# Patient Record
Sex: FEMALE | Race: WHITE | NOT HISPANIC OR LATINO | Employment: OTHER | URBAN - METROPOLITAN AREA
[De-identification: names, ages, dates, MRNs, and addresses within clinical notes are randomized per-mention and may not be internally consistent; named-entity substitution may affect disease eponyms.]

---

## 2023-03-15 DIAGNOSIS — E03.9 HYPOTHYROIDISM, UNSPECIFIED TYPE: ICD-10-CM

## 2023-03-15 RX ORDER — LEVOTHYROXINE SODIUM 100 UG/1
100 TABLET ORAL DAILY
COMMUNITY
Start: 2014-02-28 | End: 2023-03-15 | Stop reason: SDUPTHER

## 2023-03-15 RX ORDER — LEVOTHYROXINE SODIUM 100 UG/1
100 TABLET ORAL DAILY
Qty: 90 TABLET | Refills: 1 | Status: SHIPPED | OUTPATIENT
Start: 2023-03-15 | End: 2023-09-06 | Stop reason: SDUPTHER

## 2023-04-26 DIAGNOSIS — E13.69 OTHER SPECIFIED DIABETES MELLITUS WITH OTHER SPECIFIED COMPLICATION, UNSPECIFIED WHETHER LONG TERM INSULIN USE (MULTI): ICD-10-CM

## 2023-04-26 RX ORDER — LANCETS 33 GAUGE
EACH MISCELLANEOUS
Qty: 1 EACH | Refills: 2 | Status: SHIPPED | OUTPATIENT
Start: 2023-04-26

## 2023-04-26 RX ORDER — LANCETS 33 GAUGE
EACH MISCELLANEOUS
COMMUNITY
End: 2023-04-26 | Stop reason: SDUPTHER

## 2023-06-07 DIAGNOSIS — Z00.00 ROUTINE GENERAL MEDICAL EXAMINATION AT A HEALTH CARE FACILITY: ICD-10-CM

## 2023-06-07 RX ORDER — DILTIAZEM HYDROCHLORIDE 60 MG/1
1 CAPSULE, EXTENDED RELEASE ORAL DAILY
COMMUNITY
Start: 2020-11-13 | End: 2023-06-07 | Stop reason: SDUPTHER

## 2023-06-07 RX ORDER — DILTIAZEM HYDROCHLORIDE 60 MG/1
60 CAPSULE, EXTENDED RELEASE ORAL DAILY
Qty: 90 CAPSULE | Refills: 3 | Status: SHIPPED | OUTPATIENT
Start: 2023-06-07

## 2023-06-19 ENCOUNTER — TELEPHONE (OUTPATIENT)
Dept: PRIMARY CARE | Facility: CLINIC | Age: 78
End: 2023-06-19
Payer: MEDICARE

## 2023-06-19 DIAGNOSIS — R05.9 COUGH, UNSPECIFIED TYPE: ICD-10-CM

## 2023-06-19 DIAGNOSIS — E78.5 HYPERLIPIDEMIA, UNSPECIFIED HYPERLIPIDEMIA TYPE: ICD-10-CM

## 2023-06-19 RX ORDER — BUDESONIDE AND FORMOTEROL FUMARATE DIHYDRATE 160; 4.5 UG/1; UG/1
2 AEROSOL RESPIRATORY (INHALATION) 2 TIMES DAILY
COMMUNITY
Start: 2017-03-22 | End: 2023-06-19 | Stop reason: SDUPTHER

## 2023-06-19 RX ORDER — ATORVASTATIN CALCIUM 10 MG/1
10 TABLET, FILM COATED ORAL DAILY
COMMUNITY
Start: 2016-11-03 | End: 2023-06-19 | Stop reason: SDUPTHER

## 2023-06-20 ENCOUNTER — TELEPHONE (OUTPATIENT)
Dept: PRIMARY CARE | Facility: CLINIC | Age: 78
End: 2023-06-20
Payer: MEDICARE

## 2023-06-20 DIAGNOSIS — Z00.00 ROUTINE GENERAL MEDICAL EXAMINATION AT A HEALTH CARE FACILITY: ICD-10-CM

## 2023-06-20 RX ORDER — ATORVASTATIN CALCIUM 10 MG/1
10 TABLET, FILM COATED ORAL DAILY
Qty: 90 TABLET | Refills: 1 | Status: SHIPPED | OUTPATIENT
Start: 2023-06-20 | End: 2023-12-08 | Stop reason: SDUPTHER

## 2023-06-20 RX ORDER — BUDESONIDE AND FORMOTEROL FUMARATE DIHYDRATE 160; 4.5 UG/1; UG/1
2 AEROSOL RESPIRATORY (INHALATION) 2 TIMES DAILY
Qty: 10.2 G | Refills: 3 | Status: SHIPPED | OUTPATIENT
Start: 2023-06-20

## 2023-06-20 NOTE — TELEPHONE ENCOUNTER
PT uses Symbicort that was called in, but she wanted the albuterol sulfate inhalation solution 0.083 % 2.5 mg.

## 2023-06-21 ENCOUNTER — TELEPHONE (OUTPATIENT)
Dept: PRIMARY CARE | Facility: CLINIC | Age: 78
End: 2023-06-21

## 2023-06-21 RX ORDER — ALBUTEROL SULFATE 0.83 MG/ML
2.5 SOLUTION RESPIRATORY (INHALATION) 4 TIMES DAILY PRN
Qty: 3 ML | Refills: 1 | Status: SHIPPED | OUTPATIENT
Start: 2023-06-21 | End: 2024-06-20

## 2023-06-21 NOTE — TELEPHONE ENCOUNTER
Bon called and stated the albuterol 2.5/ has 3ml and concerned that isn't correct.      Please look at and correct if need be.

## 2023-07-31 DIAGNOSIS — N39.0 URINARY TRACT INFECTION WITHOUT HEMATURIA, SITE UNSPECIFIED: ICD-10-CM

## 2023-08-01 LAB
HYALINE CASTS, URINE: ABNORMAL /LPF
RBC, URINE: 2 /HPF (ref 0–5)
SQUAMOUS EPITHELIAL CELLS, URINE: <1 /HPF
WBC, URINE: <1 /HPF (ref 0–5)

## 2023-08-03 ENCOUNTER — TELEPHONE (OUTPATIENT)
Dept: PRIMARY CARE | Facility: CLINIC | Age: 78
End: 2023-08-03
Payer: MEDICARE

## 2023-08-04 ENCOUNTER — TELEPHONE (OUTPATIENT)
Dept: PRIMARY CARE | Facility: CLINIC | Age: 78
End: 2023-08-04

## 2023-08-04 NOTE — TELEPHONE ENCOUNTER
PT STATES SHE CHIPPED TOOTH AND WILL NEED SOME DENTAL WORK DONE. NEEDS ANTIBIOTIC CALLED IN AND WOULD ALSO LIKE TO KNOW IN CASE IT IS AN ABSCESS WOULD THIS TAKE CARE OF IT ALSO

## 2023-08-05 LAB — URINE CULTURE: NORMAL

## 2023-08-07 DIAGNOSIS — Z00.00 ROUTINE GENERAL MEDICAL EXAMINATION AT A HEALTH CARE FACILITY: ICD-10-CM

## 2023-08-07 RX ORDER — CLINDAMYCIN HYDROCHLORIDE 300 MG/1
300 CAPSULE ORAL 3 TIMES DAILY
Qty: 30 CAPSULE | Refills: 0 | Status: SHIPPED | OUTPATIENT
Start: 2023-08-07 | End: 2023-08-24 | Stop reason: SDUPTHER

## 2023-08-24 DIAGNOSIS — Z00.00 ROUTINE GENERAL MEDICAL EXAMINATION AT A HEALTH CARE FACILITY: ICD-10-CM

## 2023-08-24 RX ORDER — CLINDAMYCIN HYDROCHLORIDE 300 MG/1
300 CAPSULE ORAL 3 TIMES DAILY
Qty: 30 CAPSULE | Refills: 0 | Status: SHIPPED | OUTPATIENT
Start: 2023-08-24 | End: 2023-09-03

## 2023-08-24 NOTE — TELEPHONE ENCOUNTER
Pt got done with her clindamycin. Would like to know if she can get a refill or something similar since she will have a tooth extraction soon.

## 2023-09-06 DIAGNOSIS — E03.9 HYPOTHYROIDISM, UNSPECIFIED TYPE: ICD-10-CM

## 2023-09-06 RX ORDER — LEVOTHYROXINE SODIUM 100 UG/1
100 TABLET ORAL DAILY
Qty: 90 TABLET | Refills: 0 | Status: SHIPPED | OUTPATIENT
Start: 2023-09-06 | End: 2023-12-08 | Stop reason: SDUPTHER

## 2023-11-22 ENCOUNTER — TELEPHONE (OUTPATIENT)
Dept: PRIMARY CARE | Facility: CLINIC | Age: 78
End: 2023-11-22
Payer: MEDICARE

## 2023-11-22 NOTE — TELEPHONE ENCOUNTER
PATIENT CALLED AND IS ASKING FOR A CALL BACK FROM [facility], THIS IS IN REGARDS TO [facility]

## 2023-11-27 ENCOUNTER — TELEMEDICINE (OUTPATIENT)
Dept: PRIMARY CARE | Facility: CLINIC | Age: 78
End: 2023-11-27
Payer: MEDICARE

## 2023-11-27 VITALS — HEIGHT: 63 IN | BODY MASS INDEX: 47.84 KG/M2 | WEIGHT: 270 LBS

## 2023-11-27 DIAGNOSIS — E11.8 CONTROLLED TYPE 2 DIABETES MELLITUS WITH COMPLICATION, WITH LONG-TERM CURRENT USE OF INSULIN (MULTI): ICD-10-CM

## 2023-11-27 DIAGNOSIS — Z79.4 CONTROLLED TYPE 2 DIABETES MELLITUS WITH COMPLICATION, WITH LONG-TERM CURRENT USE OF INSULIN (MULTI): ICD-10-CM

## 2023-11-27 DIAGNOSIS — Z99.89 DEPENDENCE ON OTHER ENABLING MACHINES AND DEVICES: ICD-10-CM

## 2023-11-27 DIAGNOSIS — J44.1 CHRONIC OBSTRUCTIVE PULMONARY DISEASE WITH (ACUTE) EXACERBATION (MULTI): Primary | ICD-10-CM

## 2023-11-27 DIAGNOSIS — Z99.81 DEPENDENCE ON SUPPLEMENTAL OXYGEN: ICD-10-CM

## 2023-11-27 DIAGNOSIS — R26.2 DIFFICULTY IN WALKING, NOT ELSEWHERE CLASSIFIED: ICD-10-CM

## 2023-11-27 DIAGNOSIS — M15.9 OSTEOARTHRITIS OF MULTIPLE JOINTS, UNSPECIFIED OSTEOARTHRITIS TYPE: ICD-10-CM

## 2023-11-27 DIAGNOSIS — J96.22 ACUTE AND CHRONIC RESPIRATORY FAILURE WITH HYPERCAPNIA (MULTI): ICD-10-CM

## 2023-11-27 PROBLEM — I48.91 UNSPECIFIED ATRIAL FIBRILLATION (MULTI): Status: ACTIVE | Noted: 2022-09-19

## 2023-11-27 PROBLEM — K21.9 GASTRO-ESOPHAGEAL REFLUX DISEASE WITHOUT ESOPHAGITIS: Status: ACTIVE | Noted: 2018-01-17

## 2023-11-27 PROBLEM — K80.20 CALCULUS OF GALLBLADDER WITHOUT CHOLECYSTITIS WITHOUT OBSTRUCTION: Status: ACTIVE | Noted: 2022-11-01

## 2023-11-27 PROBLEM — E03.9 HYPOTHYROIDISM: Status: ACTIVE | Noted: 2018-01-17

## 2023-11-27 PROBLEM — I15.2 HYPERTENSION ASSOCIATED WITH DIABETES (MULTI): Status: ACTIVE | Noted: 2023-11-27

## 2023-11-27 PROBLEM — F41.8 ANXIETY ASSOCIATED WITH DEPRESSION: Status: ACTIVE | Noted: 2023-11-27

## 2023-11-27 PROBLEM — E78.5 HYPERLIPIDEMIA ASSOCIATED WITH TYPE 2 DIABETES MELLITUS (MULTI): Status: ACTIVE | Noted: 2023-11-27

## 2023-11-27 PROBLEM — I25.10 ATHEROSCLEROTIC HEART DISEASE OF NATIVE CORONARY ARTERY WITHOUT ANGINA PECTORIS: Status: ACTIVE | Noted: 2022-09-19

## 2023-11-27 PROBLEM — E11.69 HYPERLIPIDEMIA ASSOCIATED WITH TYPE 2 DIABETES MELLITUS (MULTI): Status: ACTIVE | Noted: 2023-11-27

## 2023-11-27 PROBLEM — I27.20 PULMONARY HYPERTENSION, UNSPECIFIED (MULTI): Status: ACTIVE | Noted: 2022-11-01

## 2023-11-27 PROBLEM — E11.59 HYPERTENSION ASSOCIATED WITH DIABETES (MULTI): Status: ACTIVE | Noted: 2023-11-27

## 2023-11-27 PROBLEM — E66.01 MORBID OBESITY (MULTI): Status: ACTIVE | Noted: 2023-11-27

## 2023-11-27 PROBLEM — I25.2 OLD MYOCARDIAL INFARCTION: Status: ACTIVE | Noted: 2022-09-19

## 2023-11-27 PROBLEM — E11.9 DIABETES TYPE 2, CONTROLLED (MULTI): Status: ACTIVE | Noted: 2022-11-01

## 2023-11-27 PROCEDURE — 99213 OFFICE O/P EST LOW 20 MIN: CPT | Performed by: EMERGENCY MEDICINE

## 2023-11-27 RX ORDER — FUROSEMIDE 20 MG/1
20 TABLET ORAL EVERY OTHER DAY
COMMUNITY
End: 2024-02-07 | Stop reason: WASHOUT

## 2023-11-27 NOTE — PROGRESS NOTES
Subjective   Patient ID: Ailyn Banegas is a 78 y.o. female who presents for Lab Orders.    Assessment/Plan   Problem List Items Addressed This Visit       Acute and chronic respiratory failure with hypercapnia (CMS/HCC)    Chronic obstructive pulmonary disease with (acute) exacerbation (CMS/HCC) - Primary    Dependence on other enabling machines and devices    Dependence on supplemental oxygen    Diabetes type 2, controlled (CMS/HCC)    Difficulty in walking, not elsewhere classified    Polyosteoarthritis, unspecified     Recommend home health care in view of patient's mobility and self care deficits.     Peripheral edema- taking furosemide 20mg      COPD- continue Symbicort, albuterol, and guaifenesin  Patient is on 3L oxygen at baseline      Hyperlipemia- continue atorvastatin     Hypothyroid- continue Synthroid      Diabetes- controlled on insulin      Paroxysmal atrial fibrillation with RVR and Nonsustained ventricular tachycardia- on diltiazem     Follow up as able in person     Source of history: Nurse, Medical personnel, Medical record, Patient.  History limitation: None.    HPI  78 year old female for virtual follow up     This visit was completed virtually due to the restrictions of the COVID-19 pandemic. All issues as below were discussed and addressed but no physical exam was performed. If it was felt that the patient should be evaluated in clinic or ER, then they were directed there. The patient verbally consented to visit      Patient signed up for home health program thorough her insurance, however has been very disappointed with it. She has not been provided with the necessary services and would like to pursue other options.   States that program was supposed to come to the home to provide a flu shot, but has been teller her they do not yet have them for months.     Patient has a very difficult time leaving the home. She is on 3L oxygen and has mobility difficulties.     History from  "hospitalization-  Hospital Course:   Pt with Hx including HTN, HLD, DM, CAD/MI, A. Fib, COPD/Reactive Airway Disease, Morbid Obesity, and DJD presented to [facility] ED with SOB. + Hypoxia. + Pulmonary Edema. Pt admitted and monitored and Diuresed and started on IV Steroids and O2. Pulmonary and Cardiology consulted and recommendations appreciated. + COPD exacerbation (Severe COPD with MARLENY and Obesity Hypoventilation). Pt did develop episode of NS V Tach while hypoxic. BiPap initiated Q HS. Pt slowly improved. O2 demand improved to 4 L NC (Baseline is 3 L at home). Solu Medrol changed to Prednisone and Pt D/C'd to SNF to F/U with Cardiology and Pulmonary.     No Known Allergies    Current Outpatient Medications   Medication Sig Dispense Refill    albuterol 2.5 mg /3 mL (0.083 %) nebulizer solution Take 3 mL (2.5 mg) by nebulization 4 times a day as needed for wheezing or shortness of breath. 3 mL 1    atorvastatin (Lipitor) 10 mg tablet Take 1 tablet (10 mg) by mouth once daily. 90 tablet 1    budesonide-formoteroL (Symbicort) 160-4.5 mcg/actuation inhaler Inhale 2 puffs 2 times a day. 10.2 g 3    dilTIAZem SR (Cardizem SR) 60 mg 12 hr capsule Take 1 capsule (60 mg) by mouth once daily. 90 capsule 3    furosemide (Lasix) 20 mg tablet Take 1 tablet (20 mg) by mouth every other day.      lancets (OneTouch Delica Lancets) 33 gauge misc Check sugar once a day 1 each 2    levothyroxine (Synthroid) 100 mcg tablet Take 1 tablet (100 mcg) by mouth once daily. 90 tablet 0     No current facility-administered medications for this visit.       Objective   Visit Vitals  Ht 1.6 m (5' 3\")   Wt 122 kg (270 lb)   BMI 47.83 kg/m²   Smoking Status Never   BSA 2.33 m²     Physical Exam  Patient was not physically examined.     Review of Systems   Comprehensive review of systems as allowed by patient condition and nursing input is negative    Orders Only on 07/31/2023   Component Date Value Ref Range Status    WBC, Urine 07/31/2023 <1  0 " - 5 /HPF Final    RBC, Urine 07/31/2023 2  0 - 5 /HPF Final    Squamous Epithelial Cells, Urine 07/31/2023 <1  /HPF Final    Hyaline Casts, Urine 07/31/2023 OCC (A)  /LPF Final    Urine Culture 07/31/2023 MIXED URETHRAL BREANA.   Final       Radiology: Reviewed imaging in powerchart.  No results found.    No family history on file.  Social History     Socioeconomic History    Marital status:      Spouse name: None    Number of children: None    Years of education: None    Highest education level: None   Occupational History    None   Tobacco Use    Smoking status: Never    Smokeless tobacco: Never   Substance and Sexual Activity    Alcohol use: None    Drug use: Not Currently    Sexual activity: None   Other Topics Concern    None   Social History Narrative    None     Social Determinants of Health     Financial Resource Strain: Not on file   Food Insecurity: Not on file   Transportation Needs: Not on file   Physical Activity: Not on file   Stress: Not on file   Social Connections: Not on file   Intimate Partner Violence: Not on file   Housing Stability: Not on file     Past Medical History:   Diagnosis Date    Abnormal weight gain     Weight gain    Essential (primary) hypertension     HTN (hypertension), benign    Localized edema     Edema extremities    Old myocardial infarction     History of myocardial infarction    Old myocardial infarction 02/27/2017    History of non-ST elevation myocardial infarction (NSTEMI)    Other specified postprocedural states     History of repair of both hip joints    Other symptoms and signs involving the genitourinary system     Urinary problem    Pain due to internal orthopedic prosthetic devices, implants and grafts, initial encounter (CMS/[facility])     Artificial joint pain    Personal history of other diseases of the circulatory system     History of hypertension    Personal history of other diseases of the musculoskeletal system and connective tissue     Personal history of  gout    Personal history of other diseases of the musculoskeletal system and connective tissue     Personal history of arthritis    Personal history of other diseases of the musculoskeletal system and connective tissue     Personal history of fibromyalgia    Personal history of other diseases of the musculoskeletal system and connective tissue     History of muscle pain    Personal history of other diseases of the respiratory system 02/12/2018    History of chronic respiratory failure    Personal history of other endocrine, nutritional and metabolic disease     History of thyroid disease    Personal history of other endocrine, nutritional and metabolic disease     History of obesity    Personal history of other endocrine, nutritional and metabolic disease     History of high cholesterol    Personal history of other endocrine, nutritional and metabolic disease 02/28/2014    History of hypothyroidism    Personal history of other medical treatment     History of mammogram    Personal history of other specified conditions     H/O shortness of breath    Personal history of pneumonia (recurrent)     History of pneumonia    Presence of artificial hip joint, bilateral     Status post total replacement of both hips    Pure hypercholesterolemia, unspecified     High cholesterol    Unspecified disorder of nose and nasal sinuses     Sinus problem     Past Surgical History:   Procedure Laterality Date    CARDIAC CATHETERIZATION  01/25/2018    Cardiac Cath Procedure Outcome:    CARPAL TUNNEL RELEASE  02/27/2014    Neuroplasty Median Nerve At Carpal Tunnel    HIP SURGERY  03/03/2018    Hip Surgery    MOUTH SURGERY  04/14/2015    Oral Surgery Tooth Extraction    THYROID SURGERY  04/14/2015    Thyroid Surgery    TONSILLECTOMY  04/14/2015    Tonsillectomy    TOTAL HIP ARTHROPLASTY  01/25/2018    Hip Replacement       Scribe Attestation  By signing my name below, I, Luc Betts   attest that this documentation has been  prepared under the direction and in the presence of Minh Crawford MD.

## 2023-12-08 DIAGNOSIS — E03.9 HYPOTHYROIDISM, UNSPECIFIED TYPE: ICD-10-CM

## 2023-12-08 DIAGNOSIS — E78.5 HYPERLIPIDEMIA, UNSPECIFIED HYPERLIPIDEMIA TYPE: ICD-10-CM

## 2023-12-11 RX ORDER — ATORVASTATIN CALCIUM 10 MG/1
10 TABLET, FILM COATED ORAL DAILY
Qty: 90 TABLET | Refills: 1 | Status: SHIPPED | OUTPATIENT
Start: 2023-12-11

## 2023-12-11 RX ORDER — LEVOTHYROXINE SODIUM 100 UG/1
100 TABLET ORAL DAILY
Qty: 90 TABLET | Refills: 1 | Status: SHIPPED | OUTPATIENT
Start: 2023-12-11 | End: 2024-06-06 | Stop reason: SDUPTHER

## 2023-12-12 ENCOUNTER — TELEPHONE (OUTPATIENT)
Dept: PRIMARY CARE | Facility: CLINIC | Age: 78
End: 2023-12-12
Payer: MEDICARE

## 2023-12-12 NOTE — TELEPHONE ENCOUNTER
PT IS REQUESTING TO HAVE A FLU VACCINE ORDERED THRU PHARMACY BECAUSE SHE IS HOME BOUND. HH WILL ADMINISTER IF ORDERED

## 2023-12-14 PROCEDURE — G0180 MD CERTIFICATION HHA PATIENT: HCPCS | Performed by: EMERGENCY MEDICINE

## 2024-01-17 ENCOUNTER — TELEPHONE (OUTPATIENT)
Dept: PRIMARY CARE | Facility: CLINIC | Age: 79
End: 2024-01-17

## 2024-01-17 NOTE — TELEPHONE ENCOUNTER
PT was in ER around 1/14/24 and she got all her results of test except a urinalysis.  She is wondering if she could get results.

## 2024-01-19 ENCOUNTER — TELEPHONE (OUTPATIENT)
Dept: PRIMARY CARE | Facility: CLINIC | Age: 79
End: 2024-01-19
Payer: MEDICARE

## 2024-01-30 NOTE — TELEPHONE ENCOUNTER
Rosaline DE LA VEGA [facility]   is:  [phone]    Wheezing in upper lobes and yellowish phlegm.    PT started albuterol nebulizer yesterday.

## 2024-02-02 ENCOUNTER — TELEPHONE (OUTPATIENT)
Dept: PRIMARY CARE | Facility: CLINIC | Age: 79
End: 2024-02-02
Payer: MEDICARE

## 2024-02-02 DIAGNOSIS — A49.9 BACTERIAL INFECTION: ICD-10-CM

## 2024-02-02 DIAGNOSIS — R05.9 COUGH, UNSPECIFIED TYPE: ICD-10-CM

## 2024-02-02 RX ORDER — AZITHROMYCIN 250 MG/1
250 TABLET, FILM COATED ORAL DAILY
Qty: 6 TABLET | Refills: 0 | Status: SHIPPED | OUTPATIENT
Start: 2024-02-02 | End: 2024-02-07

## 2024-02-06 ENCOUNTER — TELEPHONE (OUTPATIENT)
Dept: PRIMARY CARE | Facility: CLINIC | Age: 79
End: 2024-02-06
Payer: MEDICARE

## 2024-02-07 ENCOUNTER — TELEMEDICINE (OUTPATIENT)
Dept: PRIMARY CARE | Facility: CLINIC | Age: 79
End: 2024-02-07
Payer: MEDICARE

## 2024-02-07 VITALS — WEIGHT: 180 LBS | HEIGHT: 63 IN | BODY MASS INDEX: 31.89 KG/M2

## 2024-02-07 DIAGNOSIS — A49.9 BACTERIAL INFECTION: Primary | ICD-10-CM

## 2024-02-07 DIAGNOSIS — R60.9 EDEMA, UNSPECIFIED TYPE: ICD-10-CM

## 2024-02-07 PROCEDURE — 99213 OFFICE O/P EST LOW 20 MIN: CPT | Performed by: EMERGENCY MEDICINE

## 2024-02-07 PROCEDURE — 1036F TOBACCO NON-USER: CPT | Performed by: EMERGENCY MEDICINE

## 2024-02-07 PROCEDURE — 1159F MED LIST DOCD IN RCRD: CPT | Performed by: EMERGENCY MEDICINE

## 2024-02-07 RX ORDER — DOXYCYCLINE HYCLATE 100 MG
100 TABLET ORAL 2 TIMES DAILY
Qty: 20 TABLET | Refills: 0 | Status: SHIPPED | OUTPATIENT
Start: 2024-02-07 | End: 2024-02-17

## 2024-02-07 RX ORDER — TORSEMIDE 20 MG/1
40 TABLET ORAL DAILY
Qty: 20 TABLET | Refills: 1 | Status: SHIPPED | OUTPATIENT
Start: 2024-02-07 | End: 2024-05-17 | Stop reason: SDUPTHER

## 2024-02-07 RX ORDER — SPIRONOLACTONE 50 MG/1
100 TABLET, FILM COATED ORAL DAILY
Qty: 20 TABLET | Refills: 1 | Status: SHIPPED | OUTPATIENT
Start: 2024-02-07 | End: 2024-02-16 | Stop reason: SDUPTHER

## 2024-02-07 NOTE — TELEPHONE ENCOUNTER
spironolactone (Aldactone) 50 mg tablet   torsemide (Demadex) 20 mg tablet     [facility] called and need the scripts clarified.      SIG:  Both states 20 days, but not enough pills.  Is it 10 days?

## 2024-02-07 NOTE — PROGRESS NOTES
Subjective   Patient ID: Ailyn Banegas is a 78 y.o. female who presents for Wheezing and Leg Swelling.    Assessment/Plan   Problem List Items Addressed This Visit    None  Visit Diagnoses       Bacterial infection    -  Primary    Relevant Medications    doxycycline (Vibra-Tabs) 100 mg tablet    Edema, unspecified type        Relevant Medications    spironolactone (Aldactone) 50 mg tablet    torsemide (Demadex) 20 mg tablet          Recommend home health care in view of patient's mobility and self care deficits.      Peripheral edema- discontinue furosemide 20mg. Will start on torsemide 20mg and aldacotne 50mg   Doxycycline called in to cover both suspected cellulitis and respiratory infection.      COPD- continue Symbicort, albuterol, and guaifenesin  Patient is on 3L oxygen at baseline      Hyperlipemia- continue atorvastatin     Hypothyroid- continue Synthroid      Diabetes- controlled on insulin      Paroxysmal atrial fibrillation with RVR and Nonsustained ventricular tachycardia- on diltiazem      Follow up as able in person     Source of history: Nurse, Medical personnel, Medical record, Patient.  History limitation: None.    HPI  78 y.o. female here for virtual follow up visit     This visit was completed virtually due to the restrictions of the COVID-19 pandemic. All issues as below were discussed and addressed but no physical exam was performed. If it was felt that the patient should be evaluated in clinic or ER, then they were directed there. The patient verbally consented to visit      Presents with continued cough and wheezing. Started z-bridgett 5 days ago and have seen no improvement.     Home health nurse concerned about patient's lower extremity edema. Left lower leg red and warm to touch. Suspicious of cellulitis.     Patient has a very difficult time leaving the home. She is on 3L oxygen and has mobility difficulties.     Pt with Hx including HTN, HLD, DM, CAD/MI, A. Fib, COPD/Reactive Airway  "Disease, Morbid Obesity, and DJD.      No Known Allergies    Current Outpatient Medications   Medication Sig Dispense Refill    albuterol 2.5 mg /3 mL (0.083 %) nebulizer solution Take 3 mL (2.5 mg) by nebulization 4 times a day as needed for wheezing or shortness of breath. 3 mL 1    atorvastatin (Lipitor) 10 mg tablet Take 1 tablet (10 mg) by mouth once daily. 90 tablet 1    budesonide-formoteroL (Symbicort) 160-4.5 mcg/actuation inhaler Inhale 2 puffs 2 times a day. 10.2 g 3    dilTIAZem SR (Cardizem SR) 60 mg 12 hr capsule Take 1 capsule (60 mg) by mouth once daily. 90 capsule 3    furosemide (Lasix) 20 mg tablet Take 1 tablet (20 mg) by mouth every other day.      lancets (OneTouch Delica Lancets) 33 gauge misc Check sugar once a day 1 each 2    levothyroxine (Synthroid) 100 mcg tablet Take 1 tablet (100 mcg) by mouth once daily. 90 tablet 1    azithromycin (Zithromax) 250 mg tablet Take 1 tablet (250 mg) by mouth once daily for 5 days. Two pills on first day followed by one pill for the next four days (Patient not taking: Reported on 2/7/2024) 6 tablet 0    doxycycline (Vibra-Tabs) 100 mg tablet Take 1 tablet (100 mg) by mouth 2 times a day for 10 days. Take with a full glass of water and do not lie down for at least 30 minutes after. 20 tablet 0    spironolactone (Aldactone) 50 mg tablet Take 2 tablets (100 mg) by mouth once daily for 20 days. 20 tablet 1    torsemide (Demadex) 20 mg tablet Take 2 tablets (40 mg) by mouth once daily for 20 days. 20 tablet 1     No current facility-administered medications for this visit.       Objective   Visit Vitals  Ht 1.6 m (5' 3\")   Wt 81.6 kg (180 lb)   BMI 31.89 kg/m²   Smoking Status Never   BSA 1.9 m²     Physical Exam  Patient was not physically examined.     Review of Systems  Comprehensive review of systems as allowed by patient condition and nursing input is negative    No visits with results within 4 Month(s) from this visit.   Latest known visit with results " is:   Orders Only on 07/31/2023   Component Date Value Ref Range Status    WBC, Urine 07/31/2023 <1  0 - 5 /HPF Final    RBC, Urine 07/31/2023 2  0 - 5 /HPF Final    Squamous Epithelial Cells, Urine 07/31/2023 <1  /HPF Final    Hyaline Casts, Urine 07/31/2023 OCC (A)  /LPF Final    Urine Culture 07/31/2023 MIXED URETHRAL BREANA.   Final       Radiology: Reviewed imaging in powerchart.  No results found.    No family history on file.  Social History     Socioeconomic History    Marital status:      Spouse name: None    Number of children: None    Years of education: None    Highest education level: None   Occupational History    None   Tobacco Use    Smoking status: Never    Smokeless tobacco: Never   Substance and Sexual Activity    Alcohol use: None    Drug use: Not Currently    Sexual activity: None   Other Topics Concern    None   Social History Narrative    None     Social Determinants of Health     Financial Resource Strain: Not on file   Food Insecurity: Not on file   Transportation Needs: Not on file   Physical Activity: Not on file   Stress: Not on file   Social Connections: Not on file   Intimate Partner Violence: Not on file   Housing Stability: Not on file     Past Medical History:   Diagnosis Date    Abnormal weight gain     Weight gain    Essential (primary) hypertension     HTN (hypertension), benign    Localized edema     Edema extremities    Old myocardial infarction     History of myocardial infarction    Old myocardial infarction 02/27/2017    History of non-ST elevation myocardial infarction (NSTEMI)    Other specified postprocedural states     History of repair of both hip joints    Other symptoms and signs involving the genitourinary system     Urinary problem    Pain due to internal orthopedic prosthetic devices, implants and grafts, initial encounter (CMS/[facility])     Artificial joint pain    Personal history of other diseases of the circulatory system     History of hypertension    Personal  history of other diseases of the musculoskeletal system and connective tissue     Personal history of gout    Personal history of other diseases of the musculoskeletal system and connective tissue     Personal history of arthritis    Personal history of other diseases of the musculoskeletal system and connective tissue     Personal history of fibromyalgia    Personal history of other diseases of the musculoskeletal system and connective tissue     History of muscle pain    Personal history of other diseases of the respiratory system 02/12/2018    History of chronic respiratory failure    Personal history of other endocrine, nutritional and metabolic disease     History of thyroid disease    Personal history of other endocrine, nutritional and metabolic disease     History of obesity    Personal history of other endocrine, nutritional and metabolic disease     History of high cholesterol    Personal history of other endocrine, nutritional and metabolic disease 02/28/2014    History of hypothyroidism    Personal history of other medical treatment     History of mammogram    Personal history of other specified conditions     H/O shortness of breath    Personal history of pneumonia (recurrent)     History of pneumonia    Presence of artificial hip joint, bilateral     Status post total replacement of both hips    Pure hypercholesterolemia, unspecified     High cholesterol    Unspecified disorder of nose and nasal sinuses     Sinus problem     Past Surgical History:   Procedure Laterality Date    CARDIAC CATHETERIZATION  01/25/2018    Cardiac Cath Procedure Outcome:    CARPAL TUNNEL RELEASE  02/27/2014    Neuroplasty Median Nerve At Carpal Tunnel    HIP SURGERY  03/03/2018    Hip Surgery    MOUTH SURGERY  04/14/2015    Oral Surgery Tooth Extraction    THYROID SURGERY  04/14/2015    Thyroid Surgery    TONSILLECTOMY  04/14/2015    Tonsillectomy    TOTAL HIP ARTHROPLASTY  01/25/2018    Hip Replacement       Lissette  Attestation  By signing my name below, I, Chapis Winston , [employer]   attest that this documentation has been prepared under the direction and in the presence of Minh Crawford MD.

## 2024-02-13 ENCOUNTER — TELEPHONE (OUTPATIENT)
Dept: PRIMARY CARE | Facility: CLINIC | Age: 79
End: 2024-02-13
Payer: MEDICARE

## 2024-02-13 DIAGNOSIS — R60.9 EDEMA, UNSPECIFIED TYPE: ICD-10-CM

## 2024-02-13 NOTE — TELEPHONE ENCOUNTER
Rosaline RAMIREZ is:  [phone]   Home Health      PT has both legs that are still swollen.  Red bumps and feels like a burning sensation under skin.     Statement Selected

## 2024-02-16 ENCOUNTER — TELEMEDICINE (OUTPATIENT)
Dept: PRIMARY CARE | Facility: CLINIC | Age: 79
End: 2024-02-16
Payer: MEDICARE

## 2024-02-16 VITALS — WEIGHT: 180 LBS | BODY MASS INDEX: 31.89 KG/M2 | HEIGHT: 63 IN

## 2024-02-16 DIAGNOSIS — M79.606 PAIN AND SWELLING OF LOWER EXTREMITY, UNSPECIFIED LATERALITY: Primary | ICD-10-CM

## 2024-02-16 DIAGNOSIS — Z79.4 CONTROLLED TYPE 2 DIABETES MELLITUS WITH COMPLICATION, WITH LONG-TERM CURRENT USE OF INSULIN (MULTI): ICD-10-CM

## 2024-02-16 DIAGNOSIS — I15.2 HYPERTENSION ASSOCIATED WITH DIABETES (MULTI): ICD-10-CM

## 2024-02-16 DIAGNOSIS — I48.91 ATRIAL FIBRILLATION, UNSPECIFIED TYPE (MULTI): ICD-10-CM

## 2024-02-16 DIAGNOSIS — M79.89 PAIN AND SWELLING OF LOWER EXTREMITY, UNSPECIFIED LATERALITY: Primary | ICD-10-CM

## 2024-02-16 DIAGNOSIS — E78.5 HYPERLIPIDEMIA ASSOCIATED WITH TYPE 2 DIABETES MELLITUS (MULTI): ICD-10-CM

## 2024-02-16 DIAGNOSIS — E11.59 HYPERTENSION ASSOCIATED WITH DIABETES (MULTI): ICD-10-CM

## 2024-02-16 DIAGNOSIS — E11.69 HYPERLIPIDEMIA ASSOCIATED WITH TYPE 2 DIABETES MELLITUS (MULTI): ICD-10-CM

## 2024-02-16 DIAGNOSIS — E11.8 CONTROLLED TYPE 2 DIABETES MELLITUS WITH COMPLICATION, WITH LONG-TERM CURRENT USE OF INSULIN (MULTI): ICD-10-CM

## 2024-02-16 PROCEDURE — 1036F TOBACCO NON-USER: CPT | Performed by: EMERGENCY MEDICINE

## 2024-02-16 PROCEDURE — 99214 OFFICE O/P EST MOD 30 MIN: CPT | Performed by: EMERGENCY MEDICINE

## 2024-02-16 PROCEDURE — 1159F MED LIST DOCD IN RCRD: CPT | Performed by: EMERGENCY MEDICINE

## 2024-02-16 RX ORDER — FUROSEMIDE 40 MG/1
40 TABLET ORAL DAILY
Qty: 10 TABLET | Refills: 0 | Status: SHIPPED | OUTPATIENT
Start: 2024-02-16 | End: 2024-02-29 | Stop reason: SDUPTHER

## 2024-02-16 RX ORDER — SPIRONOLACTONE 50 MG/1
100 TABLET, FILM COATED ORAL DAILY
Qty: 20 TABLET | Refills: 0 | Status: SHIPPED | OUTPATIENT
Start: 2024-02-16 | End: 2024-02-29 | Stop reason: SDUPTHER

## 2024-02-16 NOTE — PROGRESS NOTES
Subjective   Patient ID: Ailyn Banegas is a 78 y.o. female who presents for Follow-up and Leg Swelling.    Assessment/Plan   Problem List Items Addressed This Visit    None  Visit Diagnoses       Pain and swelling of lower extremity, unspecified laterality    -  Primary    Relevant Orders    Vascular US lower extremity venous duplex bilateral          Recommend home health care in view of patient's mobility and self care deficits.      Peripheral edema-this is persistent  We will obtain lower extremity venous duplex   Patient does not want to take torsemide.  Will order Lasix 40 mg and Aldactone 100 mg for short course  She just completed antibiotics    If patient does not improve with these measures, may need to be admitted    COPD- continue Symbicort, albuterol, and guaifenesin  Patient is on 3L oxygen at baseline      Hyperlipemia- continue atorvastatin     Hypothyroid- continue Synthroid      Diabetes- controlled on insulin      Paroxysmal atrial fibrillation with RVR and Nonsustained ventricular tachycardia- on diltiazem      Follow up as able in person     Source of history: Nurse, Medical personnel, Medical record, Patient.  History limitation: None.    HPI  78 y.o. female here for virtual follow up visit     This visit was completed virtually due to the restrictions of the COVID-19 pandemic. All issues as below were discussed and addressed but no physical exam was performed. If it was felt that the patient should be evaluated in clinic or ER, then they were directed there. The patient verbally consented to visit      States that her legs are still swollen    Patient has a very difficult time leaving the home. She is on 3L oxygen and has mobility difficulties.     Pt with Hx including HTN, HLD, DM, CAD/MI, A. Fib, COPD/Reactive Airway Disease, Morbid Obesity, and DJD.      No Known Allergies    Current Outpatient Medications   Medication Sig Dispense Refill    albuterol 2.5 mg /3 mL (0.083 %) nebulizer  "solution Take 3 mL (2.5 mg) by nebulization 4 times a day as needed for wheezing or shortness of breath. 3 mL 1    atorvastatin (Lipitor) 10 mg tablet Take 1 tablet (10 mg) by mouth once daily. 90 tablet 1    budesonide-formoteroL (Symbicort) 160-4.5 mcg/actuation inhaler Inhale 2 puffs 2 times a day. 10.2 g 3    dilTIAZem SR (Cardizem SR) 60 mg 12 hr capsule Take 1 capsule (60 mg) by mouth once daily. 90 capsule 3    doxycycline (Vibra-Tabs) 100 mg tablet Take 1 tablet (100 mg) by mouth 2 times a day for 10 days. Take with a full glass of water and do not lie down for at least 30 minutes after. 20 tablet 0    lancets (OneTouch Delica Lancets) 33 gauge misc Check sugar once a day 1 each 2    levothyroxine (Synthroid) 100 mcg tablet Take 1 tablet (100 mcg) by mouth once daily. 90 tablet 1    spironolactone (Aldactone) 50 mg tablet Take 2 tablets (100 mg) by mouth once daily for 20 days. 20 tablet 1    torsemide (Demadex) 20 mg tablet Take 2 tablets (40 mg) by mouth once daily for 20 days. 20 tablet 1     No current facility-administered medications for this visit.       Objective   Visit Vitals  Ht 1.6 m (5' 3\")   Wt 81.6 kg (180 lb)   BMI 31.89 kg/m²   Smoking Status Never   BSA 1.9 m²     Physical Exam  Patient was not physically examined.     Review of Systems  Comprehensive review of systems as allowed by patient condition and nursing input is negative    No visits with results within 4 Month(s) from this visit.   Latest known visit with results is:   Orders Only on 07/31/2023   Component Date Value Ref Range Status    WBC, Urine 07/31/2023 <1  0 - 5 /HPF Final    RBC, Urine 07/31/2023 2  0 - 5 /HPF Final    Squamous Epithelial Cells, Urine 07/31/2023 <1  /HPF Final    Hyaline Casts, Urine 07/31/2023 OCC (A)  /LPF Final    Urine Culture 07/31/2023 MIXED URETHRAL BREANA.   Final       Radiology: Reviewed imaging in powerchart.  No results found.    No family history on file.  Social History     Socioeconomic " History    Marital status:      Spouse name: Not on file    Number of children: Not on file    Years of education: Not on file    Highest education level: Not on file   Occupational History    Not on file   Tobacco Use    Smoking status: Never    Smokeless tobacco: Never   Substance and Sexual Activity    Alcohol use: Not on file    Drug use: Not Currently    Sexual activity: Not on file   Other Topics Concern    Not on file   Social History Narrative    Not on file     Social Determinants of Health     Financial Resource Strain: Not on file   Food Insecurity: Not on file   Transportation Needs: Not on file   Physical Activity: Not on file   Stress: Not on file   Social Connections: Not on file   Intimate Partner Violence: Not on file   Housing Stability: Not on file     Past Medical History:   Diagnosis Date    Abnormal weight gain     Weight gain    Essential (primary) hypertension     HTN (hypertension), benign    Localized edema     Edema extremities    Old myocardial infarction     History of myocardial infarction    Old myocardial infarction 02/27/2017    History of non-ST elevation myocardial infarction (NSTEMI)    Other specified postprocedural states     History of repair of both hip joints    Other symptoms and signs involving the genitourinary system     Urinary problem    Pain due to internal orthopedic prosthetic devices, implants and grafts, initial encounter (CMS/[facility])     Artificial joint pain    Personal history of other diseases of the circulatory system     History of hypertension    Personal history of other diseases of the musculoskeletal system and connective tissue     Personal history of gout    Personal history of other diseases of the musculoskeletal system and connective tissue     Personal history of arthritis    Personal history of other diseases of the musculoskeletal system and connective tissue     Personal history of fibromyalgia    Personal history of other diseases of the  musculoskeletal system and connective tissue     History of muscle pain    Personal history of other diseases of the respiratory system 02/12/2018    History of chronic respiratory failure    Personal history of other endocrine, nutritional and metabolic disease     History of thyroid disease    Personal history of other endocrine, nutritional and metabolic disease     History of obesity    Personal history of other endocrine, nutritional and metabolic disease     History of high cholesterol    Personal history of other endocrine, nutritional and metabolic disease 02/28/2014    History of hypothyroidism    Personal history of other medical treatment     History of mammogram    Personal history of other specified conditions     H/O shortness of breath    Personal history of pneumonia (recurrent)     History of pneumonia    Presence of artificial hip joint, bilateral     Status post total replacement of both hips    Pure hypercholesterolemia, unspecified     High cholesterol    Unspecified disorder of nose and nasal sinuses     Sinus problem     Past Surgical History:   Procedure Laterality Date    CARDIAC CATHETERIZATION  01/25/2018    Cardiac Cath Procedure Outcome:    CARPAL TUNNEL RELEASE  02/27/2014    Neuroplasty Median Nerve At Carpal Tunnel    HIP SURGERY  03/03/2018    Hip Surgery    MOUTH SURGERY  04/14/2015    Oral Surgery Tooth Extraction    THYROID SURGERY  04/14/2015    Thyroid Surgery    TONSILLECTOMY  04/14/2015    Tonsillectomy    TOTAL HIP ARTHROPLASTY  01/25/2018    Hip Replacement       Scribe Attestation  By signing my name below, I, Minh Crawford MD , Scribe   attest that this documentation has been prepared under the direction and in the presence of Minh Crawford MD.

## 2024-02-29 ENCOUNTER — TELEPHONE (OUTPATIENT)
Dept: PRIMARY CARE | Facility: CLINIC | Age: 79
End: 2024-02-29
Payer: MEDICARE

## 2024-02-29 DIAGNOSIS — R60.9 EDEMA, UNSPECIFIED TYPE: ICD-10-CM

## 2024-03-01 RX ORDER — FUROSEMIDE 40 MG/1
20 TABLET ORAL DAILY
Qty: 30 TABLET | Refills: 0 | Status: SHIPPED | OUTPATIENT
Start: 2024-03-01

## 2024-03-01 RX ORDER — SPIRONOLACTONE 50 MG/1
50 TABLET, FILM COATED ORAL DAILY
Qty: 30 TABLET | Refills: 0 | Status: SHIPPED | OUTPATIENT
Start: 2024-03-01 | End: 2024-04-03 | Stop reason: SDUPTHER

## 2024-03-05 ENCOUNTER — HOSPITAL ENCOUNTER (OUTPATIENT)
Dept: VASCULAR MEDICINE | Facility: CLINIC | Age: 79
Discharge: HOME | End: 2024-03-05
Payer: MEDICARE

## 2024-03-05 DIAGNOSIS — M79.606 PAIN AND SWELLING OF LOWER EXTREMITY, UNSPECIFIED LATERALITY: ICD-10-CM

## 2024-03-05 DIAGNOSIS — R60.0 LOCALIZED EDEMA: ICD-10-CM

## 2024-03-05 DIAGNOSIS — M79.89 PAIN AND SWELLING OF LOWER EXTREMITY, UNSPECIFIED LATERALITY: ICD-10-CM

## 2024-03-05 PROCEDURE — 93970 EXTREMITY STUDY: CPT

## 2024-03-05 PROCEDURE — 93970 EXTREMITY STUDY: CPT | Performed by: INTERNAL MEDICINE

## 2024-04-03 ENCOUNTER — TELEMEDICINE (OUTPATIENT)
Dept: PRIMARY CARE | Facility: CLINIC | Age: 79
End: 2024-04-03
Payer: MEDICARE

## 2024-04-03 VITALS — HEIGHT: 63 IN | BODY MASS INDEX: 31.89 KG/M2

## 2024-04-03 DIAGNOSIS — J44.1 CHRONIC OBSTRUCTIVE PULMONARY DISEASE WITH (ACUTE) EXACERBATION (MULTI): ICD-10-CM

## 2024-04-03 DIAGNOSIS — E11.69 HYPERLIPIDEMIA ASSOCIATED WITH TYPE 2 DIABETES MELLITUS (MULTI): Primary | ICD-10-CM

## 2024-04-03 DIAGNOSIS — E11.8 CONTROLLED TYPE 2 DIABETES MELLITUS WITH COMPLICATION, WITH LONG-TERM CURRENT USE OF INSULIN (MULTI): ICD-10-CM

## 2024-04-03 DIAGNOSIS — Z79.4 CONTROLLED TYPE 2 DIABETES MELLITUS WITH COMPLICATION, WITH LONG-TERM CURRENT USE OF INSULIN (MULTI): ICD-10-CM

## 2024-04-03 DIAGNOSIS — E78.5 HYPERLIPIDEMIA ASSOCIATED WITH TYPE 2 DIABETES MELLITUS (MULTI): Primary | ICD-10-CM

## 2024-04-03 DIAGNOSIS — R60.9 EDEMA, UNSPECIFIED TYPE: ICD-10-CM

## 2024-04-03 DIAGNOSIS — E11.59 HYPERTENSION ASSOCIATED WITH DIABETES (MULTI): ICD-10-CM

## 2024-04-03 DIAGNOSIS — I15.2 HYPERTENSION ASSOCIATED WITH DIABETES (MULTI): ICD-10-CM

## 2024-04-03 DIAGNOSIS — I48.91 ATRIAL FIBRILLATION, UNSPECIFIED TYPE (MULTI): ICD-10-CM

## 2024-04-03 PROCEDURE — 1159F MED LIST DOCD IN RCRD: CPT | Performed by: EMERGENCY MEDICINE

## 2024-04-03 PROCEDURE — 1036F TOBACCO NON-USER: CPT | Performed by: EMERGENCY MEDICINE

## 2024-04-03 PROCEDURE — 99213 OFFICE O/P EST LOW 20 MIN: CPT | Performed by: EMERGENCY MEDICINE

## 2024-04-03 RX ORDER — SPIRONOLACTONE 50 MG/1
100 TABLET, FILM COATED ORAL DAILY
Qty: 60 TABLET | Refills: 2 | Status: SHIPPED | OUTPATIENT
Start: 2024-04-03

## 2024-04-03 NOTE — PROGRESS NOTES
Subjective   Patient ID: Ailyn Banegas is a 79 y.o. female who presents for Home health  and Leg Swelling.    Assessment/Plan   Problem List Items Addressed This Visit       Chronic obstructive pulmonary disease with (acute) exacerbation (CMS/HCC)    Diabetes type 2, controlled (CMS/HCC)    Hyperlipidemia associated with type 2 diabetes mellitus (CMS/HCC) - Primary    Hypertension associated with diabetes (CMS/HCC)    Unspecified atrial fibrillation (CMS/[facility])     Other Visit Diagnoses       Edema, unspecified type              Recommend home health care in view of patient's mobility and self care deficits.      Peripheral edema-this is persistent  Lower extremity venous duplex negative.   Patient does not want to take torsemide.  Currently taking Lasix 20 mg and aldactone 50 mg. Counseled to increase to Lasix 40 mg and Aldactone 100 mg.   Reviewed elevation and compression stockings.     If patient does not improve with these measures, may need to be admitted    Sinus issues- ENT referral provided.     COPD- continue Symbicort, albuterol, and guaifenesin  Patient is on 3L oxygen at baseline      Hyperlipemia- continue atorvastatin     Hypothyroid- continue Synthroid      Diabetes- controlled on insulin      Paroxysmal atrial fibrillation with RVR and Nonsustained ventricular tachycardia- on diltiazem      Follow up as able in person     Source of history: Nurse, Medical personnel, Medical record, Patient.  History limitation: None.    HPI  79 y.o. female here for virtual follow up visit     This visit was completed virtually due to the restrictions of the COVID-19 pandemic. All issues as below were discussed and addressed but no physical exam was performed. If it was felt that the patient should be evaluated in clinic or ER, then they were directed there. The patient verbally consented to visit      Continued leg swelling. Ongoing for months and no improvement.   Redness from mid calf down. Hard and painful at  "night. Now starting to also become painful throughout day.     Has difficulty elevating legs. Tries to do so in recliner throughout day but notices additional pain with elevation.     Patient has a very difficult time leaving the home. She is on 3L oxygen and has mobility difficulties.     Notes worsening sinus problems wit changing weather.     Pt with Hx including HTN, HLD, DM, CAD/MI, A. Fib, COPD/Reactive Airway Disease, Morbid Obesity, and DJD.      No Known Allergies    Current Outpatient Medications   Medication Sig Dispense Refill    albuterol 2.5 mg /3 mL (0.083 %) nebulizer solution Take 3 mL (2.5 mg) by nebulization 4 times a day as needed for wheezing or shortness of breath. 3 mL 1    atorvastatin (Lipitor) 10 mg tablet Take 1 tablet (10 mg) by mouth once daily. 90 tablet 1    budesonide-formoteroL (Symbicort) 160-4.5 mcg/actuation inhaler Inhale 2 puffs 2 times a day. 10.2 g 3    dilTIAZem SR (Cardizem SR) 60 mg 12 hr capsule Take 1 capsule (60 mg) by mouth once daily. 90 capsule 3    furosemide (Lasix) 40 mg tablet Take 0.5 tablets (20 mg) by mouth once daily. 30 tablet 0    lancets (OneTouch Delica Lancets) 33 gauge misc Check sugar once a day 1 each 2    levothyroxine (Synthroid) 100 mcg tablet Take 1 tablet (100 mcg) by mouth once daily. 90 tablet 1    spironolactone (Aldactone) 50 mg tablet Take 1 tablet (50 mg) by mouth once daily. 30 tablet 0    torsemide (Demadex) 20 mg tablet Take 2 tablets (40 mg) by mouth once daily for 20 days. 20 tablet 1     No current facility-administered medications for this visit.       Objective   Visit Vitals  Ht 1.6 m (5' 3\")   BMI 31.89 kg/m²   Smoking Status Never   BSA 1.9 m²     Physical Exam  Patient was not physically examined as this was a virtual visit.     Review of Systems  Comprehensive review of systems as allowed by patient condition and nursing input is negative    No visits with results within 4 Month(s) from this visit.   Latest known visit with " results is:   Orders Only on 07/31/2023   Component Date Value Ref Range Status    WBC, Urine 07/31/2023 <1  0 - 5 /HPF Final    RBC, Urine 07/31/2023 2  0 - 5 /HPF Final    Squamous Epithelial Cells, Urine 07/31/2023 <1  /HPF Final    Hyaline Casts, Urine 07/31/2023 OCC (A)  /LPF Final    Urine Culture 07/31/2023 MIXED URETHRAL BREANA.   Final       Radiology: Reviewed imaging in powerchart.  No results found.    No family history on file.  Social History     Socioeconomic History    Marital status:      Spouse name: None    Number of children: None    Years of education: None    Highest education level: None   Occupational History    None   Tobacco Use    Smoking status: Never    Smokeless tobacco: Never   Substance and Sexual Activity    Alcohol use: None    Drug use: Not Currently    Sexual activity: None   Other Topics Concern    None   Social History Narrative    None     Social Determinants of Health     Financial Resource Strain: Not on file   Food Insecurity: Not on file   Transportation Needs: Not on file   Physical Activity: Not on file   Stress: Not on file   Social Connections: Not on file   Intimate Partner Violence: Not on file   Housing Stability: Not on file     Past Medical History:   Diagnosis Date    Abnormal weight gain     Weight gain    Essential (primary) hypertension     HTN (hypertension), benign    Localized edema     Edema extremities    Old myocardial infarction     History of myocardial infarction    Old myocardial infarction 02/27/2017    History of non-ST elevation myocardial infarction (NSTEMI)    Other specified postprocedural states     History of repair of both hip joints    Other symptoms and signs involving the genitourinary system     Urinary problem    Pain due to internal orthopedic prosthetic devices, implants and grafts, initial encounter (CMS/[facility])     Artificial joint pain    Personal history of other diseases of the circulatory system     History of hypertension     Personal history of other diseases of the musculoskeletal system and connective tissue     Personal history of gout    Personal history of other diseases of the musculoskeletal system and connective tissue     Personal history of arthritis    Personal history of other diseases of the musculoskeletal system and connective tissue     Personal history of fibromyalgia    Personal history of other diseases of the musculoskeletal system and connective tissue     History of muscle pain    Personal history of other diseases of the respiratory system 02/12/2018    History of chronic respiratory failure    Personal history of other endocrine, nutritional and metabolic disease     History of thyroid disease    Personal history of other endocrine, nutritional and metabolic disease     History of obesity    Personal history of other endocrine, nutritional and metabolic disease     History of high cholesterol    Personal history of other endocrine, nutritional and metabolic disease 02/28/2014    History of hypothyroidism    Personal history of other medical treatment     History of mammogram    Personal history of other specified conditions     H/O shortness of breath    Personal history of pneumonia (recurrent)     History of pneumonia    Presence of artificial hip joint, bilateral     Status post total replacement of both hips    Pure hypercholesterolemia, unspecified     High cholesterol    Unspecified disorder of nose and nasal sinuses     Sinus problem     Past Surgical History:   Procedure Laterality Date    CARDIAC CATHETERIZATION  01/25/2018    Cardiac Cath Procedure Outcome:    CARPAL TUNNEL RELEASE  02/27/2014    Neuroplasty Median Nerve At Carpal Tunnel    HIP SURGERY  03/03/2018    Hip Surgery    MOUTH SURGERY  04/14/2015    Oral Surgery Tooth Extraction    THYROID SURGERY  04/14/2015    Thyroid Surgery    TONSILLECTOMY  04/14/2015    Tonsillectomy    TOTAL HIP ARTHROPLASTY  01/25/2018    Hip Replacement       Lissette  Attestation  By signing my name below, I, Chapis Winston , Scribclaire   attest that this documentation has been prepared under the direction and in the presence of Minh Crawford MD.

## 2024-04-18 ENCOUNTER — TELEPHONE (OUTPATIENT)
Dept: PRIMARY CARE | Facility: CLINIC | Age: 79
End: 2024-04-18
Payer: MEDICARE

## 2024-04-18 DIAGNOSIS — B99.9 INFECTION: ICD-10-CM

## 2024-04-18 RX ORDER — DOXYCYCLINE 100 MG/1
100 CAPSULE ORAL 2 TIMES DAILY
Qty: 20 CAPSULE | Refills: 0 | Status: SHIPPED | OUTPATIENT
Start: 2024-04-18 | End: 2024-04-28

## 2024-04-18 NOTE — TELEPHONE ENCOUNTER
PT had a virtual 4/3/24     PT is still on the new dose of   spironolactone (Aldactone) 100 mg tablet      She is faithfully taking the above med along with the Lasix 40 mg, but it hasn't made a change in her legs.    Red below the knee to the toes.  Hard and cold to the touch and very hot and painful at night.      She also wants reinstatement of nurse care.    What is next steps for her legs.

## 2024-04-24 ENCOUNTER — TELEPHONE (OUTPATIENT)
Dept: PRIMARY CARE | Facility: CLINIC | Age: 79
End: 2024-04-24
Payer: MEDICARE

## 2024-04-26 ENCOUNTER — TELEPHONE (OUTPATIENT)
Dept: PRIMARY CARE | Facility: CLINIC | Age: 79
End: 2024-04-26
Payer: MEDICARE

## 2024-05-01 ENCOUNTER — TELEPHONE (OUTPATIENT)
Dept: PRIMARY CARE | Facility: CLINIC | Age: 79
End: 2024-05-01
Payer: MEDICARE

## 2024-05-01 NOTE — TELEPHONE ENCOUNTER
Pt spoke with Lymphedema clinic and she said that she is on oxygen and cannot be going to get measured. Wants to know if there are other alternatives.   She also has a question regarding her Diuretics. She finished the Doxycycline and would like to know if she should keep taking both aldactone and torsemide

## 2024-05-03 NOTE — TELEPHONE ENCOUNTER
Pt would like to know if she should keep the same dosage of aldactone 100 mg and torsemide 40mg if she will be taking these as needed now instead of daily

## 2024-05-10 ENCOUNTER — TELEPHONE (OUTPATIENT)
Dept: PRIMARY CARE | Facility: CLINIC | Age: 79
End: 2024-05-10
Payer: MEDICARE

## 2024-05-10 NOTE — TELEPHONE ENCOUNTER
Sandra GARCIA from [facility] called saying that she went to see Pt and Pt claimed that she doesn't want physical therapy. Patient wanted a nurse to her house and take her vital and evaluate her for her swelling. Physical Therapy told me that she has been requesting home health every year for 3 years straight. Physical Therapy told her that Medicare doesn't skill that since she's been in the hospital and had plenty of home health. She advised patient to go the the office and get her legs evaluated bur Pt doesn't want to leave her house. Therapist believes that she might have peripheral vascular disease. Please advice     Sandra   [phone]

## 2024-05-17 ENCOUNTER — TELEMEDICINE (OUTPATIENT)
Dept: PRIMARY CARE | Facility: CLINIC | Age: 79
End: 2024-05-17
Payer: MEDICARE

## 2024-05-17 VITALS — WEIGHT: 250 LBS | BODY MASS INDEX: 44.3 KG/M2 | HEIGHT: 63 IN

## 2024-05-17 DIAGNOSIS — Z79.4 CONTROLLED TYPE 2 DIABETES MELLITUS WITH COMPLICATION, WITH LONG-TERM CURRENT USE OF INSULIN (MULTI): Primary | ICD-10-CM

## 2024-05-17 DIAGNOSIS — E11.59 HYPERTENSION ASSOCIATED WITH DIABETES (MULTI): ICD-10-CM

## 2024-05-17 DIAGNOSIS — I15.2 HYPERTENSION ASSOCIATED WITH DIABETES (MULTI): ICD-10-CM

## 2024-05-17 DIAGNOSIS — R60.9 EDEMA, UNSPECIFIED TYPE: ICD-10-CM

## 2024-05-17 DIAGNOSIS — E66.01 MORBID OBESITY (MULTI): ICD-10-CM

## 2024-05-17 DIAGNOSIS — Z99.81 DEPENDENCE ON SUPPLEMENTAL OXYGEN: ICD-10-CM

## 2024-05-17 DIAGNOSIS — E11.8 CONTROLLED TYPE 2 DIABETES MELLITUS WITH COMPLICATION, WITH LONG-TERM CURRENT USE OF INSULIN (MULTI): Primary | ICD-10-CM

## 2024-05-17 PROCEDURE — 1036F TOBACCO NON-USER: CPT | Performed by: EMERGENCY MEDICINE

## 2024-05-17 PROCEDURE — 99214 OFFICE O/P EST MOD 30 MIN: CPT | Performed by: EMERGENCY MEDICINE

## 2024-05-17 PROCEDURE — 1159F MED LIST DOCD IN RCRD: CPT | Performed by: EMERGENCY MEDICINE

## 2024-05-17 RX ORDER — TORSEMIDE 20 MG/1
40 TABLET ORAL DAILY
Qty: 60 TABLET | Refills: 2 | Status: SHIPPED | OUTPATIENT
Start: 2024-05-17

## 2024-05-17 NOTE — TELEPHONE ENCOUNTER
PT is looking for her med to be called in and I see it is up for Dr Crawford to sign off.    torsemide (Demadex) 20 mg tablet      She also mentioned some type of order where they draw blood from her in her home?

## 2024-05-17 NOTE — PROGRESS NOTES
Subjective   Patient ID: Ailyn Banegas is a 79 y.o. female who presents for Follow-up.    Assessment/Plan   Problem List Items Addressed This Visit    None      Recommend home health care in view of patient's mobility and self care deficits.      Peripheral edema-this is persistent  Lower extremity venous duplex negative.   Patient does not want to take torsemide.  Currently taking Lasix 20 mg and aldactone 50 mg. Counseled to increase to Lasix 40 mg and Aldactone 100 mg.   Reviewed elevation and compression stockings.     If patient does not improve with these measures, may need to be admitted    Extensive leg edema-duplex was negative.  Counseled patient to try torsemide instead of furosemide.  Will call it in  We will also refer her to wound clinic for consideration of lymphedema pumps  Counseled about reducing fluid intake    We will try to order labs to be done at her house to monitor electrolytes since she is on diuretics    COPD- continue Symbicort, albuterol, and guaifenesin  Patient is on 3L oxygen at baseline      Hyperlipemia- continue atorvastatin     Hypothyroid- continue Synthroid      Diabetes- controlled on insulin      Paroxysmal atrial fibrillation with RVR and Nonsustained ventricular tachycardia- on diltiazem      Follow up as able in person     Source of history: Nurse, Medical personnel, Medical record, Patient.  History limitation: None.    HPI  79 y.o. female here for virtual follow up visit      This visit was completed virtually due to the restrictions of the COVID-19 pandemic. All issues as below were discussed and addressed but no physical exam was performed. If it was felt that the patient should be evaluated in clinic or ER, then they were directed there. The patient verbally consented to visit    Continued leg swelling. Ongoing for months and no improvement.   Redness from mid calf down. Hard and painful at night. Now starting to also become painful throughout day.     Has difficulty  "elevating legs. Tries to do so in recliner throughout day but notices additional pain with elevation.     Patient has a very difficult time leaving the home. She is on 3L oxygen and has mobility difficulties.     Notes worsening sinus problems wit changing weather.     Pt with Hx including HTN, HLD, DM, CAD/MI, A. Fib, COPD/Reactive Airway Disease, Morbid Obesity, and DJD.      No Known Allergies    Current Outpatient Medications   Medication Sig Dispense Refill    albuterol 2.5 mg /3 mL (0.083 %) nebulizer solution Take 3 mL (2.5 mg) by nebulization 4 times a day as needed for wheezing or shortness of breath. 3 mL 1    atorvastatin (Lipitor) 10 mg tablet Take 1 tablet (10 mg) by mouth once daily. 90 tablet 1    budesonide-formoteroL (Symbicort) 160-4.5 mcg/actuation inhaler Inhale 2 puffs 2 times a day. 10.2 g 3    dilTIAZem SR (Cardizem SR) 60 mg 12 hr capsule Take 1 capsule (60 mg) by mouth once daily. 90 capsule 3    furosemide (Lasix) 40 mg tablet Take 0.5 tablets (20 mg) by mouth once daily. 30 tablet 0    lancets (OneTouch Delica Lancets) 33 gauge misc Check sugar once a day 1 each 2    levothyroxine (Synthroid) 100 mcg tablet Take 1 tablet (100 mcg) by mouth once daily. 90 tablet 1    spironolactone (Aldactone) 50 mg tablet Take 2 tablets (100 mg) by mouth once daily. 60 tablet 2    torsemide (Demadex) 20 mg tablet Take 2 tablets (40 mg) by mouth once daily for 20 days. (Patient not taking: Reported on 5/17/2024) 20 tablet 1     No current facility-administered medications for this visit.       Objective   Visit Vitals  Ht 1.6 m (5' 3\")   Wt 113 kg (250 lb)   BMI 44.29 kg/m²   Smoking Status Never   BSA 2.24 m²     Physical Exam  Patient was not physically examined as this was a virtual visit.     Review of Systems  Comprehensive review of systems as allowed by patient condition and nursing input is negative    No visits with results within 4 Month(s) from this visit.   Latest known visit with results is: "   Orders Only on 07/31/2023   Component Date Value Ref Range Status    WBC, Urine 07/31/2023 <1  0 - 5 /HPF Final    RBC, Urine 07/31/2023 2  0 - 5 /HPF Final    Squamous Epithelial Cells, Urine 07/31/2023 <1  /HPF Final    Hyaline Casts, Urine 07/31/2023 OCC (A)  /LPF Final    Urine Culture 07/31/2023 MIXED URETHRAL BREANA.   Final       Radiology: Reviewed imaging in powerchart.  No results found.    No family history on file.  Social History     Socioeconomic History    Marital status:      Spouse name: None    Number of children: None    Years of education: None    Highest education level: None   Occupational History    None   Tobacco Use    Smoking status: Never    Smokeless tobacco: Never   Substance and Sexual Activity    Alcohol use: None    Drug use: Not Currently    Sexual activity: None   Other Topics Concern    None   Social History Narrative    None     Social Determinants of Health     Financial Resource Strain: Not on file   Food Insecurity: Not on file   Transportation Needs: Not on file   Physical Activity: Not on file   Stress: Not on file   Social Connections: Not on file   Intimate Partner Violence: Not on file   Housing Stability: Not on file     Past Medical History:   Diagnosis Date    Abnormal weight gain     Weight gain    Essential (primary) hypertension     HTN (hypertension), benign    Localized edema     Edema extremities    Old myocardial infarction     History of myocardial infarction    Old myocardial infarction 02/27/2017    History of non-ST elevation myocardial infarction (NSTEMI)    Other specified postprocedural states     History of repair of both hip joints    Other symptoms and signs involving the genitourinary system     Urinary problem    Pain due to internal orthopedic prosthetic devices, implants and grafts, initial encounter (CMS-HCC)     Artificial joint pain    Personal history of other diseases of the circulatory system     History of hypertension    Personal  history of other diseases of the musculoskeletal system and connective tissue     Personal history of gout    Personal history of other diseases of the musculoskeletal system and connective tissue     Personal history of arthritis    Personal history of other diseases of the musculoskeletal system and connective tissue     Personal history of fibromyalgia    Personal history of other diseases of the musculoskeletal system and connective tissue     History of muscle pain    Personal history of other diseases of the respiratory system 02/12/2018    History of chronic respiratory failure    Personal history of other endocrine, nutritional and metabolic disease     History of thyroid disease    Personal history of other endocrine, nutritional and metabolic disease     History of obesity    Personal history of other endocrine, nutritional and metabolic disease     History of high cholesterol    Personal history of other endocrine, nutritional and metabolic disease 02/28/2014    History of hypothyroidism    Personal history of other medical treatment     History of mammogram    Personal history of other specified conditions     H/O shortness of breath    Personal history of pneumonia (recurrent)     History of pneumonia    Presence of artificial hip joint, bilateral     Status post total replacement of both hips    Pure hypercholesterolemia, unspecified     High cholesterol    Unspecified disorder of nose and nasal sinuses     Sinus problem     Past Surgical History:   Procedure Laterality Date    CARDIAC CATHETERIZATION  01/25/2018    Cardiac Cath Procedure Outcome:    CARPAL TUNNEL RELEASE  02/27/2014    Neuroplasty Median Nerve At Carpal Tunnel    HIP SURGERY  03/03/2018    Hip Surgery    MOUTH SURGERY  04/14/2015    Oral Surgery Tooth Extraction    THYROID SURGERY  04/14/2015    Thyroid Surgery    TONSILLECTOMY  04/14/2015    Tonsillectomy    TOTAL HIP ARTHROPLASTY  01/25/2018    Hip Replacement       Lissette  Attestation  By signing my name below, I, Minh Crawford MD , Scribe   attest that this documentation has been prepared under the direction and in the presence of Minh Crawford MD.

## 2024-05-20 ENCOUNTER — TELEPHONE (OUTPATIENT)
Dept: PRIMARY CARE | Facility: CLINIC | Age: 79
End: 2024-05-20
Payer: MEDICARE

## 2024-05-20 DIAGNOSIS — R73.02 IGT (IMPAIRED GLUCOSE TOLERANCE): ICD-10-CM

## 2024-05-20 DIAGNOSIS — E03.9 HYPOTHYROIDISM, UNSPECIFIED TYPE: ICD-10-CM

## 2024-05-20 DIAGNOSIS — D64.9 ANEMIA, UNSPECIFIED TYPE: ICD-10-CM

## 2024-05-20 DIAGNOSIS — Z00.00 ROUTINE GENERAL MEDICAL EXAMINATION AT A HEALTH CARE FACILITY: ICD-10-CM

## 2024-05-20 NOTE — TELEPHONE ENCOUNTER
CALLING RE POTENTIAL TRANSPORT TO WOUND CENTER. I SUGGESTED HOME HEALTH.     JOSE JUAN GUZMAN: [phone]

## 2024-05-25 PROCEDURE — 99238 HOSP IP/OBS DSCHRG MGMT 30/<: CPT | Performed by: EMERGENCY MEDICINE

## 2024-05-28 NOTE — TELEPHONE ENCOUNTER
PATIENT WAS DISCHARGED FROM THE HOSPITAL ON SATURDAY. SHE SAID SHE MOSTLY SAW DR CM WHILE SHE WAS THERE. SHE WAS DISCHARGED HOME ON TOROSEMIDE AND ALSO PREDNISONE  SHE IS EXPERIENCING BLOOD IN HER URINE AND IS ASKING IF THESE MEDS CAUSE THIS.  PLEASE CALL PATIENT BACK AND ADVISE  THANK YOU

## 2024-05-29 ENCOUNTER — PATIENT OUTREACH (OUTPATIENT)
Dept: CARE COORDINATION | Facility: CLINIC | Age: 79
End: 2024-05-29
Payer: MEDICARE

## 2024-05-29 NOTE — TELEPHONE ENCOUNTER
Tried calling back but no answer. Dr. Medrano said those meds should not couse that.   Please ask who she is seeing as primary care. She was supposed to establish with Dr. Medrano but not scheduled and Dr. Crawford listed as pcp. If staying with Dr. Crawford we will need to inform him and get recommendations.

## 2024-05-29 NOTE — TELEPHONE ENCOUNTER
Please call patient back regarding the blood in her urine. She left a message yesterday.  She is also asking about [facility]

## 2024-05-29 NOTE — PROGRESS NOTES
Outreach call to patient to support a smooth transition of care from recent admission.  Phone ring, no answer.

## 2024-05-30 ENCOUNTER — TELEMEDICINE (OUTPATIENT)
Dept: PRIMARY CARE | Facility: CLINIC | Age: 79
End: 2024-05-30
Payer: MEDICARE

## 2024-05-30 DIAGNOSIS — N39.0 URINARY TRACT INFECTION WITH HEMATURIA, SITE UNSPECIFIED: ICD-10-CM

## 2024-05-30 DIAGNOSIS — J98.9 CHRONIC RESPIRATORY DISEASE: ICD-10-CM

## 2024-05-30 DIAGNOSIS — G47.33 OSA (OBSTRUCTIVE SLEEP APNEA): ICD-10-CM

## 2024-05-30 DIAGNOSIS — Z76.89 ENCOUNTER FOR SUPPORT AND COORDINATION OF TRANSITION OF CARE: Primary | ICD-10-CM

## 2024-05-30 DIAGNOSIS — R31.9 URINARY TRACT INFECTION WITH HEMATURIA, SITE UNSPECIFIED: ICD-10-CM

## 2024-05-30 DIAGNOSIS — J44.9 CHRONIC OBSTRUCTIVE PULMONARY DISEASE, UNSPECIFIED COPD TYPE (MULTI): ICD-10-CM

## 2024-05-30 PROCEDURE — 99214 OFFICE O/P EST MOD 30 MIN: CPT | Performed by: STUDENT IN AN ORGANIZED HEALTH CARE EDUCATION/TRAINING PROGRAM

## 2024-05-30 PROCEDURE — 1159F MED LIST DOCD IN RCRD: CPT | Performed by: STUDENT IN AN ORGANIZED HEALTH CARE EDUCATION/TRAINING PROGRAM

## 2024-05-30 PROCEDURE — 1036F TOBACCO NON-USER: CPT | Performed by: STUDENT IN AN ORGANIZED HEALTH CARE EDUCATION/TRAINING PROGRAM

## 2024-05-30 RX ORDER — PREDNISONE 10 MG/1
TABLET ORAL
COMMUNITY

## 2024-05-30 RX ORDER — NITROFURANTOIN 25; 75 MG/1; MG/1
100 CAPSULE ORAL 2 TIMES DAILY
Qty: 10 CAPSULE | Refills: 0 | Status: SHIPPED | OUTPATIENT
Start: 2024-05-30 | End: 2024-06-04

## 2024-05-30 NOTE — PROGRESS NOTES
Subjective   Patient ID: Ailyn Banegas is a 79 y.o. female who presents for the following    Assessment/Plan   TCM   -Recently admitted to the hospital for acute on chronic respiratory failure  - Breathing is improved and she is stable.  - Tolerating home medications appropriately  - Currently on prednisone taper, torsemide, Symbicort, as needed albuterol.  - Also has a history of MARLENY and is compliant with home NIV machine.    UTI  Hematuria     PLAN  -Patient is homebound and unable to come in for evaluation.  Advise she needs a thorough physical exam to look for hemorrhoids, vaginal lesions, evidence of pyelonephritis.  Patient states that if her symptoms progress or worsen or she develops symptoms such as fevers, chills, flank pain, altered mental status she will come to the ER for evaluation.  - Will treat empirically with Macrobid 100 mg p.o. twice daily for 5 days  - Advised patient to follow-up in person at office for next visit.  - If patient is able to come into the office will obtain UA with culture.  - If hematuria does not resolve within the next week patient is is advised to come back and we will refer to urology.    HPI  79F presents via virtual visit for evaluation. She is homebound and can only complete virtual visit. She was recently hospitalized for respiratory failure at [facility] from 5/20/24-5/25/24. After discharge home she was doing better in regards to respiratory status, but noticed hematuria that started a couple days ago. Describes urine as malodorous and pink. Had purewick in hospital. Denies any abd pain, fevers, chills, dysuria. States that she has a hx of constipation but does not recall hx of rectal hemorrhoids. Denies any visible vaginal masses or vaginal discharge. Has not noticed gross vaginal bleeding.     Discussed differentials such as hemorrhoids, UTI, vaginal lesions/mass, vaginal trauma, bladder etiology/cystitis.     Denies fevers, chills, weight loss, lightheadedness,  dizziness, vision changes, sore throat, runny nose, CP, SOB, cough, palpitations, n/v/d, abd pain, black/bloody stools, arthralgias, or new numbness/weakness/tingling in arms/legs/face.      Visit Vitals  Smoking Status Never     PHYSICAL EXAM   Physical Exam     Visit Vitals  Smoking Status Never        Deferred      REVIEW OF SYSTEMS   ROS in HPI     No Known Allergies    Current Outpatient Medications   Medication Sig Dispense Refill    albuterol 2.5 mg /3 mL (0.083 %) nebulizer solution Take 3 mL (2.5 mg) by nebulization 4 times a day as needed for wheezing or shortness of breath. 3 mL 1    atorvastatin (Lipitor) 10 mg tablet Take 1 tablet (10 mg) by mouth once daily. 90 tablet 1    budesonide-formoteroL (Symbicort) 160-4.5 mcg/actuation inhaler Inhale 2 puffs 2 times a day. 10.2 g 3    dilTIAZem SR (Cardizem SR) 60 mg 12 hr capsule Take 1 capsule (60 mg) by mouth once daily. 90 capsule 3    furosemide (Lasix) 40 mg tablet Take 0.5 tablets (20 mg) by mouth once daily. 30 tablet 0    lancets (OneTouch Delica Lancets) 33 gauge misc Check sugar once a day 1 each 2    levothyroxine (Synthroid) 100 mcg tablet Take 1 tablet (100 mcg) by mouth once daily. 90 tablet 1    predniSONE (Deltasone) 10 mg tablet Take by mouth.      torsemide (Demadex) 20 mg tablet Take 2 tablets (40 mg) by mouth once daily. 60 tablet 2    spironolactone (Aldactone) 50 mg tablet Take 2 tablets (100 mg) by mouth once daily. (Patient not taking: Reported on 5/30/2024) 60 tablet 2     No current facility-administered medications for this visit.       Objective     No visits with results within 4 Month(s) from this visit.   Latest known visit with results is:   Orders Only on 07/31/2023   Component Date Value Ref Range Status    WBC, Urine 07/31/2023 <1  0 - 5 /HPF Final    RBC, Urine 07/31/2023 2  0 - 5 /HPF Final    Squamous Epithelial Cells, Urine 07/31/2023 <1  /HPF Final    Hyaline Casts, Urine 07/31/2023 OCC (A)  /LPF Final    Urine Culture  07/31/2023 MIXED URETHRAL BREANA.   Final       Radiology: Reviewed imaging in powerchart.  No results found.    No family history on file.  Social History     Socioeconomic History    Marital status:      Spouse name: None    Number of children: None    Years of education: None    Highest education level: None   Occupational History    None   Tobacco Use    Smoking status: Never    Smokeless tobacco: Never   Vaping Use    Vaping status: Never Used   Substance and Sexual Activity    Alcohol use: None    Drug use: Not Currently    Sexual activity: None   Other Topics Concern    None   Social History Narrative    None     Social Determinants of Health     Financial Resource Strain: Not on file   Food Insecurity: Not on file   Transportation Needs: Not on file   Physical Activity: Not on file   Stress: Not on file   Social Connections: Not on file   Intimate Partner Violence: Not on file   Housing Stability: Not on file     Past Medical History:   Diagnosis Date    Abnormal weight gain     Weight gain    Essential (primary) hypertension     HTN (hypertension), benign    Localized edema     Edema extremities    Old myocardial infarction     History of myocardial infarction    Old myocardial infarction 02/27/2017    History of non-ST elevation myocardial infarction (NSTEMI)    Other specified postprocedural states     History of repair of both hip joints    Other symptoms and signs involving the genitourinary system     Urinary problem    Pain due to internal orthopedic prosthetic devices, implants and grafts, initial encounter (CMS-HCC)     Artificial joint pain    Personal history of other diseases of the circulatory system     History of hypertension    Personal history of other diseases of the musculoskeletal system and connective tissue     Personal history of gout    Personal history of other diseases of the musculoskeletal system and connective tissue     Personal history of arthritis    Personal history of  other diseases of the musculoskeletal system and connective tissue     Personal history of fibromyalgia    Personal history of other diseases of the musculoskeletal system and connective tissue     History of muscle pain    Personal history of other diseases of the respiratory system 02/12/2018    History of chronic respiratory failure    Personal history of other endocrine, nutritional and metabolic disease     History of thyroid disease    Personal history of other endocrine, nutritional and metabolic disease     History of obesity    Personal history of other endocrine, nutritional and metabolic disease     History of high cholesterol    Personal history of other endocrine, nutritional and metabolic disease 02/28/2014    History of hypothyroidism    Personal history of other medical treatment     History of mammogram    Personal history of other specified conditions     H/O shortness of breath    Personal history of pneumonia (recurrent)     History of pneumonia    Presence of artificial hip joint, bilateral     Status post total replacement of both hips    Pure hypercholesterolemia, unspecified     High cholesterol    Unspecified disorder of nose and nasal sinuses     Sinus problem     Past Surgical History:   Procedure Laterality Date    CARDIAC CATHETERIZATION  01/25/2018    Cardiac Cath Procedure Outcome:    CARPAL TUNNEL RELEASE  02/27/2014    Neuroplasty Median Nerve At Carpal Tunnel    HIP SURGERY  03/03/2018    Hip Surgery    MOUTH SURGERY  04/14/2015    Oral Surgery Tooth Extraction    THYROID SURGERY  04/14/2015    Thyroid Surgery    TONSILLECTOMY  04/14/2015    Tonsillectomy    TOTAL HIP ARTHROPLASTY  01/25/2018    Hip Replacement       Charting was completed using voice recognition technology and may include unintended errors.

## 2024-06-06 DIAGNOSIS — E03.9 HYPOTHYROIDISM, UNSPECIFIED TYPE: ICD-10-CM

## 2024-06-06 RX ORDER — LEVOTHYROXINE SODIUM 100 UG/1
100 TABLET ORAL DAILY
Qty: 90 TABLET | Refills: 1 | Status: SHIPPED | OUTPATIENT
Start: 2024-06-06

## 2024-06-17 ENCOUNTER — TELEPHONE (OUTPATIENT)
Dept: PRIMARY CARE | Facility: CLINIC | Age: 79
End: 2024-06-17
Payer: MEDICARE

## 2024-06-17 DIAGNOSIS — Z00.00 ROUTINE GENERAL MEDICAL EXAMINATION AT A HEALTH CARE FACILITY: ICD-10-CM

## 2024-06-20 RX ORDER — DILTIAZEM HYDROCHLORIDE 60 MG/1
60 CAPSULE, EXTENDED RELEASE ORAL DAILY
Qty: 90 CAPSULE | Refills: 1 | Status: SHIPPED | OUTPATIENT
Start: 2024-06-20

## 2024-06-26 DIAGNOSIS — E78.5 HYPERLIPIDEMIA, UNSPECIFIED HYPERLIPIDEMIA TYPE: ICD-10-CM

## 2024-06-26 RX ORDER — ATORVASTATIN CALCIUM 10 MG/1
10 TABLET, FILM COATED ORAL DAILY
Qty: 90 TABLET | Refills: 1 | Status: SHIPPED | OUTPATIENT
Start: 2024-06-26

## 2024-07-02 ENCOUNTER — TELEPHONE (OUTPATIENT)
Dept: PRIMARY CARE | Facility: CLINIC | Age: 79
End: 2024-07-02
Payer: MEDICARE

## 2024-07-03 ENCOUNTER — TELEMEDICINE (OUTPATIENT)
Dept: PRIMARY CARE | Facility: CLINIC | Age: 79
End: 2024-07-03
Payer: MEDICARE

## 2024-07-03 VITALS — BODY MASS INDEX: 48.73 KG/M2 | HEIGHT: 63 IN | WEIGHT: 275 LBS

## 2024-07-03 DIAGNOSIS — A49.9 BACTERIAL INFECTION: ICD-10-CM

## 2024-07-03 DIAGNOSIS — F41.8 ANXIETY ASSOCIATED WITH DEPRESSION: ICD-10-CM

## 2024-07-03 DIAGNOSIS — R60.9 PERIPHERAL EDEMA: Primary | ICD-10-CM

## 2024-07-03 DIAGNOSIS — E78.5 HYPERLIPIDEMIA ASSOCIATED WITH TYPE 2 DIABETES MELLITUS (MULTI): ICD-10-CM

## 2024-07-03 DIAGNOSIS — E03.9 HYPOTHYROIDISM, UNSPECIFIED TYPE: ICD-10-CM

## 2024-07-03 DIAGNOSIS — E11.69 HYPERLIPIDEMIA ASSOCIATED WITH TYPE 2 DIABETES MELLITUS (MULTI): ICD-10-CM

## 2024-07-03 DIAGNOSIS — J44.1 CHRONIC OBSTRUCTIVE PULMONARY DISEASE WITH (ACUTE) EXACERBATION (MULTI): ICD-10-CM

## 2024-07-03 PROCEDURE — 1158F ADVNC CARE PLAN TLK DOCD: CPT | Performed by: EMERGENCY MEDICINE

## 2024-07-03 PROCEDURE — 1159F MED LIST DOCD IN RCRD: CPT | Performed by: EMERGENCY MEDICINE

## 2024-07-03 PROCEDURE — 99213 OFFICE O/P EST LOW 20 MIN: CPT | Performed by: EMERGENCY MEDICINE

## 2024-07-03 PROCEDURE — 1123F ACP DISCUSS/DSCN MKR DOCD: CPT | Performed by: EMERGENCY MEDICINE

## 2024-07-03 PROCEDURE — 1036F TOBACCO NON-USER: CPT | Performed by: EMERGENCY MEDICINE

## 2024-07-03 RX ORDER — DOXYCYCLINE HYCLATE 50 MG/1
50 TABLET, FILM COATED ORAL 2 TIMES DAILY
Qty: 20 TABLET | Refills: 0 | Status: SHIPPED | OUTPATIENT
Start: 2024-07-03 | End: 2024-07-13

## 2024-07-03 NOTE — PROGRESS NOTES
Subjective   Patient ID: Ailyn Banegas is a 79 y.o. female who presents for a follow-up    Assessment/Plan   79 year old patient presents for follow-up    This visit was completed virtually due to the restrictions of the COVID-19 pandemic. All issues as below were discussed and addressed but no physical exam was performed. If it was felt that the patient should be evaluated in clinic or ER, then they were directed there. The patient verbally consented to visit.     Peripheral Edema - Patient legs are hard and red, painful at the touch. Water blisters on the legs, one burst and is infected oozing green discharge. She is currently using water pills.  Doxycycline prescribed for infection.  Patient was told that if she has not improved, she will her to come in person    Chronic respiratory failure - Currently on prednisone taper, torsemide, Symbicort, as needed albuterol.    History of MARLENY - Compliant with home NIV machine.    Hyperlipidemia - Patient is on Lipitor    Hypothyroidism - Patient is on Synthroid        HPI    79F presents via virtual visit for follow-up.     Patient has been requesting home health persistently, however her insurance would not cover it. Counseled her on the situation and order home health with a different company, if insurance does not cover we will follow-up.    She is homebound and can only complete virtual visit. She was recently hospitalized for respiratory failure at [facility] from 5/20/24-5/25/24. After discharge home she was doing better in regards to respiratory status, but noticed hematuria that started a couple days ago. Describes urine as malodorous and pink. Had purewick in hospital. Denies any abd pain, fevers, chills, dysuria. States that she has a hx of constipation but does not recall hx of rectal hemorrhoids. Denies any visible vaginal masses or vaginal discharge. Has not noticed gross vaginal bleeding.     Discussed differentials such as hemorrhoids, UTI, vaginal lesions/mass,  vaginal trauma, bladder etiology/cystitis.     Denies fevers, chills, weight loss, lightheadedness, dizziness, vision changes, sore throat, runny nose, CP, SOB, cough, palpitations, n/v/d, abd pain, black/bloody stools, arthralgias, or new numbness/weakness/tingling in arms/legs/face.          PHYSICAL EXAM   Patient was not physically examined as this visit was completed virtually.        REVIEW OF SYSTEMS   Comprehensive review of symptoms was not positive for any symptoms other than HPI.      No Known Allergies    Current Outpatient Medications   Medication Sig Dispense Refill    atorvastatin (Lipitor) 10 mg tablet Take 1 tablet (10 mg) by mouth once daily. 90 tablet 1    budesonide-formoteroL (Symbicort) 160-4.5 mcg/actuation inhaler Inhale 2 puffs 2 times a day. 10.2 g 3    dilTIAZem SR (Cardizem SR) 60 mg 12 hr capsule Take 1 capsule (60 mg) by mouth once daily. 90 capsule 1    furosemide (Lasix) 40 mg tablet Take 0.5 tablets (20 mg) by mouth once daily. 30 tablet 0    lancets (OneTouch Delica Lancets) 33 gauge misc Check sugar once a day 1 each 2    levothyroxine (Synthroid) 100 mcg tablet Take 1 tablet (100 mcg) by mouth once daily. 90 tablet 1    predniSONE (Deltasone) 10 mg tablet Take by mouth.      spironolactone (Aldactone) 50 mg tablet Take 2 tablets (100 mg) by mouth once daily. (Patient taking differently: Take 1 tablet (50 mg) by mouth once daily.) 60 tablet 2    torsemide (Demadex) 20 mg tablet Take 2 tablets (40 mg) by mouth once daily. (Patient taking differently: Take 1 tablet (20 mg) by mouth once daily.) 60 tablet 2    albuterol 2.5 mg /3 mL (0.083 %) nebulizer solution Take 3 mL (2.5 mg) by nebulization 4 times a day as needed for wheezing or shortness of breath. 3 mL 1     No current facility-administered medications for this visit.       Objective     No visits with results within 4 Month(s) from this visit.   Latest known visit with results is:   Orders Only on 07/31/2023   Component Date  Value Ref Range Status    WBC, Urine 07/31/2023 <1  0 - 5 /HPF Final    RBC, Urine 07/31/2023 2  0 - 5 /HPF Final    Squamous Epithelial Cells, Urine 07/31/2023 <1  /HPF Final    Hyaline Casts, Urine 07/31/2023 OCC (A)  /LPF Final    Urine Culture 07/31/2023 MIXED URETHRAL BREANA.   Final       Radiology: Reviewed imaging in powerchart.  No results found.    No family history on file.  Social History     Socioeconomic History    Marital status:      Spouse name: None    Number of children: None    Years of education: None    Highest education level: None   Occupational History    None   Tobacco Use    Smoking status: Never    Smokeless tobacco: Never   Vaping Use    Vaping status: Never Used   Substance and Sexual Activity    Alcohol use: None    Drug use: Not Currently    Sexual activity: None   Other Topics Concern    None   Social History Narrative    None     Social Determinants of Health     Financial Resource Strain: Not on file   Food Insecurity: Not on file   Transportation Needs: Not on file   Physical Activity: Not on file   Stress: Not on file   Social Connections: Not on file   Intimate Partner Violence: Not on file   Housing Stability: Not on file     Past Medical History:   Diagnosis Date    Abnormal weight gain     Weight gain    Essential (primary) hypertension     HTN (hypertension), benign    Localized edema     Edema extremities    Old myocardial infarction     History of myocardial infarction    Old myocardial infarction 02/27/2017    History of non-ST elevation myocardial infarction (NSTEMI)    Other specified postprocedural states     History of repair of both hip joints    Other symptoms and signs involving the genitourinary system     Urinary problem    Pain due to internal orthopedic prosthetic devices, implants and grafts, initial encounter (CMS-[facility])     Artificial joint pain    Personal history of other diseases of the circulatory system     History of hypertension    Personal  history of other diseases of the musculoskeletal system and connective tissue     Personal history of gout    Personal history of other diseases of the musculoskeletal system and connective tissue     Personal history of arthritis    Personal history of other diseases of the musculoskeletal system and connective tissue     Personal history of fibromyalgia    Personal history of other diseases of the musculoskeletal system and connective tissue     History of muscle pain    Personal history of other diseases of the respiratory system 02/12/2018    History of chronic respiratory failure    Personal history of other endocrine, nutritional and metabolic disease     History of thyroid disease    Personal history of other endocrine, nutritional and metabolic disease     History of obesity    Personal history of other endocrine, nutritional and metabolic disease     History of high cholesterol    Personal history of other endocrine, nutritional and metabolic disease 02/28/2014    History of hypothyroidism    Personal history of other medical treatment     History of mammogram    Personal history of other specified conditions     H/O shortness of breath    Personal history of pneumonia (recurrent)     History of pneumonia    Presence of artificial hip joint, bilateral     Status post total replacement of both hips    Pure hypercholesterolemia, unspecified     High cholesterol    Unspecified disorder of nose and nasal sinuses     Sinus problem     Past Surgical History:   Procedure Laterality Date    CARDIAC CATHETERIZATION  01/25/2018    Cardiac Cath Procedure Outcome:    CARPAL TUNNEL RELEASE  02/27/2014    Neuroplasty Median Nerve At Carpal Tunnel    HIP SURGERY  03/03/2018    Hip Surgery    MOUTH SURGERY  04/14/2015    Oral Surgery Tooth Extraction    THYROID SURGERY  04/14/2015    Thyroid Surgery    TONSILLECTOMY  04/14/2015    Tonsillectomy    TOTAL HIP ARTHROPLASTY  01/25/2018    Hip Replacement       Charting was  completed using voice recognition technology and may include unintended errors.

## 2024-07-05 ENCOUNTER — PATIENT OUTREACH (OUTPATIENT)
Dept: CARE COORDINATION | Facility: CLINIC | Age: 79
End: 2024-07-05
Payer: MEDICARE

## 2024-07-15 ENCOUNTER — TELEPHONE (OUTPATIENT)
Dept: PRIMARY CARE | Facility: CLINIC | Age: 79
End: 2024-07-15
Payer: MEDICARE

## 2024-07-15 DIAGNOSIS — B99.9 INFECTION: ICD-10-CM

## 2024-07-15 NOTE — TELEPHONE ENCOUNTER
Pt stated that she finished doxy and legs are worse. Stated that she got doxy 100mg before and doesn't understand why she got 50mg instead of 100. Pt has several blisters in both legs that are draining a green white discharge. Legs are hot to the touch and red. Would like something topical too.

## 2024-07-15 NOTE — ADDENDUM NOTE
Addended by: DEANA BRAND on: 7/15/2024 05:36 PM     Modules accepted: Orders     Patient returned RN call to schedule EGD. Please contact back at [phone]

## 2024-07-16 RX ORDER — DOXYCYCLINE 100 MG/1
100 CAPSULE ORAL 2 TIMES DAILY
Qty: 20 CAPSULE | Refills: 0 | Status: SHIPPED | OUTPATIENT
Start: 2024-07-16 | End: 2024-07-26

## 2024-07-19 DIAGNOSIS — Z00.00 ROUTINE GENERAL MEDICAL EXAMINATION AT A HEALTH CARE FACILITY: ICD-10-CM

## 2024-07-30 ENCOUNTER — APPOINTMENT (OUTPATIENT)
Dept: PRIMARY CARE | Facility: CLINIC | Age: 79
End: 2024-07-30
Payer: MEDICARE

## 2024-08-06 ENCOUNTER — TELEPHONE (OUTPATIENT)
Dept: PRIMARY CARE | Facility: CLINIC | Age: 79
End: 2024-08-06
Payer: MEDICARE

## 2024-08-06 DIAGNOSIS — Z00.00 ROUTINE GENERAL MEDICAL EXAMINATION AT A HEALTH CARE FACILITY: ICD-10-CM

## 2024-08-06 RX ORDER — NYSTATIN 100000 [USP'U]/G
1 POWDER TOPICAL 3 TIMES DAILY
Qty: 60 G | Refills: 1 | Status: SHIPPED | OUTPATIENT
Start: 2024-08-06

## 2024-08-06 RX ORDER — NYSTATIN 100000 [USP'U]/G
POWDER TOPICAL 2 TIMES DAILY
Qty: 60 G | Refills: 1 | Status: SHIPPED | OUTPATIENT
Start: 2024-08-06 | End: 2025-08-06

## 2024-08-06 RX ORDER — NYSTATIN 100000 [USP'U]/G
1 POWDER TOPICAL 3 TIMES DAILY
COMMUNITY
End: 2024-08-06 | Stop reason: SDUPTHER

## 2024-08-06 NOTE — TELEPHONE ENCOUNTER
NYSTATIN POWDER  [facility]    PLEASE CALL PATIENT ONCE SCRIPT IS SENT

## 2024-08-09 ENCOUNTER — TELEPHONE (OUTPATIENT)
Dept: PRIMARY CARE | Facility: CLINIC | Age: 79
End: 2024-08-09
Payer: MEDICARE

## 2024-08-09 DIAGNOSIS — K21.9 GASTROESOPHAGEAL REFLUX DISEASE, UNSPECIFIED WHETHER ESOPHAGITIS PRESENT: ICD-10-CM

## 2024-08-09 PROCEDURE — G0180 MD CERTIFICATION HHA PATIENT: HCPCS | Performed by: EMERGENCY MEDICINE

## 2024-08-09 NOTE — TELEPHONE ENCOUNTER
Pantoprazole 40 mg twice a day was ordered when she was in hospital by dread mariano md. She has not had medication for a few days and finds that she does need it for her heartburn. Can this be prescribed. She does want noted she does not think she needs 40 mg twice a day.     Marcs [place] \[facility] [place] [facility]\"".

## 2024-08-13 RX ORDER — PANTOPRAZOLE SODIUM 20 MG/1
20 TABLET, DELAYED RELEASE ORAL 2 TIMES DAILY
Qty: 180 TABLET | Refills: 1 | Status: SHIPPED | OUTPATIENT
Start: 2024-08-13 | End: 2025-08-13

## 2024-09-27 DIAGNOSIS — R05.9 COUGH, UNSPECIFIED TYPE: ICD-10-CM

## 2024-09-27 RX ORDER — BUDESONIDE AND FORMOTEROL FUMARATE DIHYDRATE 160; 4.5 UG/1; UG/1
2 AEROSOL RESPIRATORY (INHALATION) 2 TIMES DAILY
Qty: 10.2 G | Refills: 3 | Status: SHIPPED | OUTPATIENT
Start: 2024-09-27

## 2024-11-18 ENCOUNTER — DOCUMENTATION (OUTPATIENT)
Dept: CARE COORDINATION | Facility: CLINIC | Age: 79
End: 2024-11-18
Payer: MEDICARE

## 2024-11-19 ENCOUNTER — PATIENT OUTREACH (OUTPATIENT)
Dept: CARE COORDINATION | Facility: CLINIC | Age: 79
End: 2024-11-19
Payer: MEDICARE

## 2024-11-19 SDOH — ECONOMIC STABILITY: GENERAL: WOULD YOU LIKE HELP WITH ANY OF THE FOLLOWING NEEDS?: I DONT NEED HELP WITH ANY OF THESE

## 2024-11-19 NOTE — PROGRESS NOTES
Outreach call to patient to support a smooth transition of care from recent admission/discharge from SNF. Patient states she is doing well, home care nurse was at home today and reviewed discharge medications and instructions with her. Patient confirms she has all medications. Patient states she is aware she needs to make an appointment with PCP and will call PCP office to schedule. No needs identified at this time.  Will continue to monitor through transition period.

## 2024-11-21 ENCOUNTER — TELEPHONE (OUTPATIENT)
Dept: PRIMARY CARE | Facility: CLINIC | Age: 79
End: 2024-11-21
Payer: MEDICARE

## 2024-11-21 NOTE — TELEPHONE ENCOUNTER
Called and let Pt. Know that per Dr. Crawford, it is okay to wait for tmr appt and  Would prefer a virtual so that he is able to see the swelling

## 2024-11-21 NOTE — TELEPHONE ENCOUNTER
PT was discharged from [facility] on 11/17/24.  She was there for two weeks after a hospital discharge with Cellulitis.      She has symptoms with the left that is swollen, red and hot to the touch.  The other leg has blisters.    She is concerned and would like a very strong antibiotic that will help her to stay out of the hospital.      She would like a call back if something can be called in prior to the virtual I scheduled for tomorrow.

## 2024-11-22 ENCOUNTER — TELEMEDICINE (OUTPATIENT)
Dept: PRIMARY CARE | Facility: CLINIC | Age: 79
End: 2024-11-22
Payer: MEDICARE

## 2024-11-22 DIAGNOSIS — I27.20 PULMONARY HYPERTENSION, UNSPECIFIED (MULTI): ICD-10-CM

## 2024-11-22 DIAGNOSIS — E78.5 HYPERLIPIDEMIA ASSOCIATED WITH TYPE 2 DIABETES MELLITUS (MULTI): ICD-10-CM

## 2024-11-22 DIAGNOSIS — R60.0 PERIPHERAL EDEMA: ICD-10-CM

## 2024-11-22 DIAGNOSIS — E11.59 HYPERTENSION ASSOCIATED WITH DIABETES (MULTI): ICD-10-CM

## 2024-11-22 DIAGNOSIS — E66.01 MORBID OBESITY (MULTI): ICD-10-CM

## 2024-11-22 DIAGNOSIS — E11.69 HYPERLIPIDEMIA ASSOCIATED WITH TYPE 2 DIABETES MELLITUS (MULTI): ICD-10-CM

## 2024-11-22 DIAGNOSIS — I48.91 ATRIAL FIBRILLATION, UNSPECIFIED TYPE (MULTI): ICD-10-CM

## 2024-11-22 DIAGNOSIS — Z00.00 ENCOUNTER FOR MEDICARE ANNUAL WELLNESS EXAM: Primary | ICD-10-CM

## 2024-11-22 DIAGNOSIS — A49.9 BACTERIAL INFECTION: ICD-10-CM

## 2024-11-22 DIAGNOSIS — I15.2 HYPERTENSION ASSOCIATED WITH DIABETES (MULTI): ICD-10-CM

## 2024-11-22 PROCEDURE — G0439 PPPS, SUBSEQ VISIT: HCPCS | Performed by: EMERGENCY MEDICINE

## 2024-11-22 PROCEDURE — 1036F TOBACCO NON-USER: CPT | Performed by: EMERGENCY MEDICINE

## 2024-11-22 PROCEDURE — 99213 OFFICE O/P EST LOW 20 MIN: CPT | Performed by: EMERGENCY MEDICINE

## 2024-11-22 PROCEDURE — 1159F MED LIST DOCD IN RCRD: CPT | Performed by: EMERGENCY MEDICINE

## 2024-11-22 PROCEDURE — 99497 ADVNCD CARE PLAN 30 MIN: CPT | Performed by: EMERGENCY MEDICINE

## 2024-11-22 PROCEDURE — 1158F ADVNC CARE PLAN TLK DOCD: CPT | Performed by: EMERGENCY MEDICINE

## 2024-11-22 PROCEDURE — 1170F FXNL STATUS ASSESSED: CPT | Performed by: EMERGENCY MEDICINE

## 2024-11-22 PROCEDURE — 1123F ACP DISCUSS/DSCN MKR DOCD: CPT | Performed by: EMERGENCY MEDICINE

## 2024-11-22 RX ORDER — DOXYCYCLINE HYCLATE 100 MG
100 TABLET ORAL 2 TIMES DAILY
Qty: 20 TABLET | Refills: 0 | Status: SHIPPED | OUTPATIENT
Start: 2024-11-22 | End: 2024-12-02

## 2024-11-22 ASSESSMENT — ACTIVITIES OF DAILY LIVING (ADL)
MANAGING_FINANCES: NEEDS ASSISTANCE
GROCERY_SHOPPING: NEEDS ASSISTANCE
DRESSING: INDEPENDENT
GROCERY_SHOPPING: INDEPENDENT
TAKING_MEDICATION: INDEPENDENT
DOING_HOUSEWORK: INDEPENDENT
TAKING_MEDICATION: NEEDS ASSISTANCE
DOING_HOUSEWORK: NEEDS ASSISTANCE
BATHING: INDEPENDENT
MANAGING_FINANCES: INDEPENDENT

## 2024-11-22 ASSESSMENT — PATIENT HEALTH QUESTIONNAIRE - PHQ9
2. FEELING DOWN, DEPRESSED OR HOPELESS: NOT AT ALL
2. FEELING DOWN, DEPRESSED OR HOPELESS: NOT AT ALL
1. LITTLE INTEREST OR PLEASURE IN DOING THINGS: NOT AT ALL
SUM OF ALL RESPONSES TO PHQ9 QUESTIONS 1 AND 2: 0
1. LITTLE INTEREST OR PLEASURE IN DOING THINGS: NOT AT ALL
SUM OF ALL RESPONSES TO PHQ9 QUESTIONS 1 AND 2: 0

## 2024-11-22 NOTE — PROGRESS NOTES
Subjective   Patient ID: Ailyn Banegas is a 79 y.o. female who presents for a virtual follow-up and wellness visit    This visit was completed virtually due to the restrictions of the COVID-19 pandemic. All issues as below were discussed and addressed but no physical exam was performed. If it was felt that the patient should be evaluated in clinic or ER, then they were directed there. The patient verbally consented to visit.    Assessment/Plan   Patient presents for virtual follow up visit and medicare wellness    Peripheral Edema - Patient legs are hard and red, painful at the touch. Water blisters on the legs. She states that torsemide was helpful, we will recommend torsemide at 40 mg and aldactone at 100 mg.  Doxycycline prescribed for infection.  Patient does not wish to go to the hospital at this time.    Chronic respiratory failure - Currently on prednisone taper, torsemide, Symbicort, as needed albuterol.    History of MARLENY - Compliant with home NIV machine.    Hyperlipidemia - Patient is on Lipitor    Hypothyroidism - Patient is on Synthroid    Follow up in 3 months or as necessary    PH Q-9 depression screening was completed by authorized employee of the practice for 5-10 minutes and  explained the questionnaire and discussed the answers with the patient.     Alcohol screening was completed for 5 to 10 minutes     I discussed advanced care planning for more than 16 minutes including the explanation and discussion of advanced directives. Information and advise was also provided on DO NOT RESUSCITATE and patient encouraged to consider this  Patient is not sure about DNR at this time.      Edema      Patient presents for virtual follow up visit and medicare wellness    Patient has a home health aid and occupational therapist at home.    She is homebound and can only complete virtual visit. She was recently hospitalized for respiratory failure at [facility] from 5/20/24-5/25/24. After discharge home she was doing  better in regards to respiratory status, but noticed hematuria that started a couple days ago. Describes urine as malodorous and pink. Had samra in hospital. Denies any abd pain, fevers, chills, dysuria. States that she has a hx of constipation but does not recall hx of rectal hemorrhoids. Denies any visible vaginal masses or vaginal discharge. Has not noticed gross vaginal bleeding.     Discussed differentials such as hemorrhoids, UTI, vaginal lesions/mass, vaginal trauma, bladder etiology/cystitis.     Denies fevers, chills, weight loss, lightheadedness, dizziness, vision changes, sore throat, runny nose, CP, SOB, cough, palpitations, n/v/d, abd pain, black/bloody stools, arthralgias, or new numbness/weakness/tingling in arms/legs/face.          PHYSICAL EXAM   Patient was not physically examined as this visit was completed virtually.        REVIEW OF SYSTEMS   Comprehensive review of symptoms was not positive for any symptoms other than HPI.      No Known Allergies    Current Outpatient Medications   Medication Sig Dispense Refill    atorvastatin (Lipitor) 10 mg tablet Take 1 tablet (10 mg) by mouth once daily. 90 tablet 1    budesonide-formoteroL (Symbicort) 160-4.5 mcg/actuation inhaler Inhale 2 puffs 2 times a day. 10.2 g 3    dilTIAZem SR (Cardizem SR) 60 mg 12 hr capsule Take 1 capsule (60 mg) by mouth once daily. 90 capsule 1    furosemide (Lasix) 40 mg tablet Take 0.5 tablets (20 mg) by mouth once daily. 30 tablet 0    lancets (OneTouch Delica Lancets) 33 gauge misc Check sugar once a day 1 each 2    levothyroxine (Synthroid) 100 mcg tablet Take 1 tablet (100 mcg) by mouth once daily. 90 tablet 1    nystatin (Mycostatin) 100,000 unit/gram powder Apply topically 2 times a day. For Rash 60 g 1    nystatin (Mycostatin) 100,000 unit/gram powder Apply 1 Application topically 3 times a day. 60 g 1    pantoprazole (ProtoNix) 20 mg EC tablet Take 1 tablet (20 mg) by mouth 2 times a day. Do not crush, chew, or  split. 180 tablet 1    predniSONE (Deltasone) 10 mg tablet Take by mouth.      spironolactone (Aldactone) 50 mg tablet Take 2 tablets (100 mg) by mouth once daily. (Patient taking differently: Take 1 tablet (50 mg) by mouth once daily.) 60 tablet 2    albuterol 2.5 mg /3 mL (0.083 %) nebulizer solution Take 3 mL (2.5 mg) by nebulization 4 times a day as needed for wheezing or shortness of breath. 3 mL 1    torsemide (Demadex) 20 mg tablet Take 2 tablets (40 mg) by mouth once daily. (Patient not taking: Reported on 11/22/2024) 60 tablet 2     No current facility-administered medications for this visit.       Objective     No visits with results within 4 Month(s) from this visit.   Latest known visit with results is:   Orders Only on 07/31/2023   Component Date Value Ref Range Status    WBC, Urine 07/31/2023 <1  0 - 5 /HPF Final    RBC, Urine 07/31/2023 2  0 - 5 /HPF Final    Squamous Epithelial Cells, Urine 07/31/2023 <1  /HPF Final    Hyaline Casts, Urine 07/31/2023 OCC (A)  /LPF Final    Urine Culture 07/31/2023 MIXED URETHRAL BREANA.   Final       Radiology: Reviewed imaging in powerchart.  No results found.    No family history on file.  Social History     Socioeconomic History    Marital status:    Tobacco Use    Smoking status: Never    Smokeless tobacco: Never   Vaping Use    Vaping status: Never Used   Substance and Sexual Activity    Drug use: Not Currently     Past Medical History:   Diagnosis Date    Abnormal weight gain     Weight gain    Essential (primary) hypertension     HTN (hypertension), benign    Localized edema     Edema extremities    Old myocardial infarction     History of myocardial infarction    Old myocardial infarction 02/27/2017    History of non-ST elevation myocardial infarction (NSTEMI)    Other specified postprocedural states     History of repair of both hip joints    Other symptoms and signs involving the genitourinary system     Urinary problem    Pain due to internal  orthopedic prosthetic devices, implants and grafts, initial encounter (CMS-[facility])     Artificial joint pain    Personal history of other diseases of the circulatory system     History of hypertension    Personal history of other diseases of the musculoskeletal system and connective tissue     Personal history of gout    Personal history of other diseases of the musculoskeletal system and connective tissue     Personal history of arthritis    Personal history of other diseases of the musculoskeletal system and connective tissue     Personal history of fibromyalgia    Personal history of other diseases of the musculoskeletal system and connective tissue     History of muscle pain    Personal history of other diseases of the respiratory system 02/12/2018    History of chronic respiratory failure    Personal history of other endocrine, nutritional and metabolic disease     History of thyroid disease    Personal history of other endocrine, nutritional and metabolic disease     History of obesity    Personal history of other endocrine, nutritional and metabolic disease     History of high cholesterol    Personal history of other endocrine, nutritional and metabolic disease 02/28/2014    History of hypothyroidism    Personal history of other medical treatment     History of mammogram    Personal history of other specified conditions     H/O shortness of breath    Personal history of pneumonia (recurrent)     History of pneumonia    Presence of artificial hip joint, bilateral     Status post total replacement of both hips    Pure hypercholesterolemia, unspecified     High cholesterol    Unspecified disorder of nose and nasal sinuses     Sinus problem     Past Surgical History:   Procedure Laterality Date    CARDIAC CATHETERIZATION  01/25/2018    Cardiac Cath Procedure Outcome:    CARPAL TUNNEL RELEASE  02/27/2014    Neuroplasty Median Nerve At Carpal Tunnel    HIP SURGERY  03/03/2018    Hip Surgery    MOUTH SURGERY   04/14/2015    Oral Surgery Tooth Extraction    THYROID SURGERY  04/14/2015    Thyroid Surgery    TONSILLECTOMY  04/14/2015    Tonsillectomy    TOTAL HIP ARTHROPLASTY  01/25/2018    Hip Replacement       Charting was completed using voice recognition technology and may include unintended errors.

## 2024-12-10 ENCOUNTER — TELEPHONE (OUTPATIENT)
Dept: PRIMARY CARE | Facility: CLINIC | Age: 79
End: 2024-12-10
Payer: MEDICARE

## 2024-12-13 DIAGNOSIS — R60.9 EDEMA, UNSPECIFIED TYPE: ICD-10-CM

## 2024-12-13 RX ORDER — TORSEMIDE 20 MG/1
40 TABLET ORAL DAILY
Qty: 60 TABLET | Refills: 2 | Status: SHIPPED | OUTPATIENT
Start: 2024-12-13

## 2024-12-13 RX ORDER — SPIRONOLACTONE 50 MG/1
100 TABLET, FILM COATED ORAL DAILY
Qty: 60 TABLET | Refills: 2 | Status: SHIPPED | OUTPATIENT
Start: 2024-12-13

## 2024-12-17 ENCOUNTER — TELEPHONE (OUTPATIENT)
Dept: PRIMARY CARE | Facility: CLINIC | Age: 79
End: 2024-12-17
Payer: MEDICARE

## 2024-12-17 DIAGNOSIS — L03.90 CELLULITIS, UNSPECIFIED CELLULITIS SITE: Primary | ICD-10-CM

## 2024-12-17 RX ORDER — DOXYCYCLINE 100 MG/1
100 CAPSULE ORAL 2 TIMES DAILY
Qty: 20 CAPSULE | Refills: 0 | Status: SHIPPED | OUTPATIENT
Start: 2024-12-17 | End: 2024-12-27

## 2024-12-17 NOTE — TELEPHONE ENCOUNTER
Pt is not able to come in to take a look at legs, always has problems coming in. Legs are swollen and starting to blister again. There are small bubble and wants to prevent them from hurting. Last time Dr. Crawford prescribed doxycycline 100 mg 2 times daily and she claims it worked. Would like to know if you are able to refill this without an appt.  Please advice

## 2024-12-24 ENCOUNTER — PATIENT OUTREACH (OUTPATIENT)
Dept: CARE COORDINATION | Facility: CLINIC | Age: 79
End: 2024-12-24
Payer: MEDICARE

## 2024-12-27 DIAGNOSIS — F41.8 ANXIETY ASSOCIATED WITH DEPRESSION: ICD-10-CM

## 2024-12-27 RX ORDER — HYDROXYZINE PAMOATE 25 MG/1
25 CAPSULE ORAL 3 TIMES DAILY PRN
Qty: 12 CAPSULE | Refills: 0 | Status: SHIPPED | OUTPATIENT
Start: 2024-12-27

## 2024-12-27 RX ORDER — HYDROXYZINE PAMOATE 25 MG/1
25 CAPSULE ORAL 3 TIMES DAILY PRN
COMMUNITY
Start: 2024-07-18 | End: 2024-12-27 | Stop reason: SDUPTHER

## 2024-12-30 DIAGNOSIS — E03.9 HYPOTHYROIDISM, UNSPECIFIED TYPE: ICD-10-CM

## 2024-12-30 RX ORDER — LEVOTHYROXINE SODIUM 100 UG/1
100 TABLET ORAL DAILY
Qty: 90 TABLET | Refills: 3 | Status: SHIPPED | OUTPATIENT
Start: 2024-12-30

## 2025-02-10 DIAGNOSIS — Z00.00 ROUTINE GENERAL MEDICAL EXAMINATION AT A HEALTH CARE FACILITY: ICD-10-CM

## 2025-02-10 DIAGNOSIS — E78.5 HYPERLIPIDEMIA, UNSPECIFIED HYPERLIPIDEMIA TYPE: ICD-10-CM

## 2025-02-10 RX ORDER — DILTIAZEM HYDROCHLORIDE 60 MG/1
60 CAPSULE, EXTENDED RELEASE ORAL DAILY
Qty: 90 CAPSULE | Refills: 1 | Status: SHIPPED | OUTPATIENT
Start: 2025-02-10

## 2025-02-10 RX ORDER — ATORVASTATIN CALCIUM 10 MG/1
10 TABLET, FILM COATED ORAL DAILY
Qty: 90 TABLET | Refills: 1 | Status: SHIPPED | OUTPATIENT
Start: 2025-02-10

## 2025-02-24 ENCOUNTER — TELEPHONE (OUTPATIENT)
Dept: PRIMARY CARE | Facility: CLINIC | Age: 80
End: 2025-02-24
Payer: MEDICARE

## 2025-02-24 DIAGNOSIS — L03.90 CELLULITIS, UNSPECIFIED CELLULITIS SITE: ICD-10-CM

## 2025-02-24 NOTE — TELEPHONE ENCOUNTER
Pt is currently taking 50mg spironolactone and 20 torsemide and doubles as needed. Said legs are starting to blister. Mentioned you put her on doxy 100mg last time this happened and it helped her. Please advice if this can be prescribed

## 2025-02-26 ENCOUNTER — TELEPHONE (OUTPATIENT)
Dept: PRIMARY CARE | Facility: CLINIC | Age: 80
End: 2025-02-26
Payer: MEDICARE

## 2025-02-26 RX ORDER — DOXYCYCLINE 100 MG/1
100 CAPSULE ORAL 2 TIMES DAILY
Qty: 20 CAPSULE | Refills: 0 | Status: SHIPPED | OUTPATIENT
Start: 2025-02-26 | End: 2025-03-08

## 2025-04-11 ENCOUNTER — TELEPHONE (OUTPATIENT)
Dept: PRIMARY CARE | Facility: CLINIC | Age: 80
End: 2025-04-11
Payer: MEDICARE

## 2025-04-11 DIAGNOSIS — Z00.00 ROUTINE GENERAL MEDICAL EXAMINATION AT A HEALTH CARE FACILITY: ICD-10-CM

## 2025-04-11 NOTE — TELEPHONE ENCOUNTER
PATIENT CALLED AND STATED SHE IS HAVING SOME DENTAL WORK DONE AND IN THE PAST DR GALLARDO HAS PRESCRIBED MEDICATION FOR HER BEFORE DENTAL PROCEDURES. I ASKED HER IF IT WAS ANTIBIOTICS OR IF SHE KNEW THE NAME OF THE MEDICATION AND SHE STATED ITS NOT AN ANTIBIOTIC. I ASKED HER TO CALL HER DENTIST TO SEE IF HE COULD TELL HER WHAT MEDICINE IS NEEDED BUT SHE SAID DR GALLARDO SHOULD KNOW THIS ALREADY    PLEASE CALL PATIENT BACK

## 2025-04-15 RX ORDER — AMOXICILLIN 500 MG/1
500 CAPSULE ORAL 3 TIMES DAILY
Qty: 21 CAPSULE | Refills: 0 | Status: SHIPPED | OUTPATIENT
Start: 2025-04-15 | End: 2025-04-22

## 2025-04-16 DIAGNOSIS — L03.90 CELLULITIS, UNSPECIFIED CELLULITIS SITE: ICD-10-CM

## 2025-04-16 RX ORDER — DOXYCYCLINE 100 MG/1
100 CAPSULE ORAL 2 TIMES DAILY
Qty: 20 CAPSULE | Refills: 0 | Status: SHIPPED | OUTPATIENT
Start: 2025-04-16 | End: 2025-04-26

## 2025-04-16 NOTE — TELEPHONE ENCOUNTER
Pt called back saying that her dentist will hold off on procedure. You said that it was okay to refill doxy for her legs. Will send this to pharmacy. She is aware not to take amoxicillin since she will have doxy

## 2025-04-30 ENCOUNTER — TELEMEDICINE (OUTPATIENT)
Dept: PRIMARY CARE | Facility: CLINIC | Age: 80
End: 2025-04-30
Payer: MEDICARE

## 2025-04-30 VITALS — BODY MASS INDEX: 50.3 KG/M2 | HEIGHT: 62 IN

## 2025-04-30 DIAGNOSIS — A49.9 BACTERIAL INFECTION: Primary | ICD-10-CM

## 2025-04-30 DIAGNOSIS — J44.1 CHRONIC OBSTRUCTIVE PULMONARY DISEASE WITH (ACUTE) EXACERBATION (MULTI): ICD-10-CM

## 2025-04-30 DIAGNOSIS — I25.10 ATHEROSCLEROSIS OF NATIVE CORONARY ARTERY OF NATIVE HEART WITHOUT ANGINA PECTORIS: ICD-10-CM

## 2025-04-30 DIAGNOSIS — I47.20 VENTRICULAR TACHYCARDIA (MULTI): ICD-10-CM

## 2025-04-30 DIAGNOSIS — I15.2 HYPERTENSION ASSOCIATED WITH DIABETES: ICD-10-CM

## 2025-04-30 DIAGNOSIS — J44.9 CHRONIC OBSTRUCTIVE PULMONARY DISEASE, UNSPECIFIED COPD TYPE (MULTI): ICD-10-CM

## 2025-04-30 DIAGNOSIS — I27.20 PULMONARY HYPERTENSION, UNSPECIFIED (MULTI): ICD-10-CM

## 2025-04-30 DIAGNOSIS — E11.8 CONTROLLED TYPE 2 DIABETES MELLITUS WITH COMPLICATION, WITH LONG-TERM CURRENT USE OF INSULIN (MULTI): ICD-10-CM

## 2025-04-30 DIAGNOSIS — J96.22 ACUTE AND CHRONIC RESPIRATORY FAILURE WITH HYPERCAPNIA: ICD-10-CM

## 2025-04-30 DIAGNOSIS — Z79.4 CONTROLLED TYPE 2 DIABETES MELLITUS WITH COMPLICATION, WITH LONG-TERM CURRENT USE OF INSULIN (MULTI): ICD-10-CM

## 2025-04-30 DIAGNOSIS — E66.2 EXTREME OBESITY WITH ALVEOLAR HYPOVENTILATION (MULTI): ICD-10-CM

## 2025-04-30 DIAGNOSIS — I48.0 PAROXYSMAL ATRIAL FIBRILLATION (MULTI): ICD-10-CM

## 2025-04-30 DIAGNOSIS — J96.21 ACUTE ON CHRONIC RESPIRATORY FAILURE WITH HYPOXEMIA: ICD-10-CM

## 2025-04-30 DIAGNOSIS — E11.69 HYPERLIPIDEMIA ASSOCIATED WITH TYPE 2 DIABETES MELLITUS: ICD-10-CM

## 2025-04-30 DIAGNOSIS — E78.5 HYPERLIPIDEMIA ASSOCIATED WITH TYPE 2 DIABETES MELLITUS: ICD-10-CM

## 2025-04-30 DIAGNOSIS — I48.91 ATRIAL FIBRILLATION, UNSPECIFIED TYPE (MULTI): ICD-10-CM

## 2025-04-30 DIAGNOSIS — E11.59 HYPERTENSION ASSOCIATED WITH DIABETES: ICD-10-CM

## 2025-04-30 DIAGNOSIS — E66.01 MORBID OBESITY (MULTI): ICD-10-CM

## 2025-04-30 PROCEDURE — 99214 OFFICE O/P EST MOD 30 MIN: CPT | Performed by: EMERGENCY MEDICINE

## 2025-04-30 PROCEDURE — 1159F MED LIST DOCD IN RCRD: CPT | Performed by: EMERGENCY MEDICINE

## 2025-04-30 PROCEDURE — 1036F TOBACCO NON-USER: CPT | Performed by: EMERGENCY MEDICINE

## 2025-04-30 RX ORDER — MULTIVIT,IRON,MINERALS/LUTEIN
1 TABLET ORAL DAILY
COMMUNITY

## 2025-04-30 RX ORDER — MULTIVIT-MIN/FA/LYCOPEN/LUTEIN .4-300-25
1 TABLET ORAL DAILY
COMMUNITY
Start: 2015-04-14

## 2025-04-30 RX ORDER — DOXYCYCLINE HYCLATE 50 MG/1
50 TABLET, FILM COATED ORAL 2 TIMES DAILY
Qty: 20 TABLET | Refills: 2 | Status: SHIPPED | OUTPATIENT
Start: 2025-04-30 | End: 2025-05-30

## 2025-04-30 RX ORDER — ACETAMINOPHEN 325 MG/1
TABLET ORAL EVERY 4 HOURS PRN
COMMUNITY
Start: 2022-09-19

## 2025-04-30 ASSESSMENT — PATIENT HEALTH QUESTIONNAIRE - PHQ9
1. LITTLE INTEREST OR PLEASURE IN DOING THINGS: NOT AT ALL
2. FEELING DOWN, DEPRESSED OR HOPELESS: NOT AT ALL
SUM OF ALL RESPONSES TO PHQ9 QUESTIONS 1 AND 2: 0

## 2025-04-30 NOTE — PROGRESS NOTES
Subjective   Patient ID: Ailyn Banegas is a 80 y.o. female who presents for a virtual follow-up and wellness visit    This visit was completed virtually due to the restrictions of the COVID-19 pandemic. All issues as below were discussed and addressed but no physical exam was performed. If it was felt that the patient should be evaluated in clinic or ER, then they were directed there. The patient verbally consented to visit.    Assessment/Plan   Patient presents for virtual follow up visit and medicare wellness    Peripheral Edema - Patient legs are hard and red, painful at the touch. Water blisters on the legs. She states that torsemide was helpful, we will recommend torsemide at 40 mg and aldactone at 100 mg.  Doxycycline prescribed for infection.  Patient does not wish to go to the hospital at this time.    Decub ulcers-patient wears diapers and this is secondary to exposure to urine.  Counseled them to keep the area clean and dry as much as possible    Chronic respiratory failure - Currently on prednisone taper, torsemide, Symbicort, as needed albuterol.    History of MARLENY - Compliant with home NIV machine.    Hyperlipidemia - Patient is on Lipitor    Hypothyroidism - Patient is on Synthroid      Will order home health-nursing and STN 8/8    Follow up in 3 months or as necessary        Patient presents for virtual follow up visit and medicare wellness    Patient has a home health aid and occupational therapist at home.    She is homebound and can only complete virtual visit. She was recently hospitalized for respiratory failure at [facility] from 5/20/24-5/25/24. After discharge home she was doing better in regards to respiratory status, but noticed hematuria that started a couple days ago. Describes urine as malodorous and pink. Had tariqck in hospital. Denies any abd pain, fevers, chills, dysuria. States that she has a hx of constipation but does not recall hx of rectal hemorrhoids. Denies any visible vaginal  masses or vaginal discharge. Has not noticed gross vaginal bleeding.     Discussed differentials such as hemorrhoids, UTI, vaginal lesions/mass, vaginal trauma, bladder etiology/cystitis.     Denies fevers, chills, weight loss, lightheadedness, dizziness, vision changes, sore throat, runny nose, CP, SOB, cough, palpitations, n/v/d, abd pain, black/bloody stools, arthralgias, or new numbness/weakness/tingling in arms/legs/face.          PHYSICAL EXAM   Patient was not physically examined as this visit was completed virtually.        REVIEW OF SYSTEMS   Comprehensive review of symptoms was not positive for any symptoms other than HPI.      No Known Allergies    Current Outpatient Medications   Medication Sig Dispense Refill    acetaminophen (Tylenol) 325 mg tablet Take by mouth every 4 hours if needed.      albuterol 2.5 mg /3 mL (0.083 %) nebulizer solution Take 3 mL (2.5 mg) by nebulization 4 times a day as needed for wheezing or shortness of breath. 3 mL 1    atorvastatin (Lipitor) 10 mg tablet Take 1 tablet (10 mg) by mouth once daily. 90 tablet 1    budesonide-formoteroL (Symbicort) 160-4.5 mcg/actuation inhaler Inhale 2 puffs 2 times a day. (Patient taking differently: Inhale 2 puffs if needed.) 10.2 g 3    doxycycline (Vibramycin) 100 mg capsule Take 1 capsule (100 mg) by mouth 2 times a day for 10 days. Take with at least 8 ounces (large glass) of water, do not lie down for 30 minutes after 20 capsule 0    furosemide (Lasix) 40 mg tablet Take 0.5 tablets (20 mg) by mouth once daily. 30 tablet 0    hydrOXYzine pamoate (Vistaril) 25 mg capsule Take 1 capsule (25 mg) by mouth 3 times a day as needed for anxiety. 12 capsule 0    levothyroxine (Synthroid) 100 mcg tablet Take 1 tablet (100 mcg) by mouth once daily. 90 tablet 3    multivit-min-iron-FA-vit K-lut (Centrum Silver Women) 8 mg iron-400 mcg-50 mcg tablet Take 1 tablet by mouth once daily.      multivitamin with minerals iron-free (Centrum Silver) Take 1  tablet by mouth once daily.      nystatin (Mycostatin) 100,000 unit/gram powder Apply topically 2 times a day. For Rash 60 g 1    pantoprazole (ProtoNix) 20 mg EC tablet Take 1 tablet (20 mg) by mouth 2 times a day. Do not crush, chew, or split. (Patient taking differently: Take 1 tablet (20 mg) by mouth if needed. Do not crush, chew, or split.) 180 tablet 1    spironolactone (Aldactone) 50 mg tablet Take 2 tablets (100 mg) by mouth once daily. 60 tablet 2    torsemide (Demadex) 20 mg tablet Take 2 tablets (40 mg) by mouth once daily. 60 tablet 2    dilTIAZem SR (Cardizem SR) 60 mg 12 hr capsule Take 1 capsule (60 mg) by mouth once daily. 90 capsule 1    lancets (OneTouch Delica Lancets) 33 gauge misc Check sugar once a day (Patient not taking: Reported on 4/30/2025) 1 each 2    nystatin (Mycostatin) 100,000 unit/gram powder Apply 1 Application topically 3 times a day. (Patient not taking: Reported on 4/30/2025) 60 g 1    predniSONE (Deltasone) 10 mg tablet Take by mouth. (Patient not taking: Reported on 4/30/2025)       No current facility-administered medications for this visit.       Objective     No visits with results within 4 Month(s) from this visit.   Latest known visit with results is:   Orders Only on 07/31/2023   Component Date Value Ref Range Status    WBC, Urine 07/31/2023 <1  0 - 5 /HPF Final    RBC, Urine 07/31/2023 2  0 - 5 /HPF Final    Squamous Epithelial Cells, Urine 07/31/2023 <1  /HPF Final    Hyaline Casts, Urine 07/31/2023 OCC (A)  /LPF Final    Urine Culture 07/31/2023 MIXED URETHRAL BREANA.   Final       Radiology: Reviewed imaging in powerchart.  No results found.    No family history on file.  Social History     Socioeconomic History    Marital status:    Tobacco Use    Smoking status: Never    Smokeless tobacco: Never   Vaping Use    Vaping status: Never Used   Substance and Sexual Activity    Alcohol use: Not Currently    Drug use: Not Currently     Past Medical History:    Diagnosis Date    Abnormal weight gain     Weight gain    Essential (primary) hypertension     HTN (hypertension), benign    Localized edema     Edema extremities    Old myocardial infarction     History of myocardial infarction    Old myocardial infarction 02/27/2017    History of non-ST elevation myocardial infarction (NSTEMI)    Other specified postprocedural states     History of repair of both hip joints    Other symptoms and signs involving the genitourinary system     Urinary problem    Pain due to internal orthopedic prosthetic devices, implants and grafts, initial encounter     Artificial joint pain    Personal history of other diseases of the circulatory system     History of hypertension    Personal history of other diseases of the musculoskeletal system and connective tissue     Personal history of gout    Personal history of other diseases of the musculoskeletal system and connective tissue     Personal history of arthritis    Personal history of other diseases of the musculoskeletal system and connective tissue     Personal history of fibromyalgia    Personal history of other diseases of the musculoskeletal system and connective tissue     History of muscle pain    Personal history of other diseases of the respiratory system 02/12/2018    History of chronic respiratory failure    Personal history of other endocrine, nutritional and metabolic disease     History of thyroid disease    Personal history of other endocrine, nutritional and metabolic disease     History of obesity    Personal history of other endocrine, nutritional and metabolic disease     History of high cholesterol    Personal history of other endocrine, nutritional and metabolic disease 02/28/2014    History of hypothyroidism    Personal history of other medical treatment     History of mammogram    Personal history of other specified conditions     H/O shortness of breath    Personal history of pneumonia (recurrent)     History of  pneumonia    Presence of artificial hip joint, bilateral     Status post total replacement of both hips    Pure hypercholesterolemia, unspecified     High cholesterol    Unspecified disorder of nose and nasal sinuses     Sinus problem     Past Surgical History:   Procedure Laterality Date    CARDIAC CATHETERIZATION  01/25/2018    Cardiac Cath Procedure Outcome:    CARPAL TUNNEL RELEASE  02/27/2014    Neuroplasty Median Nerve At Carpal Tunnel    HIP SURGERY  03/03/2018    Hip Surgery    MOUTH SURGERY  04/14/2015    Oral Surgery Tooth Extraction    THYROID SURGERY  04/14/2015    Thyroid Surgery    TONSILLECTOMY  04/14/2015    Tonsillectomy    TOTAL HIP ARTHROPLASTY  01/25/2018    Hip Replacement       Charting was completed using voice recognition technology and may include unintended errors.

## 2025-05-01 ENCOUNTER — TELEPHONE (OUTPATIENT)
Dept: PRIMARY CARE | Facility: CLINIC | Age: 80
End: 2025-05-01
Payer: MEDICARE

## 2025-05-01 DIAGNOSIS — L03.90 CELLULITIS, UNSPECIFIED CELLULITIS SITE: ICD-10-CM

## 2025-05-01 NOTE — TELEPHONE ENCOUNTER
Pt is asking for a refill on lasix but you only prescribed it for 10 days. She said they've been working but still has a bit of swelling. She wants to know if you was her to decrease the dosage or make any changes to it. She wanted me to ask you before making an appt.   
No

## 2025-05-01 NOTE — TELEPHONE ENCOUNTER
Patient was prescribed doxycycline 50 mg, 2 times a day. Normally patient was taking 100 mg 2 times a day, according to patient this was helping her. She would like to know if it was an error being prescribed 50 mg 2 times a day or is that was she is supposed to take? Please advise.

## 2025-05-02 DIAGNOSIS — L03.90 CELLULITIS, UNSPECIFIED CELLULITIS SITE: ICD-10-CM

## 2025-05-02 RX ORDER — DOXYCYCLINE 100 MG/1
100 CAPSULE ORAL 2 TIMES DAILY
Qty: 20 CAPSULE | Refills: 0 | Status: CANCELLED | OUTPATIENT
Start: 2025-05-02 | End: 2025-05-12

## 2025-05-02 RX ORDER — DOXYCYCLINE 100 MG/1
100 CAPSULE ORAL 2 TIMES DAILY
Qty: 20 CAPSULE | Refills: 2 | Status: SHIPPED | OUTPATIENT
Start: 2025-05-02 | End: 2025-06-01

## 2025-05-09 ENCOUNTER — TELEPHONE (OUTPATIENT)
Dept: PRIMARY CARE | Facility: CLINIC | Age: 80
End: 2025-05-09
Payer: MEDICARE

## 2025-05-09 DIAGNOSIS — R26.2 DIFFICULTY IN WALKING: ICD-10-CM

## 2025-05-09 DIAGNOSIS — R60.0 PERIPHERAL EDEMA: ICD-10-CM

## 2025-05-09 DIAGNOSIS — L89.90 PRESSURE INJURY OF SKIN, UNSPECIFIED INJURY STAGE, UNSPECIFIED LOCATION: ICD-10-CM

## 2025-05-12 ENCOUNTER — TELEPHONE (OUTPATIENT)
Dept: HOME HEALTH SERVICES | Facility: HOME HEALTH | Age: 80
End: 2025-05-12
Payer: MEDICARE

## 2025-05-12 ENCOUNTER — DOCUMENTATION (OUTPATIENT)
Dept: HOME HEALTH SERVICES | Facility: HOME HEALTH | Age: 80
End: 2025-05-12
Payer: MEDICARE

## 2025-05-12 ENCOUNTER — HOME HEALTH ADMISSION (OUTPATIENT)
Dept: HOME HEALTH SERVICES | Facility: HOME HEALTH | Age: 80
End: 2025-05-12
Payer: MEDICARE

## 2025-05-12 NOTE — TELEPHONE ENCOUNTER
Verified with patient - they want to go through  for home health. There is no other agency that they are going through.

## 2025-05-12 NOTE — HH CARE COORDINATION
Home Care received a Referral for Nursing, Physical Therapy, and Occupational Therapy. We have processed the referral for a Start of Care on 5.13-5.14.25.     If you have any questions or concerns, please feel free to contact us at [phone]. Follow the prompts, enter your five digit zip code, and you will be directed to your care team on [facility].

## 2025-05-12 NOTE — TELEPHONE ENCOUNTER
Dr. Crawford,  home care received your referral for this patient.  Per your note of 4.30.25 it states patient is active with OT and HHA in the home which would indicate the patient currently has home care from another agency.  Please confirm there is no other agency in the home. If not, then [facility] will proceed with accepting the referral.  We will wait for your response.  Thank you.

## 2025-05-15 ENCOUNTER — HOME CARE VISIT (OUTPATIENT)
Dept: HOME HEALTH SERVICES | Facility: HOME HEALTH | Age: 80
End: 2025-05-15
Payer: MEDICARE

## 2025-05-15 VITALS
HEART RATE: 90 BPM | OXYGEN SATURATION: 93 % | DIASTOLIC BLOOD PRESSURE: 60 MMHG | TEMPERATURE: 98.5 F | SYSTOLIC BLOOD PRESSURE: 110 MMHG | RESPIRATION RATE: 18 BRPM

## 2025-05-15 PROCEDURE — G0299 HHS/HOSPICE OF RN EA 15 MIN: HCPCS

## 2025-05-15 SDOH — ECONOMIC STABILITY: HOUSING INSECURITY: EVIDENCE OF SMOKING MATERIAL: 0

## 2025-05-15 SDOH — HEALTH STABILITY: MENTAL HEALTH: SMOKING IN HOME: 0

## 2025-05-15 ASSESSMENT — ENCOUNTER SYMPTOMS
LOWER EXTREMITY EDEMA: 1
LIMITED RANGE OF MOTION: 1
HIGHEST PAIN SEVERITY IN PAST 24 HOURS: 0/10
LOWEST PAIN SEVERITY IN PAST 24 HOURS: 0/10
PAIN SEVERITY GOAL: 0/10
MUSCLE WEAKNESS: 1
APPETITE LEVEL: FAIR

## 2025-05-15 ASSESSMENT — ACTIVITIES OF DAILY LIVING (ADL)
OASIS_M1830: 04
ENTERING_EXITING_HOME: STAND BY ASSIST

## 2025-05-15 ASSESSMENT — PAIN SCALES - PAIN ASSESSMENT IN ADVANCED DEMENTIA (PAINAD): BREATHING: 2

## 2025-05-16 ENCOUNTER — HOME CARE VISIT (OUTPATIENT)
Dept: HOME HEALTH SERVICES | Facility: HOME HEALTH | Age: 80
End: 2025-05-16
Payer: MEDICARE

## 2025-05-16 PROCEDURE — G0156 HHCP-SVS OF AIDE,EA 15 MIN: HCPCS

## 2025-05-16 PROCEDURE — G0152 HHCP-SERV OF OT,EA 15 MIN: HCPCS

## 2025-05-16 PROCEDURE — G0151 HHCP-SERV OF PT,EA 15 MIN: HCPCS

## 2025-05-16 SDOH — HEALTH STABILITY: MENTAL HEALTH: SMOKING IN HOME: 0

## 2025-05-16 SDOH — ECONOMIC STABILITY: HOUSING INSECURITY: EVIDENCE OF SMOKING MATERIAL: 0

## 2025-05-16 ASSESSMENT — ENCOUNTER SYMPTOMS
HIGHEST PAIN SEVERITY IN PAST 24 HOURS: 10/10
LOWEST PAIN SEVERITY IN PAST 24 HOURS: 4/10
PAIN: 1
PAIN LOCATION - PAIN SEVERITY: 4/10
PAIN LOCATION - PAIN SEVERITY: 4/10
PAIN LOCATION: LEFT LEG
MUSCLE WEAKNESS: 1
PERSON REPORTING PAIN: PATIENT
SUBJECTIVE PAIN PROGRESSION: WAXING AND WANING
PAIN LOCATION: RIGHT LEG
PAIN LOCATION - PAIN SEVERITY: 4/10
OCCASIONAL FEELINGS OF UNSTEADINESS: 1
PERSON REPORTING PAIN: PATIENT
PAIN: 1

## 2025-05-16 ASSESSMENT — ACTIVITIES OF DAILY LIVING (ADL)
DRESSING_UB_CURRENT_FUNCTION: MINIMUM ASSIST
TOILETING: MAXIMUM ASSIST
TOILETING: 1
LAUNDRY ASSESSED: 1
GROOMING ASSESSED: 1
PHYSICAL TRANSFERS ASSESSED: 1
BATHING_CURRENT_FUNCTION: MAXIMUM ASSIST
DRESSING_LB_CURRENT_FUNCTION: MAXIMUM ASSIST
CURRENT_FUNCTION: MODERATE ASSIST
BATHING ASSESSED: 1
PREPARING MEALS: DEPENDENT
FEEDING ASSESSED: 1
AMBULATION ASSISTANCE: NON-AMBULATORY
GROOMING_CURRENT_FUNCTION: SUPERVISION
FEEDING: INDEPENDENT
LAUNDRY: DEPENDENT

## 2025-05-19 ENCOUNTER — HOME CARE VISIT (OUTPATIENT)
Dept: HOME HEALTH SERVICES | Facility: HOME HEALTH | Age: 80
End: 2025-05-19
Payer: MEDICARE

## 2025-05-19 VITALS
OXYGEN SATURATION: 90 % | TEMPERATURE: 96.8 F | SYSTOLIC BLOOD PRESSURE: 110 MMHG | HEART RATE: 87 BPM | DIASTOLIC BLOOD PRESSURE: 67 MMHG

## 2025-05-19 PROCEDURE — G0158 HHC OT ASSISTANT EA 15: HCPCS | Mod: CO

## 2025-05-19 SDOH — HEALTH STABILITY: MENTAL HEALTH: SMOKING IN HOME: 0

## 2025-05-19 SDOH — ECONOMIC STABILITY: HOUSING INSECURITY: EVIDENCE OF SMOKING MATERIAL: 0

## 2025-05-19 ASSESSMENT — ENCOUNTER SYMPTOMS
LOWEST PAIN SEVERITY IN PAST 24 HOURS: 2/10
PERSON REPORTING PAIN: PATIENT
PAIN LOCATION: LEFT LEG
PAIN: 1
HIGHEST PAIN SEVERITY IN PAST 24 HOURS: 5/10
PAIN LOCATION: RIGHT LEG

## 2025-05-20 ENCOUNTER — HOME CARE VISIT (OUTPATIENT)
Dept: HOME HEALTH SERVICES | Facility: HOME HEALTH | Age: 80
End: 2025-05-20
Payer: MEDICARE

## 2025-05-20 VITALS — DIASTOLIC BLOOD PRESSURE: 68 MMHG | SYSTOLIC BLOOD PRESSURE: 124 MMHG

## 2025-05-20 PROCEDURE — G0151 HHCP-SERV OF PT,EA 15 MIN: HCPCS

## 2025-05-20 ASSESSMENT — ENCOUNTER SYMPTOMS
PAIN LOCATION: LEFT LEG
PAIN: 1
PAIN LOCATION: RIGHT LEG
PERSON REPORTING PAIN: PATIENT

## 2025-05-21 ENCOUNTER — TELEPHONE (OUTPATIENT)
Dept: PRIMARY CARE | Facility: CLINIC | Age: 80
End: 2025-05-21
Payer: MEDICARE

## 2025-05-21 ENCOUNTER — HOME CARE VISIT (OUTPATIENT)
Dept: HOME HEALTH SERVICES | Facility: HOME HEALTH | Age: 80
End: 2025-05-21
Payer: MEDICARE

## 2025-05-21 VITALS
RESPIRATION RATE: 18 BRPM | HEART RATE: 81 BPM | DIASTOLIC BLOOD PRESSURE: 72 MMHG | SYSTOLIC BLOOD PRESSURE: 122 MMHG | TEMPERATURE: 97.7 F | OXYGEN SATURATION: 95 %

## 2025-05-21 DIAGNOSIS — J44.1 CHRONIC OBSTRUCTIVE PULMONARY DISEASE WITH (ACUTE) EXACERBATION (MULTI): ICD-10-CM

## 2025-05-21 DIAGNOSIS — E78.5 DYSLIPIDEMIA: ICD-10-CM

## 2025-05-21 DIAGNOSIS — D64.9 ANEMIA, UNSPECIFIED TYPE: Primary | ICD-10-CM

## 2025-05-21 DIAGNOSIS — E03.9 HYPOTHYROIDISM, UNSPECIFIED TYPE: ICD-10-CM

## 2025-05-21 DIAGNOSIS — R73.02 IGT (IMPAIRED GLUCOSE TOLERANCE): ICD-10-CM

## 2025-05-21 DIAGNOSIS — Z00.00 ROUTINE GENERAL MEDICAL EXAMINATION AT A HEALTH CARE FACILITY: ICD-10-CM

## 2025-05-21 PROCEDURE — G0299 HHS/HOSPICE OF RN EA 15 MIN: HCPCS

## 2025-05-21 SDOH — ECONOMIC STABILITY: HOUSING INSECURITY: EVIDENCE OF SMOKING MATERIAL: 0

## 2025-05-21 SDOH — HEALTH STABILITY: MENTAL HEALTH: SMOKING IN HOME: 0

## 2025-05-21 SDOH — ECONOMIC STABILITY: GENERAL

## 2025-05-21 ASSESSMENT — ENCOUNTER SYMPTOMS
LOWEST PAIN SEVERITY IN PAST 24 HOURS: 4/10
SUBJECTIVE PAIN PROGRESSION: UNCHANGED
MUSCLE WEAKNESS: 1
LOSS OF SENSATION IN FEET: 1
PAIN: 1
DEPRESSION: 0
PAIN SEVERITY GOAL: 0/10
STOOL FREQUENCY: DAILY
FATIGUES EASILY: 1
PAIN LOCATION: LEFT KNEE
SHORTNESS OF BREATH: 1
PERSON REPORTING PAIN: PATIENT
PAIN LOCATION - PAIN FREQUENCY: INTERMITTENT
PAIN LOCATION - PAIN QUALITY: ACHES
LAST BOWEL MOVEMENT: 67346
BOWEL PATTERN NORMAL: 1
LOWER EXTREMITY EDEMA: 1
OCCASIONAL FEELINGS OF UNSTEADINESS: 1
HIGHEST PAIN SEVERITY IN PAST 24 HOURS: 10/10
DYSPNEA ACTIVITY LEVEL: AFTER AMBULATING LESS THAN 10 FT
PAIN LOCATION - PAIN SEVERITY: 7/10

## 2025-05-21 ASSESSMENT — PAIN SCALES - PAIN ASSESSMENT IN ADVANCED DEMENTIA (PAINAD)
NEGVOCALIZATION: 1 - OCCASIONAL MOAN OR GROAN. LOW-LEVEL SPEECH WITH A NEGATIVE OR DISAPPROVING QUALITY.
FACIALEXPRESSION: 1
BREATHING: 1
BODYLANGUAGE: 1 - TENSE. DISTRESSED PACING. FIDGETING.
FACIALEXPRESSION: 1 - SAD. FRIGHTENED. FROWN.
CONSOLABILITY: 1
BODYLANGUAGE: 1
TOTALSCORE: 5
CONSOLABILITY: 1 - DISTRACTED OR REASSURED BY VOICE OR TOUCH.
NEGVOCALIZATION: 1

## 2025-05-21 ASSESSMENT — ACTIVITIES OF DAILY LIVING (ADL)
CURRENT_FUNCTION: STAND BY ASSIST
PHYSICAL TRANSFERS ASSESSED: 1
MONEY MANAGEMENT (EXPENSES/BILLS): NEEDS ASSISTANCE

## 2025-05-21 NOTE — HOME HEALTH
Pt doing well. VSS. Legs remain edematous, no open areas. Teaching regarding elevation and compression done. Pt encouraged to stand once and hour and to take some steps with husbands assist.

## 2025-05-21 NOTE — TELEPHONE ENCOUNTER
Shruthi RN Case manager for  Home health called. She wanted to know if you are agreeable to prescribing an albuetrol rescue inhaler for patient. Especially for when she is SOB and not near nebulizer. Please advise.      Shruthi [phone]

## 2025-05-22 ENCOUNTER — HOME CARE VISIT (OUTPATIENT)
Dept: HOME HEALTH SERVICES | Facility: HOME HEALTH | Age: 80
End: 2025-05-22
Payer: MEDICARE

## 2025-05-22 VITALS — SYSTOLIC BLOOD PRESSURE: 135 MMHG | DIASTOLIC BLOOD PRESSURE: 78 MMHG | HEART RATE: 88 BPM | OXYGEN SATURATION: 92 %

## 2025-05-22 PROCEDURE — G0158 HHC OT ASSISTANT EA 15: HCPCS | Mod: CO

## 2025-05-22 SDOH — ECONOMIC STABILITY: HOUSING INSECURITY: HOME SAFETY: NO SMOKING SIGN PROVIDED FOR PLACEMENT ON THE DOOR.

## 2025-05-22 SDOH — HEALTH STABILITY: MENTAL HEALTH: SMOKING IN HOME: 0

## 2025-05-22 SDOH — ECONOMIC STABILITY: HOUSING INSECURITY: EVIDENCE OF SMOKING MATERIAL: 0

## 2025-05-22 ASSESSMENT — ENCOUNTER SYMPTOMS
PERSON REPORTING PAIN: PATIENT
PAIN: 1
HIGHEST PAIN SEVERITY IN PAST 24 HOURS: 7/10
PAIN LOCATION: GENERALIZED

## 2025-05-22 NOTE — TELEPHONE ENCOUNTER
Patient called and left a voicemail asking about having blood work done to check her kidney function by the  nurse. Called KEL[facility] for clarification. Shruthi said that patient told her normally she gets blood work before her next visit with you. And she wanted to know if the  nurse could do her labs. If you are agreeable to placing some labs for patient we can fax them to [facility] and they will be done in the next few weeks before she is discharged.     Fax- [phone]

## 2025-05-23 ENCOUNTER — HOME CARE VISIT (OUTPATIENT)
Dept: HOME HEALTH SERVICES | Facility: HOME HEALTH | Age: 80
End: 2025-05-23
Payer: MEDICARE

## 2025-05-23 VITALS — HEART RATE: 91 BPM | OXYGEN SATURATION: 87 %

## 2025-05-23 PROCEDURE — G0156 HHCP-SVS OF AIDE,EA 15 MIN: HCPCS

## 2025-05-23 PROCEDURE — G0151 HHCP-SERV OF PT,EA 15 MIN: HCPCS

## 2025-05-23 RX ORDER — ALBUTEROL SULFATE 90 UG/1
2 INHALANT RESPIRATORY (INHALATION) EVERY 4 HOURS PRN
Qty: 18 G | Refills: 2 | Status: SHIPPED | OUTPATIENT
Start: 2025-05-23 | End: 2026-05-23

## 2025-05-23 ASSESSMENT — ENCOUNTER SYMPTOMS
SUBJECTIVE PAIN PROGRESSION: WAXING AND WANING
PAIN: 1
PERSON REPORTING PAIN: PATIENT
PAIN LOCATION: LEFT KNEE
PAIN LOCATION - PAIN SEVERITY: 10/10
HIGHEST PAIN SEVERITY IN PAST 24 HOURS: 10/10

## 2025-05-24 ENCOUNTER — HOME CARE VISIT (OUTPATIENT)
Dept: HOME HEALTH SERVICES | Facility: HOME HEALTH | Age: 80
End: 2025-05-24
Payer: MEDICARE

## 2025-06-19 ENCOUNTER — TELEPHONE (OUTPATIENT)
Dept: PRIMARY CARE | Facility: CLINIC | Age: 80
End: 2025-06-19
Payer: MEDICARE

## 2025-06-22 ENCOUNTER — APPOINTMENT (OUTPATIENT)
Dept: RADIOLOGY | Facility: HOSPITAL | Age: 80
DRG: 208 | End: 2025-06-22
Payer: MEDICARE

## 2025-06-22 ENCOUNTER — APPOINTMENT (OUTPATIENT)
Dept: CARDIOLOGY | Facility: HOSPITAL | Age: 80
DRG: 208 | End: 2025-06-22
Payer: MEDICARE

## 2025-06-22 ENCOUNTER — HOSPITAL ENCOUNTER (INPATIENT)
Facility: HOSPITAL | Age: 80
End: 2025-06-22
Attending: EMERGENCY MEDICINE | Admitting: INTERNAL MEDICINE
Payer: MEDICARE

## 2025-06-22 DIAGNOSIS — K21.9 GASTROESOPHAGEAL REFLUX DISEASE, UNSPECIFIED WHETHER ESOPHAGITIS PRESENT: ICD-10-CM

## 2025-06-22 DIAGNOSIS — E03.9 HYPOTHYROIDISM, UNSPECIFIED TYPE: ICD-10-CM

## 2025-06-22 DIAGNOSIS — I50.20 UNSPECIFIED SYSTOLIC (CONGESTIVE) HEART FAILURE: ICD-10-CM

## 2025-06-22 DIAGNOSIS — E78.5 HYPERLIPIDEMIA, UNSPECIFIED HYPERLIPIDEMIA TYPE: ICD-10-CM

## 2025-06-22 DIAGNOSIS — J96.02 ACUTE RESPIRATORY FAILURE WITH HYPOXIA AND HYPERCAPNIA: Primary | ICD-10-CM

## 2025-06-22 DIAGNOSIS — J96.21 ACUTE AND CHRONIC RESPIRATORY FAILURE WITH HYPOXIA: ICD-10-CM

## 2025-06-22 DIAGNOSIS — J44.1 COPD EXACERBATION (MULTI): ICD-10-CM

## 2025-06-22 DIAGNOSIS — E66.01 MORBID OBESITY (MULTI): ICD-10-CM

## 2025-06-22 DIAGNOSIS — I50.30 UNSPECIFIED DIASTOLIC (CONGESTIVE) HEART FAILURE: ICD-10-CM

## 2025-06-22 DIAGNOSIS — J96.01 ACUTE RESPIRATORY FAILURE WITH HYPOXIA AND HYPERCAPNIA: Primary | ICD-10-CM

## 2025-06-22 DIAGNOSIS — R60.9 EDEMA, UNSPECIFIED TYPE: ICD-10-CM

## 2025-06-22 DIAGNOSIS — F41.8 ANXIETY ASSOCIATED WITH DEPRESSION: ICD-10-CM

## 2025-06-22 DIAGNOSIS — Z00.00 ROUTINE GENERAL MEDICAL EXAMINATION AT A HEALTH CARE FACILITY: ICD-10-CM

## 2025-06-22 DIAGNOSIS — I38 HEART FAILURE DUE TO VALVULAR DISEASE, ACUTE, DIASTOLIC: ICD-10-CM

## 2025-06-22 DIAGNOSIS — R26.2 DIFFICULTY IN WALKING, NOT ELSEWHERE CLASSIFIED: ICD-10-CM

## 2025-06-22 DIAGNOSIS — J44.1 CHRONIC OBSTRUCTIVE PULMONARY DISEASE WITH (ACUTE) EXACERBATION (MULTI): ICD-10-CM

## 2025-06-22 DIAGNOSIS — R05.9 COUGH, UNSPECIFIED TYPE: ICD-10-CM

## 2025-06-22 DIAGNOSIS — I50.31 HEART FAILURE DUE TO VALVULAR DISEASE, ACUTE, DIASTOLIC: ICD-10-CM

## 2025-06-22 DIAGNOSIS — I50.9 ACUTE ON CHRONIC CONGESTIVE HEART FAILURE, UNSPECIFIED HEART FAILURE TYPE: ICD-10-CM

## 2025-06-22 LAB
ALBUMIN SERPL BCP-MCNC: 4.1 G/DL (ref 3.4–5)
ALP SERPL-CCNC: 90 U/L (ref 33–136)
ALT SERPL W P-5'-P-CCNC: 17 U/L (ref 7–45)
ANION GAP BLDV CALCULATED.4IONS-SCNC: ABNORMAL MMOL/L
ANION GAP BLDV CALCULATED.4IONS-SCNC: ABNORMAL MMOL/L
ANION GAP SERPL CALC-SCNC: 9 MMOL/L (ref 10–20)
APPARATUS: ABNORMAL
APPARATUS: ABNORMAL
ARTERIAL PATENCY WRIST A: POSITIVE
ARTERIAL PATENCY WRIST A: POSITIVE
AST SERPL W P-5'-P-CCNC: 15 U/L (ref 9–39)
BASE EXCESS BLDA CALC-SCNC: ABNORMAL MMOL/L
BASE EXCESS BLDA CALC-SCNC: ABNORMAL MMOL/L
BASE EXCESS BLDV CALC-SCNC: ABNORMAL MMOL/L
BASE EXCESS BLDV CALC-SCNC: ABNORMAL MMOL/L
BASOPHILS # BLD AUTO: 0.05 X10*3/UL (ref 0–0.1)
BASOPHILS NFR BLD AUTO: 0.7 %
BILIRUB SERPL-MCNC: 0.7 MG/DL (ref 0–1.2)
BNP SERPL-MCNC: 106 PG/ML (ref 0–99)
BODY TEMPERATURE: 37 DEGREES CELSIUS
BUN SERPL-MCNC: 21 MG/DL (ref 6–23)
CA-I BLDV-SCNC: 1.22 MMOL/L (ref 1.1–1.33)
CA-I BLDV-SCNC: 1.29 MMOL/L (ref 1.1–1.33)
CALCIUM SERPL-MCNC: 9.7 MG/DL (ref 8.6–10.3)
CARDIAC TROPONIN I PNL SERPL HS: 12 NG/L (ref 0–13)
CARDIAC TROPONIN I PNL SERPL HS: 9 NG/L (ref 0–13)
CHLORIDE BLDV-SCNC: 90 MMOL/L (ref 98–107)
CHLORIDE BLDV-SCNC: 90 MMOL/L (ref 98–107)
CHLORIDE SERPL-SCNC: 87 MMOL/L (ref 98–107)
CO2 SERPL-SCNC: 44 MMOL/L (ref 21–32)
CREAT SERPL-MCNC: 0.76 MG/DL (ref 0.5–1.05)
CRITICAL CALL TIME: 1801
CRITICAL CALL TIME: 2030
CRITICAL CALL TIME: 2239
CRITICAL CALL TIME: 2341
CRITICAL CALLED BY: ABNORMAL
CRITICAL CALLED TO: ABNORMAL
CRITICAL READ BACK: ABNORMAL
EGFRCR SERPLBLD CKD-EPI 2021: 79 ML/MIN/1.73M*2
EOSINOPHIL # BLD AUTO: 0.05 X10*3/UL (ref 0–0.4)
EOSINOPHIL NFR BLD AUTO: 0.7 %
ERYTHROCYTE [DISTWIDTH] IN BLOOD BY AUTOMATED COUNT: 15.3 % (ref 11.5–14.5)
FLUAV RNA RESP QL NAA+PROBE: NOT DETECTED
FLUBV RNA RESP QL NAA+PROBE: NOT DETECTED
GLUCOSE BLDV-MCNC: 158 MG/DL (ref 74–99)
GLUCOSE BLDV-MCNC: 165 MG/DL (ref 74–99)
GLUCOSE SERPL-MCNC: 162 MG/DL (ref 74–99)
HCO3 BLDA-SCNC: ABNORMAL MMOL/L
HCO3 BLDA-SCNC: ABNORMAL MMOL/L
HCO3 BLDV-SCNC: ABNORMAL MMOL/L
HCO3 BLDV-SCNC: ABNORMAL MMOL/L
HCT VFR BLD AUTO: 40.3 % (ref 36–46)
HCT VFR BLD EST: 36 % (ref 36–46)
HCT VFR BLD EST: 36 % (ref 36–46)
HGB BLD-MCNC: 11.7 G/DL (ref 12–16)
HGB BLDV-MCNC: 12.1 G/DL (ref 12–16)
HGB BLDV-MCNC: 12.1 G/DL (ref 12–16)
IMM GRANULOCYTES # BLD AUTO: 0.09 X10*3/UL (ref 0–0.5)
IMM GRANULOCYTES NFR BLD AUTO: 1.3 % (ref 0–0.9)
INHALED O2 CONCENTRATION: 40 %
INHALED O2 CONCENTRATION: 60 %
IPAP CMH2O: 0.9 CM H2O
LACTATE BLDV-SCNC: 0.8 MMOL/L (ref 0.4–2)
LACTATE BLDV-SCNC: 0.9 MMOL/L (ref 0.4–2)
LYMPHOCYTES # BLD AUTO: 0.45 X10*3/UL (ref 0.8–3)
LYMPHOCYTES NFR BLD AUTO: 6.3 %
MCH RBC QN AUTO: 28.4 PG (ref 26–34)
MCHC RBC AUTO-ENTMCNC: 29 G/DL (ref 32–36)
MCV RBC AUTO: 98 FL (ref 80–100)
MONOCYTES # BLD AUTO: 0.37 X10*3/UL (ref 0.05–0.8)
MONOCYTES NFR BLD AUTO: 5.2 %
NEUTROPHILS # BLD AUTO: 6.12 X10*3/UL (ref 1.6–5.5)
NEUTROPHILS NFR BLD AUTO: 85.8 %
NRBC BLD-RTO: 0 /100 WBCS (ref 0–0)
OXYHGB MFR BLDA: 89.6 % (ref 94–98)
OXYHGB MFR BLDA: 90.9 % (ref 94–98)
OXYHGB MFR BLDV: 77.7 % (ref 45–75)
OXYHGB MFR BLDV: 80.7 % (ref 45–75)
PCO2 BLDA: >125 MM HG (ref 38–42)
PCO2 BLDA: >125 MM HG (ref 38–42)
PCO2 BLDV: >125 MM HG (ref 41–51)
PCO2 BLDV: >125 MM HG (ref 41–51)
PH BLDA: 7.22 PH (ref 7.38–7.42)
PH BLDA: 7.23 PH (ref 7.38–7.42)
PH BLDV: 7.19 PH (ref 7.33–7.43)
PH BLDV: 7.21 PH (ref 7.33–7.43)
PLATELET # BLD AUTO: 177 X10*3/UL (ref 150–450)
PO2 BLDA: 67 MM HG (ref 85–95)
PO2 BLDA: 68 MM HG (ref 85–95)
PO2 BLDV: 51 MM HG (ref 35–45)
PO2 BLDV: 54 MM HG (ref 35–45)
POTASSIUM BLDV-SCNC: 4.7 MMOL/L (ref 3.5–5.3)
POTASSIUM BLDV-SCNC: 5.1 MMOL/L (ref 3.5–5.3)
POTASSIUM SERPL-SCNC: 5.3 MMOL/L (ref 3.5–5.3)
PROT SERPL-MCNC: 7.1 G/DL (ref 6.4–8.2)
RBC # BLD AUTO: 4.12 X10*6/UL (ref 4–5.2)
RSV RNA RESP QL NAA+PROBE: NOT DETECTED
SAO2 % BLDA: 93 % (ref 94–100)
SAO2 % BLDA: 94 % (ref 94–100)
SAO2 % BLDV: 81 % (ref 45–75)
SAO2 % BLDV: 84 % (ref 45–75)
SARS-COV-2 RNA RESP QL NAA+PROBE: NOT DETECTED
SODIUM BLDV-SCNC: 132 MMOL/L (ref 136–145)
SODIUM BLDV-SCNC: 133 MMOL/L (ref 136–145)
SODIUM SERPL-SCNC: 135 MMOL/L (ref 136–145)
SPECIMEN DRAWN FROM PATIENT: ABNORMAL
SPECIMEN DRAWN FROM PATIENT: ABNORMAL
SPONTANEOUS TIDAL VOLUME: 589 ML
TEST COMMENT: ABNORMAL
TIDAL VOLUME: 450 ML
TIDAL VOLUME: 500 ML
VENTILATOR MODE: ABNORMAL
VENTILATOR MODE: ABNORMAL
VENTILATOR RATE: 20 BPM
VENTILATOR RATE: 24 BPM
WBC # BLD AUTO: 7.1 X10*3/UL (ref 4.4–11.3)

## 2025-06-22 PROCEDURE — 84484 ASSAY OF TROPONIN QUANT: CPT | Performed by: EMERGENCY MEDICINE

## 2025-06-22 PROCEDURE — 99285 EMERGENCY DEPT VISIT HI MDM: CPT | Performed by: EMERGENCY MEDICINE

## 2025-06-22 PROCEDURE — 94640 AIRWAY INHALATION TREATMENT: CPT

## 2025-06-22 PROCEDURE — 2500000005 HC RX 250 GENERAL PHARMACY W/O HCPCS: Performed by: EMERGENCY MEDICINE

## 2025-06-22 PROCEDURE — 83880 ASSAY OF NATRIURETIC PEPTIDE: CPT | Performed by: EMERGENCY MEDICINE

## 2025-06-22 PROCEDURE — 96365 THER/PROPH/DIAG IV INF INIT: CPT

## 2025-06-22 PROCEDURE — 99291 CRITICAL CARE FIRST HOUR: CPT | Performed by: EMERGENCY MEDICINE

## 2025-06-22 PROCEDURE — 94660 CPAP INITIATION&MGMT: CPT

## 2025-06-22 PROCEDURE — 71045 X-RAY EXAM CHEST 1 VIEW: CPT

## 2025-06-22 PROCEDURE — 2500000002 HC RX 250 W HCPCS SELF ADMINISTERED DRUGS (ALT 637 FOR MEDICARE OP, ALT 636 FOR OP/ED): Performed by: EMERGENCY MEDICINE

## 2025-06-22 PROCEDURE — 84132 ASSAY OF SERUM POTASSIUM: CPT | Performed by: EMERGENCY MEDICINE

## 2025-06-22 PROCEDURE — 2500000004 HC RX 250 GENERAL PHARMACY W/ HCPCS (ALT 636 FOR OP/ED): Performed by: INTERNAL MEDICINE

## 2025-06-22 PROCEDURE — 99291 CRITICAL CARE FIRST HOUR: CPT | Performed by: INTERNAL MEDICINE

## 2025-06-22 PROCEDURE — 36415 COLL VENOUS BLD VENIPUNCTURE: CPT | Performed by: EMERGENCY MEDICINE

## 2025-06-22 PROCEDURE — 71045 X-RAY EXAM CHEST 1 VIEW: CPT | Performed by: STUDENT IN AN ORGANIZED HEALTH CARE EDUCATION/TRAINING PROGRAM

## 2025-06-22 PROCEDURE — 0BH17EZ INSERTION OF ENDOTRACHEAL AIRWAY INTO TRACHEA, VIA NATURAL OR ARTIFICIAL OPENING: ICD-10-PCS

## 2025-06-22 PROCEDURE — 84132 ASSAY OF SERUM POTASSIUM: CPT | Performed by: INTERNAL MEDICINE

## 2025-06-22 PROCEDURE — 2500000004 HC RX 250 GENERAL PHARMACY W/ HCPCS (ALT 636 FOR OP/ED): Performed by: EMERGENCY MEDICINE

## 2025-06-22 PROCEDURE — 5A1945Z RESPIRATORY VENTILATION, 24-96 CONSECUTIVE HOURS: ICD-10-PCS

## 2025-06-22 PROCEDURE — 36600 WITHDRAWAL OF ARTERIAL BLOOD: CPT

## 2025-06-22 PROCEDURE — 87637 SARSCOV2&INF A&B&RSV AMP PRB: CPT | Performed by: EMERGENCY MEDICINE

## 2025-06-22 PROCEDURE — 96375 TX/PRO/DX INJ NEW DRUG ADDON: CPT

## 2025-06-22 PROCEDURE — 85025 COMPLETE CBC W/AUTO DIFF WBC: CPT | Performed by: EMERGENCY MEDICINE

## 2025-06-22 PROCEDURE — 2020000001 HC ICU ROOM DAILY

## 2025-06-22 PROCEDURE — 96376 TX/PRO/DX INJ SAME DRUG ADON: CPT

## 2025-06-22 PROCEDURE — 82805 BLOOD GASES W/O2 SATURATION: CPT | Performed by: INTERNAL MEDICINE

## 2025-06-22 PROCEDURE — 93005 ELECTROCARDIOGRAM TRACING: CPT

## 2025-06-22 RX ORDER — IPRATROPIUM BROMIDE AND ALBUTEROL SULFATE 2.5; .5 MG/3ML; MG/3ML
3 SOLUTION RESPIRATORY (INHALATION) EVERY 20 MIN
Status: COMPLETED | OUTPATIENT
Start: 2025-06-22 | End: 2025-06-22

## 2025-06-22 RX ORDER — FUROSEMIDE 10 MG/ML
40 INJECTION INTRAMUSCULAR; INTRAVENOUS ONCE
Status: COMPLETED | OUTPATIENT
Start: 2025-06-22 | End: 2025-06-22

## 2025-06-22 RX ORDER — ACETAZOLAMIDE 500 MG/5ML
500 INJECTION, POWDER, LYOPHILIZED, FOR SOLUTION INTRAVENOUS ONCE
Status: COMPLETED | OUTPATIENT
Start: 2025-06-22 | End: 2025-06-22

## 2025-06-22 RX ORDER — ENOXAPARIN SODIUM 100 MG/ML
60 INJECTION SUBCUTANEOUS EVERY 12 HOURS SCHEDULED
Status: DISCONTINUED | OUTPATIENT
Start: 2025-06-22 | End: 2025-07-07 | Stop reason: HOSPADM

## 2025-06-22 RX ORDER — MAGNESIUM SULFATE HEPTAHYDRATE 40 MG/ML
2 INJECTION, SOLUTION INTRAVENOUS ONCE
Status: COMPLETED | OUTPATIENT
Start: 2025-06-22 | End: 2025-06-22

## 2025-06-22 RX ADMIN — ACETAZOLAMIDE 500 MG: 500 INJECTION, POWDER, LYOPHILIZED, FOR SOLUTION INTRAVENOUS at 22:10

## 2025-06-22 RX ADMIN — MAGNESIUM SULFATE HEPTAHYDRATE 2 G: 40 INJECTION, SOLUTION INTRAVENOUS at 18:58

## 2025-06-22 RX ADMIN — ENOXAPARIN SODIUM 60 MG: 100 INJECTION SUBCUTANEOUS at 22:08

## 2025-06-22 RX ADMIN — FUROSEMIDE 40 MG: 10 INJECTION, SOLUTION INTRAMUSCULAR; INTRAVENOUS at 19:01

## 2025-06-22 RX ADMIN — Medication 60 PERCENT: at 21:35

## 2025-06-22 RX ADMIN — METHYLPREDNISOLONE SODIUM SUCCINATE 125 MG: 125 INJECTION, POWDER, FOR SOLUTION INTRAMUSCULAR; INTRAVENOUS at 18:23

## 2025-06-22 RX ADMIN — Medication 40 PERCENT: at 17:52

## 2025-06-22 RX ADMIN — IPRATROPIUM BROMIDE AND ALBUTEROL SULFATE 3 ML: .5; 3 SOLUTION RESPIRATORY (INHALATION) at 17:49

## 2025-06-22 RX ADMIN — FUROSEMIDE 40 MG: 10 INJECTION, SOLUTION INTRAMUSCULAR; INTRAVENOUS at 18:23

## 2025-06-22 RX ADMIN — FUROSEMIDE 10 MG/HR: 10 INJECTION, SOLUTION INTRAMUSCULAR; INTRAVENOUS at 22:08

## 2025-06-22 RX ADMIN — Medication 60 PERCENT: at 19:01

## 2025-06-22 RX ADMIN — IPRATROPIUM BROMIDE AND ALBUTEROL SULFATE 3 ML: .5; 3 SOLUTION RESPIRATORY (INHALATION) at 18:06

## 2025-06-22 RX ADMIN — IPRATROPIUM BROMIDE AND ALBUTEROL SULFATE 3 ML: .5; 3 SOLUTION RESPIRATORY (INHALATION) at 18:03

## 2025-06-22 ASSESSMENT — LIFESTYLE VARIABLES
EVER FELT BAD OR GUILTY ABOUT YOUR DRINKING: NO
HAVE PEOPLE ANNOYED YOU BY CRITICIZING YOUR DRINKING: NO
TOTAL SCORE: 0
HAVE YOU EVER FELT YOU SHOULD CUT DOWN ON YOUR DRINKING: NO
EVER HAD A DRINK FIRST THING IN THE MORNING TO STEADY YOUR NERVES TO GET RID OF A HANGOVER: NO

## 2025-06-22 ASSESSMENT — COGNITIVE AND FUNCTIONAL STATUS - GENERAL
HELP NEEDED FOR BATHING: TOTAL
DRESSING REGULAR LOWER BODY CLOTHING: TOTAL
MOVING FROM LYING ON BACK TO SITTING ON SIDE OF FLAT BED WITH BEDRAILS: TOTAL
PERSONAL GROOMING: A LOT
DRESSING REGULAR UPPER BODY CLOTHING: TOTAL
WALKING IN HOSPITAL ROOM: TOTAL
EATING MEALS: A LOT
PATIENT BASELINE BEDBOUND: YES
MOBILITY SCORE: 6
STANDING UP FROM CHAIR USING ARMS: TOTAL
MOVING TO AND FROM BED TO CHAIR: TOTAL
DAILY ACTIVITIY SCORE: 8
TURNING FROM BACK TO SIDE WHILE IN FLAT BAD: TOTAL
CLIMB 3 TO 5 STEPS WITH RAILING: TOTAL
TOILETING: TOTAL

## 2025-06-22 ASSESSMENT — PAIN - FUNCTIONAL ASSESSMENT: PAIN_FUNCTIONAL_ASSESSMENT: CPOT (CRITICAL CARE PAIN OBSERVATION TOOL)

## 2025-06-22 NOTE — H&P
[facility] Pulmonary and Critical Care Medicine   History and Physical        Subjective   Patient is a 80 y.o. female admitted on 6/22/2025  5:35 PM with chief complaint of SOB.     HPI:  Ms Banegas is a 81 yo woman with PMHx s/f reported COPD (no PFTs on record, on baseline 5L NC), MARLENY/likely OHS, Hypothyroidism, DLD, Morbid Obesity who was brought into the ED for respiratory distress.     Patient called EMS reporting onset of SOB, difficulties catching her breath or communicating. No associated cough, fevers, chills, chest pain. Noted LE edema. Patient had recent hospitalization at [facility] 05/2025 for pneumonia and COPD exacerbation, treated with steroids and antibiotics.     Patient was worked up in the ED with both laboratory studies and imaging studies.   VS unremarkable.   Labs notable for Hgb 11.7 (~baseline), bicarbonate 44, , troponin wnl, Covid test negative, VBG 7.21/>125.   CXR showed pulmonary edema and pleural effusions.    ED therapeutics consisted of Dounebs, Solumedrol, Magnesium, Lasix. Was placed on AVAPS, admitted to ICU for close monitoring.     Past Medical History:  Medical History[1]    Past Surgical History:  Surgical History[2]     Family History:  Family History[3]     Social History:   reports that she has never smoked. She has never used smokeless tobacco. She reports that she does not currently use alcohol. She reports that she does not currently use drugs.    Scheduled Medications:   Scheduled Medications[4]     Continuous Medications:   Continuous Medications[5]     PRN Medications:   PRN Medications[6]    Review of systems:   Review of Systems   A 10+ point ROS was completed and otherwise negative except as noted above and per HPI.    Objective   Vitals:  Most Recent:  Vitals:    06/22/25 1930   BP: 138/86   Pulse: 76   Resp: (!) 21   Temp:    SpO2: 94%     24hr Min/Max:  Temp  Min: 36.9 °C (98.4 °F)  Max: 36.9 °C (98.4 °F)  Pulse  Min: 71  Max: 81  BP  Min: 123/65   Max: 138/86  Resp  Min: 18  Max: 26  SpO2  Min: 90 %  Max: 95 %    LDA:   External Urinary Catheter Female (Active)   Placement Date/Time: 06/22/25 1840   Placed by: NADYA ENAMORADO  Hand Hygiene Completed: Yes  External Catheter Type: Female   Number of days: 0       Vent settings:  FiO2 (%):  [40 %-60 %] 60 %  S RR:  [16] 16  S VT:  [450 mL] 450 mL  MAP (cm H2O):  [14.2] 14.2    Hemodynamic parameters for last 24 hours:       No intake or output data in the 24 hours ending 06/22/25 1959    Physical exam:    Gen: Pleasant, no acute distress  HEENT: no gross abnormalities  CV: RRR  Lungs: Diminished bilaterally  GI: obese but soft, non tender, non distended  Ext: 3+ LE edema, bilateral venous stasis changes  Neuro: somnolent but arousable, motor and sensory grossly intact  Psych: appears appropriate    Lab/Radiology/Diagnostic Review:  Results for orders placed or performed during the hospital encounter of 06/22/25 (from the past 24 hours)   CBC and Auto Differential   Result Value Ref Range    WBC 7.1 4.4 - 11.3 x10*3/uL    nRBC 0.0 0.0 - 0.0 /100 WBCs    RBC 4.12 4.00 - 5.20 x10*6/uL    Hemoglobin 11.7 (L) 12.0 - 16.0 g/dL    Hematocrit 40.3 36.0 - 46.0 %    MCV 98 80 - 100 fL    MCH 28.4 26.0 - 34.0 pg    MCHC 29.0 (L) 32.0 - 36.0 g/dL    RDW 15.3 (H) 11.5 - 14.5 %    Platelets 177 150 - 450 x10*3/uL    Neutrophils % 85.8 40.0 - 80.0 %    Immature Granulocytes %, Automated 1.3 (H) 0.0 - 0.9 %    Lymphocytes % 6.3 13.0 - 44.0 %    Monocytes % 5.2 2.0 - 10.0 %    Eosinophils % 0.7 0.0 - 6.0 %    Basophils % 0.7 0.0 - 2.0 %    Neutrophils Absolute 6.12 (H) 1.60 - 5.50 x10*3/uL    Immature Granulocytes Absolute, Automated 0.09 0.00 - 0.50 x10*3/uL    Lymphocytes Absolute 0.45 (L) 0.80 - 3.00 x10*3/uL    Monocytes Absolute 0.37 0.05 - 0.80 x10*3/uL    Eosinophils Absolute 0.05 0.00 - 0.40 x10*3/uL    Basophils Absolute 0.05 0.00 - 0.10 x10*3/uL   Comprehensive metabolic panel   Result Value Ref Range    Glucose 162 (H) 74 -  99 mg/dL    Sodium 135 (L) 136 - 145 mmol/L    Potassium 5.3 3.5 - 5.3 mmol/L    Chloride 87 (L) 98 - 107 mmol/L    Bicarbonate 44 (HH) 21 - 32 mmol/L    Anion Gap 9 (L) 10 - 20 mmol/L    Urea Nitrogen 21 6 - 23 mg/dL    Creatinine 0.76 0.50 - 1.05 mg/dL    eGFR 79 >60 mL/min/1.73m*2    Calcium 9.7 8.6 - 10.3 mg/dL    Albumin 4.1 3.4 - 5.0 g/dL    Alkaline Phosphatase 90 33 - 136 U/L    Total Protein 7.1 6.4 - 8.2 g/dL    AST 15 9 - 39 U/L    Bilirubin, Total 0.7 0.0 - 1.2 mg/dL    ALT 17 7 - 45 U/L   B-Type Natriuretic Peptide   Result Value Ref Range     (H) 0 - 99 pg/mL   BLOOD GAS VENOUS FULL PANEL   Result Value Ref Range    POCT pH, Venous 7.21 (LL) 7.33 - 7.43 pH    POCT pCO2, Venous >125 (HH) 41 - 51 mm Hg    POCT pO2, Venous 54 (H) 35 - 45 mm Hg    POCT SO2, Venous 84 (H) 45 - 75 %    POCT Oxy Hemoglobin, Venous 80.7 (H) 45.0 - 75.0 %    POCT Hematocrit Calculated, Venous 36.0 36.0 - 46.0 %    POCT Sodium, Venous 132 (L) 136 - 145 mmol/L    POCT Potassium, Venous 5.1 3.5 - 5.3 mmol/L    POCT Chloride, Venous 90 (L) 98 - 107 mmol/L    POCT Ionized Calicum, Venous 1.29 1.10 - 1.33 mmol/L    POCT Glucose, Venous 165 (H) 74 - 99 mg/dL    POCT Lactate, Venous 0.9 0.4 - 2.0 mmol/L    POCT Base Excess, Venous      POCT HCO3 Calculated, Venous      POCT Hemoglobin, Venous 12.1 12.0 - 16.0 g/dL    POCT Anion Gap, Venous      Patient Temperature 37.0 degrees Celsius    FiO2 40 %    Critical Called By JAZZY MORAES RRT     Critical Called To DR KLERMAN     Critical Call Time 1801     Critical Read Back Y    Troponin I, High Sensitivity, Initial   Result Value Ref Range    Troponin I, High Sensitivity 9 0 - 13 ng/L   Sars-CoV-2, Influenza A/B and RSV PCR   Result Value Ref Range    Coronavirus 2019, PCR Not Detected Not Detected    Flu A Result Not Detected Not Detected    Flu B Result Not Detected Not Detected    RSV PCR Not Detected Not Detected     All other labs and Imaging have been personally reviewed.      Assessment/Plan     Ms Banegas is a 81 yo woman with PMHx s/f reported COPD (no PFTs on record, on baseline 5L NC), MARLENY/likely OHS, Hypothyroidism, DLD, Morbid Obesity who was brought into the ED for respiratory distress. Admitted to ICU for acute on chronic hypercapnic hypoxic respiratory failure 2/2 ADHF.     Neuro:  #Encephalopathy  -metabolic in setting of hypercapnia  -non focal neurological exam, no reported injury, low threshold for CT head    Cardiovascular  #ADHF  -monitor on telemetry  -troponin wnl  -updated Echo ordered  -s/p Lasix 80mg IV->monitor UOP and redose with goal net negative 2L, weight up >20lbs per   -Maintain MAPs>65  -Cardiology consult pending Echo results    #HTN  -continue home medications     #DLD  -continue home statin    Pulmonary:  #Acute on Chronic Hypercapnic Hypoxic Respiratory Failure  #Possible Pneumonia  -suspected 2/2 above, continue diuresis  -attempted AVAPS/BIPAP overnight without improvement->requiring intubation for no improvement of blood gases and worsening mental status  -procalcitonin, urine legionella/strep pneumo ags, tracheal aspirate ordered  -Given continued decompensation, will cover with broad spectrum antibiotics for now pending above    GI:  -no acute issues    Renal:   -no acute issues    Endocrine:  #Hypothyroidism  -continue home Synthroid    Heme/Onc:  #Anemia  -no active bleeding noted, monitor    ID:  -no acute issues  -Culture Data: none    Skin/MSK:  #LE wounds  -monitor for now    ICU CHECK LIST:   Antimicrobials: none  Oxygen: AVAPS  Feeding: NPO for now  Fluids: none  Analgesia: PRN  Sedation: none  Thromboprophylaxis: SCDs and subcutaneous Lovenox  Ulcer prophylaxis: PPI   Glycemic control: BGM with SSI PRN  Bowel care: PRN   Indwelling catheters: none  Lines: PIVs   Dispo: MICU   Code Status: DNAR    I have personally spent 50 minutes of critical care time, exclusive of time spent on any procedures, in evaluation and management of this  critically ill patient’s condition mentioned above in the assessment and plan.     Kate Galvin MD  Pulmonary & Critical Care Attending          [1]   Past Medical History:  Diagnosis Date    Abnormal weight gain     Weight gain    Essential (primary) hypertension     HTN (hypertension), benign    Localized edema     Edema extremities    Old myocardial infarction     History of myocardial infarction    Old myocardial infarction 02/27/2017    History of non-ST elevation myocardial infarction (NSTEMI)    Other specified postprocedural states     History of repair of both hip joints    Other symptoms and signs involving the genitourinary system     Urinary problem    Pain due to internal orthopedic prosthetic devices, implants and grafts, initial encounter     Artificial joint pain    Personal history of other diseases of the circulatory system     History of hypertension    Personal history of other diseases of the musculoskeletal system and connective tissue     Personal history of gout    Personal history of other diseases of the musculoskeletal system and connective tissue     Personal history of arthritis    Personal history of other diseases of the musculoskeletal system and connective tissue     Personal history of fibromyalgia    Personal history of other diseases of the musculoskeletal system and connective tissue     History of muscle pain    Personal history of other diseases of the respiratory system 02/12/2018    History of chronic respiratory failure    Personal history of other endocrine, nutritional and metabolic disease     History of thyroid disease    Personal history of other endocrine, nutritional and metabolic disease     History of obesity    Personal history of other endocrine, nutritional and metabolic disease     History of high cholesterol    Personal history of other endocrine, nutritional and metabolic disease 02/28/2014    History of hypothyroidism    Personal history of other  medical treatment     History of mammogram    Personal history of other specified conditions     H/O shortness of breath    Personal history of pneumonia (recurrent)     History of pneumonia    Presence of artificial hip joint, bilateral     Status post total replacement of both hips    Pure hypercholesterolemia, unspecified     High cholesterol    Unspecified disorder of nose and nasal sinuses     Sinus problem   [2]   Past Surgical History:  Procedure Laterality Date    CARDIAC CATHETERIZATION  01/25/2018    Cardiac Cath Procedure Outcome:    CARPAL TUNNEL RELEASE  02/27/2014    Neuroplasty Median Nerve At Carpal Tunnel    HIP SURGERY  03/03/2018    Hip Surgery    MOUTH SURGERY  04/14/2015    Oral Surgery Tooth Extraction    THYROID SURGERY  04/14/2015    Thyroid Surgery    TONSILLECTOMY  04/14/2015    Tonsillectomy    TOTAL HIP ARTHROPLASTY  01/25/2018    Hip Replacement   [3] No family history on file.  [4] oxygen, , inhalation, Continuous - Inhalation  [5]    [6]

## 2025-06-22 NOTE — SIGNIFICANT EVENT
Goals of Care/ICU Eval:     -Spoke with Pt's  and daughter and confirmed code status. Pt is a DNR but ok with short term intubation but no tracheostomy for prolonged vent needs.     Adjusted current AVAPS to RR 20. IPAP/EPAP 20/10. Mve is now 10L/Min. Volumes close to 500cc.     Will check Abg in one hour 2000.     Pt accepted to MICU.     Rolf Randall MD

## 2025-06-22 NOTE — ED PROVIDER NOTES
Emergency Department Provider Note       History of Present Illness     History provided by: EMS, patient  Limitations to History: Respiratory Distress  External Records Reviewed with Brief Summary: Discharge Summary from 25 from [facility] which showed admission - for resp distress, thought to be COPD exacerbation and CAP, dc'ed on prednisone taper over 12 days and levofloxacin    HPI:  Ailyn Banegas is a 80 y.o. female COPD on 5L home O2 MARLENY/obesity hypoventilation syndrome BIBEMS for resp distress - she called EMS today for SOB today.  No fever or cough.  Pt was hypoxic to 88% on home 5L but improved to 90s on NRB at 10L.  Pt unable to provide much more history due to resp distress - pursed lip breathing, head bobbing, unable to speak even one word sentences without sig distress.    Physical Exam   Triage vitals:  T    HR    BP    RR    O2        General: awake but sleepy, in severe resp distress  Eyes: Gaze conjugate.  No scleral icterus or injection  HENT: Normo-cephalic, atraumatic. No stridor  CV: Regular rate, regular rhythm  Resp: very poor air movement b/l, tachypneic even at rest with pursed lip breathing intermittently and head bobbing, unable to speak even 1 word without worsening distress  GI: Soft, non-distended, non-tender. No rebound or guarding.  MSK/Extremities: No gross bony deformities. Moving all extremities, sig b/l LE edema  Skin: Warm. Erythema to b/l legs consistent with venostasis  Neuro: sleepy/arousable, oriented. Face symmetric. Speech is fluent.  Diffuse generalized weakness but symmetric, sensation apparently intact  Psych: Appropriate mood and affect      Medical Decision Making & ED Course   Medical Decision Makin y.o. female presents with resp distress - hypoxic, hypercapneic, sleepy (but easily arousable), with tachypnea, severe inc WOB ( head bobbing, pursed lips, inability to speak) and very poor air movement b/l.  CXR with mild volume overload without clear  pneumonia.  Viral swab pending.  Labs with acute on chronic CO2 retention, minimally elevated BNP, no leukocytosis.  Pt treated with duonebs x 3, solumedrol and IV Lasix.  She was placed on BiPAP>AVAPS immediately on arrival and initially seemed to be improving (improved WOB) but then declined, becoming hypoxic and more somnolent.  AVAPS settings adjusted and discussed with ICU attending Dr. Randall who accepts admission to ICU.  ----      Differential diagnoses considered include but are not limited to: acute resp failure with hypoxia/hypercapnia, COPD exac, CHF exac, PNA, pleural effusion, pericardial effusion    Social Determinants of Health which Significantly Impact Care: Social Determinants of Health which Significantly Impact Care: obesity     EKG Independent Interpretation: EKG interpreted by myself. Please see ED Course for full interpretation.    Independent Result Review and Interpretation: Relevant laboratory and radiographic results were reviewed and independently interpreted by myself.  As necessary, they are commented on in the ED Course.    Chronic conditions affecting the patient's care: As documented above in MDM    The patient was discussed with the following consultants/services: see MDM    Care Considerations: As documented above in MDM    ED Course:  ED Course as of 06/24/25 1131   Sun Jun 22, 2025   1820 CXR interpreted by me with inc edema, ?LLL opacity [EK]   Tue Jun 24, 2025   1129 EKG interpreted by me with sinus rhythm with 1st deg AVB ?LAD otherwise normal intervals no ischemic changes  axis appears to be new compared to prior [EK]      ED Course User Index  [EK] Elyse H Klerman, MD         Diagnoses as of 06/24/25 1131   Acute respiratory failure with hypoxia and hypercapnia   COPD exacerbation (Multi)   Acute on chronic congestive heart failure, unspecified heart failure type       Disposition   As a result of their workup, the patient will require admission to the hospital.  The  patient was informed of her diagnosis.  The patient was given the opportunity to ask questions and I answered them. The patient agreed to be admitted to the hospital.    Procedures   Critical Care    Performed by: Elyse H Klerman, MD  Authorized by: Elyse H Klerman, MD    Critical care provider statement:     Critical care time (minutes):  60    Critical care time was exclusive of:  Separately billable procedures and treating other patients    Critical care was necessary to treat or prevent imminent or life-threatening deterioration of the following conditions:  Respiratory failure    Critical care was time spent personally by me on the following activities:  Development of treatment plan with patient or surrogate, evaluation of patient's response to treatment, examination of patient, obtaining history from patient or surrogate, ordering and performing treatments and interventions, ordering and review of laboratory studies, ordering and review of radiographic studies, pulse oximetry, re-evaluation of patient's condition and review of old charts    Care discussed with: admitting provider            Elyse H Klerman, MD  Emergency Medicine                                                       Elyse H Klerman, MD  06/24/25 6947

## 2025-06-23 ENCOUNTER — APPOINTMENT (OUTPATIENT)
Dept: RADIOLOGY | Facility: HOSPITAL | Age: 80
End: 2025-06-23
Payer: MEDICARE

## 2025-06-23 LAB
ALBUMIN SERPL BCP-MCNC: 3.6 G/DL (ref 3.4–5)
ANION GAP BLDA CALCULATED.4IONS-SCNC: -4 MMO/L (ref 10–25)
ANION GAP BLDA CALCULATED.4IONS-SCNC: ABNORMAL MMOL/L
ANION GAP SERPL CALC-SCNC: 13 MMOL/L (ref 10–20)
ANION GAP SERPL CALC-SCNC: ABNORMAL MMOL/L
APPARATUS: ABNORMAL
APPARATUS: ABNORMAL
ARTERIAL PATENCY WRIST A: POSITIVE
BASE EXCESS BLDA CALC-SCNC: 18.2 MMOL/L (ref -2–3)
BASE EXCESS BLDA CALC-SCNC: 22 MMOL/L (ref -2–3)
BASE EXCESS BLDA CALC-SCNC: 25.5 MMOL/L (ref -2–3)
BASE EXCESS BLDA CALC-SCNC: ABNORMAL MMOL/L
BODY TEMPERATURE: 37 DEGREES CELSIUS
BUN SERPL-MCNC: 21 MG/DL (ref 6–23)
BUN SERPL-MCNC: 25 MG/DL (ref 6–23)
CA-I BLDA-SCNC: 1.23 MMOL/L (ref 1.1–1.33)
CA-I BLDA-SCNC: 1.26 MMOL/L (ref 1.1–1.33)
CALCIUM SERPL-MCNC: 9.5 MG/DL (ref 8.6–10.3)
CALCIUM SERPL-MCNC: 9.6 MG/DL (ref 8.6–10.3)
CHLORIDE BLDA-SCNC: 89 MMOL/L (ref 98–107)
CHLORIDE BLDA-SCNC: 89 MMOL/L (ref 98–107)
CHLORIDE SERPL-SCNC: 84 MMOL/L (ref 98–107)
CHLORIDE SERPL-SCNC: 84 MMOL/L (ref 98–107)
CO2 SERPL-SCNC: 45 MMOL/L (ref 21–32)
CO2 SERPL-SCNC: >45 MMOL/L (ref 21–32)
CREAT SERPL-MCNC: 0.81 MG/DL (ref 0.5–1.05)
CREAT SERPL-MCNC: 0.92 MG/DL (ref 0.5–1.05)
CRITICAL CALL TIME: 110
CRITICAL CALL TIME: 1210
CRITICAL CALL TIME: 2117
CRITICAL CALL TIME: 639
CRITICAL CALLED BY: ABNORMAL
CRITICAL CALLED TO: ABNORMAL
CRITICAL READ BACK: ABNORMAL
EGFRCR SERPLBLD CKD-EPI 2021: 63 ML/MIN/1.73M*2
EGFRCR SERPLBLD CKD-EPI 2021: 73 ML/MIN/1.73M*2
ERYTHROCYTE [DISTWIDTH] IN BLOOD BY AUTOMATED COUNT: 14.7 % (ref 11.5–14.5)
GLUCOSE BLD MANUAL STRIP-MCNC: 125 MG/DL (ref 74–99)
GLUCOSE BLD MANUAL STRIP-MCNC: 138 MG/DL (ref 74–99)
GLUCOSE BLD MANUAL STRIP-MCNC: 148 MG/DL (ref 74–99)
GLUCOSE BLDA-MCNC: 151 MG/DL (ref 74–99)
GLUCOSE BLDA-MCNC: 184 MG/DL (ref 74–99)
GLUCOSE SERPL-MCNC: 140 MG/DL (ref 74–99)
GLUCOSE SERPL-MCNC: 234 MG/DL (ref 74–99)
HCO3 BLDA-SCNC: 46.7 MMOL/L (ref 22–26)
HCO3 BLDA-SCNC: 51.1 MMOL/L (ref 22–26)
HCO3 BLDA-SCNC: 52.9 MMOL/L (ref 22–26)
HCO3 BLDA-SCNC: ABNORMAL MMOL/L
HCT VFR BLD AUTO: 35.1 % (ref 36–46)
HCT VFR BLD EST: 32 % (ref 36–46)
HCT VFR BLD EST: 37 % (ref 36–46)
HGB BLD-MCNC: 10.6 G/DL (ref 12–16)
HGB BLDA-MCNC: 10.7 G/DL (ref 12–16)
HGB BLDA-MCNC: 12.2 G/DL (ref 12–16)
INHALED O2 CONCENTRATION: 60 %
INHALED O2 CONCENTRATION: 80 %
LACTATE BLDA-SCNC: 1 MMOL/L (ref 0.4–2)
LACTATE BLDA-SCNC: 1.4 MMOL/L (ref 0.4–2)
LEGIONELLA AG UR QL: NEGATIVE
MAGNESIUM SERPL-MCNC: 2.02 MG/DL (ref 1.6–2.4)
MCH RBC QN AUTO: 28.6 PG (ref 26–34)
MCHC RBC AUTO-ENTMCNC: 30.2 G/DL (ref 32–36)
MCV RBC AUTO: 95 FL (ref 80–100)
MRSA DNA SPEC QL NAA+PROBE: NOT DETECTED
NRBC BLD-RTO: 0 /100 WBCS (ref 0–0)
OXYHGB MFR BLDA: 93.7 % (ref 94–98)
OXYHGB MFR BLDA: 94.3 % (ref 94–98)
OXYHGB MFR BLDA: 95 % (ref 94–98)
OXYHGB MFR BLDA: 95.3 % (ref 94–98)
PCO2 BLDA: 71 MM HG (ref 38–42)
PCO2 BLDA: 72 MM HG (ref 38–42)
PCO2 BLDA: 77 MM HG (ref 38–42)
PCO2 BLDA: >125 MM HG (ref 38–42)
PEAK PRESSURE: 16 CM H2O
PEAK PRESSURE: 26 CM H2O
PEEP CMH2O: 8 CM H2O
PEEP CMH2O: 8 CM H2O
PH BLDA: 7.19 PH (ref 7.38–7.42)
PH BLDA: 7.42 PH (ref 7.38–7.42)
PH BLDA: 7.43 PH (ref 7.38–7.42)
PH BLDA: 7.48 PH (ref 7.38–7.42)
PHOSPHATE SERPL-MCNC: 3.7 MG/DL (ref 2.5–4.9)
PLATELET # BLD AUTO: 175 X10*3/UL (ref 150–450)
PO2 BLDA: 77 MM HG (ref 85–95)
PO2 BLDA: 78 MM HG (ref 85–95)
PO2 BLDA: 82 MM HG (ref 85–95)
PO2 BLDA: 89 MM HG (ref 85–95)
POTASSIUM BLDA-SCNC: 3.8 MMOL/L (ref 3.5–5.3)
POTASSIUM BLDA-SCNC: 5.1 MMOL/L (ref 3.5–5.3)
POTASSIUM SERPL-SCNC: 3.8 MMOL/L (ref 3.5–5.3)
POTASSIUM SERPL-SCNC: 3.9 MMOL/L (ref 3.5–5.3)
PROCALCITONIN SERPL-MCNC: 0.12 NG/ML
RBC # BLD AUTO: 3.7 X10*6/UL (ref 4–5.2)
S PNEUM AG UR QL: NEGATIVE
SAO2 % BLDA: 97 % (ref 94–100)
SAO2 % BLDA: 98 % (ref 94–100)
SAO2 % BLDA: 98 % (ref 94–100)
SAO2 % BLDA: 99 % (ref 94–100)
SODIUM BLDA-SCNC: 134 MMOL/L (ref 136–145)
SODIUM BLDA-SCNC: 134 MMOL/L (ref 136–145)
SODIUM SERPL-SCNC: 136 MMOL/L (ref 136–145)
SODIUM SERPL-SCNC: 138 MMOL/L (ref 136–145)
SPECIMEN DRAWN FROM PATIENT: ABNORMAL
SPONTANEOUS TIDAL VOLUME: 400 ML
SPONTANEOUS TIDAL VOLUME: 507 ML
TEST COMMENT: ABNORMAL
TIDAL VOLUME: 400 ML
TIDAL VOLUME: 450 ML
TIDAL VOLUME: 500 ML
TOTAL MINUTE VOLUME: 12.2 LITER
VENTILATOR MODE: ABNORMAL
VENTILATOR RATE: 20 BPM
VENTILATOR RATE: 24 BPM
WBC # BLD AUTO: 7 X10*3/UL (ref 4.4–11.3)

## 2025-06-23 PROCEDURE — 84132 ASSAY OF SERUM POTASSIUM: CPT

## 2025-06-23 PROCEDURE — 2500000002 HC RX 250 W HCPCS SELF ADMINISTERED DRUGS (ALT 637 FOR MEDICARE OP, ALT 636 FOR OP/ED): Performed by: INTERNAL MEDICINE

## 2025-06-23 PROCEDURE — 2500000005 HC RX 250 GENERAL PHARMACY W/O HCPCS: Performed by: INTERNAL MEDICINE

## 2025-06-23 PROCEDURE — 2500000005 HC RX 250 GENERAL PHARMACY W/O HCPCS: Performed by: EMERGENCY MEDICINE

## 2025-06-23 PROCEDURE — 99291 CRITICAL CARE FIRST HOUR: CPT

## 2025-06-23 PROCEDURE — 37799 UNLISTED PX VASCULAR SURGERY: CPT | Performed by: INTERNAL MEDICINE

## 2025-06-23 PROCEDURE — 85027 COMPLETE CBC AUTOMATED: CPT | Performed by: INTERNAL MEDICINE

## 2025-06-23 PROCEDURE — 82947 ASSAY GLUCOSE BLOOD QUANT: CPT

## 2025-06-23 PROCEDURE — 82805 BLOOD GASES W/O2 SATURATION: CPT | Performed by: INTERNAL MEDICINE

## 2025-06-23 PROCEDURE — 2500000004 HC RX 250 GENERAL PHARMACY W/ HCPCS (ALT 636 FOR OP/ED): Performed by: INTERNAL MEDICINE

## 2025-06-23 PROCEDURE — 84100 ASSAY OF PHOSPHORUS: CPT | Performed by: INTERNAL MEDICINE

## 2025-06-23 PROCEDURE — 31500 INSERT EMERGENCY AIRWAY: CPT | Performed by: INTERNAL MEDICINE

## 2025-06-23 PROCEDURE — 94640 AIRWAY INHALATION TREATMENT: CPT

## 2025-06-23 PROCEDURE — 37799 UNLISTED PX VASCULAR SURGERY: CPT

## 2025-06-23 PROCEDURE — 94002 VENT MGMT INPAT INIT DAY: CPT

## 2025-06-23 PROCEDURE — 2500000004 HC RX 250 GENERAL PHARMACY W/ HCPCS (ALT 636 FOR OP/ED)

## 2025-06-23 PROCEDURE — 87899 AGENT NOS ASSAY W/OPTIC: CPT | Mod: PARLAB | Performed by: INTERNAL MEDICINE

## 2025-06-23 PROCEDURE — 31500 INSERT EMERGENCY AIRWAY: CPT

## 2025-06-23 PROCEDURE — 71045 X-RAY EXAM CHEST 1 VIEW: CPT | Performed by: STUDENT IN AN ORGANIZED HEALTH CARE EDUCATION/TRAINING PROGRAM

## 2025-06-23 PROCEDURE — 71045 X-RAY EXAM CHEST 1 VIEW: CPT

## 2025-06-23 PROCEDURE — 87641 MR-STAPH DNA AMP PROBE: CPT | Performed by: INTERNAL MEDICINE

## 2025-06-23 PROCEDURE — 84145 PROCALCITONIN (PCT): CPT | Mod: PARLAB | Performed by: INTERNAL MEDICINE

## 2025-06-23 PROCEDURE — 2020000001 HC ICU ROOM DAILY

## 2025-06-23 PROCEDURE — 83735 ASSAY OF MAGNESIUM: CPT

## 2025-06-23 PROCEDURE — 87449 NOS EACH ORGANISM AG IA: CPT | Mod: PARLAB | Performed by: INTERNAL MEDICINE

## 2025-06-23 RX ORDER — IPRATROPIUM BROMIDE AND ALBUTEROL SULFATE 2.5; .5 MG/3ML; MG/3ML
3 SOLUTION RESPIRATORY (INHALATION)
Status: DISCONTINUED | OUTPATIENT
Start: 2025-06-23 | End: 2025-06-23

## 2025-06-23 RX ORDER — ROCURONIUM BROMIDE 10 MG/ML
60 INJECTION, SOLUTION INTRAVENOUS ONCE
Status: COMPLETED | OUTPATIENT
Start: 2025-06-23 | End: 2025-06-23

## 2025-06-23 RX ORDER — FENTANYL CITRATE-0.9 % NACL/PF 10 MCG/ML
0-200 PLASTIC BAG, INJECTION (ML) INTRAVENOUS CONTINUOUS
Status: DISCONTINUED | OUTPATIENT
Start: 2025-06-23 | End: 2025-06-25

## 2025-06-23 RX ORDER — VANCOMYCIN HYDROCHLORIDE 1.5 G/300ML
1500 INJECTION, SOLUTION INTRAVITREAL EVERY 24 HOURS
Status: DISCONTINUED | OUTPATIENT
Start: 2025-06-24 | End: 2025-06-23

## 2025-06-23 RX ORDER — PANTOPRAZOLE SODIUM 40 MG/10ML
40 INJECTION, POWDER, LYOPHILIZED, FOR SOLUTION INTRAVENOUS DAILY
Status: DISCONTINUED | OUTPATIENT
Start: 2025-06-23 | End: 2025-06-30

## 2025-06-23 RX ORDER — IPRATROPIUM BROMIDE AND ALBUTEROL SULFATE 2.5; .5 MG/3ML; MG/3ML
3 SOLUTION RESPIRATORY (INHALATION)
Status: DISCONTINUED | OUTPATIENT
Start: 2025-06-23 | End: 2025-07-07 | Stop reason: HOSPADM

## 2025-06-23 RX ORDER — FUROSEMIDE 10 MG/ML
60 INJECTION INTRAMUSCULAR; INTRAVENOUS ONCE
Status: COMPLETED | OUTPATIENT
Start: 2025-06-23 | End: 2025-06-23

## 2025-06-23 RX ORDER — VANCOMYCIN HYDROCHLORIDE 1 G/20ML
INJECTION, POWDER, LYOPHILIZED, FOR SOLUTION INTRAVENOUS DAILY PRN
Status: DISCONTINUED | OUTPATIENT
Start: 2025-06-23 | End: 2025-06-23

## 2025-06-23 RX ORDER — FENTANYL CITRATE 50 UG/ML
50 INJECTION, SOLUTION INTRAMUSCULAR; INTRAVENOUS ONCE
Status: COMPLETED | OUTPATIENT
Start: 2025-06-23 | End: 2025-06-23

## 2025-06-23 RX ORDER — ACETYLCYSTEINE 100 MG/ML
6 SOLUTION ORAL; RESPIRATORY (INHALATION) 4 TIMES DAILY
Status: DISCONTINUED | OUTPATIENT
Start: 2025-06-23 | End: 2025-06-23

## 2025-06-23 RX ORDER — PROPOFOL 10 MG/ML
0-20 INJECTION, EMULSION INTRAVENOUS CONTINUOUS
Status: DISCONTINUED | OUTPATIENT
Start: 2025-06-23 | End: 2025-06-25

## 2025-06-23 RX ORDER — DEXTROSE 50 % IN WATER (D50W) INTRAVENOUS SYRINGE
12.5
Status: DISCONTINUED | OUTPATIENT
Start: 2025-06-23 | End: 2025-07-07 | Stop reason: HOSPADM

## 2025-06-23 RX ORDER — ETOMIDATE 2 MG/ML
30 INJECTION INTRAVENOUS ONCE
Status: COMPLETED | OUTPATIENT
Start: 2025-06-23 | End: 2025-06-23

## 2025-06-23 RX ORDER — DEXTROSE 50 % IN WATER (D50W) INTRAVENOUS SYRINGE
25
Status: DISCONTINUED | OUTPATIENT
Start: 2025-06-23 | End: 2025-07-07 | Stop reason: HOSPADM

## 2025-06-23 RX ORDER — INSULIN LISPRO 100 [IU]/ML
0-15 INJECTION, SOLUTION INTRAVENOUS; SUBCUTANEOUS EVERY 4 HOURS
Status: DISCONTINUED | OUTPATIENT
Start: 2025-06-23 | End: 2025-06-26

## 2025-06-23 RX ORDER — IPRATROPIUM BROMIDE AND ALBUTEROL SULFATE 2.5; .5 MG/3ML; MG/3ML
3 SOLUTION RESPIRATORY (INHALATION) EVERY 2 HOUR PRN
Status: DISCONTINUED | OUTPATIENT
Start: 2025-06-23 | End: 2025-07-07 | Stop reason: HOSPADM

## 2025-06-23 RX ORDER — ACETYLCYSTEINE 200 MG/ML
2 SOLUTION ORAL; RESPIRATORY (INHALATION)
Status: DISCONTINUED | OUTPATIENT
Start: 2025-06-23 | End: 2025-06-28

## 2025-06-23 RX ADMIN — Medication 50 PERCENT: at 08:55

## 2025-06-23 RX ADMIN — Medication 50 PERCENT: at 08:11

## 2025-06-23 RX ADMIN — IPRATROPIUM BROMIDE AND ALBUTEROL SULFATE 3 ML: .5; 3 SOLUTION RESPIRATORY (INHALATION) at 18:35

## 2025-06-23 RX ADMIN — Medication 50 MCG/HR: at 15:22

## 2025-06-23 RX ADMIN — Medication 60 PERCENT: at 00:14

## 2025-06-23 RX ADMIN — PIPERACILLIN SODIUM AND TAZOBACTAM SODIUM 4.5 G: 4; .5 INJECTION, SOLUTION INTRAVENOUS at 08:10

## 2025-06-23 RX ADMIN — PROPOFOL 15 MCG/KG/MIN: 10 INJECTION, EMULSION INTRAVENOUS at 21:29

## 2025-06-23 RX ADMIN — FENTANYL CITRATE 50 MCG: 50 INJECTION INTRAMUSCULAR; INTRAVENOUS at 00:09

## 2025-06-23 RX ADMIN — Medication 60 PERCENT: at 04:41

## 2025-06-23 RX ADMIN — PROPOFOL 15 MCG/KG/MIN: 10 INJECTION, EMULSION INTRAVENOUS at 06:46

## 2025-06-23 RX ADMIN — ACETYLCYSTEINE 400 MG: 200 SOLUTION ORAL; RESPIRATORY (INHALATION) at 11:40

## 2025-06-23 RX ADMIN — PIPERACILLIN SODIUM AND TAZOBACTAM SODIUM 4.5 G: 4; .5 INJECTION, SOLUTION INTRAVENOUS at 02:17

## 2025-06-23 RX ADMIN — FUROSEMIDE 60 MG: 10 INJECTION, SOLUTION INTRAMUSCULAR; INTRAVENOUS at 06:46

## 2025-06-23 RX ADMIN — VANCOMYCIN HYDROCHLORIDE 2000 MG: 10 INJECTION, POWDER, LYOPHILIZED, FOR SOLUTION INTRAVENOUS at 02:47

## 2025-06-23 RX ADMIN — ETOMIDATE 30 MG: 2 INJECTION INTRAVENOUS at 00:09

## 2025-06-23 RX ADMIN — PIPERACILLIN SODIUM AND TAZOBACTAM SODIUM 4.5 G: 4; .5 INJECTION, SOLUTION INTRAVENOUS at 17:53

## 2025-06-23 RX ADMIN — PIPERACILLIN SODIUM AND TAZOBACTAM SODIUM 4.5 G: 4; .5 INJECTION, SOLUTION INTRAVENOUS at 12:53

## 2025-06-23 RX ADMIN — ENOXAPARIN SODIUM 60 MG: 100 INJECTION SUBCUTANEOUS at 08:10

## 2025-06-23 RX ADMIN — PROPOFOL 15 MCG/KG/MIN: 10 INJECTION, EMULSION INTRAVENOUS at 14:04

## 2025-06-23 RX ADMIN — ENOXAPARIN SODIUM 60 MG: 100 INJECTION SUBCUTANEOUS at 19:50

## 2025-06-23 RX ADMIN — PROPOFOL 5 MCG/KG/MIN: 10 INJECTION, EMULSION INTRAVENOUS at 00:31

## 2025-06-23 RX ADMIN — PANTOPRAZOLE SODIUM 40 MG: 40 INJECTION, POWDER, FOR SOLUTION INTRAVENOUS at 09:30

## 2025-06-23 RX ADMIN — IPRATROPIUM BROMIDE AND ALBUTEROL SULFATE 3 ML: .5; 3 SOLUTION RESPIRATORY (INHALATION) at 11:40

## 2025-06-23 RX ADMIN — ACETYLCYSTEINE 400 MG: 200 SOLUTION ORAL; RESPIRATORY (INHALATION) at 18:35

## 2025-06-23 RX ADMIN — FUROSEMIDE 15 MG/HR: 10 INJECTION, SOLUTION INTRAMUSCULAR; INTRAVENOUS at 14:50

## 2025-06-23 RX ADMIN — Medication 80 PERCENT: at 19:50

## 2025-06-23 RX ADMIN — ROCURONIUM BROMIDE 60 MG: 10 INJECTION, SOLUTION INTRAVENOUS at 00:09

## 2025-06-23 RX ADMIN — Medication 25 MCG/HR: at 00:31

## 2025-06-23 SDOH — HEALTH STABILITY: PHYSICAL HEALTH
ON AVERAGE, HOW MANY DAYS PER WEEK DO YOU ENGAGE IN MODERATE TO STRENUOUS EXERCISE (LIKE A BRISK WALK)?: PATIENT UNABLE TO ANSWER

## 2025-06-23 SDOH — SOCIAL STABILITY: SOCIAL INSECURITY: WITHIN THE LAST YEAR, HAVE YOU BEEN AFRAID OF YOUR PARTNER OR EX-PARTNER?: PATIENT UNABLE TO ANSWER

## 2025-06-23 SDOH — ECONOMIC STABILITY: INCOME INSECURITY
IN THE PAST 12 MONTHS HAS THE ELECTRIC, GAS, OIL, OR WATER COMPANY THREATENED TO SHUT OFF SERVICES IN YOUR HOME?: PATIENT UNABLE TO ANSWER

## 2025-06-23 SDOH — ECONOMIC STABILITY: HOUSING INSECURITY: IN THE PAST 12 MONTHS, HOW MANY TIMES HAVE YOU MOVED WHERE YOU WERE LIVING?: 1

## 2025-06-23 SDOH — SOCIAL STABILITY: SOCIAL INSECURITY: HAS ANYONE EVER THREATENED TO HURT YOUR FAMILY OR YOUR PETS?: UNABLE TO ASSESS

## 2025-06-23 SDOH — SOCIAL STABILITY: SOCIAL INSECURITY
WITHIN THE LAST YEAR, HAVE YOU BEEN KICKED, HIT, SLAPPED, OR OTHERWISE PHYSICALLY HURT BY YOUR PARTNER OR EX-PARTNER?: PATIENT UNABLE TO ANSWER

## 2025-06-23 SDOH — SOCIAL STABILITY: SOCIAL INSECURITY: DOES ANYONE TRY TO KEEP YOU FROM HAVING/CONTACTING OTHER FRIENDS OR DOING THINGS OUTSIDE YOUR HOME?: UNABLE TO ASSESS

## 2025-06-23 SDOH — ECONOMIC STABILITY: FOOD INSECURITY
WITHIN THE PAST 12 MONTHS, THE FOOD YOU BOUGHT JUST DIDN'T LAST AND YOU DIDN'T HAVE MONEY TO GET MORE.: PATIENT UNABLE TO ANSWER

## 2025-06-23 SDOH — HEALTH STABILITY: PHYSICAL HEALTH
HOW OFTEN DO YOU NEED TO HAVE SOMEONE HELP YOU WHEN YOU READ INSTRUCTIONS, PAMPHLETS, OR OTHER WRITTEN MATERIAL FROM YOUR DOCTOR OR PHARMACY?: SOMETIMES

## 2025-06-23 SDOH — ECONOMIC STABILITY: HOUSING INSECURITY: AT ANY TIME IN THE PAST 12 MONTHS, WERE YOU HOMELESS OR LIVING IN A SHELTER (INCLUDING NOW)?: PATIENT UNABLE TO ANSWER

## 2025-06-23 SDOH — ECONOMIC STABILITY: FOOD INSECURITY
HOW HARD IS IT FOR YOU TO PAY FOR THE VERY BASICS LIKE FOOD, HOUSING, MEDICAL CARE, AND HEATING?: PATIENT UNABLE TO ANSWER

## 2025-06-23 SDOH — SOCIAL STABILITY: SOCIAL INSECURITY: HAVE YOU HAD ANY THOUGHTS OF HARMING ANYONE ELSE?: UNABLE TO ASSESS

## 2025-06-23 SDOH — SOCIAL STABILITY: SOCIAL INSECURITY: HAVE YOU HAD THOUGHTS OF HARMING ANYONE ELSE?: NO

## 2025-06-23 SDOH — HEALTH STABILITY: PHYSICAL HEALTH: ON AVERAGE, HOW MANY MINUTES DO YOU ENGAGE IN EXERCISE AT THIS LEVEL?: PATIENT UNABLE TO ANSWER

## 2025-06-23 SDOH — SOCIAL STABILITY: SOCIAL INSECURITY
WITHIN THE LAST YEAR, HAVE YOU BEEN RAPED OR FORCED TO HAVE ANY KIND OF SEXUAL ACTIVITY BY YOUR PARTNER OR EX-PARTNER?: PATIENT UNABLE TO ANSWER

## 2025-06-23 SDOH — SOCIAL STABILITY: SOCIAL INSECURITY
WITHIN THE LAST YEAR, HAVE YOU BEEN HUMILIATED OR EMOTIONALLY ABUSED IN OTHER WAYS BY YOUR PARTNER OR EX-PARTNER?: PATIENT UNABLE TO ANSWER

## 2025-06-23 SDOH — SOCIAL STABILITY: SOCIAL INSECURITY: ARE THERE ANY APPARENT SIGNS OF INJURIES/BEHAVIORS THAT COULD BE RELATED TO ABUSE/NEGLECT?: UNABLE TO ASSESS

## 2025-06-23 SDOH — ECONOMIC STABILITY: HOUSING INSECURITY
IN THE LAST 12 MONTHS, WAS THERE A TIME WHEN YOU WERE NOT ABLE TO PAY THE MORTGAGE OR RENT ON TIME?: PATIENT UNABLE TO ANSWER

## 2025-06-23 SDOH — SOCIAL STABILITY: SOCIAL INSECURITY: ARE YOU OR HAVE YOU BEEN THREATENED OR ABUSED PHYSICALLY, EMOTIONALLY, OR SEXUALLY BY ANYONE?: UNABLE TO ASSESS

## 2025-06-23 SDOH — ECONOMIC STABILITY: FOOD INSECURITY
WITHIN THE PAST 12 MONTHS, YOU WORRIED THAT YOUR FOOD WOULD RUN OUT BEFORE YOU GOT THE MONEY TO BUY MORE.: PATIENT UNABLE TO ANSWER

## 2025-06-23 SDOH — SOCIAL STABILITY: SOCIAL INSECURITY: ABUSE: ADULT

## 2025-06-23 SDOH — SOCIAL STABILITY: SOCIAL INSECURITY: DO YOU FEEL UNSAFE GOING BACK TO THE PLACE WHERE YOU ARE LIVING?: UNABLE TO ASSESS

## 2025-06-23 SDOH — ECONOMIC STABILITY: TRANSPORTATION INSECURITY
IN THE PAST 12 MONTHS, HAS LACK OF TRANSPORTATION KEPT YOU FROM MEDICAL APPOINTMENTS OR FROM GETTING MEDICATIONS?: PATIENT UNABLE TO ANSWER

## 2025-06-23 SDOH — SOCIAL STABILITY: SOCIAL INSECURITY: DO YOU FEEL ANYONE HAS EXPLOITED OR TAKEN ADVANTAGE OF YOU FINANCIALLY OR OF YOUR PERSONAL PROPERTY?: UNABLE TO ASSESS

## 2025-06-23 ASSESSMENT — ACTIVITIES OF DAILY LIVING (ADL)
TOILETING: UNABLE TO ASSESS
JUDGMENT_ADEQUATE_SAFELY_COMPLETE_DAILY_ACTIVITIES: UNABLE TO ASSESS
HEARING - LEFT EAR: UNABLE TO ASSESS
HEARING - RIGHT EAR: UNABLE TO ASSESS
LACK_OF_TRANSPORTATION: YES
HEARING - RIGHT EAR: FUNCTIONAL
PATIENT'S MEMORY ADEQUATE TO SAFELY COMPLETE DAILY ACTIVITIES?: UNABLE TO ASSESS
BATHING: NEEDS ASSISTANCE
ADEQUATE_TO_COMPLETE_ADL: UNABLE TO ASSESS
PATIENT'S MEMORY ADEQUATE TO SAFELY COMPLETE DAILY ACTIVITIES?: UNABLE TO ASSESS
ASSISTIVE_DEVICE: WALKER
JUDGMENT_ADEQUATE_SAFELY_COMPLETE_DAILY_ACTIVITIES: UNABLE TO ASSESS
BATHING: UNABLE TO ASSESS
FEEDING YOURSELF: UNABLE TO ASSESS
GROOMING: UNABLE TO ASSESS
FEEDING YOURSELF: NEEDS ASSISTANCE
ADEQUATE_TO_COMPLETE_ADL: UNABLE TO ASSESS
LACK_OF_TRANSPORTATION: PATIENT UNABLE TO ANSWER
DRESSING YOURSELF: UNABLE TO ASSESS
WALKS IN HOME: UNABLE TO ASSESS
ASSISTIVE_DEVICE: WALKER;WHEELCHAIR
HEARING - LEFT EAR: FUNCTIONAL
WALKS IN HOME: NEEDS ASSISTANCE
GROOMING: NEEDS ASSISTANCE
DRESSING YOURSELF: NEEDS ASSISTANCE

## 2025-06-23 ASSESSMENT — COGNITIVE AND FUNCTIONAL STATUS - GENERAL
DAILY ACTIVITIY SCORE: 6
MOVING TO AND FROM BED TO CHAIR: TOTAL
DRESSING REGULAR LOWER BODY CLOTHING: TOTAL
EATING MEALS: TOTAL
PERSONAL GROOMING: TOTAL
DRESSING REGULAR UPPER BODY CLOTHING: TOTAL
MOVING FROM LYING ON BACK TO SITTING ON SIDE OF FLAT BED WITH BEDRAILS: TOTAL
CLIMB 3 TO 5 STEPS WITH RAILING: TOTAL
TOILETING: TOTAL
STANDING UP FROM CHAIR USING ARMS: TOTAL
WALKING IN HOSPITAL ROOM: TOTAL
TURNING FROM BACK TO SIDE WHILE IN FLAT BAD: TOTAL
HELP NEEDED FOR BATHING: TOTAL
MOBILITY SCORE: 6

## 2025-06-23 ASSESSMENT — LIFESTYLE VARIABLES
AUDIT-C TOTAL SCORE: -1
HOW OFTEN DO YOU HAVE A DRINK CONTAINING ALCOHOL: PATIENT DECLINED
AUDIT-C TOTAL SCORE: -1
HOW OFTEN DO YOU HAVE 6 OR MORE DRINKS ON ONE OCCASION: PATIENT DECLINED
SKIP TO QUESTIONS 9-10: 0
HOW MANY STANDARD DRINKS CONTAINING ALCOHOL DO YOU HAVE ON A TYPICAL DAY: PATIENT DECLINED

## 2025-06-23 ASSESSMENT — PATIENT HEALTH QUESTIONNAIRE - PHQ9
SUM OF ALL RESPONSES TO PHQ9 QUESTIONS 1 & 2: 0
2. FEELING DOWN, DEPRESSED OR HOPELESS: NOT AT ALL
1. LITTLE INTEREST OR PLEASURE IN DOING THINGS: NOT AT ALL

## 2025-06-23 ASSESSMENT — PAIN - FUNCTIONAL ASSESSMENT
PAIN_FUNCTIONAL_ASSESSMENT: CPOT (CRITICAL CARE PAIN OBSERVATION TOOL)

## 2025-06-23 NOTE — PROCEDURES
A time out was performed. My hands were washed immediately prior to the  procedure. I wore a surgical cap, mask with protective eyewear, gown  and gloves throughout the procedure. The patient was placed on a cardiac  monitor including continuous pulse oximetry. Rapid Sequence Intubation  was conducted. The patient received 50 mcg of Fentanyl, 30 mg of Etomidate for induction and 60 mg of Rocuronium for adequate paralysis. Grade II view obtained. Using a video laryngoscope with a lo pro S3 blade and a size 7.5 endotracheal tube with stylet, the patient was intubated on the first attempt. The stylet was removed and cuff balloon was  inflated. Appropriate endotracheal tube position was confirmed by  direct visualization of vocal cord passage, fogging of the tube, CO2  colormetric indicator and symmetric breath sounds. The tube was secured  at 22 cm at the lips. Post intubation chest x-ray is pending at this  time.

## 2025-06-23 NOTE — PROGRESS NOTES
MICU PROGRESS NOTE    Subjective      Patient intubated and sedated. ICU team spoke with family ( and son) during rounds. Reaffirmed DNR status.     Assessment & Plan   Ailyn Banegas is a 80 y.o. female with a PMHx of reported COPD (no PFTs on record, on baseline 5L NC), MARLENY/likely OHS, Hypothyroidism, DLD, Morbid Obesity who was brought into the ED for respiratory distress. Admitted to ICU for acute on chronic hypercapnic hypoxic respiratory failure 2/2 ADHF. On day 1 of admission. DNR.    Plan:  NEUROLOGICAL / PSYCH:  Dx:  Encephalopathy   Intubated and sedated  Management:  On fentanyl and propofol  Bilateral soft wrist restraints in place    CARDIOVASCULAR:  Dx:  ADHF EF 60-65% in 8/22  Hx paroxysmal atrial fibrillation  Hx mild AVS  Hx DLD, HTN  Management:  Home cardiac meds: 10 mg Lipitor, 30 mg Cardizem, 20 mg Lasix, 100 mg Aldactone, 40 mg Demadex  Hold home meds  Telemetry  Echo  Consult cardiology  On Lasix gtt 10mg/mL, ~2.8 L UOP   Goal: net negative 2L achieved    PULMONARY:  Dx:  Intubated  Acute on chronic hypercapnic hypoxic respiratory failure  Pulmonary edema with small bilateral pleural effusions  RLL atelectacic collapse  PNA, C/F  COPD on home 5LNC  MARLENY on home CPAP  OHS  Management:  S/p  mg methylprednisolone, multiple IV Lasix doses and a Diamox dose in ED  Intubated s/p AVAPS/BIPAP overnight without improvement (ABG and worsening mentation  Daily SAT/SBTs, adjust and wean vent settings as tolerated  Supplemental O2 PRN to maintain SpO2 89-94%  Vent Mode: Volume control/assist control  FiO2 (%):  [40 %-60 %] 50 %  S RR:  [16-24] 24  S VT:  [400 mL-500 mL] 400 mL  PEEP/CPAP (cm H2O):  [8 cm H20] 8 cm H20  MAP (cm H2O):  [13.5-17] 15  Switched patient from volume control to pressure control  Diuresis with Lasix gtt 15mg/mL, monitor UOP with strict I/O's  ABX: Vancomycin and Zosyn, discontinuing vancomycin ISO negative MRSA PCR. Continue to de-escalate per infectious workup as  below  Respiratory and mucolytic treatments: DuoNeb QID, DuoNeb q2h PRN, Mucomyst    GASTROENTEROLOGY:  Dx:  Management:  NPO with enteral feeding, place OG/NG tube and start TF  While intubated, PPI QD    RENAL / GENITOURINARY:  Dx:  Management:  Maintain Mccallum, strict I/O's    ENDOCRINOLOGY:  Dx:  Hyperglycemia  Hypothyroidism  Management:  Continue Synthroid  q4h BG checks + SSI    HEMATOLOGY / ONCOLOGY:  Dx:  Anemia  Management:  DVT ppx with SCD's & Lovenox    MUSCULOSKELETAL / SKIN:  Dx:  LE wounds  Management:  ICU wound prevention and skin care    INFECTIOUS DISEASE:  Dx:  PNA, C/F  Management:  ABX: Vancomycin and Zosyn, discontinuing vancomycin ISO negative MRSA PCR. Continue to de-escalate per infectious workup  Infectious workup: MRSA PCR negative, viral panel with COVID and Flu and RSV negative, respiratory Cx from tracheal aspirate pending collection, PNA urinary antigens pending, Pro-Rodolfo pending    Checklist:  Antimicrobials: Zosyn  Oxygen: ET, 50% FiO2 PEEP 8  Feeding: NPO  Fluids: -  DVT ppx: SCD's & Lovenox  Ulcer ppx: PPI  Glycemic control: q4h BG checks + SSI  Bowel care: PRN  Indwelling catheters: Left radial arterial line, Mccallum  Lines: PIVs  Consults: -  Code Status: DNR    Dr. Hany Kelly, DO  PGY-2, Internal Medicine    This is a preliminary note, please await attending attestation for final recommendations    Disclaimer: Documentation completed with the information available at the time of input. The times in the chart may not be reflective of actual patient care times, interventions, or procedures. Documentation occurs after the physical care of the patient.     Objective (Vitals, labs, radiological imaging, cardiac work up were personally reviewed)     Physical Exam  Constitutional:       Appearance: She is morbidly obese.      Interventions: She is sedated and intubated.   HENT:      Head:      Comments: ETT in place  Neck:      Vascular: JVD present.   Cardiovascular:      Rate and  Rhythm: Normal rate and regular rhythm.      Pulses: Normal pulses.   Pulmonary:      Effort: She is intubated.      Breath sounds: Decreased air movement present. Decreased breath sounds present.   Abdominal:      General: Abdomen is protuberant.      Palpations: Abdomen is soft.   Genitourinary:     Comments: Mccallum in place  Musculoskeletal:      Right lower leg: Pitting Edema present.      Left lower leg: Pitting Edema present.      Comments: L radial art line   Skin:     General: Skin is warm.      Capillary Refill: Capillary refill takes less than 2 seconds.   Neurological:      Comments: Does not follow commands     Last Recorded Vitals  Vitals:    06/23/25 0743 06/23/25 0800 06/23/25 0900 06/23/25 1000   BP:       BP Location:       Patient Position:       Pulse:  69 66 66   Resp:  26 24 24   Temp: 36.3 °C (97.3 °F)      TempSrc: Temporal      SpO2:  90% 92% 90%   Weight:       Height:         Intake/Output last 3 Shifts:  I/O last 3 completed shifts:  In: 748.1 (5 mL/kg) [I.V.:148.1 (1 mL/kg); IV Piggyback:600]  Out: 2485 (16.5 mL/kg) [Urine:2485 (0.5 mL/kg/hr)]  Weight: 150.8 kg     Ventilator/O2 Supply  Oxygen Therapy/Pulse Ox  Medical Gas Therapy: Supplemental oxygen  Medical Gas Delivery Method: Endotracheal tube  FiO2 (%): 50 %  SpO2: 90 %  Patient Activity During SpO2 Measurement: At rest  Temp: 36.3 °C (97.3 °F)    Labs:   Results from last 7 days   Lab Units 06/23/25  0617 06/22/25  1752   SODIUM mmol/L 136 135*   POTASSIUM mmol/L 3.9 5.3   CHLORIDE mmol/L 84* 87*   CO2 mmol/L >45* 44*   BUN mg/dL 21 21   CREATININE mg/dL 0.81 0.76   GLUCOSE mg/dL 234* 162*   CALCIUM mg/dL 9.6 9.7     Results from last 7 days   Lab Units 06/23/25  0617   WBC AUTO x10*3/uL 7.0   HEMOGLOBIN g/dL 10.6*   HEMATOCRIT % 35.1*   PLATELETS AUTO x10*3/uL 175     Results from last 7 days   Lab Units 06/23/25  0632 06/23/25  0106 06/22/25  2336 06/22/25  2234 06/22/25 2109   POCT PH, ARTERIAL pH 7.42 7.43* 7.22*   < > 7.19*    POCT PCO2, ARTERIAL mm Hg 72* 77* >125*   < > >125*   POCT PO2, ARTERIAL mm Hg 77* 89 67*   < > 78*   POCT HCO3 CALCULATED, ARTERIAL mmol/L 46.7* 51.1*  --   --   --    POCT BASE EXCESS, ARTERIAL mmol/L 18.2* 22.0*  --   --   --     < > = values in this interval not displayed.     Imaging  XR chest 1 view   Final Result   - ETT termination: 4.6 cm above the nadia        Suspect a component of right lower lobe collapse.        MACRO:   None.        Signed by: Gary Mendez 6/23/2025 1:13 AM   Dictation workstation:   [id number]      XR chest 1 view   Final Result   Pulmonary edema and pleural effusions.             MACRO:   None.        Signed by: Gary Mendez 6/22/2025 6:22 PM   Dictation workstation:   [id number]        Echocardiogram 8/17/22  CONCLUSIONS:  1. Left ventricular systolic function is normal with a 60-65% estimated ejection fraction.  2. Spectral Doppler shows an impaired relaxation pattern of left ventricular diastolic filling.  3. There is moderate mitral annular calcification.  4. Mild to moderately elevated right ventricular systolic pressure.  5. Mild aortic valve stenosis.    Meds  Current Outpatient Medications   Medication Instructions    acetaminophen (TYLENOL) 650 mg, oral, Every 4 hours PRN    albuterol (Proventil HFA) 90 mcg/actuation inhaler 2 puffs, inhalation, Every 4 hours PRN    albuterol 2.5 mg, nebulization, 4 times daily PRN    atorvastatin (LIPITOR) 10 mg, oral, Daily    budesonide-formoteroL (Symbicort) 160-4.5 mcg/actuation inhaler 2 puffs, inhalation, 2 times daily    dilTIAZem SR (CARDIZEM SR) 60 mg, oral, Daily    furosemide (LASIX) 20 mg, oral, Daily    hydrOXYzine pamoate (VISTARIL) 25 mg, oral, 3 times daily PRN    lancets (OneTouch Delica Lancets) 33 gauge misc Check sugar once a day    levothyroxine (SYNTHROID) 100 mcg, oral, Daily    multivit-min-iron-FA-vit K-lut (Centrum Silver Women) 8 mg iron-400 mcg-50 mcg tablet 1 tablet, oral, Daily    multivitamin  with minerals iron-free (Centrum Silver) 1 tablet, oral, Daily    nystatin (Mycostatin) 100,000 unit/gram powder Topical, 2 times daily, For Rash    nystatin (Mycostatin) 100,000 unit/gram powder 1 Application, Topical, 3 times daily    oxygen (O2) 4 L/min, inhalation, Continuous    pantoprazole (PROTONIX) 20 mg, oral, 2 times daily, Do not crush, chew, or split.    predniSONE (Deltasone) 10 mg tablet Take by mouth.    spironolactone (ALDACTONE) 100 mg, oral, Daily    torsemide (DEMADEX) 40 mg, oral, Daily

## 2025-06-23 NOTE — PROGRESS NOTES
06/23/25 1056   Discharge Planning   Living Arrangements Other (Comment)  (From [facility], skilled)   Support Systems Spouse/significant other;Children;Family members   Assistance Needed Total care, able to feed self, brush teeth.  Ususally uses wheelchair for ambulation   Type of Residence Skilled nursing facility   Do you have animals or pets at home? Yes   Type of Animals or Pets cat   Home or Post Acute Services Post acute facilities (Rehab/SNF/etc)  (pending hospital progress)   Type of Post Acute Facility Services Skilled nursing   Expected Discharge Disposition SNF  (pending hospital progress)   Does the patient need discharge transport arranged? Yes   RoundTrip coordination needed? Yes   Has discharge transport been arranged? No   Financial Resource Strain   How hard is it for you to pay for the very basics like food, housing, medical care, and heating? Not hard   Housing Stability   In the last 12 months, was there a time when you were not able to pay the mortgage or rent on time? N   In the past 12 months, how many times have you moved where you were living? 0   At any time in the past 12 months, were you homeless or living in a shelter (including now)? N   Transportation Needs   In the past 12 months, has lack of transportation kept you from medical appointments or from getting medications? yes   In the past 12 months, has lack of transportation kept you from meetings, work, or from getting things needed for daily living? Yes   Patient Choice   Patient / Family choosing to utilize agency / facility established prior to hospitalization Yes  ([facility], pending hospital progress)     Record reviewed.  GENO from [facility].  Admitted for acute respiratory failure w/hypoxia and hypercapnia.  Intubated, sedated.  TCC rounded with ICU team.  ICU team updated spouse and son who are at bedside.  TCC met with patient's son independently at bedside.  Introduced self, explained role.  Demographic information and  insurance verified.  Patient is from [facility], receiving skilled care.  Was planned to discharge home today.  Patient was complete care at facility, receiving therapy, but ambulating mostly with W/C.  Prior to admission to recent hospitalization at [facility], patient was at home with spouse.  Total assist for bathing and dressing, ambulating with wheel chair.  Home O2 at 5L at baseline.  Denies SW needs at this time.  Discharge plan is pending hospital progress, but would plan to return to [facility], if SNF recommended.  Transportation needs pending hospital progress.  Care Transitions will continue to follow.

## 2025-06-23 NOTE — CONSULTS
Cardiology Consult    Impression:  Acute on chronic hypercapnic, hypoxic respiratory failure requiring intubation.  Metabolic alkalosis.  Advanced COPD.  On home oxygen.  Peripheral fluid overload.  MARLENY.  Refuses BiPAP.  Obesity hypoventilation syndrome  Anemia  Paroxysmal atrial fibrillation.  Currently sinus rhythm.  Mild aortic stenosis  Hypertension  Hyperlipidemia  Plan:  Lasix drip per ICU.  May benefit from additional Diamox to alleviate profound alkalosis.  CC  Shortness of breath  HPI:  80-year-old woman who was brought to hospital for evaluation of shortness of breath, altered mental status.  Patient was recently admitted to [facility] For acute on chronic hypercapnic respiratory failure.  Has been managed in a nursing home..  She refused to wear BiPAP.  She comes in now with shortness of breath and confusion.  pCO2 was greater than 125.  She required intubation for encephalopathy.  Given a dose of IV Lasix and started on Lasix infusion.  Chest x-ray showing right lower lobe atelectasis with no evidence for pulmonary edema.  .  Troponin negative.  EKG showing sinus rhythm with no ischemic changes.  Blood gas improved with a PCO2 greater than 125 now down to 72.  Bicarb greater than 45.  Meds:  Scheduled medications  Scheduled Medications[1]  Continuous medications  Continuous Medications[2]  PRN medications  PRN Medications[3]    PMHx:  As listed above  Social history:  Lives in a nursing home.  No cigarettes or alcohol noncontributory  Family history:  Noncontributory  Review of systems:  Deferred  Physical exam:  Vitals:    06/23/25 1100   BP:    Pulse: 66   Resp: 16   Temp:    SpO2: 96%      Intubated.  Sedated.  JVP not visible.  Heart sounds are distant difficult to hear.  Soft systolic murmur at the base.  Chest exam reveals markedly decreased breath sounds.  Abdomen obese.  Moderate edema.  EKG:  Normal sinus rhythm.  First-degree AV block.  Poor R wave progression.  Echo:  2022:  Normal EF.  Mild AS.  Labs:  Lab Results   Component Value Date    WBC 7.0 06/23/2025    HGB 10.6 (L) 06/23/2025    HCT 35.1 (L) 06/23/2025     06/23/2025    CHOL 133 11/13/2020    TRIG 88 11/13/2020    HDL 55.4 11/13/2020    ALT 17 06/22/2025    AST 15 06/22/2025     06/23/2025    K 3.9 06/23/2025    CL 84 (L) 06/23/2025    CREATININE 0.81 06/23/2025    BUN 21 06/23/2025    CO2 >45 (HH) 06/23/2025    TSH 3.34 04/06/2022    HGBA1C 5.9 (A) 04/06/2022     par        [1] acetylcysteine, 2 mL, nebulization, TID  enoxaparin, 60 mg, subcutaneous, q12h VLADIMIR  insulin lispro, 0-15 Units, subcutaneous, q4h  ipratropium-albuteroL, 3 mL, nebulization, TID  oxygen, , inhalation, Continuous - Inhalation  pantoprazole, 40 mg, intravenous, Daily  piperacillin-tazobactam, 4.5 g, intravenous, q6h  [2] fentaNYL, 0-200 mcg/hr, Last Rate: 50 mcg/hr (06/23/25 0745)  furosemide, 15 mg/hr, Last Rate: 15 mg/hr (06/23/25 0746)  propofol, 0-20 mcg/kg/min (Dosing Weight), Last Rate: 15 mcg/kg/min (06/23/25 0746)  [3] PRN medications: dextrose, dextrose, glucagon, glucagon, ipratropium-albuteroL

## 2025-06-23 NOTE — CARE PLAN
The patient's goals for the shift include      The clinical goals for the shift include Maintain SpO2 >90% throughout this shift    Over the shift, the patient did not make progress toward the following goals. Barriers to progression include copd,fluid overload. Recommendations to address these barriers include continue to monitor.    Problem: Discharge Planning  Goal: Discharge to home or other facility with appropriate resources  Outcome: Not Progressing     Problem: Chronic Conditions and Co-morbidities  Goal: Patient's chronic conditions and co-morbidity symptoms are monitored and maintained or improved  Outcome: Not Progressing     Problem: Nutrition  Goal: Nutrient intake appropriate for maintaining nutritional needs  Outcome: Not Progressing

## 2025-06-23 NOTE — CONSULTS
Vancomycin Dosing by Pharmacy- INITIAL    Ailyn Banegas is a 80 y.o. year old female who Pharmacy has been consulted for vancomycin dosing for pneumonia. Based on the patient's indication and renal status this patient will be dosed based on a goal AUC of 400-600.     Renal function is currently stable.    Visit Vitals  /58   Pulse 70   Temp 36.1 °C (97 °F) (Temporal)   Resp 24        Lab Results   Component Value Date    CREATININE 0.76 2025    CREATININE 0.66 2022    CREATININE 0.69 2022    CREATININE 0.64 2022    CREATININE 0.57 2022        Patient weight is as follows:   Vitals:    25   Weight: (!) 151 kg (333 lb 8.9 oz)       Cultures:  No results found for the encounter in last 14 days.        No intake/output data recorded.  I/O during current shift:  I/O this shift:  In: 121.6 [I.V.:21.6; IV Piggyback:100]  Out: 660 [Urine:660]    Temp (24hrs), Av.5 °C (97.7 °F), Min:36.1 °C (97 °F), Max:36.9 °C (98.4 °F)         Assessment/Plan     Patient has already been given a loading dose of 2000 mg.  Will initiate vancomycin maintenance based on Earnest modeling, 1500 mg every 24 hours.    This dosing regimen is predicted by [employer] (Earnest model) to result in the following pharmacokinetic parameters:  Regimen: 1500 mg IV every 24 hours.  Exposure target: AUC24 (range) 400-600 mg/L.hr   [id number]: 479 mg/L.hr  AUC24,ss: 494 mg/L.hr  Probability of AUC24 > 400: 84 %  Ctrough,ss: 13.4 mg/L  Probability of Ctrough,ss > 20: 5 %    Follow-up level will be ordered on  at 1000 unless clinically indicated sooner.  Will continue to monitor renal function daily while on vancomycin and order serum creatinine at least every 48 hours if not already ordered.  Follow for continued vancomycin needs, clinical response, and signs/symptoms of toxicity.       Blaze Gates RPh

## 2025-06-23 NOTE — CARE PLAN
Problem: Pain - Adult  Goal: Verbalizes/displays adequate comfort level or baseline comfort level  Outcome: Progressing     Problem: Safety - Adult  Goal: Free from fall injury  Outcome: Progressing     Problem: Discharge Planning  Goal: Discharge to home or other facility with appropriate resources  Outcome: Progressing     Problem: Chronic Conditions and Co-morbidities  Goal: Patient's chronic conditions and co-morbidity symptoms are monitored and maintained or improved  Outcome: Progressing     Problem: Nutrition  Goal: Nutrient intake appropriate for maintaining nutritional needs  Outcome: Progressing     Problem: Diabetes  Goal: Achieve decreasing blood glucose levels by end of shift  Outcome: Progressing  Goal: Increase stability of blood glucose readings by end of shift  Outcome: Progressing  Goal: Maintain electrolyte levels within acceptable range throughout shift  Outcome: Progressing  Goal: Maintain glucose levels >70mg/dl to <250mg/dl throughout shift  Outcome: Progressing  Goal: No changes in neurological exam by end of shift  Outcome: Progressing  Goal: Learn about and adhere to nutrition recommendations by end of shift  Outcome: Progressing  Goal: Vital signs within normal range for age by end of shift  Outcome: Progressing  Goal: Increase self care and/or family involovement by end of shift  Outcome: Progressing  Goal: Receive DSME education by end of shift  Outcome: Progressing     Problem: Skin  Goal: Decreased wound size/increased tissue granulation at next dressing change  Outcome: Progressing  Flowsheets (Taken 6/23/2025 0636)  Decreased wound size/increased tissue granulation at next dressing change:   Promote sleep for wound healing   Protective dressings over bony prominences  Goal: Participates in plan/prevention/treatment measures  Outcome: Progressing  Flowsheets (Taken 6/23/2025 0636)  Participates in plan/prevention/treatment measures: Elevate heels  Goal: Prevent/manage excess  moisture  Outcome: Progressing  Flowsheets (Taken 6/23/2025 0636)  Prevent/manage excess moisture:   Cleanse incontinence/protect with barrier cream   Monitor for/manage infection if present   Moisturize dry skin  Goal: Prevent/minimize sheer/friction injuries  Outcome: Progressing  Flowsheets (Taken 6/23/2025 0636)  Prevent/minimize sheer/friction injuries:   HOB 30 degrees or less   Turn/reposition every 2 hours/use positioning/transfer devices   Use pull sheet   Complete micro-shifts as needed if patient unable. Adjust patient position to relieve pressure points, not a full turn  Goal: Promote/optimize nutrition  Outcome: Progressing  Flowsheets (Taken 6/23/2025 0636)  Promote/optimize nutrition: Discuss with provider if NPO > 2 days  Goal: Promote skin healing  Outcome: Progressing  Flowsheets (Taken 6/23/2025 0636)  Promote skin healing:   Assess skin/pad under line(s)/device(s)   Turn/reposition every 2 hours/use positioning/transfer devices   Protective dressings over bony prominences     Problem: Knowledge Deficit  Goal: Patient/family/caregiver demonstrates understanding of disease process, treatment plan, medications, and discharge instructions  Outcome: Progressing     Problem: Mechanical Ventilation  Goal: Patient Will Maintain Patent Airway  Outcome: Progressing  Flowsheets (Taken 6/23/2025 0636)  Patient Will Maintain Patent Airway:   Assess and monitor airway secretions and suction as needed per patient's condition and according to policy   Hyper oxygenate with 100% FiO2 prior to suctioning   Suctioning secretions as indicated to maintain patent airway  Goal: Oral health is maintained or improved  Outcome: Progressing  Flowsheets (Taken 6/23/2025 0636)  Oral Health is Maintained or Improved:   Assess and monitor condition of lips and mouth and perform oral care per hospital policy   Perform oral care with an oral swab   Apply water-based moisturizer to lips  Goal: ET tube will be managed  safely  Outcome: Progressing  Flowsheets (Taken 6/23/2025 0636)  Endotracheal Tube Will Be Managed Safely:   Assess and monitor insertion depth at lip/nare line   Utilize ETT securing device and change as necessary to ensure ETT is properly secured to patient   Support ventilator tubing to avoid pressure from drag of tubing   Keep ambu bag, mask, oxygen connection tubing, and extra ETT at bedside and accompanying patient at all times   Utilize endotracheal tube securing device  Goal: Ability to express needs and understand communication  Outcome: Progressing  Flowsheets (Taken 6/23/2025 0636)  Ability to express needs and understand communication: Assess and monitor patient's ability to understand information and communicate  Goal: Mobility/activity is maintained at optimum level for patient  Outcome: Progressing     Problem: Pain  Goal: Takes deep breaths with improved pain control throughout the shift  Outcome: Progressing  Goal: Turns in bed with improved pain control throughout the shift  Outcome: Progressing  Goal: Free from opioid side effects throughout the shift  Outcome: Progressing  Goal: Free from acute confusion related to pain meds throughout the shift  Outcome: Progressing     Problem: Safety - Medical Restraint  Goal: Remains free of injury from restraints (Restraint for Interference with Medical Device)  Outcome: Progressing  Flowsheets (Taken 6/23/2025 0636)  Remains free of injury from restraints (restraint for interference with medical device): Every 2 hours: Monitor safety, psychosocial status, comfort, nutrition and hydration  Goal: Free from restraint(s) (Restraint for Interference with Medical Device)  Outcome: Progressing  Flowsheets (Taken 6/23/2025 0636)  Free from restraint(s) (restraint for interference with medical device): ONCE/SHIFT or MINIMUM Every 12 hours: Assess and document the continuing need for restraints     The clinical goals for the shift include Maintain SpO2 >90%  throughout this shift

## 2025-06-24 ENCOUNTER — APPOINTMENT (OUTPATIENT)
Dept: RADIOLOGY | Facility: HOSPITAL | Age: 80
End: 2025-06-24
Payer: MEDICARE

## 2025-06-24 ENCOUNTER — APPOINTMENT (OUTPATIENT)
Dept: CARDIOLOGY | Facility: HOSPITAL | Age: 80
DRG: 208 | End: 2025-06-24
Payer: MEDICARE

## 2025-06-24 LAB
ALBUMIN SERPL BCP-MCNC: 3.4 G/DL (ref 3.4–5)
ANION GAP BLDA CALCULATED.4IONS-SCNC: 1 MMO/L (ref 10–25)
ANION GAP SERPL CALC-SCNC: 13 MMOL/L (ref 10–20)
ANION GAP SERPL CALC-SCNC: 14 MMOL/L (ref 10–20)
AORTIC VALVE MEAN GRADIENT: 8 MMHG
AORTIC VALVE PEAK VELOCITY: 2.04 M/S
AV PEAK GRADIENT: 17 MMHG
AVA (PEAK VEL): 1.17 CM2
AVA (VTI): 1.26 CM2
BASE EXCESS BLDA CALC-SCNC: 18.6 MMOL/L (ref -2–3)
BASOPHILS # BLD AUTO: 0.03 X10*3/UL (ref 0–0.1)
BASOPHILS NFR BLD AUTO: 0.4 %
BODY TEMPERATURE: 37 DEGREES CELSIUS
BUN SERPL-MCNC: 29 MG/DL (ref 6–23)
BUN SERPL-MCNC: 30 MG/DL (ref 6–23)
CA-I BLDA-SCNC: 1.19 MMOL/L (ref 1.1–1.33)
CALCIUM SERPL-MCNC: 9.5 MG/DL (ref 8.6–10.3)
CALCIUM SERPL-MCNC: 9.7 MG/DL (ref 8.6–10.3)
CHLORIDE BLDA-SCNC: 91 MMOL/L (ref 98–107)
CHLORIDE SERPL-SCNC: 85 MMOL/L (ref 98–107)
CHLORIDE SERPL-SCNC: 87 MMOL/L (ref 98–107)
CO2 SERPL-SCNC: 43 MMOL/L (ref 21–32)
CO2 SERPL-SCNC: 44 MMOL/L (ref 21–32)
CREAT SERPL-MCNC: 1.09 MG/DL (ref 0.5–1.05)
CREAT SERPL-MCNC: 1.1 MG/DL (ref 0.5–1.05)
EGFRCR SERPLBLD CKD-EPI 2021: 51 ML/MIN/1.73M*2
EGFRCR SERPLBLD CKD-EPI 2021: 51 ML/MIN/1.73M*2
EJECTION FRACTION APICAL 4 CHAMBER: 54.3
EJECTION FRACTION: 58 %
EOSINOPHIL # BLD AUTO: 0.1 X10*3/UL (ref 0–0.4)
EOSINOPHIL NFR BLD AUTO: 1.2 %
ERYTHROCYTE [DISTWIDTH] IN BLOOD BY AUTOMATED COUNT: 15.3 % (ref 11.5–14.5)
FREQUENCY (BPM): 27 BPM
GLUCOSE BLD MANUAL STRIP-MCNC: 108 MG/DL (ref 74–99)
GLUCOSE BLD MANUAL STRIP-MCNC: 115 MG/DL (ref 74–99)
GLUCOSE BLD MANUAL STRIP-MCNC: 115 MG/DL (ref 74–99)
GLUCOSE BLD MANUAL STRIP-MCNC: 118 MG/DL (ref 74–99)
GLUCOSE BLD MANUAL STRIP-MCNC: 127 MG/DL (ref 74–99)
GLUCOSE BLD MANUAL STRIP-MCNC: 129 MG/DL (ref 74–99)
GLUCOSE BLDA-MCNC: 123 MG/DL (ref 74–99)
GLUCOSE SERPL-MCNC: 116 MG/DL (ref 74–99)
GLUCOSE SERPL-MCNC: 134 MG/DL (ref 74–99)
HCO3 BLDA-SCNC: 44.5 MMOL/L (ref 22–26)
HCT VFR BLD AUTO: 34.1 % (ref 36–46)
HCT VFR BLD EST: 35 % (ref 36–46)
HGB BLD-MCNC: 10.6 G/DL (ref 12–16)
HGB BLDA-MCNC: 11.5 G/DL (ref 12–16)
IMM GRANULOCYTES # BLD AUTO: 0.07 X10*3/UL (ref 0–0.5)
IMM GRANULOCYTES NFR BLD AUTO: 0.9 % (ref 0–0.9)
INHALED O2 CONCENTRATION: 45 %
LACTATE BLDA-SCNC: 1.6 MMOL/L (ref 0.4–2)
LEFT VENTRICLE INTERNAL DIMENSION DIASTOLE: 4.1 CM (ref 3.5–6)
LEFT VENTRICULAR OUTFLOW TRACT DIAMETER: 1.9 CM
LYMPHOCYTES # BLD AUTO: 1.22 X10*3/UL (ref 0.8–3)
LYMPHOCYTES NFR BLD AUTO: 14.8 %
MAGNESIUM SERPL-MCNC: 1.95 MG/DL (ref 1.6–2.4)
MAGNESIUM SERPL-MCNC: 2.04 MG/DL (ref 1.6–2.4)
MCH RBC QN AUTO: 28.1 PG (ref 26–34)
MCHC RBC AUTO-ENTMCNC: 31.1 G/DL (ref 32–36)
MCV RBC AUTO: 91 FL (ref 80–100)
MITRAL VALVE E/A RATIO: 0.71
MONOCYTES # BLD AUTO: 0.54 X10*3/UL (ref 0.05–0.8)
MONOCYTES NFR BLD AUTO: 6.6 %
NEUTROPHILS # BLD AUTO: 6.27 X10*3/UL (ref 1.6–5.5)
NEUTROPHILS NFR BLD AUTO: 76.1 %
NRBC BLD-RTO: 0.2 /100 WBCS (ref 0–0)
OXYHGB MFR BLDA: 92.8 % (ref 94–98)
PCO2 BLDA: 57 MM HG (ref 38–42)
PEEP CMH2O: 8 CM H2O
PH BLDA: 7.5 PH (ref 7.38–7.42)
PHOSPHATE SERPL-MCNC: 3.7 MG/DL (ref 2.5–4.9)
PLATELET # BLD AUTO: 213 X10*3/UL (ref 150–450)
PO2 BLDA: 72 MM HG (ref 85–95)
POTASSIUM BLDA-SCNC: 3.8 MMOL/L (ref 3.5–5.3)
POTASSIUM SERPL-SCNC: 3.3 MMOL/L (ref 3.5–5.3)
POTASSIUM SERPL-SCNC: 3.6 MMOL/L (ref 3.5–5.3)
PRESSURE SUPPORT: 12 CM H2O
RBC # BLD AUTO: 3.77 X10*6/UL (ref 4–5.2)
RIGHT VENTRICLE FREE WALL PEAK S': 9.57 CM/S
SAO2 % BLDA: 96 % (ref 94–100)
SODIUM BLDA-SCNC: 133 MMOL/L (ref 136–145)
SODIUM SERPL-SCNC: 139 MMOL/L (ref 136–145)
SODIUM SERPL-SCNC: 140 MMOL/L (ref 136–145)
SPONTANEOUS TIDAL VOLUME: 519 ML
TOTAL MINUTE VOLUME: 15.4 LITER
VENTILATOR MODE: ABNORMAL
WBC # BLD AUTO: 8.2 X10*3/UL (ref 4.4–11.3)

## 2025-06-24 PROCEDURE — 2500000004 HC RX 250 GENERAL PHARMACY W/ HCPCS (ALT 636 FOR OP/ED): Performed by: INTERNAL MEDICINE

## 2025-06-24 PROCEDURE — 71045 X-RAY EXAM CHEST 1 VIEW: CPT | Performed by: STUDENT IN AN ORGANIZED HEALTH CARE EDUCATION/TRAINING PROGRAM

## 2025-06-24 PROCEDURE — 99291 CRITICAL CARE FIRST HOUR: CPT

## 2025-06-24 PROCEDURE — 2500000004 HC RX 250 GENERAL PHARMACY W/ HCPCS (ALT 636 FOR OP/ED)

## 2025-06-24 PROCEDURE — 94003 VENT MGMT INPAT SUBQ DAY: CPT

## 2025-06-24 PROCEDURE — 80069 RENAL FUNCTION PANEL: CPT

## 2025-06-24 PROCEDURE — 94660 CPAP INITIATION&MGMT: CPT

## 2025-06-24 PROCEDURE — 85025 COMPLETE CBC W/AUTO DIFF WBC: CPT

## 2025-06-24 PROCEDURE — 37799 UNLISTED PX VASCULAR SURGERY: CPT

## 2025-06-24 PROCEDURE — 2020000001 HC ICU ROOM DAILY

## 2025-06-24 PROCEDURE — 82947 ASSAY GLUCOSE BLOOD QUANT: CPT

## 2025-06-24 PROCEDURE — 71045 X-RAY EXAM CHEST 1 VIEW: CPT

## 2025-06-24 PROCEDURE — 84132 ASSAY OF SERUM POTASSIUM: CPT

## 2025-06-24 PROCEDURE — 2500000002 HC RX 250 W HCPCS SELF ADMINISTERED DRUGS (ALT 637 FOR MEDICARE OP, ALT 636 FOR OP/ED): Performed by: INTERNAL MEDICINE

## 2025-06-24 PROCEDURE — 83735 ASSAY OF MAGNESIUM: CPT

## 2025-06-24 PROCEDURE — 2500000005 HC RX 250 GENERAL PHARMACY W/O HCPCS: Performed by: INTERNAL MEDICINE

## 2025-06-24 PROCEDURE — C8929 TTE W OR WO FOL WCON,DOPPLER: HCPCS

## 2025-06-24 PROCEDURE — 94640 AIRWAY INHALATION TREATMENT: CPT

## 2025-06-24 RX ORDER — POTASSIUM CHLORIDE 14.9 MG/ML
20 INJECTION INTRAVENOUS
Status: COMPLETED | OUTPATIENT
Start: 2025-06-24 | End: 2025-06-24

## 2025-06-24 RX ADMIN — PIPERACILLIN SODIUM AND TAZOBACTAM SODIUM 4.5 G: 4; .5 INJECTION, SOLUTION INTRAVENOUS at 08:23

## 2025-06-24 RX ADMIN — Medication 45 PERCENT: at 20:47

## 2025-06-24 RX ADMIN — ACETYLCYSTEINE 400 MG: 200 SOLUTION ORAL; RESPIRATORY (INHALATION) at 18:33

## 2025-06-24 RX ADMIN — PIPERACILLIN SODIUM AND TAZOBACTAM SODIUM 4.5 G: 4; .5 INJECTION, SOLUTION INTRAVENOUS at 12:11

## 2025-06-24 RX ADMIN — Medication 45 MCG/HR: at 11:26

## 2025-06-24 RX ADMIN — POTASSIUM CHLORIDE 20 MEQ: 14.9 INJECTION, SOLUTION INTRAVENOUS at 14:14

## 2025-06-24 RX ADMIN — IPRATROPIUM BROMIDE AND ALBUTEROL SULFATE 3 ML: .5; 3 SOLUTION RESPIRATORY (INHALATION) at 18:32

## 2025-06-24 RX ADMIN — ENOXAPARIN SODIUM 60 MG: 100 INJECTION SUBCUTANEOUS at 08:17

## 2025-06-24 RX ADMIN — IPRATROPIUM BROMIDE AND ALBUTEROL SULFATE 3 ML: .5; 3 SOLUTION RESPIRATORY (INHALATION) at 13:52

## 2025-06-24 RX ADMIN — POTASSIUM CHLORIDE 20 MEQ: 14.9 INJECTION, SOLUTION INTRAVENOUS at 10:29

## 2025-06-24 RX ADMIN — Medication 60 PERCENT: at 07:20

## 2025-06-24 RX ADMIN — FUROSEMIDE 15 MG/HR: 10 INJECTION, SOLUTION INTRAMUSCULAR; INTRAVENOUS at 11:27

## 2025-06-24 RX ADMIN — PIPERACILLIN SODIUM AND TAZOBACTAM SODIUM 4.5 G: 4; .5 INJECTION, SOLUTION INTRAVENOUS at 01:46

## 2025-06-24 RX ADMIN — ENOXAPARIN SODIUM 60 MG: 100 INJECTION SUBCUTANEOUS at 20:18

## 2025-06-24 RX ADMIN — PROPOFOL 18 MCG/KG/MIN: 10 INJECTION, EMULSION INTRAVENOUS at 02:30

## 2025-06-24 RX ADMIN — PIPERACILLIN SODIUM AND TAZOBACTAM SODIUM 4.5 G: 4; .5 INJECTION, SOLUTION INTRAVENOUS at 18:35

## 2025-06-24 RX ADMIN — POTASSIUM CHLORIDE 20 MEQ: 14.9 INJECTION, SOLUTION INTRAVENOUS at 12:30

## 2025-06-24 RX ADMIN — IPRATROPIUM BROMIDE AND ALBUTEROL SULFATE 3 ML: .5; 3 SOLUTION RESPIRATORY (INHALATION) at 06:59

## 2025-06-24 RX ADMIN — PROPOFOL 18 MCG/KG/MIN: 10 INJECTION, EMULSION INTRAVENOUS at 08:23

## 2025-06-24 RX ADMIN — ACETYLCYSTEINE 400 MG: 200 SOLUTION ORAL; RESPIRATORY (INHALATION) at 06:59

## 2025-06-24 RX ADMIN — PANTOPRAZOLE SODIUM 40 MG: 40 INJECTION, POWDER, FOR SOLUTION INTRAVENOUS at 08:17

## 2025-06-24 RX ADMIN — PERFLUTREN 10 ML OF DILUTION: 6.52 INJECTION, SUSPENSION INTRAVENOUS at 08:17

## 2025-06-24 RX ADMIN — POTASSIUM CHLORIDE 20 MEQ: 14.9 INJECTION, SOLUTION INTRAVENOUS at 08:25

## 2025-06-24 RX ADMIN — ACETYLCYSTEINE 400 MG: 200 SOLUTION ORAL; RESPIRATORY (INHALATION) at 13:53

## 2025-06-24 ASSESSMENT — COGNITIVE AND FUNCTIONAL STATUS - GENERAL
HELP NEEDED FOR BATHING: TOTAL
MOVING FROM LYING ON BACK TO SITTING ON SIDE OF FLAT BED WITH BEDRAILS: TOTAL
TOILETING: TOTAL
EATING MEALS: A LOT
DAILY ACTIVITIY SCORE: 12
MOBILITY SCORE: 6
MOVING TO AND FROM BED TO CHAIR: TOTAL
DAILY ACTIVITIY SCORE: 6
WALKING IN HOSPITAL ROOM: TOTAL
DRESSING REGULAR UPPER BODY CLOTHING: A LOT
MOBILITY SCORE: 9
DRESSING REGULAR UPPER BODY CLOTHING: TOTAL
PERSONAL GROOMING: A LOT
CLIMB 3 TO 5 STEPS WITH RAILING: TOTAL
PERSONAL GROOMING: TOTAL
TOILETING: A LOT
CLIMB 3 TO 5 STEPS WITH RAILING: TOTAL
HELP NEEDED FOR BATHING: A LOT
STANDING UP FROM CHAIR USING ARMS: A LOT
MOVING FROM LYING ON BACK TO SITTING ON SIDE OF FLAT BED WITH BEDRAILS: A LOT
DRESSING REGULAR LOWER BODY CLOTHING: A LOT
STANDING UP FROM CHAIR USING ARMS: TOTAL
WALKING IN HOSPITAL ROOM: TOTAL
TURNING FROM BACK TO SIDE WHILE IN FLAT BAD: A LOT
DRESSING REGULAR LOWER BODY CLOTHING: TOTAL
MOVING TO AND FROM BED TO CHAIR: TOTAL
EATING MEALS: TOTAL
TURNING FROM BACK TO SIDE WHILE IN FLAT BAD: TOTAL

## 2025-06-24 ASSESSMENT — RESPIRATORY DISTRESS OBSERVATION SCALE (RDOS)
RDOS TOTAL SCORE: 2
LOOK OF FEAR: 0 - NONE
ACCESSORY MUSCLE RISE IN CLAVICLE DURING INSPIRATION: 0 - NONE
ACCESSORY MUSCLE RISE IN CLAVICLE DURING INSPIRATION: 0 - NONE
RESPIRATORY RATE PER MINUTE: 1 - 19-30 BREATHS
RDOS TOTAL SCORE: 3
HEART RATE PER MINUTE: 1 - 90-109 BEATS
RESTLESS NONPURPOSEFUL MOVEMENTS: 0 - NONE
INVOLUNTARY NASAL FLARING: 0 - NONE
GRUNTING AT END OF EXPIRATION: 0 - NONE
GRUNTING AT END OF EXPIRATION: 0 - NONE
RESPIRATORY RATE PER MINUTE: 1 - 19-30 BREATHS
LOOK OF FEAR: 0 - NONE
PARADOXICAL BREATHING PATTERN: 0 - NONE
INVOLUNTARY NASAL FLARING: 0 - NONE
HEART RATE PER MINUTE: 1 - 90-109 BEATS
PARADOXICAL BREATHING PATTERN: 0 - NONE
RESTLESS NONPURPOSEFUL MOVEMENTS: 1 - OCCASIONAL, SLIGHT MOVEMENTS

## 2025-06-24 ASSESSMENT — PAIN SCALES - GENERAL
PAINLEVEL_OUTOF10: 1
PAINLEVEL_OUTOF10: 0 - NO PAIN
PAINLEVEL_OUTOF10: 1

## 2025-06-24 ASSESSMENT — PAIN - FUNCTIONAL ASSESSMENT: PAIN_FUNCTIONAL_ASSESSMENT: CPOT (CRITICAL CARE PAIN OBSERVATION TOOL)

## 2025-06-24 NOTE — PROGRESS NOTES
MICU PROGRESS NOTE    Subjective      Patient intubated and sedated. ICU team spoke with family during rounds. Updated on clinical status.    Assessment & Plan   Ailyn Banegas is a 80 y.o. female with a PMHx of reported COPD (no PFTs on record, on baseline 5L NC), MARLENY/likely OHS, Hypothyroidism, DLD, Morbid Obesity who was brought into the ED for respiratory distress. Admitted to ICU for acute on chronic hypercapnic hypoxic respiratory failure 2/2 ADHF. On day 2 of admission. DNR.    PROGRESS:  6/24: HDS elevated intermittently hypotensive overnight. 4.5L UOP yesterday on Lasix gtt. FiO2 weaned from 80->60 overnight, further weaning down to 45% through ETT today. K replacement with 80 mEq. Echo resulted, not significantly different from SWG but with moderately reduced right ventricular systolic function. Optimizing for potential extubation later today/tomorrow morning    Plan:  NEUROLOGICAL / PSYCH:  Dx:  Encephalopathy   Intubated and sedated  Management:  On fentanyl and propofol, following commands  Bilateral soft wrist restraints in place, reordered    CARDIOVASCULAR:  Dx:  ADHF EF 60-65% in 8/22  Hx paroxysmal atrial fibrillation  Hx mild AVS  Hx DLD, HTN  Management:  Home cardiac meds: 10 mg Lipitor, 30 mg Cardizem, 20 mg Lasix, 100 mg Aldactone, 40 mg Demadex  Hold home meds  Telemetry  Echo  Consult cardiology  On Lasix gtt 10mg/mL, ~4.5 L UOP yesterday  6L UOP thus far this admission    PULMONARY:  Dx:  Intubated  Acute on chronic hypercapnic hypoxic respiratory failure  Pulmonary edema with small bilateral pleural effusions  RLL atelectacic collapse  PNA, C/F  COPD on home 5LNC  MARLENY on home CPAP  OHS  Management:  S/p  mg methylprednisolone, multiple IV Lasix doses and a Diamox dose in ED  Intubated s/p AVAPS/BIPAP evening of 6/22 due to worsening mentation, hypercarbia  Daily SAT/SBTs, adjust and wean vent settings as tolerated  Supplemental O2 PRN to maintain SpO2 89-94%  Vent Mode:  Spontaneous  FiO2 (%):  [45 %-80 %] 45 %  S RR:  [16] 16  PEEP/CPAP (cm H2O):  [8 cm H20] 8 cm H20  MAP (cm H2O):  [12-14] 12  Diuresis with Lasix gtt 10 mg/mL, monitor UOP with strict I/O's  ABX: Zosyn. Continue to de-escalate per infectious workup as below  Respiratory and mucolytic treatments: DuoNeb QID, DuoNeb q2h PRN, Mucomyst    GASTROENTEROLOGY:  Dx:  Management:  NPO with enteral feeding, place OG/NG tube and start TF if intubated still by tomorrow  While intubated, PPI QD    RENAL / GENITOURINARY:  Dx:  Management:  Maintain Mccallum, strict I/O's    ENDOCRINOLOGY:  Dx:  Hyperglycemia  Hypothyroidism  Management:  Continue Synthroid  q4h BG checks + SSI    HEMATOLOGY / ONCOLOGY:  Dx:  Anemia  Management:  DVT ppx with SCD's & Lovenox  Trend CBC    MUSCULOSKELETAL / SKIN:  Dx:  LE wounds  Management:  ICU wound prevention and skin care    INFECTIOUS DISEASE:  Dx:  PNA, C/F  Management:  ABX: Zosyn. Continue to de-escalate per infectious workup  Infectious workup: MRSA PCR negative, viral panel with COVID and Flu and RSV negative, respiratory Cx from tracheal aspirate pending collection, PNA urinary antigens negative, Pro-Rodolfo 0.12  Monitor for infectious sequelae    Checklist:  Antimicrobials: Zosyn  Oxygen: ET, 45% FiO2 PEEP 8  Feeding: NPO  Fluids: -  DVT ppx: SCD's & Lovenox  Ulcer ppx: PPI  Glycemic control: q4h BG checks + SSI  Bowel care: PRN  Indwelling catheters: Left radial arterial line, Mccallum  Lines: PIVs  Consults: -  Code Status: DNR    Dr. Hany Kelly, DO  PGY-2, Internal Medicine    This is a preliminary note, please await attending attestation for final recommendations    Disclaimer: Documentation completed with the information available at the time of input. The times in the chart may not be reflective of actual patient care times, interventions, or procedures. Documentation occurs after the physical care of the patient.     Objective (Vitals, labs, radiological imaging, cardiac work up  were personally reviewed)     Physical Exam  Constitutional:       Appearance: She is morbidly obese.      Interventions: She is sedated and intubated.   HENT:      Head:      Comments: ETT in place  Neck:      Vascular: JVD present.   Cardiovascular:      Rate and Rhythm: Normal rate and regular rhythm.      Pulses: Normal pulses.   Pulmonary:      Effort: She is intubated.      Breath sounds: Decreased air movement present. Decreased breath sounds present.   Abdominal:      General: Abdomen is protuberant.      Palpations: Abdomen is soft.   Genitourinary:     Comments: Mccallum in place  Musculoskeletal:      Right lower leg: Pitting Edema present.      Left lower leg: Pitting Edema present.      Comments: L radial art line   Skin:     General: Skin is warm.      Capillary Refill: Capillary refill takes less than 2 seconds.   Neurological:      Comments: Follows commands   Psychiatric:         Mood and Affect: Mood is anxious.     Last Recorded Vitals  Vitals:    06/24/25 1000 06/24/25 1047 06/24/25 1100 06/24/25 1200   BP:       BP Location:       Patient Position:       Pulse: 71  76 72   Resp: 18  19 (!) 31   Temp:       TempSrc:       SpO2: 93% 92% (!) 88% 91%   Weight:       Height:         Intake/Output last 3 Shifts:  I/O last 3 completed shifts:  In: 1328.8 (8.9 mL/kg) [I.V.:428.8 (2.9 mL/kg); IV Piggyback:900]  Out: 5535 (37.2 mL/kg) [Urine:5535 (1 mL/kg/hr)]  Weight: 148.7 kg     Ventilator/O2 Supply  Oxygen Therapy/Pulse Ox  Medical Gas Therapy: Supplemental oxygen  Medical Gas Delivery Method: Endotracheal tube  FiO2 (%): 45 %  SpO2: 91 %  Patient Activity During SpO2 Measurement: At rest  Temp: 35.4 °C (95.7 °F)    Labs:   Results from last 7 days   Lab Units 06/24/25  0526 06/23/25  1630 06/23/25  0617   SODIUM mmol/L 140 138 136   POTASSIUM mmol/L 3.3* 3.8 3.9   CHLORIDE mmol/L 85* 84* 84*   CO2 mmol/L 44* 45* >45*   BUN mg/dL 29* 25* 21   CREATININE mg/dL 1.09* 0.92 0.81   GLUCOSE mg/dL 116* 140*  234*   CALCIUM mg/dL 9.5 9.5 9.6     Results from last 7 days   Lab Units 06/24/25  0526   WBC AUTO x10*3/uL 8.2   HEMOGLOBIN g/dL 10.6*   HEMATOCRIT % 34.1*   PLATELETS AUTO x10*3/uL 213     Results from last 7 days   Lab Units 06/23/25  1204 06/23/25  0632 06/23/25  0106   POCT PH, ARTERIAL pH 7.48* 7.42 7.43*   POCT PCO2, ARTERIAL mm Hg 71* 72* 77*   POCT PO2, ARTERIAL mm Hg 82* 77* 89   POCT HCO3 CALCULATED, ARTERIAL mmol/L 52.9* 46.7* 51.1*   POCT BASE EXCESS, ARTERIAL mmol/L 25.5* 18.2* 22.0*

## 2025-06-24 NOTE — PROGRESS NOTES
Spiritual Care Visit  Spiritual Care Request    Reason for Visit:  Continue Visiting: No     Request Received From:  Referral From:     Focus of Care:  Visited With: Patient         Refer to :          Spiritual Care Assessment    Spiritual Assessment:                      Care Provided:  Intended Effects: Demonstrate caring and concern  Methods: Offer spiritual/Baptism support  Interventions: Acknowledge current situation    Sense of Community and or Mosque Affiliation:  None         Addressed Needs/Concerns and/or Amaury Through:          Outcome:  Outcome of Spiritual Care Visit: Spirituality connected     Advance Directives:         Spiritual Care Annotation    Annotation:  ***      Notes:  *** Mr. Rascon welcomed a visit. This was a patient-centered visit. showed care and concern and offered the opportunity for emotional, spiritual care if needed. I created time and space for active, empathic listening. I intervened with emotional, spiritual care and maintained a non-anxious, nonjudgmental presence. Pt used agency of voice to share their story. They have children. spiritual support as part of a holistic approach to wellness that addressed the whole person. *** was offered the opportunity for a visit for emotional, spiritual support. I was unable to assess at this time. This  offered opportunity for a holistic approach to wellness as whole-person care and allowed for integrative healing of the body, mind and spirit. I followed the policy for PPE. There are no other needs.  ***    Patient: Ailyn Banegas    Date: 6/24/2025  Time: 4:33 PM  Total time (min.):***    I offered instruction on how to reach out to the Spiritual Care Department for future needs. I remain available upon request.  [facility] Department of Spiritual Care Contact #: [phone]  Signed by: Asaf Sheffield, MA

## 2025-06-24 NOTE — PROGRESS NOTES
Cardiology Progress    Impression:  Acute on chronic hypercapnic, hypoxic respiratory failure requiring intubation.  Metabolic alkalosis.  Advanced COPD.  On home oxygen.  Right heart failure.  Echo 6/20/2025 showing normal EF, moderate RV dysfunction.  RVSP could not be estimated.  Peripheral fluid overload.  MARLENY.  Refuses BiPAP.  Obesity hypoventilation syndrome  Anemia  Paroxysmal atrial fibrillation.  Currently sinus rhythm.  Mild aortic stenosis  Hypertension  Hyperlipidemia  Plan:  Continue diuresis  Follow labs  HPI:  No problems overnight.  Remains intubated, sedated.  Good urine output with Lasix infusion.  Hemodynamics okay.  Remains sinus rhythm.  Meds:  Scheduled medications  Scheduled Medications[1]  Continuous medications  Continuous Medications[2]  PRN medications  PRN Medications[3]    Physical exam:  Vitals:    06/24/25 1000   BP:    Pulse: 71   Resp: 18   Temp:    SpO2: 93%      JVP not visible.  Poor air entry.  Moderate edema.  EKG:  Telemetry showing sinus rhythm  Echo:  6/20/2025: Normal EF.  Moderate RV dysfunction.  RVSP could not be obtained.  Labs:  Lab Results   Component Value Date    WBC 8.2 06/24/2025    HGB 10.6 (L) 06/24/2025    HCT 34.1 (L) 06/24/2025     06/24/2025    CHOL 133 11/13/2020    TRIG 88 11/13/2020    HDL 55.4 11/13/2020    ALT 17 06/22/2025    AST 15 06/22/2025     06/24/2025    K 3.3 (L) 06/24/2025    CL 85 (L) 06/24/2025    CREATININE 1.09 (H) 06/24/2025    BUN 29 (H) 06/24/2025    CO2 44 (HH) 06/24/2025    TSH 3.34 04/06/2022    HGBA1C 5.9 (A) 04/06/2022     par         [1] acetylcysteine, 2 mL, nebulization, TID  enoxaparin, 60 mg, subcutaneous, q12h VLADIMIR  insulin lispro, 0-15 Units, subcutaneous, q4h  ipratropium-albuteroL, 3 mL, nebulization, TID  oxygen, , inhalation, Continuous - Inhalation  pantoprazole, 40 mg, intravenous, Daily  perflutren protein A microsphere, 0.5 mL, intravenous, Once in imaging  piperacillin-tazobactam, 4.5 g, intravenous,  q6h  potassium chloride, 20 mEq, intravenous, q2h  sulfur hexafluoride microsphr, 2 mL, intravenous, Once in imaging  [2] fentaNYL, 0-200 mcg/hr, Last Rate: 45 mcg/hr (06/23/25 2157)  furosemide, 15 mg/hr, Last Rate: 15 mg/hr (06/23/25 1450)  propofol, 0-20 mcg/kg/min (Dosing Weight), Last Rate: 18 mcg/kg/min (06/24/25 0823)  [3] PRN medications: dextrose, dextrose, glucagon, glucagon, ipratropium-albuteroL

## 2025-06-24 NOTE — CARE PLAN
Problem: Pain - Adult  Goal: Verbalizes/displays adequate comfort level or baseline comfort level  Outcome: Progressing     Problem: Safety - Adult  Goal: Free from fall injury  Outcome: Progressing     Problem: Discharge Planning  Goal: Discharge to home or other facility with appropriate resources  Outcome: Progressing     Problem: Chronic Conditions and Co-morbidities  Goal: Patient's chronic conditions and co-morbidity symptoms are monitored and maintained or improved  Outcome: Progressing     Problem: Nutrition  Goal: Nutrient intake appropriate for maintaining nutritional needs  Outcome: Progressing     Problem: Diabetes  Goal: Achieve decreasing blood glucose levels by end of shift  Outcome: Progressing  Goal: Increase stability of blood glucose readings by end of shift  Outcome: Progressing  Goal: Maintain electrolyte levels within acceptable range throughout shift  Outcome: Progressing  Goal: Maintain glucose levels >70mg/dl to <250mg/dl throughout shift  Outcome: Progressing  Goal: No changes in neurological exam by end of shift  Outcome: Progressing  Goal: Learn about and adhere to nutrition recommendations by end of shift  Outcome: Progressing  Goal: Vital signs within normal range for age by end of shift  Outcome: Progressing  Goal: Increase self care and/or family involovement by end of shift  Outcome: Progressing  Goal: Receive DSME education by end of shift  Outcome: Progressing     Problem: Skin  Goal: Decreased wound size/increased tissue granulation at next dressing change  Outcome: Progressing  Goal: Participates in plan/prevention/treatment measures  Outcome: Progressing  Goal: Prevent/manage excess moisture  Outcome: Progressing  Goal: Prevent/minimize sheer/friction injuries  Outcome: Progressing  Goal: Promote/optimize nutrition  Outcome: Progressing  Goal: Promote skin healing  Outcome: Progressing     Problem: Knowledge Deficit  Goal: Patient/family/caregiver demonstrates understanding of  disease process, treatment plan, medications, and discharge instructions  Outcome: Progressing     Problem: Pain  Goal: Takes deep breaths with improved pain control throughout the shift  Outcome: Progressing  Goal: Turns in bed with improved pain control throughout the shift  Outcome: Progressing  Goal: Free from opioid side effects throughout the shift  Outcome: Progressing  Goal: Free from acute confusion related to pain meds throughout the shift  Outcome: Progressing     Problem: Safety - Medical Restraint  Goal: Remains free of injury from restraints (Restraint for Interference with Medical Device)  Outcome: Progressing  Goal: Free from restraint(s) (Restraint for Interference with Medical Device)  Outcome: Progressing     Problem: Fall/Injury  Goal: Not fall by end of shift  Outcome: Progressing  Goal: Be free from injury by end of the shift  Outcome: Progressing  Goal: Verbalize understanding of personal risk factors for fall in the hospital  Outcome: Progressing  Goal: Verbalize understanding of risk factor reduction measures to prevent injury from fall in the home  Outcome: Progressing  Goal: Use assistive devices by end of the shift  Outcome: Progressing  Goal: Pace activities to prevent fatigue by end of the shift  Outcome: Progressing   The patient's goals for the shift include      The clinical goals for the shift include maintain sat >90%    Over the shift, the patient did not make progress toward the following goals. Barriers to progression include anxiety . Recommendations to address these barriers include calming techniques and promotion of rest .

## 2025-06-25 ENCOUNTER — APPOINTMENT (OUTPATIENT)
Dept: RADIOLOGY | Facility: HOSPITAL | Age: 80
End: 2025-06-25
Payer: MEDICARE

## 2025-06-25 LAB
ALBUMIN SERPL BCP-MCNC: 3.5 G/DL (ref 3.4–5)
ANION GAP SERPL CALC-SCNC: 12 MMOL/L (ref 10–20)
ANION GAP SERPL CALC-SCNC: 12 MMOL/L (ref 10–20)
BASOPHILS # BLD AUTO: 0.06 X10*3/UL (ref 0–0.1)
BASOPHILS NFR BLD AUTO: 0.7 %
BUN SERPL-MCNC: 31 MG/DL (ref 6–23)
BUN SERPL-MCNC: 31 MG/DL (ref 6–23)
CALCIUM SERPL-MCNC: 9.6 MG/DL (ref 8.6–10.3)
CALCIUM SERPL-MCNC: 9.7 MG/DL (ref 8.6–10.3)
CHLORIDE SERPL-SCNC: 87 MMOL/L (ref 98–107)
CHLORIDE SERPL-SCNC: 88 MMOL/L (ref 98–107)
CO2 SERPL-SCNC: 42 MMOL/L (ref 21–32)
CO2 SERPL-SCNC: 44 MMOL/L (ref 21–32)
CREAT SERPL-MCNC: 1.08 MG/DL (ref 0.5–1.05)
CREAT SERPL-MCNC: 1.1 MG/DL (ref 0.5–1.05)
EGFRCR SERPLBLD CKD-EPI 2021: 51 ML/MIN/1.73M*2
EGFRCR SERPLBLD CKD-EPI 2021: 52 ML/MIN/1.73M*2
EOSINOPHIL # BLD AUTO: 0.17 X10*3/UL (ref 0–0.4)
EOSINOPHIL NFR BLD AUTO: 2.1 %
ERYTHROCYTE [DISTWIDTH] IN BLOOD BY AUTOMATED COUNT: 15.7 % (ref 11.5–14.5)
GLUCOSE BLD MANUAL STRIP-MCNC: 101 MG/DL (ref 74–99)
GLUCOSE BLD MANUAL STRIP-MCNC: 110 MG/DL (ref 74–99)
GLUCOSE BLD MANUAL STRIP-MCNC: 117 MG/DL (ref 74–99)
GLUCOSE BLD MANUAL STRIP-MCNC: 133 MG/DL (ref 74–99)
GLUCOSE BLD MANUAL STRIP-MCNC: 156 MG/DL (ref 74–99)
GLUCOSE BLD MANUAL STRIP-MCNC: 160 MG/DL (ref 74–99)
GLUCOSE SERPL-MCNC: 106 MG/DL (ref 74–99)
GLUCOSE SERPL-MCNC: 149 MG/DL (ref 74–99)
HCT VFR BLD AUTO: 34.8 % (ref 36–46)
HGB BLD-MCNC: 10.6 G/DL (ref 12–16)
IMM GRANULOCYTES # BLD AUTO: 0.04 X10*3/UL (ref 0–0.5)
IMM GRANULOCYTES NFR BLD AUTO: 0.5 % (ref 0–0.9)
LYMPHOCYTES # BLD AUTO: 1.08 X10*3/UL (ref 0.8–3)
LYMPHOCYTES NFR BLD AUTO: 13.3 %
MAGNESIUM SERPL-MCNC: 2.05 MG/DL (ref 1.6–2.4)
MAGNESIUM SERPL-MCNC: 2.38 MG/DL (ref 1.6–2.4)
MCH RBC QN AUTO: 27.7 PG (ref 26–34)
MCHC RBC AUTO-ENTMCNC: 30.5 G/DL (ref 32–36)
MCV RBC AUTO: 91 FL (ref 80–100)
MONOCYTES # BLD AUTO: 0.51 X10*3/UL (ref 0.05–0.8)
MONOCYTES NFR BLD AUTO: 6.3 %
NEUTROPHILS # BLD AUTO: 6.25 X10*3/UL (ref 1.6–5.5)
NEUTROPHILS NFR BLD AUTO: 77.1 %
NRBC BLD-RTO: 0 /100 WBCS (ref 0–0)
PHOSPHATE SERPL-MCNC: 5.5 MG/DL (ref 2.5–4.9)
PLATELET # BLD AUTO: 218 X10*3/UL (ref 150–450)
POTASSIUM SERPL-SCNC: 3.5 MMOL/L (ref 3.5–5.3)
POTASSIUM SERPL-SCNC: 4.3 MMOL/L (ref 3.5–5.3)
RBC # BLD AUTO: 3.82 X10*6/UL (ref 4–5.2)
SODIUM SERPL-SCNC: 138 MMOL/L (ref 136–145)
SODIUM SERPL-SCNC: 139 MMOL/L (ref 136–145)
WBC # BLD AUTO: 8.1 X10*3/UL (ref 4.4–11.3)

## 2025-06-25 PROCEDURE — 2500000005 HC RX 250 GENERAL PHARMACY W/O HCPCS: Performed by: INTERNAL MEDICINE

## 2025-06-25 PROCEDURE — 99291 CRITICAL CARE FIRST HOUR: CPT

## 2025-06-25 PROCEDURE — 2500000002 HC RX 250 W HCPCS SELF ADMINISTERED DRUGS (ALT 637 FOR MEDICARE OP, ALT 636 FOR OP/ED): Performed by: INTERNAL MEDICINE

## 2025-06-25 PROCEDURE — 2020000001 HC ICU ROOM DAILY

## 2025-06-25 PROCEDURE — 2500000004 HC RX 250 GENERAL PHARMACY W/ HCPCS (ALT 636 FOR OP/ED): Performed by: INTERNAL MEDICINE

## 2025-06-25 PROCEDURE — 85025 COMPLETE CBC W/AUTO DIFF WBC: CPT

## 2025-06-25 PROCEDURE — 94660 CPAP INITIATION&MGMT: CPT

## 2025-06-25 PROCEDURE — 82374 ASSAY BLOOD CARBON DIOXIDE: CPT

## 2025-06-25 PROCEDURE — 80069 RENAL FUNCTION PANEL: CPT

## 2025-06-25 PROCEDURE — 94640 AIRWAY INHALATION TREATMENT: CPT

## 2025-06-25 PROCEDURE — 71045 X-RAY EXAM CHEST 1 VIEW: CPT

## 2025-06-25 PROCEDURE — 37799 UNLISTED PX VASCULAR SURGERY: CPT

## 2025-06-25 PROCEDURE — 83735 ASSAY OF MAGNESIUM: CPT

## 2025-06-25 PROCEDURE — 71045 X-RAY EXAM CHEST 1 VIEW: CPT | Mod: FOREIGN READ | Performed by: RADIOLOGY

## 2025-06-25 PROCEDURE — 82947 ASSAY GLUCOSE BLOOD QUANT: CPT

## 2025-06-25 PROCEDURE — 2500000004 HC RX 250 GENERAL PHARMACY W/ HCPCS (ALT 636 FOR OP/ED)

## 2025-06-25 PROCEDURE — 36415 COLL VENOUS BLD VENIPUNCTURE: CPT

## 2025-06-25 RX ORDER — POTASSIUM CHLORIDE 14.9 MG/ML
20 INJECTION INTRAVENOUS
Status: COMPLETED | OUTPATIENT
Start: 2025-06-25 | End: 2025-06-25

## 2025-06-25 RX ORDER — DEXAMETHASONE SODIUM PHOSPHATE 10 MG/ML
10 INJECTION INTRAMUSCULAR; INTRAVENOUS ONCE
Status: COMPLETED | OUTPATIENT
Start: 2025-06-25 | End: 2025-06-25

## 2025-06-25 RX ORDER — TALC
6 POWDER (GRAM) TOPICAL DAILY
Status: COMPLETED | OUTPATIENT
Start: 2025-06-25 | End: 2025-06-25

## 2025-06-25 RX ORDER — POTASSIUM CHLORIDE 14.9 MG/ML
20 INJECTION INTRAVENOUS ONCE
Status: DISCONTINUED | OUTPATIENT
Start: 2025-06-25 | End: 2025-06-25

## 2025-06-25 RX ADMIN — SODIUM CHLORIDE, SODIUM LACTATE, POTASSIUM CHLORIDE, AND CALCIUM CHLORIDE 250 ML: .6; .31; .03; .02 INJECTION, SOLUTION INTRAVENOUS at 09:19

## 2025-06-25 RX ADMIN — IPRATROPIUM BROMIDE AND ALBUTEROL SULFATE 3 ML: .5; 3 SOLUTION RESPIRATORY (INHALATION) at 12:17

## 2025-06-25 RX ADMIN — Medication 60 L/MIN: at 20:11

## 2025-06-25 RX ADMIN — PIPERACILLIN SODIUM AND TAZOBACTAM SODIUM 4.5 G: 4; .5 INJECTION, SOLUTION INTRAVENOUS at 01:36

## 2025-06-25 RX ADMIN — Medication 6 MG: at 21:22

## 2025-06-25 RX ADMIN — PIPERACILLIN SODIUM AND TAZOBACTAM SODIUM 4.5 G: 4; .5 INJECTION, SOLUTION INTRAVENOUS at 18:10

## 2025-06-25 RX ADMIN — IPRATROPIUM BROMIDE AND ALBUTEROL SULFATE 3 ML: .5; 3 SOLUTION RESPIRATORY (INHALATION) at 06:42

## 2025-06-25 RX ADMIN — Medication 45 PERCENT: at 22:58

## 2025-06-25 RX ADMIN — POTASSIUM CHLORIDE 20 MEQ: 14.9 INJECTION, SOLUTION INTRAVENOUS at 10:55

## 2025-06-25 RX ADMIN — ENOXAPARIN SODIUM 60 MG: 100 INJECTION SUBCUTANEOUS at 20:10

## 2025-06-25 RX ADMIN — Medication 45 PERCENT: at 06:42

## 2025-06-25 RX ADMIN — ENOXAPARIN SODIUM 60 MG: 100 INJECTION SUBCUTANEOUS at 08:25

## 2025-06-25 RX ADMIN — ACETYLCYSTEINE 400 MG: 200 SOLUTION ORAL; RESPIRATORY (INHALATION) at 19:02

## 2025-06-25 RX ADMIN — POTASSIUM CHLORIDE 20 MEQ: 14.9 INJECTION, SOLUTION INTRAVENOUS at 12:58

## 2025-06-25 RX ADMIN — PIPERACILLIN SODIUM AND TAZOBACTAM SODIUM 4.5 G: 4; .5 INJECTION, SOLUTION INTRAVENOUS at 12:58

## 2025-06-25 RX ADMIN — DEXAMETHASONE SODIUM PHOSPHATE 10 MG: 10 INJECTION, SOLUTION INTRAMUSCULAR; INTRAVENOUS at 10:58

## 2025-06-25 RX ADMIN — ACETYLCYSTEINE 400 MG: 200 SOLUTION ORAL; RESPIRATORY (INHALATION) at 12:16

## 2025-06-25 RX ADMIN — POTASSIUM CHLORIDE 20 MEQ: 14.9 INJECTION, SOLUTION INTRAVENOUS at 08:25

## 2025-06-25 RX ADMIN — PIPERACILLIN SODIUM AND TAZOBACTAM SODIUM 4.5 G: 4; .5 INJECTION, SOLUTION INTRAVENOUS at 08:25

## 2025-06-25 RX ADMIN — IPRATROPIUM BROMIDE AND ALBUTEROL SULFATE 3 ML: .5; 3 SOLUTION RESPIRATORY (INHALATION) at 19:02

## 2025-06-25 RX ADMIN — ACETYLCYSTEINE 400 MG: 200 SOLUTION ORAL; RESPIRATORY (INHALATION) at 06:42

## 2025-06-25 RX ADMIN — PANTOPRAZOLE SODIUM 40 MG: 40 INJECTION, POWDER, FOR SOLUTION INTRAVENOUS at 08:25

## 2025-06-25 ASSESSMENT — COGNITIVE AND FUNCTIONAL STATUS - GENERAL
CLIMB 3 TO 5 STEPS WITH RAILING: TOTAL
DRESSING REGULAR UPPER BODY CLOTHING: TOTAL
MOVING FROM LYING ON BACK TO SITTING ON SIDE OF FLAT BED WITH BEDRAILS: TOTAL
MOBILITY SCORE: 6
HELP NEEDED FOR BATHING: TOTAL
STANDING UP FROM CHAIR USING ARMS: TOTAL
EATING MEALS: A LITTLE
MOVING FROM LYING ON BACK TO SITTING ON SIDE OF FLAT BED WITH BEDRAILS: A LOT
TURNING FROM BACK TO SIDE WHILE IN FLAT BAD: A LOT
DRESSING REGULAR LOWER BODY CLOTHING: A LOT
MOVING TO AND FROM BED TO CHAIR: TOTAL
TOILETING: TOTAL
PERSONAL GROOMING: A LITTLE
TURNING FROM BACK TO SIDE WHILE IN FLAT BAD: TOTAL
WALKING IN HOSPITAL ROOM: TOTAL
MOVING TO AND FROM BED TO CHAIR: TOTAL
WALKING IN HOSPITAL ROOM: TOTAL
DAILY ACTIVITIY SCORE: 13
TOILETING: TOTAL
DRESSING REGULAR UPPER BODY CLOTHING: A LOT
EATING MEALS: A LITTLE
STANDING UP FROM CHAIR USING ARMS: TOTAL
CLIMB 3 TO 5 STEPS WITH RAILING: TOTAL
DRESSING REGULAR LOWER BODY CLOTHING: TOTAL
PERSONAL GROOMING: A LITTLE
DAILY ACTIVITIY SCORE: 10
MOBILITY SCORE: 8
HELP NEEDED FOR BATHING: A LOT

## 2025-06-25 ASSESSMENT — PAIN SCALES - GENERAL
PAINLEVEL_OUTOF10: 7
PAINLEVEL_OUTOF10: 0 - NO PAIN
PAINLEVEL_OUTOF10: 0 - NO PAIN
PAINLEVEL_OUTOF10: 3
PAINLEVEL_OUTOF10: 0 - NO PAIN

## 2025-06-25 ASSESSMENT — PAIN - FUNCTIONAL ASSESSMENT
PAIN_FUNCTIONAL_ASSESSMENT: 0-10

## 2025-06-25 NOTE — PROGRESS NOTES
Speech-Language Pathology                 Therapy Communication Note    Patient Name: Ailyn Banegas  MRN: [id number]  Department: [facility] ICU  Room: 129/129-A  Today's Date: 6/25/2025     Discipline: Speech Language Pathology    Missed Visit:   Per nurse Eliza, pt just placed on bipap per orders (alternating between bipap and airvo) since being extubated 6/24    Missed Visit Reason: Missed Visit Reason: Unavailable (Comment)    Missed Time: Attempt; unable to complete bedside swallow evaluation due to pt on bipap.     Comment: ST to follow up as able at a later time

## 2025-06-25 NOTE — CONSULTS
"Nutrition Initial Assessment:   Nutrition Assessment    Reason for Assessment: Admission nursing screening    Patient is a 80 y.o. female presenting with SOB  confusion      Nutrition History:  Food and Nutrient History: Pt was intubated and then extubated yesterday.  Today she is alternating between airvo and bi pap.  She was no Bi pap when visited today. SLP has been unable to see her while her breathing is so compromised.       Anthropometrics:  Height: 157.5 cm (5' 2\")   Weight: 145 kg (319 lb)   BMI (Calculated): 58.33  IBW/kg (Dietitian Calculated): 50 kg  Percent of IBW: 297 %                      Weight History:   Wt Readings from Last 10 Encounters:   06/25/25 145 kg (319 lb)   07/03/24 125 kg (275 lb)   05/17/24 113 kg (250 lb)   02/16/24 81.6 kg (180 lb)   02/07/24 81.6 kg (180 lb)   11/27/23 122 kg (270 lb)   10/07/22 122 kg (270 lb)   08/17/22 122 kg (270 lb)   04/06/22 127 kg (280 lb)   12/03/21 127 kg (280 lb)         Weight Change %:  Weight History / % Weight Change: Records indicate pt was 250 lbs in May 2024 and 275 lbs in July 2024.  She has 3+ LE edema on admit and peripheral fluid overload indicating a considerable amount of fluid weight  Significant Weight Loss: No    Nutrition Focused Physical Exam Findings:    Subcutaneous Fat Loss:   Defer Subcutaneous Fat Loss Assessment: Defer all  Defer All Reason: not a good time  Muscle Wasting:  Defer Muscle Wasting Assessment: Defer all  Defer All Reason: not a good time  Edema:  Edema: +3 moderate  Edema Location: LE edema   peripheral fluid overload  Physical Findings:  Skin: Positive (Rt upper leg and Rt tibial wounds)    Nutrition Significant Labs:  BMP Trend:   Results from last 7 days   Lab Units 06/25/25  0454 06/24/25  1715 06/24/25  0526 06/23/25  1630   GLUCOSE mg/dL 106* 134* 116* 140*   CALCIUM mg/dL 9.6 9.7 9.5 9.5   SODIUM mmol/L 139 139 140 138   POTASSIUM mmol/L 3.5 3.6 3.3* 3.8   CO2 mmol/L 44* 43* 44* 45*   CHLORIDE mmol/L 87* 87* " 85* 84*   BUN mg/dL 31* 30* 29* 25*   CREATININE mg/dL 1.10* 1.10* 1.09* 0.92        Nutrition Specific Medications:  Lovenox; protonix; decadron; lasix  (levothyroxine at home)    I/O:   Last BM Date:  (JOSE);      Dietary Orders (From admission, onward)       Start     Ordered    06/23/25 1640  May Participate in Room Service  ( ROOM SERVICE MAY PARTICIPATE)  Once        Question:  .  Answer:  Yes    06/23/25 1639    06/22/25 2059  NPO Diet; Effective now  Diet effective now         06/22/25 2059                     Estimated Needs:      Method for Estimating Needs: 8537-0081  26-30 sharmaine kg of OBW     Method for Estimating 24 Hour Protein Needs: 50-60  1-1.2 gm kg of IBW     Method for Estimating 24 Hour Fluid Needs: 2024-5344  20-30 ml kg of IBW as medically indicated  Patient on Order Fluid Restriction: No        Nutrition Diagnosis        Nutrition Diagnosis  Patient has Nutrition Diagnosis: Yes  Diagnosis Status (1): New  Nutrition Diagnosis 1: Inadequate oral intake  Related to (1): decreased ability to consume sufficient energy  As Evidenced by (1): Pt is on airvo or bi pap and is unable to participate in a speech eval       Nutrition Interventions/Recommendations   Nutrition prescription for enteral nutrition    Nutrition Recommendations:  Individualized Nutrition Prescription Provided for : If pt continues not to be able to take PO then consider TF of Jevity 1.5,  start at 10 ml hr and increase, as tolerated, 10 ml every 6 hours to goal rate of 42 ml hr 22 hrs, (off for 1 hour before and 1 hour after levothyroxine).  This will provide 1386 sharmaine and 59 gm pro with 702 ml fluid toward fluid needs.   Add water flushes of 125 ml X 4 for a total of 1202 ml fluid per day or 24 ml kg of IBW.  Monitor labs and toleration of feeding,  Monitor for any needed changes in water flushes.    Nutrition Interventions/Goals:   Enteral Intake: Management of composition of enteral nutrition, Management of delivery rate of  enteral nutrition, Management of flushing of feeding tube  Goal: NPO less then 2 more days  Coordination of Care with Providers: Nursing, Provider           Nutrition Monitoring and Evaluation   Enteral and Parenteral Nutrition Intake Determination: Enteral nutrition formula/solution, Enteral nutrition intake - Tolerate TF at goal rate, Enteral nutrition intake - To meet > 75% estimated energy needs         Criteria: Improved BMP  Criteria: glucose within desired range              Time Spent (min): 45 minutes

## 2025-06-25 NOTE — CARE PLAN
Problem: Pain - Adult  Goal: Verbalizes/displays adequate comfort level or baseline comfort level  Outcome: Met     Problem: Safety - Adult  Goal: Free from fall injury  Outcome: Met     Problem: Discharge Planning  Goal: Discharge to home or other facility with appropriate resources  Outcome: Progressing     Problem: Chronic Conditions and Co-morbidities  Goal: Patient's chronic conditions and co-morbidity symptoms are monitored and maintained or improved  Outcome: Progressing     Problem: Diabetes  Goal: Achieve decreasing blood glucose levels by end of shift  Outcome: Progressing     Problem: Skin  Goal: Promote skin healing  Outcome: Progressing     Problem: Knowledge Deficit  Goal: Patient/family/caregiver demonstrates understanding of disease process, treatment plan, medications, and discharge instructions  Outcome: Progressing     Problem: Fall/Injury  Goal: Not fall by end of shift  Outcome: Met

## 2025-06-25 NOTE — PROGRESS NOTES
MICU PROGRESS NOTE    Subjective      A&O x 3/4 on HFNC 60% FiO2. ICU team spoke with family during rounds. Updated on clinical status.    Assessment & Plan   Ailyn Banegas is a 80 y.o. female with a PMHx of reported COPD (no PFTs on record, on baseline 5L NC), MARLENY/likely OHS, Hypothyroidism, DLD, Morbid Obesity who was brought into the ED for respiratory distress. Admitted to ICU for acute on chronic hypercapnic hypoxic respiratory failure 2/2 ADHF. On day 3 of admission. DNR.    PROGRESS:  6/24: HDS elevated intermittently hypotensive overnight. 4.5L UOP yesterday on Lasix gtt. FiO2 weaned from 80->60 overnight, further weaning down to 45% through ETT today. K replacement with 80 mEq. Echo resulted, not significantly different from SWG but with moderately reduced right ventricular systolic function. Optimizing for potential extubation later today/tomorrow morning  6/25: A&O x 3/4, deescalated from BiPAP to HFNC 60% FiO2, adjusted bronchopulmonary hygiene. Progressively more hypotensive with 3.5 L UOP yesterday, turned lasix gtt off, gave 250cc bolus, HDS thereafter. Failed bedside swallow evaluation, SLP ordered; 10 mg Decadron once for edema around neck. 60 mEq K replacement ordered. Removing A line    Plan:  NEUROLOGICAL / PSYCH:  Dx:  Encephalopathy, resolved  Management:  A&O x 3/4, following commands  Delirium precautions    CARDIOVASCULAR:  Dx:  Hypotension  ADHF EF 60-65% in 8/22  Hx paroxysmal atrial fibrillation  Hx mild AVS  Hx DLD, HTN  Management:  Inpatient echo results: LVEF 55-60%, grade 1 impaired relaxation pattern of left ventricular diastolic filling, moderately reduced right ventricular systolic function, aortic valve sclerosis  Home cardiac meds: 10 mg Lipitor, 30 mg Cardizem, 20 mg Lasix, 100 mg Aldactone, 40 mg Demadex  Hold home meds  Telemetry  Cardiology on consult  Maintain K >4, Mg >2  Paused 15 mg/h Lasix gtt ISO hypotension  Gave 250 cc LR bolus with improvement in pressures  3.5L  UOP yesterday 6/24  7.3L UOP thus far this admission    PULMONARY:  Dx:  Extubated 6/24  Acute on chronic hypercapnic hypoxic respiratory failure  Pulmonary edema with small bilateral pleural effusions  RLL atelectacic collapse  PNA, C/F  COPD on home 5LNC  MARLENY on home CPAP  OHS  Management:  S/p  mg methylprednisolone, multiple IV Lasix doses and a Diamox dose in ED  Intubated s/p AVAPS/BIPAP evening of 6/22 due to worsening mentation, hypercarbia  Daily SAT/SBTs, adjust and wean vent settings as tolerated  Supplemental O2 PRN to maintain SpO2 89-94%  Vent Mode: AVAPS  FiO2 (%):  [45 %-60 %] 60 %  S RR:  [16] 16  S VT:  [650 mL] 650 mL  PEEP/CPAP (cm H2O):  [8 cm H20] 8 cm H20  Diuresis with Lasix gtt 10 mg/mL, monitor UOP with strict I/O's  ABX: Zosyn. Continue to de-escalate per infectious workup as below  Respiratory and mucolytic treatments: DuoNeb QID, DuoNeb q2h PRN, Mucomyst, IS, respiratory treatments via AVAPS   One-time 10 mg Decadron ordered for neck swelling s/p extubation    GASTROENTEROLOGY:  Dx:   C/F dysphagia  Management:  NPO with enteral feeding, place OG/NG tube if she fails SLP eval    RENAL / GENITOURINARY:  Dx:  Management:  Maintain Mccallum, strict I/O's  Trend renal function and metabolic panel  Avoid nephrotoxicity (hypotension, iodinated contrast, NSAIDs, etc.)  Replete electrolytes as needed    ENDOCRINOLOGY:  Dx:  Hyperglycemia  Hypothyroidism  Management:  Continue Synthroid  q4h BG checks + SSI    HEMATOLOGY / ONCOLOGY:  Dx:  Anemia  Management:  DVT ppx with SCD's & Lovenox  Trend CBC    MUSCULOSKELETAL / SKIN:  Dx:  LE wounds  Management:  ICU wound prevention and skin care    INFECTIOUS DISEASE:  Dx:  PNA, C/F  Management:  ABX: Zosyn. Continue to de-escalate per infectious workup  Infectious workup: MRSA PCR negative, viral panel with COVID and Flu and RSV negative, respiratory Cx from tracheal aspirate pending collection, PNA urinary antigens negative, Pro-Rodolfo 0.12  Monitor  for infectious sequelae    Checklist:  Antimicrobials: Zosyn  Oxygen: AVAPS 60% FiO2  Feeding: NPO  Fluids: -  DVT ppx: SCD's & Lovenox  Ulcer ppx: PPI  Glycemic control: q4h BG checks + SSI  Bowel care: PRN  Indwelling catheters: Mccallum  Lines: PIVs  Consults: -  Code Status: DNR    Dr. Hany Kelly, DO  PGY-2, Internal Medicine    This is a preliminary note, please await attending attestation for final recommendations    Disclaimer: Documentation completed with the information available at the time of input. The times in the chart may not be reflective of actual patient care times, interventions, or procedures. Documentation occurs after the physical care of the patient.     Objective (Vitals, labs, radiological imaging, cardiac work up were personally reviewed)     Physical Exam  Constitutional:       Appearance: She is morbidly obese.      Interventions: She is sedated.   HENT:      Mouth/Throat:      Comments: Wide neck circumference  Neck:      Vascular: JVD present.   Cardiovascular:      Rate and Rhythm: Normal rate and regular rhythm.      Pulses: Normal pulses.   Pulmonary:      Breath sounds: Decreased air movement present. Decreased breath sounds present.      Comments: 60% FiO2 on AVAPS  Abdominal:      General: Abdomen is protuberant.      Palpations: Abdomen is soft.   Genitourinary:     Comments: Mccallum in place  Musculoskeletal:      Right lower leg: Pitting Edema present.      Left lower leg: Pitting Edema present.   Skin:     General: Skin is warm.      Capillary Refill: Capillary refill takes less than 2 seconds.   Neurological:      Mental Status: She is oriented to person, place, and time. She is lethargic.   Psychiatric:         Mood and Affect: Mood is anxious.     Last Recorded Vitals  Vitals:    06/25/25 0800 06/25/25 0900 06/25/25 1000 06/25/25 1100   BP:       BP Location:       Patient Position:       Pulse: 85 99 95 97   Resp: (!) 35 (!) 46 (!) 29 (!) 39   Temp: 35.2 °C (95.4 °F)       TempSrc: Temporal      SpO2: 91% 91% 92% 93%   Weight:       Height:         Intake/Output last 3 Shifts:  I/O last 3 completed shifts:  In: 390.1 (2.7 mL/kg) [I.V.:90.1 (0.6 mL/kg); IV Piggyback:300]  Out: 4775 (33 mL/kg) [Urine:4775 (0.9 mL/kg/hr)]  Weight: 144.7 kg     Ventilator/O2 Supply  Oxygen Therapy/Pulse Ox  Medical Gas Therapy: Supplemental oxygen  Medical Gas Delivery Method: High flow nasal cannula  FiO2 (%): 60 % (airvo 60 L 60%)  SpO2: 93 %  Patient Activity During SpO2 Measurement: At rest  Temp: 35.2 °C (95.4 °F)    Labs:   Results from last 7 days   Lab Units 06/25/25  0454 06/24/25  1715 06/24/25  0526   SODIUM mmol/L 139 139 140   POTASSIUM mmol/L 3.5 3.6 3.3*   CHLORIDE mmol/L 87* 87* 85*   CO2 mmol/L 44* 43* 44*   BUN mg/dL 31* 30* 29*   CREATININE mg/dL 1.10* 1.10* 1.09*   GLUCOSE mg/dL 106* 134* 116*   CALCIUM mg/dL 9.6 9.7 9.5     Results from last 7 days   Lab Units 06/25/25  0454   WBC AUTO x10*3/uL 8.1   HEMOGLOBIN g/dL 10.6*   HEMATOCRIT % 34.8*   PLATELETS AUTO x10*3/uL 218     Results from last 7 days   Lab Units 06/24/25  1407 06/23/25  1204 06/23/25  0632   POCT PH, ARTERIAL pH 7.50* 7.48* 7.42   POCT PCO2, ARTERIAL mm Hg 57* 71* 72*   POCT PO2, ARTERIAL mm Hg 72* 82* 77*   POCT HCO3 CALCULATED, ARTERIAL mmol/L 44.5* 52.9* 46.7*   POCT BASE EXCESS, ARTERIAL mmol/L 18.6* 25.5* 18.2*

## 2025-06-25 NOTE — PROGRESS NOTES
06/25/25 1445   Discharge Planning   Home or Post Acute Services Post acute facilities (Rehab/SNF/etc)   Type of Post Acute Facility Services Long term care   Expected Discharge Disposition Long Term     Record reviewed.  Extubated to BiPAP/HFNC.  TCC contacted by resident attending provider, plan for LTCH.  TCC phoned patient's spouse, Umberto, to provide update.  Unable to reach, left generic VM.  TCC attemped to meet with patient.  Patient sleeping and on BIPAP.  LTCH list left at bedside.  Care Transitions will continue to follow.

## 2025-06-25 NOTE — PROGRESS NOTES
Cardiology Progress    Impression:  Acute on chronic hypercapnic, hypoxic respiratory failure.  Now extubated.  Metabolic alkalosis.  Advanced COPD.  On home oxygen.  Right heart failure.  Echo 6/20/2025 showing normal EF, moderate RV dysfunction.  RVSP could not be estimated.  Peripheral fluid overload.  MARLENY.  Refuses BiPAP.  Obesity hypoventilation syndrome  Anemia  Paroxysmal atrial fibrillation.  Currently sinus rhythm.  Mild aortic stenosis  Hypertension  Hyperlipidemia  Plan:  Transition to bolus dose diuretic  Follow labs  HPI:  Extubated.  On Airvo.  Awake and alert.  Remains sinus rhythm.  Meds:  Scheduled medications  Scheduled Medications[1]  Continuous medications  Continuous Medications[2]  PRN medications  PRN Medications[3]    Physical exam:  Vitals:    06/25/25 1100   BP:    Pulse: 97   Resp: (!) 39   Temp:    SpO2: 93%      JVP not visible.  Poor air entry.  Moderate edema.  EKG:  Telemetry shows sinus rhythm.  Echo:  6/20/2025: Normal EF. Moderate RV dysfunction. RVSP could not be obtained.    Labs:  Lab Results   Component Value Date    WBC 8.1 06/25/2025    HGB 10.6 (L) 06/25/2025    HCT 34.8 (L) 06/25/2025     06/25/2025    CHOL 133 11/13/2020    TRIG 88 11/13/2020    HDL 55.4 11/13/2020    ALT 17 06/22/2025    AST 15 06/22/2025     06/25/2025    K 3.5 06/25/2025    CL 87 (L) 06/25/2025    CREATININE 1.10 (H) 06/25/2025    BUN 31 (H) 06/25/2025    CO2 44 (HH) 06/25/2025    TSH 3.34 04/06/2022    HGBA1C 5.9 (A) 04/06/2022     par         [1] acetylcysteine, 2 mL, nebulization, TID  enoxaparin, 60 mg, subcutaneous, q12h VLADIMIR  insulin lispro, 0-15 Units, subcutaneous, q4h  ipratropium-albuteroL, 3 mL, nebulization, TID  oxygen, , inhalation, Continuous - Inhalation  oxygen, , inhalation, Continuous - Inhalation  pantoprazole, 40 mg, intravenous, Daily  perflutren protein A microsphere, 0.5 mL, intravenous, Once in imaging  piperacillin-tazobactam, 4.5 g, intravenous, q6h  potassium  chloride, 20 mEq, intravenous, q2h  sulfur hexafluoride microsphr, 2 mL, intravenous, Once in imaging  [2] fentaNYL, 0-200 mcg/hr, Last Rate: Stopped (06/24/25 1420)  furosemide, 15 mg/hr, Last Rate: Stopped (06/25/25 0917)  propofol, 0-20 mcg/kg/min (Dosing Weight), Last Rate: Stopped (06/24/25 1300)  [3] PRN medications: dextrose, dextrose, glucagon, glucagon, ipratropium-albuteroL

## 2025-06-26 LAB
ANION GAP SERPL CALC-SCNC: 12 MMOL/L (ref 10–20)
BASOPHILS # BLD AUTO: 0.02 X10*3/UL (ref 0–0.1)
BASOPHILS NFR BLD AUTO: 0.3 %
BUN SERPL-MCNC: 32 MG/DL (ref 6–23)
CALCIUM SERPL-MCNC: 9.7 MG/DL (ref 8.6–10.3)
CHLORIDE SERPL-SCNC: 90 MMOL/L (ref 98–107)
CO2 SERPL-SCNC: 41 MMOL/L (ref 21–32)
CREAT SERPL-MCNC: 1.15 MG/DL (ref 0.5–1.05)
EGFRCR SERPLBLD CKD-EPI 2021: 48 ML/MIN/1.73M*2
EOSINOPHIL # BLD AUTO: 0 X10*3/UL (ref 0–0.4)
EOSINOPHIL NFR BLD AUTO: 0 %
ERYTHROCYTE [DISTWIDTH] IN BLOOD BY AUTOMATED COUNT: 15.2 % (ref 11.5–14.5)
GLUCOSE BLD MANUAL STRIP-MCNC: 102 MG/DL (ref 74–99)
GLUCOSE BLD MANUAL STRIP-MCNC: 118 MG/DL (ref 74–99)
GLUCOSE BLD MANUAL STRIP-MCNC: 125 MG/DL (ref 74–99)
GLUCOSE BLD MANUAL STRIP-MCNC: 130 MG/DL (ref 74–99)
GLUCOSE BLD MANUAL STRIP-MCNC: 131 MG/DL (ref 74–99)
GLUCOSE SERPL-MCNC: 121 MG/DL (ref 74–99)
HCT VFR BLD AUTO: 34.9 % (ref 36–46)
HGB BLD-MCNC: 10.5 G/DL (ref 12–16)
HOLD SPECIMEN: NORMAL
IMM GRANULOCYTES # BLD AUTO: 0.06 X10*3/UL (ref 0–0.5)
IMM GRANULOCYTES NFR BLD AUTO: 0.9 % (ref 0–0.9)
LYMPHOCYTES # BLD AUTO: 0.66 X10*3/UL (ref 0.8–3)
LYMPHOCYTES NFR BLD AUTO: 9.4 %
MAGNESIUM SERPL-MCNC: 2.33 MG/DL (ref 1.6–2.4)
MCH RBC QN AUTO: 27.6 PG (ref 26–34)
MCHC RBC AUTO-ENTMCNC: 30.1 G/DL (ref 32–36)
MCV RBC AUTO: 92 FL (ref 80–100)
MONOCYTES # BLD AUTO: 0.4 X10*3/UL (ref 0.05–0.8)
MONOCYTES NFR BLD AUTO: 5.7 %
NEUTROPHILS # BLD AUTO: 5.85 X10*3/UL (ref 1.6–5.5)
NEUTROPHILS NFR BLD AUTO: 83.7 %
NRBC BLD-RTO: 0 /100 WBCS (ref 0–0)
PLATELET # BLD AUTO: 216 X10*3/UL (ref 150–450)
POTASSIUM SERPL-SCNC: 3.4 MMOL/L (ref 3.5–5.3)
RBC # BLD AUTO: 3.8 X10*6/UL (ref 4–5.2)
SODIUM SERPL-SCNC: 140 MMOL/L (ref 136–145)
WBC # BLD AUTO: 7 X10*3/UL (ref 4.4–11.3)

## 2025-06-26 PROCEDURE — 2500000001 HC RX 250 WO HCPCS SELF ADMINISTERED DRUGS (ALT 637 FOR MEDICARE OP): Performed by: INTERNAL MEDICINE

## 2025-06-26 PROCEDURE — 2500000001 HC RX 250 WO HCPCS SELF ADMINISTERED DRUGS (ALT 637 FOR MEDICARE OP)

## 2025-06-26 PROCEDURE — 82947 ASSAY GLUCOSE BLOOD QUANT: CPT

## 2025-06-26 PROCEDURE — 97165 OT EVAL LOW COMPLEX 30 MIN: CPT | Mod: GO

## 2025-06-26 PROCEDURE — 2500000004 HC RX 250 GENERAL PHARMACY W/ HCPCS (ALT 636 FOR OP/ED): Performed by: INTERNAL MEDICINE

## 2025-06-26 PROCEDURE — 99291 CRITICAL CARE FIRST HOUR: CPT

## 2025-06-26 PROCEDURE — 82374 ASSAY BLOOD CARBON DIOXIDE: CPT

## 2025-06-26 PROCEDURE — 2500000002 HC RX 250 W HCPCS SELF ADMINISTERED DRUGS (ALT 637 FOR MEDICARE OP, ALT 636 FOR OP/ED): Performed by: INTERNAL MEDICINE

## 2025-06-26 PROCEDURE — 92610 EVALUATE SWALLOWING FUNCTION: CPT | Mod: GN

## 2025-06-26 PROCEDURE — 2500000004 HC RX 250 GENERAL PHARMACY W/ HCPCS (ALT 636 FOR OP/ED)

## 2025-06-26 PROCEDURE — 2500000005 HC RX 250 GENERAL PHARMACY W/O HCPCS: Performed by: INTERNAL MEDICINE

## 2025-06-26 PROCEDURE — 36415 COLL VENOUS BLD VENIPUNCTURE: CPT

## 2025-06-26 PROCEDURE — 85025 COMPLETE CBC W/AUTO DIFF WBC: CPT

## 2025-06-26 PROCEDURE — 94640 AIRWAY INHALATION TREATMENT: CPT

## 2025-06-26 PROCEDURE — 97162 PT EVAL MOD COMPLEX 30 MIN: CPT | Mod: GP

## 2025-06-26 PROCEDURE — 83735 ASSAY OF MAGNESIUM: CPT

## 2025-06-26 PROCEDURE — 94660 CPAP INITIATION&MGMT: CPT

## 2025-06-26 PROCEDURE — 2020000001 HC ICU ROOM DAILY

## 2025-06-26 RX ORDER — LEVOTHYROXINE SODIUM 100 UG/1
100 TABLET ORAL DAILY
Status: DISCONTINUED | OUTPATIENT
Start: 2025-06-26 | End: 2025-07-07 | Stop reason: HOSPADM

## 2025-06-26 RX ORDER — ACETAMINOPHEN 325 MG/1
975 TABLET ORAL EVERY 6 HOURS PRN
Status: DISCONTINUED | OUTPATIENT
Start: 2025-06-26 | End: 2025-06-26

## 2025-06-26 RX ORDER — ACETAMINOPHEN 325 MG/1
975 TABLET ORAL EVERY 8 HOURS PRN
Status: DISCONTINUED | OUTPATIENT
Start: 2025-06-26 | End: 2025-07-01

## 2025-06-26 RX ORDER — INSULIN LISPRO 100 [IU]/ML
0-15 INJECTION, SOLUTION INTRAVENOUS; SUBCUTANEOUS
Status: DISCONTINUED | OUTPATIENT
Start: 2025-06-26 | End: 2025-07-07 | Stop reason: HOSPADM

## 2025-06-26 RX ORDER — POTASSIUM CHLORIDE 14.9 MG/ML
20 INJECTION INTRAVENOUS
Status: COMPLETED | OUTPATIENT
Start: 2025-06-26 | End: 2025-06-26

## 2025-06-26 RX ORDER — POTASSIUM CHLORIDE 14.9 MG/ML
20 INJECTION INTRAVENOUS ONCE
Status: DISCONTINUED | OUTPATIENT
Start: 2025-06-26 | End: 2025-06-26

## 2025-06-26 RX ORDER — POLYETHYLENE GLYCOL 3350 17 G/17G
17 POWDER, FOR SOLUTION ORAL DAILY PRN
Status: DISCONTINUED | OUTPATIENT
Start: 2025-06-26 | End: 2025-07-07 | Stop reason: HOSPADM

## 2025-06-26 RX ORDER — DOCUSATE SODIUM 100 MG/1
100 CAPSULE, LIQUID FILLED ORAL NIGHTLY
Status: DISCONTINUED | OUTPATIENT
Start: 2025-06-26 | End: 2025-07-07 | Stop reason: HOSPADM

## 2025-06-26 RX ORDER — TORSEMIDE 20 MG/1
40 TABLET ORAL DAILY
Status: DISCONTINUED | OUTPATIENT
Start: 2025-06-26 | End: 2025-07-07 | Stop reason: HOSPADM

## 2025-06-26 RX ORDER — ACETAMINOPHEN 500 MG
5 TABLET ORAL NIGHTLY PRN
Status: DISCONTINUED | OUTPATIENT
Start: 2025-06-26 | End: 2025-07-07 | Stop reason: HOSPADM

## 2025-06-26 RX ADMIN — Medication 50 PERCENT: at 08:00

## 2025-06-26 RX ADMIN — IPRATROPIUM BROMIDE AND ALBUTEROL SULFATE 3 ML: .5; 3 SOLUTION RESPIRATORY (INHALATION) at 12:50

## 2025-06-26 RX ADMIN — PIPERACILLIN SODIUM AND TAZOBACTAM SODIUM 4.5 G: 4; .5 INJECTION, SOLUTION INTRAVENOUS at 00:29

## 2025-06-26 RX ADMIN — PANTOPRAZOLE SODIUM 40 MG: 40 INJECTION, POWDER, FOR SOLUTION INTRAVENOUS at 08:56

## 2025-06-26 RX ADMIN — DOCUSATE SODIUM 100 MG: 100 CAPSULE, LIQUID FILLED ORAL at 20:18

## 2025-06-26 RX ADMIN — TORSEMIDE 40 MG: 20 TABLET ORAL at 10:48

## 2025-06-26 RX ADMIN — IPRATROPIUM BROMIDE AND ALBUTEROL SULFATE 3 ML: .5; 3 SOLUTION RESPIRATORY (INHALATION) at 08:41

## 2025-06-26 RX ADMIN — PIPERACILLIN SODIUM AND TAZOBACTAM SODIUM 4.5 G: 4; .5 INJECTION, SOLUTION INTRAVENOUS at 06:04

## 2025-06-26 RX ADMIN — Medication 60 PERCENT: at 19:40

## 2025-06-26 RX ADMIN — LEVOTHYROXINE SODIUM 100 MCG: 0.1 TABLET ORAL at 07:27

## 2025-06-26 RX ADMIN — ACETAMINOPHEN 975 MG: 325 TABLET ORAL at 13:04

## 2025-06-26 RX ADMIN — ACETYLCYSTEINE 400 MG: 200 SOLUTION ORAL; RESPIRATORY (INHALATION) at 19:39

## 2025-06-26 RX ADMIN — ENOXAPARIN SODIUM 60 MG: 100 INJECTION SUBCUTANEOUS at 20:18

## 2025-06-26 RX ADMIN — ACETAMINOPHEN 975 MG: 325 TABLET ORAL at 04:53

## 2025-06-26 RX ADMIN — Medication 5 MG: at 20:18

## 2025-06-26 RX ADMIN — POTASSIUM CHLORIDE 20 MEQ: 14.9 INJECTION, SOLUTION INTRAVENOUS at 13:04

## 2025-06-26 RX ADMIN — ACETAMINOPHEN 975 MG: 325 TABLET ORAL at 21:03

## 2025-06-26 RX ADMIN — IPRATROPIUM BROMIDE AND ALBUTEROL SULFATE 3 ML: .5; 3 SOLUTION RESPIRATORY (INHALATION) at 19:38

## 2025-06-26 RX ADMIN — ENOXAPARIN SODIUM 60 MG: 100 INJECTION SUBCUTANEOUS at 08:56

## 2025-06-26 RX ADMIN — POTASSIUM CHLORIDE 20 MEQ: 14.9 INJECTION, SOLUTION INTRAVENOUS at 08:57

## 2025-06-26 RX ADMIN — ACETYLCYSTEINE 400 MG: 200 SOLUTION ORAL; RESPIRATORY (INHALATION) at 08:42

## 2025-06-26 RX ADMIN — PIPERACILLIN SODIUM AND TAZOBACTAM SODIUM 4.5 G: 4; .5 INJECTION, SOLUTION INTRAVENOUS at 13:04

## 2025-06-26 RX ADMIN — POTASSIUM CHLORIDE 20 MEQ: 14.9 INJECTION, SOLUTION INTRAVENOUS at 10:48

## 2025-06-26 RX ADMIN — PIPERACILLIN SODIUM AND TAZOBACTAM SODIUM 4.5 G: 4; .5 INJECTION, SOLUTION INTRAVENOUS at 18:55

## 2025-06-26 RX ADMIN — ACETYLCYSTEINE 400 MG: 200 SOLUTION ORAL; RESPIRATORY (INHALATION) at 12:50

## 2025-06-26 ASSESSMENT — PAIN SCALES - GENERAL
PAINLEVEL_OUTOF10: 3
PAINLEVEL_OUTOF10: 0 - NO PAIN
PAINLEVEL_OUTOF10: 3
PAINLEVEL_OUTOF10: 0 - NO PAIN
PAINLEVEL_OUTOF10: 0 - NO PAIN
PAINLEVEL_OUTOF10: 6
PAINLEVEL_OUTOF10: 0 - NO PAIN
PAINLEVEL_OUTOF10: 2
PAINLEVEL_OUTOF10: 0 - NO PAIN
PAINLEVEL_OUTOF10: 3
PAINLEVEL_OUTOF10: 0 - NO PAIN
PAINLEVEL_OUTOF10: 0 - NO PAIN

## 2025-06-26 ASSESSMENT — COGNITIVE AND FUNCTIONAL STATUS - GENERAL
TURNING FROM BACK TO SIDE WHILE IN FLAT BAD: TOTAL
WALKING IN HOSPITAL ROOM: A LOT
DRESSING REGULAR LOWER BODY CLOTHING: TOTAL
CLIMB 3 TO 5 STEPS WITH RAILING: TOTAL
STANDING UP FROM CHAIR USING ARMS: A LOT
MOBILITY SCORE: 11
DAILY ACTIVITIY SCORE: 12
TOILETING: TOTAL
PERSONAL GROOMING: A LITTLE
MOVING FROM LYING ON BACK TO SITTING ON SIDE OF FLAT BED WITH BEDRAILS: A LOT
HELP NEEDED FOR BATHING: TOTAL
MOVING FROM LYING ON BACK TO SITTING ON SIDE OF FLAT BED WITH BEDRAILS: A LOT
PERSONAL GROOMING: A LITTLE
DAILY ACTIVITIY SCORE: 14
HELP NEEDED FOR BATHING: A LOT
EATING MEALS: A LITTLE
MOVING TO AND FROM BED TO CHAIR: A LITTLE
CLIMB 3 TO 5 STEPS WITH RAILING: TOTAL
WALKING IN HOSPITAL ROOM: A LOT
MOVING TO AND FROM BED TO CHAIR: A LOT
DRESSING REGULAR UPPER BODY CLOTHING: A LOT
TOILETING: A LOT
DRESSING REGULAR LOWER BODY CLOTHING: A LOT
STANDING UP FROM CHAIR USING ARMS: A LOT
TURNING FROM BACK TO SIDE WHILE IN FLAT BAD: A LOT
MOBILITY SCORE: 11
DRESSING REGULAR UPPER BODY CLOTHING: A LOT

## 2025-06-26 ASSESSMENT — PAIN DESCRIPTION - DESCRIPTORS
DESCRIPTORS: ACHING

## 2025-06-26 ASSESSMENT — ACTIVITIES OF DAILY LIVING (ADL): ADL_ASSISTANCE: INDEPENDENT

## 2025-06-26 ASSESSMENT — PAIN DESCRIPTION - ORIENTATION: ORIENTATION: RIGHT;LEFT

## 2025-06-26 ASSESSMENT — PAIN DESCRIPTION - LOCATION: LOCATION: KNEE

## 2025-06-26 NOTE — PROGRESS NOTES
Occupational Therapy    Evaluation    Patient Name: Ailyn Banegas  MRN: [id number]  Department: [facility] ICU  Room[facility]  Today's Date: 6/26/2025  Time Calculation  Start Time: 1454  Stop Time: 1518  Time Calculation (min): 24 min    Assessment  IP OT Assessment  OT Assessment: Pt. presents with a decline in self-care, mobility and safety and would benefit from skilled OT services to maximize independence and promote return to prior level of function.  Prognosis: Good  Barriers to Discharge Home: Physical needs  End of Session Communication: Bedside nurse  End of Session Patient Position: Up in chair, Alarm off, not on at start of session  Plan:  Treatment Interventions: ADL retraining, Functional transfer training, Endurance training, Compensatory technique education  OT Frequency: 3 times per week (during this acute inpatient hospitalization)  OT Discharge Recommendations: Moderate intensity level of continued care (Based on current functional status and rehab potential, patient is anticipated to tolerate and benefit from 5 or more days per week of skilled rehabilitative therapy after discharge from this acute inpatient hospitalization.)  OT Recommended Transfer Status: Assist of 2  OT - OK to Discharge: Yes (to next level of care when cleared by medical team)    Subjective   Current Problem:  1. Acute respiratory failure with hypoxia and hypercapnia  Transthoracic Echo Complete    Transthoracic Echo Complete      2. COPD exacerbation (Multi)        3. Acute on chronic congestive heart failure, unspecified heart failure type  Transthoracic Echo Complete      4. Heart failure due to valvular disease, acute, diastolic  Transthoracic Echo Complete    Transthoracic Echo Complete    Transthoracic Echo Complete      5. Unspecified diastolic (congestive) heart failure  Transthoracic Echo Complete      6. Unspecified systolic (congestive) heart failure  Transthoracic Echo Complete      7. Acute and chronic respiratory  failure with hypoxia  Transthoracic Echo Complete        OT Visit Info:  OT Received On: 06/26/25  General Visit Info:  General  Reason for Referral: impaired adl; pt. admitted with sob, encephalopathy, pulmonary edema, intubated now extubated, recent [facility] admission 5/25 to 6/2, pt. then discharged to SNF, pt. admitted from SNF now, bicarb elevated at 41  Referred By: Humberto  Past Medical History Relevant to Rehab: copd, o2 5 L/min at home, mary, obesity, dld, hypothyroidism  Co-Treatment: PT  Co-Treatment Reason: to maximize patient safety/abilities  Prior to Session Communication: Bedside nurse  Patient Position Received: Bed, 3 rail up, Alarm off, not on at start of session  General Comment: pt. agreeable to therapy intervention  Precautions:  Precautions Comment: VSS, tele, airvo 60 L/min, 48%, GARRETT wilkinson     Date/Time Vitals Session Patient Position Pulse Resp SpO2 BP MAP (mmHg)    06/26/25 1453 --  --  --  --  --  --  --     06/26/25 1500 --  --  69  35  97 %  --  --     06/26/25 1600 --  --  66  30  96 %  92/50  65                Pain:  Pain Assessment  Pain Assessment: 0-10  0-10 (Numeric) Pain Score:  (bilateral knee pain, tolerable)    Objective   Cognition:  Overall Cognitive Status: Within Functional Limits  Orientation Level: Oriented X4           Home Living:  Home Living Comments: prior to recent hospitalization pt. home with spouse, ramp entrance, 1 floor, laundry basement, wh. walker, wheelchair, stall shower with [facility], gb, shower chair, reacher, RTS, at [facility] pt. able to ambulate 1 assist, wheelchair follow   Prior Function:  Prior Function Comments: prior to hospitalization:  pt. independent with wh. walker or use of manual wheelchair, does not wear socks/shoes, able to use reacher for dressing, was spongebathing, shared iadl, at [facility], staff completing iadl tasks  IADL History:     ADL:  ADL Comments: would estimate dependent for LB bathe/dress/toilet, mod assist for UB bathe/dress/groom, set up for  feeding  Activity Tolerance:  Endurance: Decreased tolerance for upright activites  Early Mobility/Exercise Safety Screen: Proceed with mobilization - No exclusion criteria met  Bed Mobility/Transfers: Bed Mobility  Bed Mobility:  (mod assist x 2 supine to sit)    Transfers  Transfer:  (sit <> stand 1st trial:  mod assist x 2, pt. returned to sitting.  sit to stand 2nd trial:  min assist x 1)      Functional Mobility:  Functional Mobility  Functional Mobility Performed:  (min assist via wh. walker bed to chair)  Sensation:  Sensation Comment: sensation intact  Strength:  Strength Comments: bue's at least 3/5 per observation     Coordination:  Movements are Fluid and Coordinated: Yes   Outcome Measures: [facility] Daily Activity  Putting on and taking off regular lower body clothing: Total  Bathing (including washing, rinsing, drying): Total  Putting on and taking off regular upper body clothing: A lot  Toileting, which includes using toilet, bedpan or urinal: Total  Taking care of personal grooming such as brushing teeth: A little  Eating Meals: None  Daily Activity - Total Score: 12         and Early Mobility/Exercise Safety Screen: Proceed with mobilization - No exclusion criteria met  ICU Mobility Scale: Transferring bed to chair [5]  Education Documentation  Precautions, taught by Neyda Box OT at 6/26/2025  4:25 PM.  Learner: Patient  Readiness: Acceptance  Method: Explanation  Response: Verbalizes Understanding, Needs Reinforcement  Comment: OT POC    ADL Training, taught by Neyda Box OT at 6/26/2025  4:25 PM.  Learner: Patient  Readiness: Acceptance  Method: Explanation  Response: Verbalizes Understanding, Needs Reinforcement  Comment: OT POC        Goals:   Encounter Problems       Encounter Problems (Active)       OT Goals       Increase functional mobility and  functional transfers to supervision for bed/chair/toilet with dme prn   (Progressing)       Start:  06/26/25    Expected End:  07/10/25             increase bue ther ex/activity x 7-10 minutes and increase standing tolerance x 3-5 minutes with supervision to promote greater activity tolerance for assist with adl.   (Progressing)       Start:  06/26/25    Expected End:  07/10/25            Increase ub/lb dressing to min assist x 1 with dme prn  (Progressing)       Start:  06/26/25    Expected End:  07/10/25            Increase toileting to min assist x 1 with dme prn  (Progressing)       Start:  06/26/25    Expected End:  07/10/25            pt. to apply ec/ws techniques with minimal cues to all mobility/transfer/adl to decrease fatigue/promote efficient use of energy toward completion of functional tasks.  (Progressing)       Start:  06/26/25    Expected End:  07/10/25

## 2025-06-26 NOTE — PROGRESS NOTES
Physical Therapy    Physical Therapy Evaluation    Patient Name: Ailyn Banegas  MRN: [id number]  129/129-A  Today's Date: 6/26/2025   Time Calculation  Start Time: 1453  Stop Time: 1516  Time Calculation (min): 23 min    Assessment/Plan   PT Assessment  PT Assessment Results: Decreased strength, Decreased endurance, Impaired balance, Decreased mobility  Rehab Prognosis: Good  Barriers to Discharge Home: Physical needs  Physical Needs: Ambulating household distances limited by function/safety, 24hr mobility assistance needed, 24hr ADL assistance needed  End of Session Communication: Bedside nurse  Assessment Comment: Pt is presenting with a decline in functional mobility from baseline. Pt would benefit from further PT services to address these deficits to return to prior level of function.  End of Session Patient Position: Up in chair, Alarm off, not on at start of session  IP OR SWING BED PT PLAN  Inpatient or Swing Bed: Inpatient  PT Plan  Treatment/Interventions: Bed mobility, Transfer training, Gait training, Balance training, Strengthening, Endurance training, Therapeutic exercise, Therapeutic activity  PT Plan: Ongoing PT  PT Frequency: 3 times per week  PT Discharge Recommendations: Moderate intensity level of continued care  PT Recommended Transfer Status: Assist x2  PT - OK to Discharge: Yes    Subjective     General Visit Information:  General  Reason for Referral: PT eval and treat; SOB, encephalopathy, pulm edema, intubated and now extubated  Referred By: Claus Paul  Past Medical History Relevant to Rehab: COPD, 5L O2 at home, MARLENY, obestiy, DLD  Family/Caregiver Present: No  Co-Treatment: OT  Co-Treatment Reason: For patient safety and to maximize mobility  Prior to Session Communication: Bedside nurse  Patient Position Received: Bed, 3 rail up, Alarm off, not on at start of session  General Comment: Pt resting in bed upon entering, agreeable to PT    Home Living:  Home Living  Type of Home:  House  Lives With: Spouse  Home Adaptive Equipment:  (shower chiar, WC, reacher, WW)  Home Layout:  (ramp entry, first floor bed and bath, laundry in basement)  Bathroom Shower/Tub: Walk-in shower  Bathroom Toilet: Handicapped height  Bathroom Equipment: Grab bars in shower, Shower chair with back, Hand-held shower hose  Home Living Comments: Pt admitted from [facility] this admission, Pt living at home prior to June 2025    Prior Level of Function:  Prior Function Per Pt/Caregiver Report  Level of [place]: Independent with ADLs and functional transfers  ADL Assistance: Independent  Homemaking Assistance:  (daughter cleans,  does laundry and drives, shares cooking)  Ambulatory Assistance:  (Ambulates with WW, WC for distance)    Precautions:  Precautions  Precautions Comment: up in chair orders, telemetry, minh, airvo 60L/48%    Vital Signs:  Vital Signs  Resp:  (increased RR with mobility to 30s-40s with cues to slow breathing and relaxation, pt with increased anxiety noted)  Vital Signs Comment: VSS throughout session  Objective     Pain:  Pain Assessment  Pain Assessment: 0-10  0-10 (Numeric) Pain Score:  (7/10 BLE knee pain, pt repositioned)    Cognition:  Cognition  Overall Cognitive Status: Within Functional Limits    General Assessments:      Activity Tolerance  Endurance: Tolerates less than 10 min exercise, no significant change in vital signs  Early Mobility/Exercise Safety Screen: Proceed with mobilization - No exclusion criteria met  Sensation  Light Touch: No apparent deficits  Strength  Strength Comments: BLE ankle 4/5, knee ext 4/5, hip flex 3-/5  Perception  Inattention/Neglect: Appears intact  Coordination  Movements are Fluid and Coordinated: Yes  Postural Control  Postural Control:  (Cues for upright posture)  Static Sitting Balance  Static Sitting-Level of Assistance: Close supervision  Dynamic Sitting Balance  Dynamic Sitting-Level of Assistance: Contact guard  Static Standing  Balance  Static Standing-Level of Assistance: Minimum assistance  Dynamic Standing Balance  Dynamic Standing-Level of Assistance: Minimum assistance    Functional Assessments:     Bed Mobility  Bed Mobility: Yes  Bed Mobility 1  Bed Mobility Comments 1: supine to sit mod assist x 2 with HOB raised  Transfers  Transfer: Yes  Transfer 1  Trials/Comments 1: sit to stand with mod assist x 2 first trial, min assist x 1 second trial, both trials with WW, cues for technique     Stairs  Stairs:  (Pt ambulates bed to chair with min assist x 1 and WW. Pt with forward flex posture, shuffling steps, assist for walker management and breathing technique)      Outcome Measures:  [facility] Basic Mobility  Turning from your back to your side while in a flat bed without using bedrails: A lot  Moving from lying on your back to sitting on the side of a flat bed without using bedrails: Total  Moving to and from bed to chair (including a wheelchair): A little  Standing up from a chair using your arms (e.g. wheelchair or bedside chair): A lot  To walk in hospital room: A lot  Climbing 3-5 steps with railing: Total  Basic Mobility - Total Score: 11           FSS-ICU  Ambulation: Walks <50 feet with any assistance x1 or walks any distance with assistance x2 people  Rolling: Moderate assistance (performs 50 - 74% of task)  Sitting: Minimal assistance (performs 75% or more of task)  Transfer Sit-to-Stand: Moderate assistance (performs 50 - 74% of task)  Transfer Supine-to-Sit: Moderate assistance (performs 50 - 74% of task)  Total Score: 14  ICU Mobility Screen  Early Mobility/Exercise Safety Screen: Proceed with mobilization - No exclusion criteria met  ICU Mobility Scale: Transferring bed to chair  E = Exercise and Early Mobility  Early Mobility/Exercise Safety Screen: Proceed with mobilization - No exclusion criteria met  ICU Mobility Scale: Transferring bed to chair          Goals:  Encounter Problems       Encounter Problems (Active)        PT Problem       PT Goal 1 STG - Pt will amb 25' using WW with SBA (Progressing)       Start:  06/26/25    Expected End:  07/10/25            PT Goal 2 STG - Pt will transition supine <> sitting with  SBA (Progressing)       Start:  06/26/25    Expected End:  07/10/25            PT Goal 3 STG - Pt will SPT bed <> chair with  SBA (Progressing)       Start:  06/26/25    Expected End:  07/10/25            PT Goal 4 STG - Pt will transfer STS with  SBA (Progressing)       Start:  06/26/25    Expected End:  07/10/25               Pain - Adult            Education Documentation  Mobility Training, taught by Neisha Valentine, PT at 6/26/2025  4:19 PM.  Learner: Patient  Readiness: Acceptance  Method: Explanation  Response: Verbalizes Understanding  Comment: PT POC    Education Comments  No comments found.

## 2025-06-26 NOTE — PROGRESS NOTES
Speech-Language Pathology    SLP Adult Inpatient Speech-Language Pathology Clinical Swallow Evaluation    Patient Name: Ailyn Banegas  MRN: [id number]  Today's Date: 6/26/2025   Time Calculation  Start Time: 0945  Stop Time: 1015  Time Calculation (min): 30 min         Current Problem:   1. Acute respiratory failure with hypoxia and hypercapnia  Transthoracic Echo Complete    Transthoracic Echo Complete      2. COPD exacerbation (Multi)        3. Acute on chronic congestive heart failure, unspecified heart failure type  Transthoracic Echo Complete      4. Heart failure due to valvular disease, acute, diastolic  Transthoracic Echo Complete    Transthoracic Echo Complete    Transthoracic Echo Complete      5. Unspecified diastolic (congestive) heart failure  Transthoracic Echo Complete      6. Unspecified systolic (congestive) heart failure  Transthoracic Echo Complete      7. Acute and chronic respiratory failure with hypoxia  Transthoracic Echo Complete            Recommendations:  Additional Recommendations: Nutrition  Solid Diet Recommendations : Easy to Chew (IDDSI Level 7)  Liquid Diet Recommendations: Thin (IDDSI Level 0)  Compensatory Swallowing Strategies: Small bites/sips, No straws, Single sips, Add moisture to solids, External pacing  Medication Administration Recommendations: Whole, With Pureed  Family aware and pt at the bedside for mini meals and use of NO straws.     Assessment: Mild slow oral prep skills with pt on Airvo at this time s/p extubation 6/24/25.  No suspected pharyngeal dysphagia -pt medically improving with safe general swallowing strategies implemented to maximize the swallowing /respiratory coordination.   No overt s/sx of aspiration across PO  even sequential cup sips . OME intact , Vocal Quality WFL at this time .  PLOF-Regular/thins with no c/o swallowing issues on the LRD.  Pt with no c/o swallowing pain at this time.    -Min slow oral prep phase of the swallow across harder  textured PO and suspected functional pharyngeal swallow given clinical bedside indicators.   Pt is managing secretions, tongue protrudes to midline, no noticeable facial droop.  -PO trials of ice chips x4, tsp sips pf water x5,  cup sips x10,  3 oz water challenge  deferred 2/2 the HFNC via Airvo at 60L , 50%. , purees across 4 ounces of yogurt, gelacio crackers in yogurt for  soft solids and  shortbread cookie solid x4.     -ORAL PHASE:  Min prolonged d/t pt slow with mastication skills with no oral focal motor weakness.   Labial seal intact on cup and straw and tsp. There is no anterior loss of the oral bolus.  Mastication on adequate/natural dentition across soft solids. ; oral bolus formation and manipulation mildly slow .   No oral pocketing. No significant oral residue after the swallow.     -PHARYNGEAL PHASE; no overt s/s aspiration with all consistencies.   No change in vocal quality or respirations with PO intake. SpO2 remains 96%+ with PO intake and in conversation.   RR 24 across the encounter on the Airvo. RT to titrate the HFNC slowly over the next 24 hours.   Will suggest pt resume an oral diet, suggest Easy to  Chew solids textures and thin liquids via cup in segmented sips to maximize the pt's energy conservation and coordination of the swallowing /respiratory coordination.   Pt independent with use of  the oral [place] to expectorate any mucous via a strong cough.     Prognosis: Good  Medical Staff Made Aware: Yes  Strengths: Family/Caregiver Support, Motivation, Cognition  Barriers: Comorbidities      Plan:  Inpatient/Swing Bed or Outpatient: Inpatient  Treatment/Interventions: Assess diet tolerance, Diet recommendations  SLP Plan: Skilled SLP  Diet Recommendations: Solid  Solid Consistency: Other (Comment) (EC7/thins)  Liquid Consistency: Thin (IDDSI Level 0)  Discussed POC: Patient, Physician, Nursing, Caregiver/family  Discussed Risks/Benefits: Yes, Patient, Caregiver/Family, Nursing,  Physician  Patient/Caregiver Agreeable: Yes    Goals ( established 6/26/25)   Pt will tolerate least restrictive diet with adequate oral phase and no s/s of aspiration.  Patient will trial upgraded textures  with SLP only for possible diet advancement as indicated given bedside and clinical indicators.    Pt/family education.       Subjective Alert, Ox4, conversational, follows directives.  Current Problem:  80 y.o. female with a PMHx of reported COPD (no PFTs on record, on baseline 5L NC), MARLENY/likely OHS, Hypothyroidism, DLD, Morbid Obesity who was brought into the ED for respiratory distress. Admitted to ICU for acute on chronic hypercapnic hypoxic respiratory failure 2/2 ADHF. On day 4 of admission. DNR. Discharge plan LTACH via [facility].     SLP Visit Info:  SLP Received On: 06/26/25    General Visit Information:  Patient Class: Inpatient  Living Environment: Home  Referred By: Humberto  Prior Level of Function: WFL  Developmental Status: Age Appropriate  Date of Onset: 06/25/25  Date of Order: 06/25/25  BaseLine Diet: RG7/thins  Current Diet : NPO    80 y.o. female admitted on 6/22/2025   with chief complaint of SOB.     HPI:  Ms Banegas is a 81 yo woman with PMHx s/f reported COPD (no PFTs on record, on baseline 5L NC), MARLENY/likely OHS, Hypothyroidism, DLD, Morbid Obesity who was brought into the ED for respiratory distress. Admitted to ICU for acute on chronic hypercapnic hypoxic respiratory failure 2/2 ADHF.      Patient called EMS reporting onset of SOB, difficulties catching her breath or communicating. No associated cough, fevers, chills, chest pain. Noted LE edema. Patient had recent hospitalization at [facility] 05/2025 for pneumonia and COPD exacerbation, treated with steroids and antibiotics.      Patient was worked up in the ED with both laboratory studies and imaging studies.   VS unremarkable.   Labs notable for Hgb 11.7 (~baseline), bicarbonate 44, , troponin wnl, Covid test negative, VBG 7.21/>125.    CXR showed pulmonary edema and pleural effusions.  Rapid Sequence Intubation was conducted on 6/23/25.     Vital Signs:     RR 24, O2 saturation 96% , WBC     Objective      Chest Radiograph; 6/25/2025   LUNGS:  Lungs are clear.     ABDOMEN:  No remarkable upper abdominal findings.     BONES:  No acute osseous changes.  IMPRESSION:  No acute findings.    Baseline Assessment:  Respiratory Status: Other (Comment) (Airvo HFNC 60 L , 50%)  History of Intubation: Yes  Length of Intubations (days):  (3 days , extubated 6/24 -intubated 6/22)  Date extubated: 06/24/25  Behavior/Cognition: Alert, Cooperative, Pleasant mood (Ox4)  Patient Positioning: Upright in Bed  Baseline Vocal Quality: Normal  Volitional Cough: Strong  Volitional Swallow: Within Functional Limits      Pain:  Pain Assessment  Pain Assessment: 0-10  0-10 (Numeric) Pain Score: 0 - No pain    Oral/Motor Assessment:  Dentition: Adequate/Natural  Oral Motor: Within Functional Limits  Facial Sensation: Within Functional Limits  Facial Symmetry: Within Functional Limits  Labial ROM: Within Functional Limits  Labial Strength: Within Functional Limits  Labial Symmetry: Within Functional Limits  Lingual ROM: Within Functional Limits  Vocal Quality: Within Functional Limits  Intelligibility: Intelligible      Consistencies Trialed:  Consistencies Trialed: Yes  Consistencies Trialed: Ice Chips, Thin (IDDSI Level 0) - Cup, Thin (IDDSI Level 0) - Spoon, Pureed/extremely thick (IDDSI Level 4), Soft & bite sized/chopped (IDDSI Level 6), Regular (IDDSI Level 7)    Functional safe swallow demonstrated across varying PO self fed at a slow rate with cup sips.     Clinical Observations:  Patient Positioning: Upright in Bed  Was The 3 oz Swallow Protocol Completed: NO d/t pt on HFNC via Airvo at 60L , and 50 %   Prolonged Oral Manipulation: Regular (IDDSI Level 7)        Inpatient:    Education provided as it relates to POC and progress.  Individual(s) Educated: Patient,  pt's family   Verbal Education : yes  Patient/Caregiver Demonstrated Understanding: yes  Plan of Care Discussed and Agreed Upon: yes        Consultations/Referrals/Coordination of Services: n/a

## 2025-06-26 NOTE — CARE PLAN
Problem: Diabetes  Goal: Achieve decreasing blood glucose levels by end of shift  Outcome: Progressing  Flowsheets (Taken 6/26/2025 1847)  Achieve decreasing blood glucose levels by end of shift: Med administration/monitoring of effect  Goal: Increase stability of blood glucose readings by end of shift  Outcome: Progressing  Flowsheets (Taken 6/26/2025 1847)  Increase stability of blood glucose readings by end of shift: Med administration/monitoring of effect     Problem: Skin  Goal: Decreased wound size/increased tissue granulation at next dressing change  Outcome: Progressing  Flowsheets (Taken 6/26/2025 1847)  Decreased wound size/increased tissue granulation at next dressing change: Utilize specialty bed per algorithm  Goal: Participates in plan/prevention/treatment measures  Outcome: Progressing  Flowsheets (Taken 6/26/2025 1847)  Participates in plan/prevention/treatment measures: Elevate heels  Goal: Prevent/minimize sheer/friction injuries  Outcome: Progressing  Flowsheets (Taken 6/26/2025 1847)  Prevent/minimize sheer/friction injuries: Use pull sheet     Problem: Pain  Goal: Takes deep breaths with improved pain control throughout the shift  Outcome: Progressing  Goal: Turns in bed with improved pain control throughout the shift  Outcome: Progressing  Goal: Free from opioid side effects throughout the shift  Outcome: Progressing     Problem: Skin  Goal: Decreased wound size/increased tissue granulation at next dressing change  Outcome: Progressing  Flowsheets (Taken 6/26/2025 1847)  Decreased wound size/increased tissue granulation at next dressing change: Utilize specialty bed per algorithm  Goal: Participates in plan/prevention/treatment measures  Outcome: Progressing  Flowsheets (Taken 6/26/2025 1847)  Participates in plan/prevention/treatment measures: Elevate heels  Goal: Prevent/minimize sheer/friction injuries  Outcome: Progressing  Flowsheets (Taken 6/26/2025 1847)  Prevent/minimize  sheer/friction injuries: Use pull sheet

## 2025-06-26 NOTE — PROGRESS NOTES
Joann met with pt and pt's family to see what LTACH facility they would like a referral sent to. They have chosen [facility].  Sw made the referral as requested. Sw to keep pt and family updated on the referral status. Sw will continue to provide support and services as needed.     10:44am- [facility] is able to accept pt.  Sw to notify family.  Pt will be ready for DC soon and precert will be started today. Sw will continue to provide support and services as needed.     11:36am [facility] started precert.  Joann will keep pt's family updated on the precert status. Precert Ref number [id number]

## 2025-06-26 NOTE — PROGRESS NOTES
MICU PROGRESS NOTE    Subjective      A&O x 3/4 on HFNC 60% FiO2. ICU team spoke with family during rounds. Updated on clinical status.    Assessment & Plan   Ailyn Banegas is a 80 y.o. female with a PMHx of reported COPD (no PFTs on record, on baseline 5L NC), MARLENY/likely OHS, Hypothyroidism, DLD, Morbid Obesity who was brought into the ED for respiratory distress. Admitted to ICU for acute on chronic hypercapnic hypoxic respiratory failure 2/2 ADHF. On day 4 of admission. DNR.    PROGRESS:  6/24: HDS elevated intermittently hypotensive overnight. 4.5L UOP yesterday on Lasix gtt. FiO2 weaned from 80->60 overnight, further weaning down to 45% through ETT today. K replacement with 80 mEq. Echo resulted, not significantly different from SWG but with moderately reduced right ventricular systolic function. Optimizing for potential extubation later today/tomorrow morning  6/25: A&O x 3/4, deescalated from BiPAP to HFNC 60% FiO2, adjusted bronchopulmonary hygiene. Progressively more hypotensive with 3.5 L UOP yesterday, turned lasix gtt off, gave 250cc bolus, HDS thereafter. Failed bedside swallow evaluation, SLP ordered; 10 mg Decadron once for edema around neck. 60 mEq K replacement ordered. Removing A line  6/26: Maintaining MAP >65. Easy to chew, thin liquids diet per SLP. Maintaining good UOP, 2200 cc UOP yesterday. Torsemide started per cardiology. 60 mEq K. PT/OT ordered. Precert to [facility] pending.    Plan:  NEUROLOGICAL / PSYCH:  Dx:  Encephalopathy, resolved  Management:  A&O x 3/4, following commands  Delirium precautions    CARDIOVASCULAR:  Dx:  Hypotension  ADHF EF 60-65% in 8/22  Hx paroxysmal atrial fibrillation  Hx mild AVS  Hx DLD, HTN  Management:  Inpatient echo results: LVEF 55-60%, grade 1 impaired relaxation pattern of left ventricular diastolic filling, moderately reduced right ventricular systolic function, aortic valve sclerosis  Home cardiac meds: 10 mg Lipitor, 30 mg Cardizem, 20 mg Lasix,  100 mg Aldactone, 40 mg Demadex  Hold home meds  Telemetry  Maintain K >4, Mg >2  Cardiology on consult  Discontinued Lasix gtt., started PO 40 mg torsemide daily per cardiology  Gave 250 cc LR bolus with improvement in pressures  6/24 3.5L UOP, 6/25 2.2L  11.3 L UOP thus far this admission    PULMONARY:  Dx:  Extubated 6/24  Acute on chronic hypercapnic hypoxic respiratory failure  Pulmonary edema with small bilateral pleural effusions  RLL atelectacic collapse  PNA, C/F  COPD on home 5LNC  MARLENY on home CPAP  OHS  Management:  S/p  mg methylprednisolone, multiple IV Lasix doses and a Diamox dose in ED  Intubated s/p AVAPS/BIPAP evening of 6/22 due to worsening mentation, hypercarbia.  Extubated 6/24   One-time 10 mg Decadron 6/25 for neck swelling s/p extubation, no stridors  AVAPS overnight, shorter periods on AVAPS during the day with longer periods on Airvo, stable on Airvo 50% FiO2  Supplemental O2 PRN to maintain SpO2 89-94%  FiO2 (%):  [45 %-60 %] 50 %  S RR:  [16] 16  S VT:  [650 mL] 650 mL  PEEP/CPAP (cm H2O):  [8 cm H20] 8 cm H20  Diuresis with torsemide per cardiology, monitor UOP with strict I/O's  ABX: Zosyn. Continue to de-escalate per infectious workup as below  Respiratory and mucolytic treatments: DuoNeb QID, DuoNeb q2h PRN, Mucomyst, IS, respiratory treatments via AVAPS    GASTROENTEROLOGY:  Dx:   No acute concern  Management:  Easy to chew solids, thin liquids per SLP    RENAL / GENITOURINARY:  Dx:  Management:  Maintain Mccallum, strict I/O's  Trend renal function and metabolic panel  Avoid nephrotoxicity (hypotension, iodinated contrast, NSAIDs, etc.)  Replete electrolytes as needed    ENDOCRINOLOGY:  Dx:  Hyperglycemia  Hypothyroidism  Management:  Continue Synthroid  Accu-Cheks + SSI #3 before meals and at bedtime    HEMATOLOGY / ONCOLOGY:  Dx:  Anemia  Management:  DVT ppx with SCD's & Lovenox  Trend CBC    MUSCULOSKELETAL / SKIN:  Dx:  LE wounds  Management:  ICU wound prevention and skin  care    INFECTIOUS DISEASE:  Dx:  PNA, C/F  Management:  ABX: Zosyn day 5  Infectious workup: MRSA PCR negative, viral panel with COVID and Flu and RSV negative, respiratory Cx from tracheal aspirate pending collection, PNA urinary antigens negative, Pro-Rodolfo 0.12  Monitor for infectious sequelae    Checklist:  Antimicrobials: Zosyn  Oxygen: Airvo 50% FiO2  Feeding: Easy to chew solids, thin liquids  Fluids: -  DVT ppx: SCD's & Lovenox  Ulcer ppx: PPI  Glycemic control: Accu-Cheks + SSI #3  Bowel care: PRN  Indwelling catheters: Mccallum  Lines: PIVs  Consults: -  Code Status: DNR    Dr. Hany Kelly, DO  PGY-2, Internal Medicine    This is a preliminary note, please await attending attestation for final recommendations    Disclaimer: Documentation completed with the information available at the time of input. The times in the chart may not be reflective of actual patient care times, interventions, or procedures. Documentation occurs after the physical care of the patient.     Objective (Vitals, labs, radiological imaging, cardiac work up were personally reviewed)     Physical Exam  Constitutional:       Appearance: She is morbidly obese.   HENT:      Mouth/Throat:      Comments: Wide neck circumference  Neck:      Vascular: JVD present.   Cardiovascular:      Rate and Rhythm: Normal rate and regular rhythm.      Pulses: Normal pulses.   Pulmonary:      Breath sounds: Decreased air movement present. Decreased breath sounds present.      Comments: 50% FiO2 on Airvo  Abdominal:      General: Abdomen is protuberant.      Palpations: Abdomen is soft.   Genitourinary:     Comments: Mccallum in place  Musculoskeletal:      Right lower leg: Pitting Edema present.      Left lower leg: Pitting Edema present.   Skin:     General: Skin is warm.      Capillary Refill: Capillary refill takes less than 2 seconds.   Neurological:      Mental Status: She is oriented to person, place, and time. She is lethargic.   Psychiatric:          Mood and Affect: Mood is anxious.     Last Recorded Vitals  Vitals:    06/26/25 0900 06/26/25 1000 06/26/25 1100 06/26/25 1132   BP: 113/64 117/66 115/64    BP Location:       Patient Position:       Pulse: 69 72 74    Resp: 26 26 26 24   Temp:       TempSrc:       SpO2: 96% 96% 96%    Weight:       Height:         Intake/Output last 3 Shifts:  I/O last 3 completed shifts:  In: 1022.9 (7.3 mL/kg) [I.V.:22.9 (0.2 mL/kg); IV Piggyback:1000]  Out: 3525 (25 mL/kg) [Urine:3525 (0.7 mL/kg/hr)]  Weight: 141 kg     Ventilator/O2 Supply  Oxygen Therapy/Pulse Ox  Medical Gas Therapy: Supplemental oxygen  Medical Gas Delivery Method: High flow nasal cannula  FiO2 (%): 50 % (Airvo check 60L, 50%)  SpO2: 96 %  Patient Activity During SpO2 Measurement: At rest  Temp: 36.3 °C (97.3 °F)    Labs:   Results from last 7 days   Lab Units 06/26/25  0519 06/25/25  1630 06/25/25  0454   SODIUM mmol/L 140 138 139   POTASSIUM mmol/L 3.4* 4.3 3.5   CHLORIDE mmol/L 90* 88* 87*   CO2 mmol/L 41* 42* 44*   BUN mg/dL 32* 31* 31*   CREATININE mg/dL 1.15* 1.08* 1.10*   GLUCOSE mg/dL 121* 149* 106*   CALCIUM mg/dL 9.7 9.7 9.6     Results from last 7 days   Lab Units 06/26/25  0519   WBC AUTO x10*3/uL 7.0   HEMOGLOBIN g/dL 10.5*   HEMATOCRIT % 34.9*   PLATELETS AUTO x10*3/uL 216     Results from last 7 days   Lab Units 06/24/25  1407 06/23/25  1204 06/23/25  0632   POCT PH, ARTERIAL pH 7.50* 7.48* 7.42   POCT PCO2, ARTERIAL mm Hg 57* 71* 72*   POCT PO2, ARTERIAL mm Hg 72* 82* 77*   POCT HCO3 CALCULATED, ARTERIAL mmol/L 44.5* 52.9* 46.7*   POCT BASE EXCESS, ARTERIAL mmol/L 18.6* 25.5* 18.2*

## 2025-06-26 NOTE — CARE PLAN
Problem: Chronic Conditions and Co-morbidities  Goal: Patient's chronic conditions and co-morbidity symptoms are monitored and maintained or improved  Outcome: Progressing  Flowsheets (Taken 6/26/2025 0248)  Care Plan - Patient's Chronic Conditions and Co-Morbidity Symptoms are Monitored and Maintained or Improved: Monitor and assess patient's chronic conditions and comorbid symptoms for stability, deterioration, or improvement     Problem: Nutrition  Goal: Nutrient intake appropriate for maintaining nutritional needs  Outcome: Progressing  Note: Speech to evaluate patient swallowing this am     Problem: Diabetes  Goal: Achieve decreasing blood glucose levels by end of shift  Outcome: Progressing  Flowsheets (Taken 6/26/2025 0248)  Achieve decreasing blood glucose levels by end of shift: Med administration/monitoring of effect  Goal: Maintain electrolyte levels within acceptable range throughout shift  Outcome: Progressing  Flowsheets (Taken 6/26/2025 0248)  Maintain electrolyte levels within acceptable range throughout shift:   Monitor urine output   Med administration/monitoring of effect  Goal: Maintain glucose levels >70mg/dl to <250mg/dl throughout shift  Outcome: Progressing  Flowsheets (Taken 6/26/2025 0248)  Maintain glucose levels >70mg/dl to <250mg/dl throughout shift: Med administration/monitoring of effect  Goal: No changes in neurological exam by end of shift  Outcome: Progressing  Flowsheets (Taken 6/26/2025 0248)  No changes in neurological exam by end of shift: Complete frequent neurological assessments  Note: Every 4 hours per ICU     Problem: Skin  Goal: Decreased wound size/increased tissue granulation at next dressing change  Outcome: Progressing  Flowsheets (Taken 6/26/2025 0248)  Decreased wound size/increased tissue granulation at next dressing change:   Promote sleep for wound healing   Protective dressings over bony prominences  Goal: Participates in plan/prevention/treatment  measures  Outcome: Progressing  Flowsheets (Taken 6/23/2025 0636 by Nora Flaherty, RN)  Participates in plan/prevention/treatment measures: Elevate heels  Goal: Prevent/manage excess moisture  Outcome: Progressing  Flowsheets (Taken 6/26/2025 0248)  Prevent/manage excess moisture:   Cleanse incontinence/protect with barrier cream   Moisturize dry skin  Goal: Prevent/minimize sheer/friction injuries  Outcome: Progressing  Flowsheets (Taken 6/23/2025 0636 by Nora Flaherty, RN)  Prevent/minimize sheer/friction injuries:   HOB 30 degrees or less   Turn/reposition every 2 hours/use positioning/transfer devices   Use pull sheet   Complete micro-shifts as needed if patient unable. Adjust patient position to relieve pressure points, not a full turn     Problem: Pain  Goal: Takes deep breaths with improved pain control throughout the shift  Outcome: Progressing  Goal: Turns in bed with improved pain control throughout the shift  Outcome: Progressing     Problem: Safety - Medical Restraint  Goal: Remains free of injury from restraints (Restraint for Interference with Medical Device)  Outcome: Met  Note: Pt free from Restraints at this time/  Goal: Free from restraint(s) (Restraint for Interference with Medical Device)  Outcome: Met     Problem: Fall/Injury  Goal: Be free from injury by end of the shift  Outcome: Progressing  Note: Bed alarm maintained    The clinical goals for the shift include Pt to keep saturation greater than 90 while titrating oxygen demands down    Over the shift, the patient did make progress toward the following goals. Pt maintained saturation greater than 90 percent while on AVAP HS. Encourage pt to call for needs/wants. Call light within pt reach. Bed alarm maintained. Will cont to monitor.

## 2025-06-26 NOTE — PROGRESS NOTES
Cardiology Progress    Impression:  Acute on chronic hypercapnic, hypoxic respiratory failure.  Now extubated.  Metabolic alkalosis.  Advanced COPD.  On home oxygen.  Right heart failure.  Echo 6/20/2025 showing normal EF, moderate RV dysfunction.  RVSP could not be estimated.  Peripheral fluid overload.  MARLENY.  Struggles with BiPAP.  Obesity hypoventilation syndrome  Anemia  Paroxysmal atrial fibrillation.  Currently sinus rhythm.  Mild aortic stenosis  Hypertension  Hyperlipidemia  Plan:  Resume oral diuretics, torsemide 40 daily  I am okay with LTAC  HPI:  No problems overnight.  No shortness of breath at rest.  Still on Airvo.  Chest x-ray from yesterday looks pretty good.  Meds:  Scheduled medications  Scheduled Medications[1]  Continuous medications  Continuous Medications[2]  PRN medications  PRN Medications[3]    Physical exam:  Vitals:    06/26/25 1000   BP: 117/66   Pulse: 72   Resp: 26   Temp:    SpO2: 96%      JVP not visible.  Decreased breath sounds bilaterally.  Mild edema.  EKG:  Telemetry shows sinus rhythm.  Echo:  6/20/2025: Normal EF. Moderate RV dysfunction. RVSP could not be obtained.    Labs:  Lab Results   Component Value Date    WBC 7.0 06/26/2025    HGB 10.5 (L) 06/26/2025    HCT 34.9 (L) 06/26/2025     06/26/2025    CHOL 133 11/13/2020    TRIG 88 11/13/2020    HDL 55.4 11/13/2020    ALT 17 06/22/2025    AST 15 06/22/2025     06/26/2025    K 3.4 (L) 06/26/2025    CL 90 (L) 06/26/2025    CREATININE 1.15 (H) 06/26/2025    BUN 32 (H) 06/26/2025    CO2 41 (HH) 06/26/2025    TSH 3.34 04/06/2022    HGBA1C 5.9 (A) 04/06/2022     par         [1] acetylcysteine, 2 mL, nebulization, TID  enoxaparin, 60 mg, subcutaneous, q12h VLADIMIR  insulin lispro, 0-15 Units, subcutaneous, q4h  ipratropium-albuteroL, 3 mL, nebulization, TID  levothyroxine, 100 mcg, oral, Daily  oxygen, , inhalation, Continuous - Inhalation  oxygen, , inhalation, Continuous - Inhalation  pantoprazole, 40 mg, intravenous,  Daily  perflutren protein A microsphere, 0.5 mL, intravenous, Once in imaging  piperacillin-tazobactam, 4.5 g, intravenous, q6h  potassium chloride, 20 mEq, intravenous, q2h  sulfur hexafluoride microsphr, 2 mL, intravenous, Once in imaging  torsemide, 40 mg, oral, Daily  [2]    [3] PRN medications: acetaminophen, dextrose, dextrose, glucagon, glucagon, ipratropium-albuteroL

## 2025-06-27 LAB
ANION GAP SERPL CALC-SCNC: 12 MMOL/L (ref 10–20)
BASOPHILS # BLD AUTO: 0.04 X10*3/UL (ref 0–0.1)
BASOPHILS NFR BLD AUTO: 0.5 %
BUN SERPL-MCNC: 39 MG/DL (ref 6–23)
CALCIUM SERPL-MCNC: 9.6 MG/DL (ref 8.6–10.3)
CHLORIDE SERPL-SCNC: 93 MMOL/L (ref 98–107)
CO2 SERPL-SCNC: 39 MMOL/L (ref 21–32)
CREAT SERPL-MCNC: 1.29 MG/DL (ref 0.5–1.05)
EGFRCR SERPLBLD CKD-EPI 2021: 42 ML/MIN/1.73M*2
EOSINOPHIL # BLD AUTO: 0.15 X10*3/UL (ref 0–0.4)
EOSINOPHIL NFR BLD AUTO: 2 %
ERYTHROCYTE [DISTWIDTH] IN BLOOD BY AUTOMATED COUNT: 15.4 % (ref 11.5–14.5)
GLUCOSE BLD MANUAL STRIP-MCNC: 101 MG/DL (ref 74–99)
GLUCOSE BLD MANUAL STRIP-MCNC: 111 MG/DL (ref 74–99)
GLUCOSE BLD MANUAL STRIP-MCNC: 119 MG/DL (ref 74–99)
GLUCOSE BLD MANUAL STRIP-MCNC: 127 MG/DL (ref 74–99)
GLUCOSE BLD MANUAL STRIP-MCNC: 144 MG/DL (ref 74–99)
GLUCOSE BLD MANUAL STRIP-MCNC: 149 MG/DL (ref 74–99)
GLUCOSE BLD MANUAL STRIP-MCNC: 157 MG/DL (ref 74–99)
GLUCOSE BLD MANUAL STRIP-MCNC: 96 MG/DL (ref 74–99)
GLUCOSE SERPL-MCNC: 97 MG/DL (ref 74–99)
HCT VFR BLD AUTO: 35.6 % (ref 36–46)
HGB BLD-MCNC: 10.7 G/DL (ref 12–16)
IMM GRANULOCYTES # BLD AUTO: 0.06 X10*3/UL (ref 0–0.5)
IMM GRANULOCYTES NFR BLD AUTO: 0.8 % (ref 0–0.9)
LYMPHOCYTES # BLD AUTO: 1.23 X10*3/UL (ref 0.8–3)
LYMPHOCYTES NFR BLD AUTO: 16.4 %
MAGNESIUM SERPL-MCNC: 2.45 MG/DL (ref 1.6–2.4)
MCH RBC QN AUTO: 27.9 PG (ref 26–34)
MCHC RBC AUTO-ENTMCNC: 30.1 G/DL (ref 32–36)
MCV RBC AUTO: 93 FL (ref 80–100)
MONOCYTES # BLD AUTO: 0.46 X10*3/UL (ref 0.05–0.8)
MONOCYTES NFR BLD AUTO: 6.1 %
NEUTROPHILS # BLD AUTO: 5.54 X10*3/UL (ref 1.6–5.5)
NEUTROPHILS NFR BLD AUTO: 74.2 %
NRBC BLD-RTO: 0 /100 WBCS (ref 0–0)
PLATELET # BLD AUTO: 217 X10*3/UL (ref 150–450)
POTASSIUM SERPL-SCNC: 3.5 MMOL/L (ref 3.5–5.3)
RBC # BLD AUTO: 3.83 X10*6/UL (ref 4–5.2)
SODIUM SERPL-SCNC: 140 MMOL/L (ref 136–145)
WBC # BLD AUTO: 7.5 X10*3/UL (ref 4.4–11.3)

## 2025-06-27 PROCEDURE — 36415 COLL VENOUS BLD VENIPUNCTURE: CPT

## 2025-06-27 PROCEDURE — 2500000004 HC RX 250 GENERAL PHARMACY W/ HCPCS (ALT 636 FOR OP/ED): Performed by: INTERNAL MEDICINE

## 2025-06-27 PROCEDURE — 94640 AIRWAY INHALATION TREATMENT: CPT

## 2025-06-27 PROCEDURE — 2500000002 HC RX 250 W HCPCS SELF ADMINISTERED DRUGS (ALT 637 FOR MEDICARE OP, ALT 636 FOR OP/ED): Performed by: INTERNAL MEDICINE

## 2025-06-27 PROCEDURE — 2500000004 HC RX 250 GENERAL PHARMACY W/ HCPCS (ALT 636 FOR OP/ED)

## 2025-06-27 PROCEDURE — 83735 ASSAY OF MAGNESIUM: CPT

## 2025-06-27 PROCEDURE — 80048 BASIC METABOLIC PNL TOTAL CA: CPT

## 2025-06-27 PROCEDURE — 94660 CPAP INITIATION&MGMT: CPT

## 2025-06-27 PROCEDURE — 99291 CRITICAL CARE FIRST HOUR: CPT

## 2025-06-27 PROCEDURE — 2500000001 HC RX 250 WO HCPCS SELF ADMINISTERED DRUGS (ALT 637 FOR MEDICARE OP)

## 2025-06-27 PROCEDURE — 2020000001 HC ICU ROOM DAILY

## 2025-06-27 PROCEDURE — 92526 ORAL FUNCTION THERAPY: CPT | Mod: GN

## 2025-06-27 PROCEDURE — 2500000005 HC RX 250 GENERAL PHARMACY W/O HCPCS: Performed by: INTERNAL MEDICINE

## 2025-06-27 PROCEDURE — 2500000001 HC RX 250 WO HCPCS SELF ADMINISTERED DRUGS (ALT 637 FOR MEDICARE OP): Performed by: INTERNAL MEDICINE

## 2025-06-27 PROCEDURE — 82947 ASSAY GLUCOSE BLOOD QUANT: CPT

## 2025-06-27 PROCEDURE — 85025 COMPLETE CBC W/AUTO DIFF WBC: CPT

## 2025-06-27 RX ORDER — AMMONIUM LACTATE 12 G/100G
LOTION TOPICAL EVERY 12 HOURS
Status: DISCONTINUED | OUTPATIENT
Start: 2025-06-27 | End: 2025-07-07 | Stop reason: HOSPADM

## 2025-06-27 RX ORDER — POTASSIUM CHLORIDE 1.5 G/1.58G
60 POWDER, FOR SOLUTION ORAL ONCE
Status: COMPLETED | OUTPATIENT
Start: 2025-06-27 | End: 2025-06-27

## 2025-06-27 RX ORDER — BUDESONIDE 0.5 MG/2ML
0.5 INHALANT ORAL
Status: DISCONTINUED | OUTPATIENT
Start: 2025-06-27 | End: 2025-07-07 | Stop reason: HOSPADM

## 2025-06-27 RX ORDER — BUDESONIDE 0.5 MG/2ML
0.5 INHALANT ORAL
Status: DISCONTINUED | OUTPATIENT
Start: 2025-06-27 | End: 2025-06-27

## 2025-06-27 RX ORDER — AZITHROMYCIN 250 MG/1
500 TABLET, FILM COATED ORAL 3 TIMES WEEKLY
Status: DISCONTINUED | OUTPATIENT
Start: 2025-06-30 | End: 2025-07-07 | Stop reason: HOSPADM

## 2025-06-27 RX ADMIN — Medication 5 MG: at 20:23

## 2025-06-27 RX ADMIN — ACETYLCYSTEINE 400 MG: 200 SOLUTION ORAL; RESPIRATORY (INHALATION) at 06:51

## 2025-06-27 RX ADMIN — PIPERACILLIN SODIUM AND TAZOBACTAM SODIUM 4.5 G: 4; .5 INJECTION, SOLUTION INTRAVENOUS at 06:12

## 2025-06-27 RX ADMIN — ACETAMINOPHEN 975 MG: 325 TABLET ORAL at 17:48

## 2025-06-27 RX ADMIN — PIPERACILLIN SODIUM AND TAZOBACTAM SODIUM 4.5 G: 4; .5 INJECTION, SOLUTION INTRAVENOUS at 19:16

## 2025-06-27 RX ADMIN — IPRATROPIUM BROMIDE AND ALBUTEROL SULFATE 3 ML: .5; 3 SOLUTION RESPIRATORY (INHALATION) at 06:51

## 2025-06-27 RX ADMIN — BUDESONIDE INHALATION 0.5 MG: 0.5 SUSPENSION RESPIRATORY (INHALATION) at 19:23

## 2025-06-27 RX ADMIN — ENOXAPARIN SODIUM 60 MG: 100 INJECTION SUBCUTANEOUS at 08:16

## 2025-06-27 RX ADMIN — PIPERACILLIN SODIUM AND TAZOBACTAM SODIUM 4.5 G: 4; .5 INJECTION, SOLUTION INTRAVENOUS at 00:29

## 2025-06-27 RX ADMIN — Medication 50 PERCENT: at 19:48

## 2025-06-27 RX ADMIN — ACETYLCYSTEINE 400 MG: 200 SOLUTION ORAL; RESPIRATORY (INHALATION) at 12:20

## 2025-06-27 RX ADMIN — Medication: at 14:31

## 2025-06-27 RX ADMIN — Medication 50 PERCENT: at 07:22

## 2025-06-27 RX ADMIN — POTASSIUM CHLORIDE 60 MEQ: 1.5 POWDER, FOR SOLUTION ORAL at 08:16

## 2025-06-27 RX ADMIN — TORSEMIDE 40 MG: 20 TABLET ORAL at 08:16

## 2025-06-27 RX ADMIN — LEVOTHYROXINE SODIUM 100 MCG: 0.1 TABLET ORAL at 06:12

## 2025-06-27 RX ADMIN — IPRATROPIUM BROMIDE AND ALBUTEROL SULFATE 3 ML: .5; 3 SOLUTION RESPIRATORY (INHALATION) at 12:20

## 2025-06-27 RX ADMIN — ACETYLCYSTEINE 400 MG: 200 SOLUTION ORAL; RESPIRATORY (INHALATION) at 19:23

## 2025-06-27 RX ADMIN — PANTOPRAZOLE SODIUM 40 MG: 40 INJECTION, POWDER, FOR SOLUTION INTRAVENOUS at 08:16

## 2025-06-27 RX ADMIN — PIPERACILLIN SODIUM AND TAZOBACTAM SODIUM 4.5 G: 4; .5 INJECTION, SOLUTION INTRAVENOUS at 12:10

## 2025-06-27 RX ADMIN — IPRATROPIUM BROMIDE AND ALBUTEROL SULFATE 3 ML: .5; 3 SOLUTION RESPIRATORY (INHALATION) at 19:23

## 2025-06-27 RX ADMIN — ENOXAPARIN SODIUM 60 MG: 100 INJECTION SUBCUTANEOUS at 20:23

## 2025-06-27 ASSESSMENT — COGNITIVE AND FUNCTIONAL STATUS - GENERAL
TOILETING: A LOT
WALKING IN HOSPITAL ROOM: A LOT
PERSONAL GROOMING: A LOT
DRESSING REGULAR UPPER BODY CLOTHING: A LOT
HELP NEEDED FOR BATHING: A LOT
EATING MEALS: A LOT
CLIMB 3 TO 5 STEPS WITH RAILING: A LOT
HELP NEEDED FOR BATHING: A LOT
TOILETING: A LOT
MOVING FROM LYING ON BACK TO SITTING ON SIDE OF FLAT BED WITH BEDRAILS: A LITTLE
MOVING TO AND FROM BED TO CHAIR: A LOT
CLIMB 3 TO 5 STEPS WITH RAILING: TOTAL
DAILY ACTIVITIY SCORE: 14
MOBILITY SCORE: 14
DAILY ACTIVITIY SCORE: 12
STANDING UP FROM CHAIR USING ARMS: A LOT
MOVING TO AND FROM BED TO CHAIR: A LOT
MOBILITY SCORE: 13
DRESSING REGULAR LOWER BODY CLOTHING: A LOT
PERSONAL GROOMING: A LITTLE
TURNING FROM BACK TO SIDE WHILE IN FLAT BAD: A LITTLE
DRESSING REGULAR UPPER BODY CLOTHING: A LOT
TURNING FROM BACK TO SIDE WHILE IN FLAT BAD: A LITTLE
WALKING IN HOSPITAL ROOM: A LOT
DRESSING REGULAR LOWER BODY CLOTHING: A LOT
EATING MEALS: A LITTLE
STANDING UP FROM CHAIR USING ARMS: A LOT
MOVING FROM LYING ON BACK TO SITTING ON SIDE OF FLAT BED WITH BEDRAILS: A LITTLE

## 2025-06-27 ASSESSMENT — PAIN - FUNCTIONAL ASSESSMENT
PAIN_FUNCTIONAL_ASSESSMENT: 0-10

## 2025-06-27 ASSESSMENT — PAIN SCALES - GENERAL
PAINLEVEL_OUTOF10: 8
PAINLEVEL_OUTOF10: 3
PAINLEVEL_OUTOF10: 0 - NO PAIN

## 2025-06-27 NOTE — PROGRESS NOTES
Speech-Language Pathology    SLP Adult Inpatient  Speech-Language Pathology Treatment     Patient Name: Ailyn Banegas  MRN: [id number]  Today's Date: 6/27/2025  Time Calculation  Start Time: 1345  Stop Time: 1408  Time Calculation (min): 23 min         Current Problem:   1. Acute respiratory failure with hypoxia and hypercapnia  Transthoracic Echo Complete    Transthoracic Echo Complete      2. COPD exacerbation (Multi)        3. Acute on chronic congestive heart failure, unspecified heart failure type  Transthoracic Echo Complete      4. Heart failure due to valvular disease, acute, diastolic  Transthoracic Echo Complete    Transthoracic Echo Complete    Transthoracic Echo Complete      5. Unspecified diastolic (congestive) heart failure  Transthoracic Echo Complete      6. Unspecified systolic (congestive) heart failure  Transthoracic Echo Complete      7. Acute and chronic respiratory failure with hypoxia  Transthoracic Echo Complete            SLP tx per POC :  SLP TX Intervention Outcome: Making Progress Towards Goals  Prognosis: Good  Treatment Tolerance: Patient tolerated treatment well  Medical Staff Made Aware: Yes  Strengths: Family/Caregiver Support, Motivation, Cognition  Barriers: Comorbidities  Swallowing treatment completed at the bedside.    Pt seen over portions of the lunch meal as pt out of bed seated fully uptight in a chair.     Pt self feeds small bites of mac n cheese and carrots  x6 with jello and cup sips of juice x10.    Pt independent with the universal breathing /swallowing coordination controls implemented with good understanding demonstrated.    Pt taking small bites/sips, using cup sips- no straws and pacing self eating slowly to maximize respiratory stamina while on the HFNC via Airvo at 60L and 50 % with O2 saturation 94 t/o the encounter with the RR 28-29 during PO and the complex conversational exchange.     Safe functional swallow demonstrated across the current EC7/thin liquid  diet.  Pt very happy with the soft PO choices and feels comfortable on this food at this time. No hard solids trialed as pt feels the softer food is currently easier to chew .     SLP to follow  for regular harder  solid trials per the pt's comfort as her high O2  demand  lessens.  Pt in  agreement with the plan.     Plan:  Inpatient/Swing Bed or Outpatient: Inpatient  Treatment/Interventions: Dysphagia treatment  SLP TX Plan: Continue Plan of Care  SLP Discharge Recommendations: Continue skilled SLP services at the next level of care (if d/c's before diet upgraded to the LRD -baseline RG7/thins)  Discussed POC: Patient, Nursing, Physician (Humberto)  Discussed Risks/Benefits: Nursing, Patient  Patient/Caregiver Agreeable: Yes      Goals ( established 6/26/25)   Pt will tolerate least restrictive diet with adequate oral phase and no s/s of aspiration.  ONGOING for RG7 solids but goal met for EC7/thins.   Patient will trial upgraded textures  with SLP only for possible diet advancement as indicated given bedside and clinical indicators.  Goal not targeted this date 6/27/25.   Pt/family education. ONGOING this date with pt 6/27/25 who demonstrates complete understanding,          Subjective Alert, Ox4, very conversational and pleasant.   Intact Vocal Quality.   Current Problem:  COPD requires BIPAP off and on t/o the day here at prior to the admit at home.     SLP Visit Info:  SLP Received On: 06/27/25    General Visit Information:  Prior to Session Communication: Bedside nurse (Karla)    Pain Assessment:  Pain Assessment  Pain Assessment: 0-10  0-10 (Numeric) Pain Score: 0 - No pain      Objective     Chest Radiograph; 6/25/2025     LUNGS:  Lungs are clear.     ABDOMEN:  No remarkable upper abdominal findings.     BONES:  No acute osseous changes.  IMPRESSION:  No acute findings.    Therapeutic Swallow:  Therapeutic Swallow Intervention : Compensatory Strategies, PO Trials (pt education)  Solid Diet Recommendations: Easy  to Chew (IDDSI Level 7)  Liquid Diet Recommendations: Thin (IDDSI Level 0) via cup sips   Swallow Comments:  (Independent with swallowing controls)    Safe functional swallow demonstrated across portions of the late lunch meal.     Inpatient:  Education provided as it relates to POC and progress.  Individual(s) Educated: Patient, RN Karla, MD Paul   Verbal Education : yes  Patient/Medical Caregivers  Demonstrated Understanding: yes  Plan of Care Discussed and Agreed Upon: yes        Consultations/Referrals/Coordination of Services: SLP at [facility] for diet upgrade  to RG7 if pt discharged over the weekend if clinically appropriate.

## 2025-06-27 NOTE — PROGRESS NOTES
Cardiology Progress    Impression:  Acute on chronic hypercapnic, hypoxic respiratory failure.  Now extubated.  Metabolic alkalosis.  Advanced COPD.  On home oxygen.  Right heart failure.  Echo 6/20/2025 showing normal EF, moderate RV dysfunction.  RVSP could not be estimated.  Peripheral fluid overload.  MARLENY.  Struggles with BiPAP.  Obesity hypoventilation syndrome  Anemia  Paroxysmal atrial fibrillation.  Currently sinus rhythm.  Mild aortic stenosis  Hypertension  Hyperlipidemia  Plan:  Torsemide on hold for rising creatinine.  Follow labs  Will need some diuretics at discharge.  HPI:  No problems overnight.  Up in the chair.  Still on Airvo.  Meds:  Scheduled medications  Scheduled Medications[1]  Continuous medications  Continuous Medications[2]  PRN medications  PRN Medications[3]    Physical exam:  Vitals:    06/27/25 1220   BP:    Pulse:    Resp:    Temp:    SpO2: 96%      JVP not visible.  Decreased breath sounds at the bases.  Mild edema.  EKG:  Telemetry shows sinus rhythm.  Echo:  6/20/2025: Normal EF. Moderate RV dysfunction. RVSP could not be obtained.    Labs:  Lab Results   Component Value Date    WBC 7.5 06/27/2025    HGB 10.7 (L) 06/27/2025    HCT 35.6 (L) 06/27/2025     06/27/2025    CHOL 133 11/13/2020    TRIG 88 11/13/2020    HDL 55.4 11/13/2020    ALT 17 06/22/2025    AST 15 06/22/2025     06/27/2025    K 3.5 06/27/2025    CL 93 (L) 06/27/2025    CREATININE 1.29 (H) 06/27/2025    BUN 39 (H) 06/27/2025    CO2 39 (H) 06/27/2025    TSH 3.34 04/06/2022    HGBA1C 5.9 (A) 04/06/2022     par         [1] acetylcysteine, 2 mL, nebulization, TID  ammonium lactate, , Topical, q12h  [START ON 6/30/2025] azithromycin, 500 mg, oral, Once per day on Monday Wednesday Friday  budesonide, 0.5 mg, nebulization, BID  docusate sodium, 100 mg, oral, Nightly  enoxaparin, 60 mg, subcutaneous, q12h VLADIMIR  insulin lispro, 0-15 Units, subcutaneous, TID AC  ipratropium-albuteroL, 3 mL, nebulization,  TID  levothyroxine, 100 mcg, oral, Daily  oxygen, , inhalation, Continuous - Inhalation  pantoprazole, 40 mg, intravenous, Daily  perflutren protein A microsphere, 0.5 mL, intravenous, Once in imaging  piperacillin-tazobactam, 4.5 g, intravenous, q6h  sulfur hexafluoride microsphr, 2 mL, intravenous, Once in imaging  [Held by provider] torsemide, 40 mg, oral, Daily  [2]    [3] PRN medications: acetaminophen, dextrose, dextrose, glucagon, glucagon, ipratropium-albuteroL, melatonin, polyethylene glycol

## 2025-06-27 NOTE — PROGRESS NOTES
MICU PROGRESS NOTE    Subjective      NAEO, NAD. Denies respiratory complains other than Airvo leaking fluid into her nose.     Assessment & Plan   Ailyn Banegas is a 80 y.o. female with a PMHx of reported COPD (no PFTs on record, on baseline 5L NC), MARLENY/likely OHS, Hypothyroidism, DLD, Morbid Obesity who was brought into the ED for respiratory distress. Admitted to ICU for acute on chronic hypercapnic hypoxic respiratory failure 2/2 ADHF. On day 5 of admission. DNR.    PROGRESS:  6/24: HDS elevated intermittently hypotensive overnight. 4.5L UOP yesterday on Lasix gtt. FiO2 weaned from 80->60 overnight, further weaning down to 45% through ETT today. K replacement with 80 mEq. Echo resulted, not significantly different from SWG but with moderately reduced right ventricular systolic function. Optimizing for potential extubation later today/tomorrow morning  6/25: A&O x 3/4, deescalated from BiPAP to HFNC 60% FiO2, adjusted bronchopulmonary hygiene. Progressively more hypotensive with 3.5 L UOP yesterday, turned lasix gtt off, gave 250cc bolus, HDS thereafter. Failed bedside swallow evaluation, SLP ordered; 10 mg Decadron once for edema around neck. 60 mEq K replacement ordered. Removing A line  6/26: Maintaining MAP >65. Easy to chew, thin liquids diet per SLP. Maintaining good UOP, 2200 cc UOP yesterday. Torsemide started per cardiology. 60 mEq K. PT/OT ordered. Precert to [facility] pending.  6/27: ALVERTO with worsening renal function, likely prerenal ISO ongoing diuresis - net volume down by 8.1L. Holding Torsemide. Adding Pulmicort. Zosyn day 6/7. Awaiting precert approval for DC to [facility]    Plan:  NEUROLOGICAL / PSYCH:  Dx:  Encephalopathy, resolved  Management:  A&O x 3/4, following commands  Delirium precautions    CARDIOVASCULAR:  Dx:  Hypotension  ADHF EF 60-65% in 8/22  Hx paroxysmal atrial fibrillation  Hx mild AVS  Hx DLD, HTN  Management:  Inpatient echo results: LVEF 55-60%, grade 1 impaired  relaxation pattern of left ventricular diastolic filling, moderately reduced right ventricular systolic function, aortic valve sclerosis  Home cardiac meds: 10 mg Lipitor, 30 mg Cardizem, 20 mg Lasix, 100 mg Aldactone, 40 mg Demadex  Hold home meds  Telemetry  Maintain K >4, Mg >2  Cardiology on consult  Discontinued Lasix gtt., started PO 40 mg torsemide daily per cardiology  Gave 250 cc LR bolus with improvement in pressures  6/24 3.5L UOP, 6/25 2.2L, 6/26 1L - urinary output decreasing  12.4 L UOP thus far this admission    PULMONARY:  Dx:  Extubated 6/24  Acute on chronic hypercapnic hypoxic respiratory failure  Pulmonary edema with small bilateral pleural effusions  RLL atelectacic collapse  PNA, C/F  COPD on home 5LNC  MARLENY on home CPAP  OHS  Management:  S/p  mg methylprednisolone, multiple IV Lasix doses and a Diamox dose in ED  Intubated s/p AVAPS/BIPAP evening of 6/22 due to worsening mentation, hypercarbia.  Extubated 6/24   One-time 10 mg Decadron 6/25 for neck swelling s/p extubation, no stridors  AVAPS overnight, shorter periods on AVAPS during the day with longer periods on Airvo, stable on Airvo 50% FiO2  Supplemental O2 PRN to maintain SpO2 89-94%  FiO2 (%):  [50 %-60 %] 50 %  S RR:  [16] 16  S VT:  [650 mL] 650 mL  PEEP/CPAP (cm H2O):  [8 cm H20] 8 cm H20  ABX: Zosyn. Continue to de-escalate per infectious workup as below  Respiratory and mucolytic treatments: DuoNeb QID, DuoNeb q2h PRN, Mucomyst, IS, Pulmicort, respiratory treatments via AVAPS  Diuresis with torsemide per cardiology HELD, monitor UOP with strict I/O's    GASTROENTEROLOGY:  Dx:   No acute concern  Management:  Easy to chew solids, thin liquids per SLP    RENAL / GENITOURINARY:  Dx:   ALVERTO, prerenal ISO diuresis s/p Lasix gtt, torsemide  Management:  Maintain Mccallum, strict I/O's  Trend renal function and metabolic panel  Avoid nephrotoxicity (hypotension, iodinated contrast, NSAIDs, etc.)  Replete electrolytes as needed  Holding  torsemide    ENDOCRINOLOGY:  Dx:  Hyperglycemia  Hypothyroidism  Management:  Continue Synthroid  Accu-Cheks + SSI #3 before meals and at bedtime    HEMATOLOGY / ONCOLOGY:  Dx:  Anemia  Management:  DVT ppx with SCD's & Lovenox  Trend CBC    MUSCULOSKELETAL / SKIN:  Dx:  LE wounds  Management:  ICU wound prevention and skin care    INFECTIOUS DISEASE:  Dx:  PNA, C/F  Management:  ABX: Zosyn day 6/7 for PNA coverage  Start 500 mg azithromycin three times weekly (M,W,F) starting next Monday  Infectious workup: MRSA PCR negative, viral panel with COVID and Flu and RSV negative, respiratory Cx from tracheal aspirate pending collection, PNA urinary antigens negative, Pro-Rodolfo 0.12  Monitor for infectious sequelae    Checklist:  Antimicrobials: Zosyn  Oxygen: Airvo 50% FiO2  Feeding: Easy to chew solids, thin liquids  Fluids: -  DVT ppx: SCD's & Lovenox  Ulcer ppx: PPI  Glycemic control: Accu-Cheks + SSI #3  Bowel care: PRN  Indwelling catheters: Mccallum  Lines: PIVs  Consults: -  Code Status: DNR    Dr. Hany Kelly, DO  PGY-2, Internal Medicine    This is a preliminary note, please await attending attestation for final recommendations    Disclaimer: Documentation completed with the information available at the time of input. The times in the chart may not be reflective of actual patient care times, interventions, or procedures. Documentation occurs after the physical care of the patient.     Objective (Vitals, labs, radiological imaging, cardiac work up were personally reviewed)     Physical Exam  Constitutional:       Appearance: She is morbidly obese.   HENT:      Mouth/Throat:      Comments: Wide neck circumference  Neck:      Vascular: JVD present.   Cardiovascular:      Rate and Rhythm: Normal rate and regular rhythm.      Pulses: Normal pulses.   Pulmonary:      Breath sounds: Decreased air movement present. Decreased breath sounds present.      Comments: 50% FiO2 on Airvo  Abdominal:      General: Abdomen is  protuberant.      Palpations: Abdomen is soft.   Genitourinary:     Comments: Mccallum in place  Musculoskeletal:      Right lower leg: Pitting Edema present.      Left lower leg: Pitting Edema present.   Skin:     General: Skin is warm.      Capillary Refill: Capillary refill takes less than 2 seconds.   Neurological:      Mental Status: She is oriented to person, place, and time. She is lethargic.   Psychiatric:         Mood and Affect: Mood is anxious.     Last Recorded Vitals  Vitals:    06/27/25 0657 06/27/25 0700 06/27/25 0800 06/27/25 0900   BP:  119/62 119/66 115/70   BP Location:       Patient Position:       Pulse:  73 72 86   Resp:  21 19 (!) 38   Temp:   35.9 °C (96.6 °F)    TempSrc:   Temporal    SpO2: 96% 93% 95% 92%   Weight:       Height:         Intake/Output last 3 Shifts:  I/O last 3 completed shifts:  In: 1820 (12.7 mL/kg) [P.O.:820; IV Piggyback:1000]  Out: 1765 (12.3 mL/kg) [Urine:1765 (0.3 mL/kg/hr)]  Weight: 143 kg     Ventilator/O2 Supply  Oxygen Therapy/Pulse Ox  Medical Gas Therapy: Supplemental oxygen  Medical Gas Delivery Method: High flow nasal cannula  FiO2 (%): 50 %  SpO2: 92 %  Patient Activity During SpO2 Measurement: At rest  Temp: 35.9 °C (96.6 °F)    Labs:   Results from last 7 days   Lab Units 06/27/25  0606 06/26/25  0519 06/25/25  1630   SODIUM mmol/L 140 140 138   POTASSIUM mmol/L 3.5 3.4* 4.3   CHLORIDE mmol/L 93* 90* 88*   CO2 mmol/L 39* 41* 42*   BUN mg/dL 39* 32* 31*   CREATININE mg/dL 1.29* 1.15* 1.08*   GLUCOSE mg/dL 97 121* 149*   CALCIUM mg/dL 9.6 9.7 9.7     Results from last 7 days   Lab Units 06/27/25  0606   WBC AUTO x10*3/uL 7.5   HEMOGLOBIN g/dL 10.7*   HEMATOCRIT % 35.6*   PLATELETS AUTO x10*3/uL 217     Results from last 7 days   Lab Units 06/24/25  1407 06/23/25  1204 06/23/25  0632   POCT PH, ARTERIAL pH 7.50* 7.48* 7.42   POCT PCO2, ARTERIAL mm Hg 57* 71* 72*   POCT PO2, ARTERIAL mm Hg 72* 82* 77*   POCT HCO3 CALCULATED, ARTERIAL mmol/L 44.5* 52.9* 46.7*    POCT BASE EXCESS, ARTERIAL mmol/L 18.6* 25.5* 18.2*

## 2025-06-27 NOTE — CONSULTS
"Nutrition Follow Up Assessment:   Nutrition Assessment         Patient is a 80 y.o. female presenting with AMS; generalized weakness      Nutrition History:  Food and Nutrient History: Pt is on bi pap for 2 hours and then off for 6 and then on again, etc.  She is on an easy to chew diet per SLP.  She ate all of her breakfast today and was looking forward to it.  SLP was with her at lunch and she continues to do well       Anthropometrics:  Height: 157.5 cm (5' 2\")   Weight: 143 kg (315 lb 4.1 oz)   BMI (Calculated): 57.65  IBW/kg (Dietitian Calculated): 50 kg  Percent of IBW: 297 %                      Weight History:   Wt Readings from Last 10 Encounters:   06/27/25 143 kg (315 lb 4.1 oz)   07/03/24 125 kg (275 lb)   05/17/24 113 kg (250 lb)   02/16/24 81.6 kg (180 lb)   02/07/24 81.6 kg (180 lb)   11/27/23 122 kg (270 lb)   10/07/22 122 kg (270 lb)   08/17/22 122 kg (270 lb)   04/06/22 127 kg (280 lb)   12/03/21 127 kg (280 lb)         Weight Change %:  Weight History / % Weight Change: wt is dowqn 6 kg since admit  Significant Weight Gain: Fluid related    Nutrition Focused Physical Exam Findings:    Subcutaneous Fat Loss:   Defer Subcutaneous Fat Loss Assessment: Defer all  Defer All Reason: not a good time  Muscle Wasting:  Defer Muscle Wasting Assessment: Defer all  Defer All Reason: not a good time  Edema:  Edema: +3 moderate  Edema Location: LE edema   peripheral fluid overload  Physical Findings:  Skin: Positive (Rt upper leg and tibial wounds)    Nutrition Significant Labs:  BMP Trend:   Results from last 7 days   Lab Units 06/27/25  0606 06/26/25  0519 06/25/25  1630 06/25/25  0454   GLUCOSE mg/dL 97 121* 149* 106*   CALCIUM mg/dL 9.6 9.7 9.7 9.6   SODIUM mmol/L 140 140 138 139   POTASSIUM mmol/L 3.5 3.4* 4.3 3.5   CO2 mmol/L 39* 41* 42* 44*   CHLORIDE mmol/L 93* 90* 88* 87*   BUN mg/dL 39* 32* 31* 31*   CREATININE mg/dL 1.29* 1.15* 1.08* 1.10*        Nutrition Specific Medications:  Colace; lovenox; " synthroid; protonix; demadex    I/O:   Last BM Date: 06/27/25; Stool Appearance: Loose (06/27/25 1500)    Dietary Orders (From admission, onward)       Start     Ordered    06/26/25 1100  Adult diet Regular; Easy to chew; Thin 0; No straw  Diet effective now        Comments: Pills through apple sauce feeding   Question Answer Comment   Diet type Regular    Texture Easy to chew    Fluid consistency Thin 0    Select tray type: No straw        06/26/25 1100    06/23/25 1640  May Participate in Room Service  ( ROOM SERVICE MAY PARTICIPATE)  Once        Question:  .  Answer:  Yes    06/23/25 1639                     Estimated Needs:      Method for Estimating Needs: 6010-8066  26-30 sharmaine kg of OBW     Method for Estimating 24 Hour Protein Needs: 50-60  1-1.2 gm kg of IBW     Method for Estimating 24 Hour Fluid Needs: 7023-2610  20-30 ml kg of IBW as medically indicated  Patient on Order Fluid Restriction: No        Nutrition Diagnosis        Nutrition Diagnosis  Patient has Nutrition Diagnosis: Yes  Diagnosis Status (1): Resolved  Nutrition Diagnosis 1: Inadequate oral intake  Related to (1): decreased ability to consume sufficient energy  As Evidenced by (1): Pt is on airvo or bi pap and is unable to participate in a speech eval  Additional Nutrition Diagnosis: Diagnosis 3  Diagnosis Status (2): New  Nutrition Diagnosis 2: Swallowing difficulty  Related to (2): motor causes  As Evidenced by (2): pt is on an easy to chew diet per SLP  Diagnosis Status (3): Active  Nutrition Diagnosis 3: Obesity class III  Related to (3): excessive energy intake and physical inactivity  As Evidenced by (3): obesity grade III   BMI  57.7       Nutrition Interventions/Recommendations        Nutrition Recommendations:  Individualized Nutrition Prescription Provided for : Continue diet texture per pt need,  monitor for possible supplement need    Nutrition Interventions/Goals:   Meals and Snacks: Texture-modified diet  Goal: >75% of  meals  Goal: NPO less then 2 more days  Coordination of Care with Providers: Nursing, Provider           Nutrition Monitoring and Evaluation   Estimated Energy Intake: Energy intake greater or equal to 75% of estimated energy needs  Fluid Intake:  (adequate fluid intake without fluid overload)  Intake / Amount of food: Consumes at least 75% or more of meals/snacks/supplements, Meets > 75% estimated energy needs         Criteria: Improved BMP  Criteria: glucose within desired range              Time Spent (min): 30 minutes

## 2025-06-27 NOTE — CARE PLAN
The patient's goals for the shift include      The clinical goals for the shift include Pt to maintain AVAP through HS while maintaining pulse ox greater than 90 percent    Over the shift, the patient did not make progress toward the following goals. Barriers to progression include pna, copd. Recommendations to address these barriers include continue to monitor.

## 2025-06-27 NOTE — PROGRESS NOTES
Ailyn Banegas is a 80 y.o. female on day 5 of admission presenting with Acute respiratory failure with hypoxia and hypercapnia.  Record reviewed.  Discharge plan is to [facility].  Auth still pending.  TCC met with family at bedside and provided update.  Care Transitions will continue to follow.  Marisela Zhong RN BSN, TCC    2:42 pm addendum  [facility] reviewed for updates.  [facility] sent more information as requested by [facility].  Auth still pending.  Care Transitions will continue to follow.

## 2025-06-27 NOTE — CARE PLAN
Problem: Chronic Conditions and Co-morbidities  Goal: Patient's chronic conditions and co-morbidity symptoms are monitored and maintained or improved  Outcome: Progressing  Flowsheets (Taken 6/27/2025 0528)  Care Plan - Patient's Chronic Conditions and Co-Morbidity Symptoms are Monitored and Maintained or Improved: Monitor and assess patient's chronic conditions and comorbid symptoms for stability, deterioration, or improvement     Problem: Diabetes  Goal: Achieve decreasing blood glucose levels by end of shift  Outcome: Progressing  Flowsheets (Taken 6/27/2025 0528)  Achieve decreasing blood glucose levels by end of shift: Med administration/monitoring of effect  Goal: Maintain electrolyte levels within acceptable range throughout shift  Flowsheets (Taken 6/27/2025 0528)  Maintain electrolyte levels within acceptable range throughout shift: Monitor urine output  Goal: Maintain glucose levels >70mg/dl to <250mg/dl throughout shift  Outcome: Progressing  Goal: No changes in neurological exam by end of shift  Outcome: Progressing  Flowsheets (Taken 6/27/2025 0528)  No changes in neurological exam by end of shift: Complete frequent neurological assessments     Problem: Skin  Goal: Decreased wound size/increased tissue granulation at next dressing change  Flowsheets (Taken 6/27/2025 0528)  Decreased wound size/increased tissue granulation at next dressing change: Promote sleep for wound healing  Goal: Participates in plan/prevention/treatment measures  Outcome: Progressing  Flowsheets (Taken 6/27/2025 0528)  Participates in plan/prevention/treatment measures: Elevate heels  Goal: Prevent/manage excess moisture  Outcome: Progressing  Flowsheets (Taken 6/27/2025 0528)  Prevent/manage excess moisture: Monitor for/manage infection if present  Goal: Prevent/minimize sheer/friction injuries  Outcome: Progressing  Flowsheets (Taken 6/27/2025 0528)  Prevent/minimize sheer/friction injuries:   Use pull sheet   Turn/reposition  every 2 hours/use positioning/transfer devices     Problem: Pain  Goal: Takes deep breaths with improved pain control throughout the shift  Outcome: Progressing  Goal: Turns in bed with improved pain control throughout the shift  Outcome: Progressing     Problem: Fall/Injury  Goal: Be free from injury by end of the shift  Outcome: Progressing  Note: Bed alarm maintained  Goal: Use assistive devices by end of the shift  Outcome: Progressing    The clinical goals for the shift include Pt to maintain AVAP through HS while maintaining pulse ox greater than 90 percent    Over the shift, the patient did make progress toward the following goals. Pt tolerated saturation greater than 90 percent while on AVAP.  Recommendations to address these barriers include  cont to monitor pulse ox and pt anxiety/ Encourage pt to call for needs/wants. Call light within pt reach. Bed alarm maintained. Will cont to monitor.

## 2025-06-28 LAB
ANION GAP SERPL CALC-SCNC: 15 MMOL/L (ref 10–20)
ATRIAL RATE: 83 BPM
BASOPHILS # BLD AUTO: 0.04 X10*3/UL (ref 0–0.1)
BASOPHILS NFR BLD AUTO: 0.5 %
BUN SERPL-MCNC: 33 MG/DL (ref 6–23)
CALCIUM SERPL-MCNC: 9.5 MG/DL (ref 8.6–10.3)
CHLORIDE SERPL-SCNC: 96 MMOL/L (ref 98–107)
CO2 SERPL-SCNC: 36 MMOL/L (ref 21–32)
CREAT SERPL-MCNC: 0.98 MG/DL (ref 0.5–1.05)
EGFRCR SERPLBLD CKD-EPI 2021: 58 ML/MIN/1.73M*2
EOSINOPHIL # BLD AUTO: 0.21 X10*3/UL (ref 0–0.4)
EOSINOPHIL NFR BLD AUTO: 2.6 %
ERYTHROCYTE [DISTWIDTH] IN BLOOD BY AUTOMATED COUNT: 15.2 % (ref 11.5–14.5)
GLUCOSE BLD MANUAL STRIP-MCNC: 142 MG/DL (ref 74–99)
GLUCOSE BLD MANUAL STRIP-MCNC: 154 MG/DL (ref 74–99)
GLUCOSE BLD MANUAL STRIP-MCNC: 93 MG/DL (ref 74–99)
GLUCOSE SERPL-MCNC: 105 MG/DL (ref 74–99)
HCT VFR BLD AUTO: 37 % (ref 36–46)
HGB BLD-MCNC: 10.7 G/DL (ref 12–16)
IMM GRANULOCYTES # BLD AUTO: 0.06 X10*3/UL (ref 0–0.5)
IMM GRANULOCYTES NFR BLD AUTO: 0.7 % (ref 0–0.9)
LYMPHOCYTES # BLD AUTO: 0.87 X10*3/UL (ref 0.8–3)
LYMPHOCYTES NFR BLD AUTO: 10.6 %
MAGNESIUM SERPL-MCNC: 2.14 MG/DL (ref 1.6–2.4)
MCH RBC QN AUTO: 27.2 PG (ref 26–34)
MCHC RBC AUTO-ENTMCNC: 28.9 G/DL (ref 32–36)
MCV RBC AUTO: 94 FL (ref 80–100)
MONOCYTES # BLD AUTO: 0.5 X10*3/UL (ref 0.05–0.8)
MONOCYTES NFR BLD AUTO: 6.1 %
NEUTROPHILS # BLD AUTO: 6.51 X10*3/UL (ref 1.6–5.5)
NEUTROPHILS NFR BLD AUTO: 79.5 %
NRBC BLD-RTO: 0 /100 WBCS (ref 0–0)
P AXIS: 44 DEGREES
PLATELET # BLD AUTO: 231 X10*3/UL (ref 150–450)
POTASSIUM SERPL-SCNC: 3.6 MMOL/L (ref 3.5–5.3)
PR INTERVAL: 254 MS
Q ONSET: 253 MS
QRS COUNT: 13 BEATS
QRS DURATION: 90 MS
QT INTERVAL: 361 MS
QTC CALCULATION(BAZETT): 422 MS
QTC FREDERICIA: 400 MS
R AXIS: -8 DEGREES
RBC # BLD AUTO: 3.93 X10*6/UL (ref 4–5.2)
SODIUM SERPL-SCNC: 143 MMOL/L (ref 136–145)
T AXIS: 25 DEGREES
T OFFSET: 433 MS
VENTRICULAR RATE: 82 BPM
WBC # BLD AUTO: 8.2 X10*3/UL (ref 4.4–11.3)

## 2025-06-28 PROCEDURE — 82947 ASSAY GLUCOSE BLOOD QUANT: CPT

## 2025-06-28 PROCEDURE — 2500000001 HC RX 250 WO HCPCS SELF ADMINISTERED DRUGS (ALT 637 FOR MEDICARE OP): Performed by: INTERNAL MEDICINE

## 2025-06-28 PROCEDURE — 99233 SBSQ HOSP IP/OBS HIGH 50: CPT

## 2025-06-28 PROCEDURE — 94660 CPAP INITIATION&MGMT: CPT

## 2025-06-28 PROCEDURE — 2500000005 HC RX 250 GENERAL PHARMACY W/O HCPCS: Performed by: INTERNAL MEDICINE

## 2025-06-28 PROCEDURE — 2500000004 HC RX 250 GENERAL PHARMACY W/ HCPCS (ALT 636 FOR OP/ED)

## 2025-06-28 PROCEDURE — 2500000002 HC RX 250 W HCPCS SELF ADMINISTERED DRUGS (ALT 637 FOR MEDICARE OP, ALT 636 FOR OP/ED)

## 2025-06-28 PROCEDURE — 2500000002 HC RX 250 W HCPCS SELF ADMINISTERED DRUGS (ALT 637 FOR MEDICARE OP, ALT 636 FOR OP/ED): Performed by: INTERNAL MEDICINE

## 2025-06-28 PROCEDURE — 99291 CRITICAL CARE FIRST HOUR: CPT | Performed by: INTERNAL MEDICINE

## 2025-06-28 PROCEDURE — 36415 COLL VENOUS BLD VENIPUNCTURE: CPT

## 2025-06-28 PROCEDURE — 2500000004 HC RX 250 GENERAL PHARMACY W/ HCPCS (ALT 636 FOR OP/ED): Performed by: INTERNAL MEDICINE

## 2025-06-28 PROCEDURE — 85025 COMPLETE CBC W/AUTO DIFF WBC: CPT

## 2025-06-28 PROCEDURE — 94640 AIRWAY INHALATION TREATMENT: CPT

## 2025-06-28 PROCEDURE — 2020000001 HC ICU ROOM DAILY

## 2025-06-28 PROCEDURE — 83735 ASSAY OF MAGNESIUM: CPT

## 2025-06-28 PROCEDURE — 80048 BASIC METABOLIC PNL TOTAL CA: CPT

## 2025-06-28 RX ORDER — ACETYLCYSTEINE 200 MG/ML
2 SOLUTION ORAL; RESPIRATORY (INHALATION)
Status: DISCONTINUED | OUTPATIENT
Start: 2025-06-28 | End: 2025-07-07 | Stop reason: HOSPADM

## 2025-06-28 RX ADMIN — PIPERACILLIN SODIUM AND TAZOBACTAM SODIUM 4.5 G: 4; .5 INJECTION, SOLUTION INTRAVENOUS at 06:02

## 2025-06-28 RX ADMIN — ACETAMINOPHEN 975 MG: 325 TABLET ORAL at 20:37

## 2025-06-28 RX ADMIN — Medication: at 00:26

## 2025-06-28 RX ADMIN — PIPERACILLIN SODIUM AND TAZOBACTAM SODIUM 4.5 G: 4; .5 INJECTION, SOLUTION INTRAVENOUS at 19:31

## 2025-06-28 RX ADMIN — ACETYLCYSTEINE 400 MG: 200 SOLUTION ORAL; RESPIRATORY (INHALATION) at 07:03

## 2025-06-28 RX ADMIN — INSULIN LISPRO 3 UNITS: 100 INJECTION, SOLUTION INTRAVENOUS; SUBCUTANEOUS at 11:57

## 2025-06-28 RX ADMIN — PANTOPRAZOLE SODIUM 40 MG: 40 INJECTION, POWDER, FOR SOLUTION INTRAVENOUS at 09:02

## 2025-06-28 RX ADMIN — IPRATROPIUM BROMIDE AND ALBUTEROL SULFATE 3 ML: .5; 3 SOLUTION RESPIRATORY (INHALATION) at 18:16

## 2025-06-28 RX ADMIN — Medication 60 L/MIN: at 20:37

## 2025-06-28 RX ADMIN — BUDESONIDE INHALATION 0.5 MG: 0.5 SUSPENSION RESPIRATORY (INHALATION) at 07:04

## 2025-06-28 RX ADMIN — Medication 60 L/MIN: at 20:36

## 2025-06-28 RX ADMIN — ENOXAPARIN SODIUM 60 MG: 100 INJECTION SUBCUTANEOUS at 20:37

## 2025-06-28 RX ADMIN — IPRATROPIUM BROMIDE AND ALBUTEROL SULFATE 3 ML: .5; 3 SOLUTION RESPIRATORY (INHALATION) at 13:58

## 2025-06-28 RX ADMIN — PIPERACILLIN SODIUM AND TAZOBACTAM SODIUM 4.5 G: 4; .5 INJECTION, SOLUTION INTRAVENOUS at 00:25

## 2025-06-28 RX ADMIN — Medication: at 12:05

## 2025-06-28 RX ADMIN — BUDESONIDE INHALATION 0.5 MG: 0.5 SUSPENSION RESPIRATORY (INHALATION) at 18:17

## 2025-06-28 RX ADMIN — LEVOTHYROXINE SODIUM 100 MCG: 0.1 TABLET ORAL at 05:26

## 2025-06-28 RX ADMIN — PIPERACILLIN SODIUM AND TAZOBACTAM SODIUM 4.5 G: 4; .5 INJECTION, SOLUTION INTRAVENOUS at 13:52

## 2025-06-28 RX ADMIN — IPRATROPIUM BROMIDE AND ALBUTEROL SULFATE 3 ML: .5; 3 SOLUTION RESPIRATORY (INHALATION) at 07:03

## 2025-06-28 RX ADMIN — ACETYLCYSTEINE 400 MG: 200 SOLUTION ORAL; RESPIRATORY (INHALATION) at 18:17

## 2025-06-28 RX ADMIN — Medication 50 PERCENT: at 07:03

## 2025-06-28 RX ADMIN — ENOXAPARIN SODIUM 60 MG: 100 INJECTION SUBCUTANEOUS at 09:01

## 2025-06-28 RX ADMIN — ACETAMINOPHEN 975 MG: 325 TABLET ORAL at 11:57

## 2025-06-28 ASSESSMENT — COGNITIVE AND FUNCTIONAL STATUS - GENERAL
DRESSING REGULAR UPPER BODY CLOTHING: TOTAL
DAILY ACTIVITIY SCORE: 6
STANDING UP FROM CHAIR USING ARMS: TOTAL
PERSONAL GROOMING: TOTAL
TOILETING: TOTAL
DRESSING REGULAR LOWER BODY CLOTHING: TOTAL
TURNING FROM BACK TO SIDE WHILE IN FLAT BAD: A LOT
WALKING IN HOSPITAL ROOM: TOTAL
EATING MEALS: TOTAL
HELP NEEDED FOR BATHING: TOTAL
CLIMB 3 TO 5 STEPS WITH RAILING: TOTAL
MOBILITY SCORE: 8
MOVING TO AND FROM BED TO CHAIR: TOTAL
MOVING FROM LYING ON BACK TO SITTING ON SIDE OF FLAT BED WITH BEDRAILS: A LOT

## 2025-06-28 ASSESSMENT — PAIN SCALES - GENERAL
PAINLEVEL_OUTOF10: 2
PAINLEVEL_OUTOF10: 0 - NO PAIN
PAINLEVEL_OUTOF10: 0 - NO PAIN
PAINLEVEL_OUTOF10: 2
PAINLEVEL_OUTOF10: 8
PAINLEVEL_OUTOF10: 0 - NO PAIN
PAINLEVEL_OUTOF10: 0 - NO PAIN

## 2025-06-28 ASSESSMENT — PAIN DESCRIPTION - LOCATION: LOCATION: KNEE

## 2025-06-28 ASSESSMENT — PAIN - FUNCTIONAL ASSESSMENT
PAIN_FUNCTIONAL_ASSESSMENT: 0-10

## 2025-06-28 NOTE — PROGRESS NOTES
Sw waiting to hear back from [facility] on the status of the precert.  Sw to keep the following MD updated once update is provided.

## 2025-06-28 NOTE — CARE PLAN
The patient's goals for the shift include      The clinical goals for the shift include maintain pulse ox 90%    Over the shift, the patient did not make progress toward the following goals. Barriers to progression include airvo alternating with avap. Recommendations to address these barriers include continue to observe.

## 2025-06-28 NOTE — PROGRESS NOTES
MICU PROGRESS NOTE    Subjective      NAEO, NAD. Denies respiratory complains other than Airvo leaking fluid into her nose.     Assessment & Plan   Ailyn Banegas is a 80 y.o. female with a PMHx of reported COPD (no PFTs on record, on baseline 5L NC), MARLENY/likely OHS, Hypothyroidism, DLD, Morbid Obesity who was brought into the ED for respiratory distress. Admitted to ICU for acute on chronic hypercapnic hypoxic respiratory failure 2/2 ADHF. On day 6 of admission. DNR.    PROGRESS:  6/24: HDS elevated intermittently hypotensive overnight. 4.5L UOP yesterday on Lasix gtt. FiO2 weaned from 80->60 overnight, further weaning down to 45% through ETT today. K replacement with 80 mEq. Echo resulted, not significantly different from SWG but with moderately reduced right ventricular systolic function. Optimizing for potential extubation later today/tomorrow morning  6/25: A&O x 3/4, deescalated from BiPAP to HFNC 60% FiO2, adjusted bronchopulmonary hygiene. Progressively more hypotensive with 3.5 L UOP yesterday, turned lasix gtt off, gave 250cc bolus, HDS thereafter. Failed bedside swallow evaluation, SLP ordered; 10 mg Decadron once for edema around neck. 60 mEq K replacement ordered. Removing A line  6/26: Maintaining MAP >65. Easy to chew, thin liquids diet per SLP. Maintaining good UOP, 2200 cc UOP yesterday. Torsemide started per cardiology. 60 mEq K. PT/OT ordered. Precert to [facility] pending.  6/27: ALVERTO with worsening renal function, likely prerenal ISO ongoing diuresis - net volume down by 8.1L. Holding Torsemide. Adding Pulmicort. Zosyn day 6/7. Awaiting precert approval for DC to [facility]  6/28: Creatinine within normal limits today.  Patient net -970 mL yesterday.  Un held torsemide.  Day 7 of 7 Zosyn.  Continue to await approval to LT[facility].    Plan:  NEUROLOGICAL / PSYCH:  Dx:  Encephalopathy, resolved  Management:  A&O x 3/4, following commands  Delirium  precautions    CARDIOVASCULAR:  Dx:  Hypotension  ADHF EF 60-65% in 8/22  Hx paroxysmal atrial fibrillation  Hx mild AVS  Hx DLD, HTN  Management:  Inpatient echo results: LVEF 55-60%, grade 1 impaired relaxation pattern of left ventricular diastolic filling, moderately reduced right ventricular systolic function, aortic valve sclerosis  Home cardiac meds: 10 mg Lipitor, 30 mg Cardizem, 20 mg Lasix, 100 mg Aldactone, 40 mg Demadex  Hold home meds  Telemetry  Maintain K >4, Mg >2  Cardiology on consult  Discontinued Lasix gtt., started PO 40 mg torsemide daily per cardiology  Gave 250 cc LR bolus with improvement in pressures  6/24 3.5L UOP, 6/25 2.2L, 6/26 1L - urinary output decreasing  12.4 L UOP thus far this admission    PULMONARY:  Dx:  Extubated 6/24  Acute on chronic hypercapnic hypoxic respiratory failure  Pulmonary edema with small bilateral pleural effusions  RLL atelectacic collapse  PNA, C/F  COPD on home 5LNC  MARLENY on home CPAP  OHS  Management:  S/p  mg methylprednisolone, multiple IV Lasix doses and a Diamox dose in ED  Intubated s/p AVAPS/BIPAP evening of 6/22 due to worsening mentation, hypercarbia.  Extubated 6/24   One-time 10 mg Decadron 6/25 for neck swelling s/p extubation, no stridors  AVAPS overnight, shorter periods on AVAPS during the day with longer periods on Airvo, stable on Airvo 50% FiO2  Supplemental O2 PRN to maintain SpO2 89-94%  FiO2 (%):  [30 %-50 %] 50 %  S RR:  [16] 16  S VT:  [350 mL-650 mL] 350 mL  MAP (cm H2O):  [13.7] 13.7  ABX: Zosyn. Continue to de-escalate per infectious workup as below  Respiratory and mucolytic treatments: DuoNeb QID, DuoNeb q2h PRN, Mucomyst, IS, Pulmicort, respiratory treatments via AVAPS  Diuresis with torsemide, monitor UOP with strict I/O's    GASTROENTEROLOGY:  Dx:   No acute concern  Management:  Easy to chew solids, thin liquids per SLP    RENAL / GENITOURINARY:  Dx:   ALVERTO, prerenal ISO diuresis s/p Lasix gtt,  torsemide  Management:  Maintain Mccallum, strict I/O's  Trend renal function and metabolic panel  Avoid nephrotoxicity (hypotension, iodinated contrast, NSAIDs, etc.)  Replete electrolytes as needed  Holding torsemide    ENDOCRINOLOGY:  Dx:  Hyperglycemia  Hypothyroidism  Management:  Continue Synthroid  Accu-Cheks + SSI #3 before meals and at bedtime    HEMATOLOGY / ONCOLOGY:  Dx:  Anemia  Management:  DVT ppx with SCD's & Lovenox  Trend CBC    MUSCULOSKELETAL / SKIN:  Dx:  LE wounds  Management:  ICU wound prevention and skin care    INFECTIOUS DISEASE:  Dx:  PNA, C/F  Management:  ABX: Zosyn day 7/7 for PNA coverage  Start 500 mg azithromycin three times weekly (M,W,F) starting next Monday  Infectious workup: MRSA PCR negative, viral panel with COVID and Flu and RSV negative, respiratory Cx from tracheal aspirate pending collection, PNA urinary antigens negative, Pro-Rodolfo 0.12  Monitor for infectious sequelae    Checklist:  Antimicrobials: Zosyn  Oxygen: Airvo 50% FiO2  Feeding: Easy to chew solids, thin liquids  Fluids: -  DVT ppx: SCD's & Lovenox  Ulcer ppx: PPI  Glycemic control: Accu-Cheks + SSI #3  Bowel care: PRN  Indwelling catheters: Mccallum  Lines: PIVs  Consults: -  Code Status: DNR    Dr. Darvin Bhatt, DO  PGY-1, Internal Medicine    This is a preliminary note, please await attending attestation for final recommendations    Disclaimer: Documentation completed with the information available at the time of input. The times in the chart may not be reflective of actual patient care times, interventions, or procedures. Documentation occurs after the physical care of the patient.     Objective (Vitals, labs, radiological imaging, cardiac work up were personally reviewed)     Physical Exam  Constitutional:       Appearance: She is morbidly obese.   HENT:      Mouth/Throat:      Comments: Wide neck circumference  Neck:      Vascular: JVD present.   Cardiovascular:      Rate and Rhythm: Normal rate and regular  rhythm.      Pulses: Normal pulses.   Pulmonary:      Breath sounds: Decreased air movement present. Decreased breath sounds present.      Comments: 50% FiO2 on Airvo  Abdominal:      General: Abdomen is protuberant.      Palpations: Abdomen is soft.   Genitourinary:     Comments: Mccallum in place  Musculoskeletal:      Right lower leg: Pitting Edema present.      Left lower leg: Pitting Edema present.   Skin:     General: Skin is warm.      Capillary Refill: Capillary refill takes less than 2 seconds.   Neurological:      Mental Status: She is oriented to person, place, and time. She is lethargic.   Psychiatric:         Mood and Affect: Mood is anxious.   Last Recorded Vitals  Vitals:    06/28/25 0531 06/28/25 0600 06/28/25 0700 06/28/25 0703   BP:  141/69 152/82    BP Location:       Patient Position:       Pulse:  83 82    Resp:  (!) 36 (!) 39    Temp:       TempSrc:       SpO2:  94% 91% 95%   Weight: 144 kg (317 lb 0.3 oz)      Height:         Intake/Output last 3 Shifts:  I/O last 3 completed shifts:  In: 980 (6.8 mL/kg) [P.O.:280; IV Piggyback:700]  Out: 2045 (14.2 mL/kg) [Urine:1845 (0.4 mL/kg/hr); Stool:200]  Weight: 143.8 kg     Ventilator/O2 Supply  Oxygen Therapy/Pulse Ox  Medical Gas Therapy: Supplemental oxygen  Medical Gas Delivery Method: High flow nasal cannula (AIRVO 60L)  FiO2 (%): 50 %  SpO2: 95 %  Patient Activity During SpO2 Measurement: At rest  Temp: 36.2 °C (97.2 °F)    Labs:   Results from last 7 days   Lab Units 06/28/25  0530 06/27/25  0606 06/26/25  0519   SODIUM mmol/L 143 140 140   POTASSIUM mmol/L 3.6 3.5 3.4*   CHLORIDE mmol/L 96* 93* 90*   CO2 mmol/L 36* 39* 41*   BUN mg/dL 33* 39* 32*   CREATININE mg/dL 0.98 1.29* 1.15*   GLUCOSE mg/dL 105* 97 121*   CALCIUM mg/dL 9.5 9.6 9.7     Results from last 7 days   Lab Units 06/28/25  0530   WBC AUTO x10*3/uL 8.2   HEMOGLOBIN g/dL 10.7*   HEMATOCRIT % 37.0   PLATELETS AUTO x10*3/uL 231     Results from last 7 days   Lab Units 06/24/25  0570  06/23/25  1204 06/23/25  0632   POCT PH, ARTERIAL pH 7.50* 7.48* 7.42   POCT PCO2, ARTERIAL mm Hg 57* 71* 72*   POCT PO2, ARTERIAL mm Hg 72* 82* 77*   POCT HCO3 CALCULATED, ARTERIAL mmol/L 44.5* 52.9* 46.7*   POCT BASE EXCESS, ARTERIAL mmol/L 18.6* 25.5* 18.2*

## 2025-06-28 NOTE — CARE PLAN
Problem: Chronic Conditions and Co-morbidities  Goal: Patient's chronic conditions and co-morbidity symptoms are monitored and maintained or improved  Outcome: Progressing  Flowsheets (Taken 6/28/2025 0322)  Care Plan - Patient's Chronic Conditions and Co-Morbidity Symptoms are Monitored and Maintained or Improved: Monitor and assess patient's chronic conditions and comorbid symptoms for stability, deterioration, or improvement     Problem: Nutrition  Goal: Nutrient intake appropriate for maintaining nutritional needs  Outcome: Progressing     Problem: Diabetes  Goal: Maintain electrolyte levels within acceptable range throughout shift  Outcome: Progressing  Flowsheets (Taken 6/27/2025 0528)  Maintain electrolyte levels within acceptable range throughout shift: Monitor urine output  Goal: Maintain glucose levels >70mg/dl to <250mg/dl throughout shift  Outcome: Progressing  Flowsheets (Taken 6/26/2025 0248)  Maintain glucose levels >70mg/dl to <250mg/dl throughout shift: Med administration/monitoring of effect  Goal: No changes in neurological exam by end of shift  Outcome: Progressing  Flowsheets (Taken 6/27/2025 0528)  No changes in neurological exam by end of shift: Complete frequent neurological assessments     Problem: Skin  Goal: Decreased wound size/increased tissue granulation at next dressing change  Outcome: Progressing  Flowsheets (Taken 6/27/2025 0528)  Decreased wound size/increased tissue granulation at next dressing change: Promote sleep for wound healing  Goal: Participates in plan/prevention/treatment measures  Outcome: Progressing  Flowsheets (Taken 6/28/2025 0322)  Participates in plan/prevention/treatment measures: Elevate heels  Goal: Prevent/manage excess moisture  Outcome: Progressing  Flowsheets (Taken 6/28/2025 0322)  Prevent/manage excess moisture: Cleanse incontinence/protect with barrier cream  Goal: Prevent/minimize sheer/friction injuries  Outcome: Progressing  Flowsheets (Taken  6/28/2025 0322)  Prevent/minimize sheer/friction injuries:   Use pull sheet   Turn/reposition every 2 hours/use positioning/transfer devices  Goal: Promote/optimize nutrition  Outcome: Progressing  Flowsheets (Taken 6/28/2025 0322)  Promote/optimize nutrition: Consume > 50% meals/supplements     Problem: Pain  Goal: Takes deep breaths with improved pain control throughout the shift  Outcome: Progressing  Goal: Turns in bed with improved pain control throughout the shift  Outcome: Progressing     Problem: Fall/Injury  Goal: Be free from injury by end of the shift  Outcome: Progressing  Note: Bed alarm maintained   The patient's goals for the shift include      The clinical goals for the shift include Pt to rest comfortably thorough HS while maintaining AVAP and keeping saturation greater than 90 percent    Over the shift, the patient did make progress toward the following goals. Pt resting comfortably. No s/s of pain or distress. Encourage pt to call for needs/wants. Call light within pt reach. Pt cont to keep saturation greater than 90 percent. Will cont to monitor. Bed alarm maintained.

## 2025-06-29 VITALS
DIASTOLIC BLOOD PRESSURE: 70 MMHG | RESPIRATION RATE: 23 BRPM | WEIGHT: 293 LBS | TEMPERATURE: 97.2 F | HEART RATE: 76 BPM | BODY MASS INDEX: 53.92 KG/M2 | OXYGEN SATURATION: 97 % | SYSTOLIC BLOOD PRESSURE: 127 MMHG | HEIGHT: 62 IN

## 2025-06-29 LAB
ANION GAP SERPL CALC-SCNC: 9 MMOL/L (ref 10–20)
BASOPHILS # BLD AUTO: 0.04 X10*3/UL (ref 0–0.1)
BASOPHILS NFR BLD AUTO: 0.5 %
BUN SERPL-MCNC: 23 MG/DL (ref 6–23)
CALCIUM SERPL-MCNC: 9.4 MG/DL (ref 8.6–10.3)
CHLORIDE SERPL-SCNC: 98 MMOL/L (ref 98–107)
CO2 SERPL-SCNC: 37 MMOL/L (ref 21–32)
CREAT SERPL-MCNC: 0.78 MG/DL (ref 0.5–1.05)
EGFRCR SERPLBLD CKD-EPI 2021: 76 ML/MIN/1.73M*2
EOSINOPHIL # BLD AUTO: 0.19 X10*3/UL (ref 0–0.4)
EOSINOPHIL NFR BLD AUTO: 2.5 %
ERYTHROCYTE [DISTWIDTH] IN BLOOD BY AUTOMATED COUNT: 15.1 % (ref 11.5–14.5)
GLUCOSE BLD MANUAL STRIP-MCNC: 116 MG/DL (ref 74–99)
GLUCOSE BLD MANUAL STRIP-MCNC: 137 MG/DL (ref 74–99)
GLUCOSE BLD MANUAL STRIP-MCNC: 162 MG/DL (ref 74–99)
GLUCOSE SERPL-MCNC: 99 MG/DL (ref 74–99)
HCT VFR BLD AUTO: 37.2 % (ref 36–46)
HGB BLD-MCNC: 10.8 G/DL (ref 12–16)
IMM GRANULOCYTES # BLD AUTO: 0.05 X10*3/UL (ref 0–0.5)
IMM GRANULOCYTES NFR BLD AUTO: 0.7 % (ref 0–0.9)
LYMPHOCYTES # BLD AUTO: 1.02 X10*3/UL (ref 0.8–3)
LYMPHOCYTES NFR BLD AUTO: 13.4 %
MAGNESIUM SERPL-MCNC: 2.07 MG/DL (ref 1.6–2.4)
MCH RBC QN AUTO: 27.6 PG (ref 26–34)
MCHC RBC AUTO-ENTMCNC: 29 G/DL (ref 32–36)
MCV RBC AUTO: 95 FL (ref 80–100)
MONOCYTES # BLD AUTO: 0.51 X10*3/UL (ref 0.05–0.8)
MONOCYTES NFR BLD AUTO: 6.7 %
NEUTROPHILS # BLD AUTO: 5.82 X10*3/UL (ref 1.6–5.5)
NEUTROPHILS NFR BLD AUTO: 76.2 %
NRBC BLD-RTO: 0 /100 WBCS (ref 0–0)
PLATELET # BLD AUTO: 227 X10*3/UL (ref 150–450)
POTASSIUM SERPL-SCNC: 3.7 MMOL/L (ref 3.5–5.3)
RBC # BLD AUTO: 3.91 X10*6/UL (ref 4–5.2)
SODIUM SERPL-SCNC: 140 MMOL/L (ref 136–145)
WBC # BLD AUTO: 7.6 X10*3/UL (ref 4.4–11.3)

## 2025-06-29 PROCEDURE — 2500000002 HC RX 250 W HCPCS SELF ADMINISTERED DRUGS (ALT 637 FOR MEDICARE OP, ALT 636 FOR OP/ED): Performed by: STUDENT IN AN ORGANIZED HEALTH CARE EDUCATION/TRAINING PROGRAM

## 2025-06-29 PROCEDURE — 2500000001 HC RX 250 WO HCPCS SELF ADMINISTERED DRUGS (ALT 637 FOR MEDICARE OP)

## 2025-06-29 PROCEDURE — 99222 1ST HOSP IP/OBS MODERATE 55: CPT

## 2025-06-29 PROCEDURE — 36415 COLL VENOUS BLD VENIPUNCTURE: CPT

## 2025-06-29 PROCEDURE — 94799 UNLISTED PULMONARY SVC/PX: CPT

## 2025-06-29 PROCEDURE — 2500000002 HC RX 250 W HCPCS SELF ADMINISTERED DRUGS (ALT 637 FOR MEDICARE OP, ALT 636 FOR OP/ED)

## 2025-06-29 PROCEDURE — 94640 AIRWAY INHALATION TREATMENT: CPT

## 2025-06-29 PROCEDURE — 99233 SBSQ HOSP IP/OBS HIGH 50: CPT | Performed by: STUDENT IN AN ORGANIZED HEALTH CARE EDUCATION/TRAINING PROGRAM

## 2025-06-29 PROCEDURE — 80048 BASIC METABOLIC PNL TOTAL CA: CPT

## 2025-06-29 PROCEDURE — 2500000005 HC RX 250 GENERAL PHARMACY W/O HCPCS: Performed by: INTERNAL MEDICINE

## 2025-06-29 PROCEDURE — 2500000004 HC RX 250 GENERAL PHARMACY W/ HCPCS (ALT 636 FOR OP/ED)

## 2025-06-29 PROCEDURE — 2500000005 HC RX 250 GENERAL PHARMACY W/O HCPCS

## 2025-06-29 PROCEDURE — 2500000002 HC RX 250 W HCPCS SELF ADMINISTERED DRUGS (ALT 637 FOR MEDICARE OP, ALT 636 FOR OP/ED): Performed by: INTERNAL MEDICINE

## 2025-06-29 PROCEDURE — 85025 COMPLETE CBC W/AUTO DIFF WBC: CPT

## 2025-06-29 PROCEDURE — 94660 CPAP INITIATION&MGMT: CPT

## 2025-06-29 PROCEDURE — 2500000001 HC RX 250 WO HCPCS SELF ADMINISTERED DRUGS (ALT 637 FOR MEDICARE OP): Performed by: INTERNAL MEDICINE

## 2025-06-29 PROCEDURE — 83735 ASSAY OF MAGNESIUM: CPT

## 2025-06-29 PROCEDURE — 2060000001 HC INTERMEDIATE ICU ROOM DAILY

## 2025-06-29 PROCEDURE — 82947 ASSAY GLUCOSE BLOOD QUANT: CPT

## 2025-06-29 PROCEDURE — 2500000004 HC RX 250 GENERAL PHARMACY W/ HCPCS (ALT 636 FOR OP/ED): Performed by: INTERNAL MEDICINE

## 2025-06-29 PROCEDURE — 99233 SBSQ HOSP IP/OBS HIGH 50: CPT

## 2025-06-29 RX ORDER — ATORVASTATIN CALCIUM 10 MG/1
10 TABLET, FILM COATED ORAL DAILY
Status: DISCONTINUED | OUTPATIENT
Start: 2025-06-29 | End: 2025-07-07 | Stop reason: HOSPADM

## 2025-06-29 RX ORDER — DILTIAZEM HYDROCHLORIDE 60 MG/1
60 CAPSULE, EXTENDED RELEASE ORAL DAILY
Status: DISCONTINUED | OUTPATIENT
Start: 2025-06-29 | End: 2025-06-29

## 2025-06-29 RX ORDER — ACETAMINOPHEN 325 MG/1
650 TABLET ORAL EVERY 4 HOURS PRN
Status: DISCONTINUED | OUTPATIENT
Start: 2025-06-29 | End: 2025-07-07 | Stop reason: HOSPADM

## 2025-06-29 RX ORDER — IPRATROPIUM BROMIDE AND ALBUTEROL SULFATE 2.5; .5 MG/3ML; MG/3ML
3 SOLUTION RESPIRATORY (INHALATION) EVERY 6 HOURS
Status: DISCONTINUED | OUTPATIENT
Start: 2025-06-29 | End: 2025-06-29

## 2025-06-29 RX ORDER — IPRATROPIUM BROMIDE AND ALBUTEROL SULFATE 2.5; .5 MG/3ML; MG/3ML
3 SOLUTION RESPIRATORY (INHALATION)
Status: DISCONTINUED | OUTPATIENT
Start: 2025-06-29 | End: 2025-06-30

## 2025-06-29 RX ORDER — ACETAMINOPHEN 160 MG/5ML
650 SOLUTION ORAL EVERY 4 HOURS PRN
Status: DISCONTINUED | OUTPATIENT
Start: 2025-06-29 | End: 2025-07-07 | Stop reason: HOSPADM

## 2025-06-29 RX ORDER — LEVOTHYROXINE SODIUM 100 UG/1
100 TABLET ORAL DAILY
Status: DISCONTINUED | OUTPATIENT
Start: 2025-06-29 | End: 2025-06-30

## 2025-06-29 RX ORDER — PANTOPRAZOLE SODIUM 20 MG/1
20 TABLET, DELAYED RELEASE ORAL 2 TIMES DAILY
Status: DISCONTINUED | OUTPATIENT
Start: 2025-06-29 | End: 2025-07-07 | Stop reason: HOSPADM

## 2025-06-29 RX ORDER — ACETAMINOPHEN 650 MG/1
650 SUPPOSITORY RECTAL EVERY 4 HOURS PRN
Status: DISCONTINUED | OUTPATIENT
Start: 2025-06-29 | End: 2025-07-07 | Stop reason: HOSPADM

## 2025-06-29 RX ADMIN — IPRATROPIUM BROMIDE AND ALBUTEROL SULFATE 3 ML: .5; 3 SOLUTION RESPIRATORY (INHALATION) at 18:18

## 2025-06-29 RX ADMIN — ACETAMINOPHEN 975 MG: 325 TABLET ORAL at 08:33

## 2025-06-29 RX ADMIN — ENOXAPARIN SODIUM 60 MG: 100 INJECTION SUBCUTANEOUS at 08:33

## 2025-06-29 RX ADMIN — ACETAMINOPHEN 650 MG: 325 TABLET ORAL at 20:47

## 2025-06-29 RX ADMIN — IPRATROPIUM BROMIDE AND ALBUTEROL SULFATE 3 ML: .5; 3 SOLUTION RESPIRATORY (INHALATION) at 07:54

## 2025-06-29 RX ADMIN — Medication 40 L/MIN: at 08:37

## 2025-06-29 RX ADMIN — ATORVASTATIN CALCIUM 10 MG: 10 TABLET, FILM COATED ORAL at 15:55

## 2025-06-29 RX ADMIN — Medication 40 PERCENT: at 21:06

## 2025-06-29 RX ADMIN — ACETAMINOPHEN 650 MG: 325 TABLET ORAL at 17:03

## 2025-06-29 RX ADMIN — Medication: at 14:19

## 2025-06-29 RX ADMIN — PANTOPRAZOLE SODIUM 20 MG: 20 TABLET, DELAYED RELEASE ORAL at 15:55

## 2025-06-29 RX ADMIN — INSULIN LISPRO 3 UNITS: 100 INJECTION, SOLUTION INTRAVENOUS; SUBCUTANEOUS at 12:10

## 2025-06-29 RX ADMIN — ACETYLCYSTEINE 400 MG: 200 SOLUTION ORAL; RESPIRATORY (INHALATION) at 07:54

## 2025-06-29 RX ADMIN — Medication 40 L/MIN: at 08:38

## 2025-06-29 RX ADMIN — LEVOTHYROXINE SODIUM 100 MCG: 0.1 TABLET ORAL at 05:20

## 2025-06-29 RX ADMIN — ENOXAPARIN SODIUM 60 MG: 100 INJECTION SUBCUTANEOUS at 20:47

## 2025-06-29 RX ADMIN — TORSEMIDE 40 MG: 20 TABLET ORAL at 08:33

## 2025-06-29 RX ADMIN — PANTOPRAZOLE SODIUM 40 MG: 40 INJECTION, POWDER, FOR SOLUTION INTRAVENOUS at 08:33

## 2025-06-29 RX ADMIN — BUDESONIDE INHALATION 0.5 MG: 0.5 SUSPENSION RESPIRATORY (INHALATION) at 08:02

## 2025-06-29 RX ADMIN — Medication: at 01:31

## 2025-06-29 ASSESSMENT — PAIN SCALES - GENERAL
PAINLEVEL_OUTOF10: 0 - NO PAIN
PAINLEVEL_OUTOF10: 0 - NO PAIN
PAINLEVEL_OUTOF10: 3
PAINLEVEL_OUTOF10: 8
PAINLEVEL_OUTOF10: 0 - NO PAIN
PAINLEVEL_OUTOF10: 2

## 2025-06-29 ASSESSMENT — PAIN DESCRIPTION - DESCRIPTORS
DESCRIPTORS: ACHING
DESCRIPTORS: ACHING

## 2025-06-29 ASSESSMENT — PAIN - FUNCTIONAL ASSESSMENT
PAIN_FUNCTIONAL_ASSESSMENT: 0-10

## 2025-06-29 ASSESSMENT — PAIN DESCRIPTION - LOCATION
LOCATION: KNEE
LOCATION: KNEE

## 2025-06-29 ASSESSMENT — PAIN DESCRIPTION - ORIENTATION
ORIENTATION: LEFT;RIGHT
ORIENTATION: RIGHT;LEFT

## 2025-06-29 NOTE — CONSULTS
Consults    Reason For Consult  Medical management    History Of Present Illness  Ailyn Banegas is a 80 y.o. female with a PMHx of reported COPD (no PFTs on record, on baseline 5L NC), MARLENY/likely OHS, Hypothyroidism, DLD, Morbid Obesity who was brought into the ED for respiratory distress. Admitted to ICU for acute on chronic hypercapnic hypoxic respiratory failure 2/2 ADHF.  General medicine has been consulted for further medical management as patient has no ICU needs.     Past Medical History  As above    Surgical History  She has a past surgical history that includes Carpal tunnel release (02/27/2014); Total hip arthroplasty (01/25/2018); Cardiac catheterization (01/25/2018); Hip surgery (03/03/2018); Thyroid surgery (04/14/2015); Tonsillectomy (04/14/2015); and Mouth surgery (04/14/2015).     Social History  She reports that she has never smoked. She has never used smokeless tobacco. She reports that she does not currently use alcohol. She reports that she does not currently use drugs.    Family History  Family History[1]     Allergies  Patient has no known allergies.      Physical Exam  CONSTITUTIONAL -obese, in no acute distress, not ill-appearing, and not tired appearing  SKIN - normal skin color and pigmentation, normal skin turgor without rash, lesions, or nodules visualized  HEAD - no trauma, normocephalic  EYES - pupils are equal and reactive to light, extraocular muscles are intact, and normal external exam  ENT - TM's intact, no injection, no signs of infection, uvula midline, normal tongue movement and throat normal, no exudate, nasal passage without discharge and patent  NECK -JVD present no thyromegaly or thyroid nodules  CHEST -diminished breath sounds, no crackles and no rales, good effort  CARDIAC - regular rate and regular rhythm, no skipped beats, no murmur  ABDOMEN - no organomegaly, soft, nontender, nondistended, normal bowel sounds, no guarding/rebound/rigidity, negative McBurney sign and  negative Holt sign  EXTREMITIES -BLE pitting edema  NEUROLOGICAL - normal gait, normal balance, normal motor, no ataxia, DTRs equal and symmetrical; alert, oriented and no focal signs     Last Recorded Vitals  BP 96/60   Pulse 74   Temp 37 °C (98.6 °F) (Temporal)   Resp 25   Wt 144 kg (317 lb 0.3 oz)   SpO2 97%        Assessment/Plan   Ailyn Banegas is a 80 y.o. female with a PMHx of reported COPD (no PFTs on record, on baseline 5L NC), MARLENY/likely OHS, Hypothyroidism, DLD, Morbid Obesity who was brought into the ED for respiratory distress. Admitted to ICU for acute on chronic hypercapnic hypoxic respiratory failure 2/2 ADHF.    Daily progress  Vital signs stable, pressures a bit soft.  Appreciate social work note, waiting to hear back from [facility]    Acute Medical Conditions  Acute on chronic hypercapnic hypoxic respiratory failure  Acute metabolic encephalopathy secondary to hypercapnia-resolved  Decompensated heart failure EF 55 to 60% echo 6/2025  Pulmonary edema with small bilateral pleural effusions  Concern for pneumonia  Paroxysmal atrial fibrillation  COPD on baseline 5 L nasal cannula  ALVERTO, likely prerenal in setting of diuresis  - Extubated 6/24  - Pneumonia workup unremarkable, Pro-Rodolfo 0.12  Plan:  -Currently on AVAPS/Airvo, wean oxygen as tolerated  - Continue Mucomyst, Pulmicort, DuoNebs  - Continue torsemide p.o. 40 mg daily per cardiology  -Home cardiac meds held: 10 mg Lipitor, 30 mg Cardizem, 20 mg Lasix, 100 mg Aldactone, 40 mg Demadex  - Strict I's/O's  - Recheck and replete electrolytes, avoid nephrotoxic agents  - Completed Zosyn 7 out of 7 days, continue azithromycin 6/30-    Chronic Medical Conditions  Hypothyroidism-continue home dose Synthroid  Hyperglycemia-SSI #3  Chronic anemia  Chronic lower extremity wounds  MARLENY on home CPAP    DVT-Lovenox  Diet-regular, easy to chew, thin liquids  Code Status-DNR CCA  Consults-cardiology  Antibiotics    Dr. Ron Lim   Internal  Medicine PGY-1  Available on [facility] for any questions.    This is a preliminary note pending signature from the attending physician.         [1] No family history on file.

## 2025-06-29 NOTE — PROGRESS NOTES
Subjective Data:  Currently on BiPAP.  Denies chest pain today.  Shortness of breath is at baseline.     Objective Data:  Last Recorded Vitals:  Vitals:    06/29/25 1000 06/29/25 1023 06/29/25 1033 06/29/25 1100   BP:  107/55  96/60   BP Location:       Patient Position:       Pulse: 84   74   Resp: (!) 28  (!) 28 25   Temp:       TempSrc:       SpO2: 93%   97%   Weight:       Height:           Last Labs:  CBC - 6/29/2025:  5:14 AM  7.6 10.8 227    37.2      CMP - 6/29/2025:  5:14 AM  9.4 7.1 15 --- 0.7   5.5 3.5 17 90      PTT - No results in last year.  _   _ _     TROPHS   Date/Time Value Ref Range Status   06/22/2025 06:48 PM 12 0 - 13 ng/L Final   06/22/2025 05:52 PM 9 0 - 13 ng/L Final   09/13/2022 05:14 AM 5 0 - 13 ng/L Final     Comment:     .  Less than 99th percentile of normal range cutoff-  Female and children under 18 years old <14 ng/L; Male <21 ng/L: Negative  Repeat testing should be performed if clinically indicated.   .  Female and children under 18 years old 14-50 ng/L; Male 21-50 ng/L:  Consistent with possible cardiac damage and possible increased clinical   risk. Serial measurements may help to assess extent of myocardial damage.   .  >50 ng/L: Consistent with cardiac damage, increased clinical risk and  myocardial infarction. Serial measurements may help assess extent of   myocardial damage.   .   NOTE: Children less than 1 year old may have higher baseline troponin   levels and results should be interpreted in conjunction with the overall   clinical context.   .  NOTE: Troponin I testing is performed using a different   testing methodology at [facility] than at other   [facility] hospitals. Direct result comparisons should only   be made within the same method.     09/12/2022 08:47 PM 4 0 - 13 ng/L Final     Comment:     .  Less than 99th percentile of normal range cutoff-  Female and children under 18 years old <14 ng/L; Male <21 ng/L: Negative  Repeat testing should be performed  if clinically indicated.   .  Female and children under 18 years old 14-50 ng/L; Male 21-50 ng/L:  Consistent with possible cardiac damage and possible increased clinical   risk. Serial measurements may help to assess extent of myocardial damage.   .  >50 ng/L: Consistent with cardiac damage, increased clinical risk and  myocardial infarction. Serial measurements may help assess extent of   myocardial damage.   .   NOTE: Children less than 1 year old may have higher baseline troponin   levels and results should be interpreted in conjunction with the overall   clinical context.   .  NOTE: Troponin I testing is performed using a different   testing methodology at [facility] than at other   [facility] hospitals. Direct result comparisons should only   be made within the same method.       BNP   Date/Time Value Ref Range Status   06/22/2025 05:52  0 - 99 pg/mL Final   09/12/2022 08:47 PM 46 0 - 99 pg/mL Final     Comment:     .  <100 pg/mL - Heart failure unlikely  100-299 pg/mL - Intermediate probability of acute heart  .               failure exacerbation. Correlate with clinical  .               context and patient history.    >=300 pg/mL - Heart Failure likely. Correlate with clinical  .               context and patient history.  BNP testing is performed using different testing   methodology at [facility] than at other   [facility]. Direct result comparisons should   only be made within the same method.       HGBA1C   Date/Time Value Ref Range Status   04/06/2022 11:42 AM 5.9 % Final     Comment:          Diagnosis of Diabetes-Adults   Non-Diabetic: < or = 5.6%   Increased risk for developing diabetes: 5.7-6.4%   Diagnostic of diabetes: > or = 6.5%  .       Monitoring of Diabetes                Age (y)     Therapeutic Goal (%)   Adults:          >18           <7.0   Pediatrics:    13-18           <7.5                   7-12           <8.0                   0- 6             7.5-8.5   American Diabetes Association. Diabetes Care 33(S1), Jan 2010.     07/28/2021 11:05 AM 6.0 % Final     Comment:          Diagnosis of Diabetes-Adults   Non-Diabetic: < or = 5.6%   Increased risk for developing diabetes: 5.7-6.4%   Diagnostic of diabetes: > or = 6.5%  .       Monitoring of Diabetes                Age (y)     Therapeutic Goal (%)   Adults:          >18           <7.0   Pediatrics:    13-18           <7.5                   7-12           <8.0                   0- 6            7.5-8.5   American Diabetes Association. Diabetes Care 33(S1), Jan 2010.       VLDL   Date/Time Value Ref Range Status   11/13/2020 11:34 AM 18 0 - 40 mg/dL Final   11/15/2019 08:25 AM 18 0 - 40 mg/dL Final   02/27/2019 08:15 AM 19 0 - 40 mg/dL Final      Last I/O:  I/O last 3 completed shifts:  In: 1560 (10.8 mL/kg) [P.O.:1260; IV Piggyback:300]  Out: 2070 (14.4 mL/kg) [Urine:2070 (0.4 mL/kg/hr)]  Weight: 143.8 kg     Past Cardiology Tests (Last 3 Years):  EKG:  ECG 12 lead 06/22/2025    Echo:  Transthoracic Echo Complete 06/24/2025    Ejection Fractions:  EF   Date/Time Value Ref Range Status   06/24/2025 08:43 AM 58 %      Cath:  No results found for this or any previous visit from the past 1095 days.    Stress Test:  No results found for this or any previous visit from the past 1095 days.    Cardiac Imaging:  No results found for this or any previous visit from the past 1095 days.      Inpatient Medications:  Scheduled Medications[1]  PRN Medications[2]  Continuous Medications[3]    Physical Exam:  General: No distress on BiPAP, morbidly obese female  Skin: Warm and dry  Neck: JVD is not elevated  ENT: Moist mucous membranes no lesions appreciated  Pulmonary: Breath sounds diminished in anterior lung fields  Cards: Regular rate rhythm, 2 out of 6 systolic murmurs, no gallops or rubs appreciated normal S1-S2  Abdomen: Obese soft nontender nondistended  Extremities: No edema or cyanosis  Psych: Appropriate mood and  affect        Assessment/Plan     1.  Acute on chronic hypercapnic and hypoxic respiratory failure requiring intubation now extubated on BiPAP: History of advanced COPD on home oxygen MARLENY on BPAP obesity hypoventilation syndrome with a background of right heart failure moderate RV dysfunction normal LV size and function, RVSP unknown.  Patient is also on broad-spectrum antibiotic therapy.  -Agree with diuresis utilizing torsemide 40 mg daily     2.  Paroxysmal atrial fibrillation: Maintaining sinus rhythm today.    3.  Hypertension: Soft blood pressures today.  Monitor closely.    (This note was generated with voice recognition software and may contain errors including spelling, grammar, syntax and missed recognition of what was dictated, of which may not have been fully corrected)     Code Status:  DNR    Gary Ramirez MD PhD       [1]   Scheduled medications   Medication Dose Route Frequency    acetylcysteine  2 mL nebulization BID    ammonium lactate   Topical q12h    [START ON 6/30/2025] azithromycin  500 mg oral Once per day on Monday Wednesday Friday    budesonide  0.5 mg nebulization BID    docusate sodium  100 mg oral Nightly    enoxaparin  60 mg subcutaneous q12h VLADIMIR    insulin lispro  0-15 Units subcutaneous TID AC    ipratropium-albuteroL  3 mL nebulization TID    levothyroxine  100 mcg oral Daily    oxygen   inhalation Continuous - Inhalation    oxygen   inhalation Continuous - Inhalation    pantoprazole  40 mg intravenous Daily    perflutren protein A microsphere  0.5 mL intravenous Once in imaging    sulfur hexafluoride microsphr  2 mL intravenous Once in imaging    torsemide  40 mg oral Daily   [2]   PRN medications   Medication    acetaminophen    dextrose    dextrose    glucagon    glucagon    ipratropium-albuteroL    melatonin    polyethylene glycol   [3]   Continuous Medications   Medication Dose Last Rate

## 2025-06-29 NOTE — NURSING NOTE
"UPDATE 1930: patient sitting upright in bedside recliner, patient on Airvo 40 Liters 40%, patient has no complaints of SOB or pain at this time, patient updated on POC, patient requesting her PW be changed out. Patient voiding clear yellow urine throughout day shift. Patient instructed to utilize call bell for any assistance and to call for help prior to getting up out of bed    UPDATE 1938: Updated Shruthi Jesse [facility] patient medications have \"Transfer Held\", needs verification    UPDATE 1951: Patient cleaned up, patient pur wick replaced, stood up with 2 assist and use of walker, patient requesting to stay in bedside recliner for this evening. Continuous Pulse ox reapplied.     UPDATE 2105: Rt at bedside placing patient on BiPAP.   "

## 2025-06-29 NOTE — PROGRESS NOTES
MICU PROGRESS NOTE    Subjective      NAEO, NAD. Denies respiratory complains other than Airvo leaking fluid into her nose.     Assessment & Plan   Ailyn Banegas is a 80 y.o. female with a PMHx of reported COPD (no PFTs on record, on baseline 5L NC), MARLENY/likely OHS, Hypothyroidism, DLD, Morbid Obesity who was brought into the ED for respiratory distress. Admitted to ICU for acute on chronic hypercapnic hypoxic respiratory failure 2/2 ADHF. On day 6 of admission. DNR.    PROGRESS:  6/24: HDS elevated intermittently hypotensive overnight. 4.5L UOP yesterday on Lasix gtt. FiO2 weaned from 80->60 overnight, further weaning down to 45% through ETT today. K replacement with 80 mEq. Echo resulted, not significantly different from SWG but with moderately reduced right ventricular systolic function. Optimizing for potential extubation later today/tomorrow morning  6/25: A&O x 3/4, deescalated from BiPAP to HFNC 60% FiO2, adjusted bronchopulmonary hygiene. Progressively more hypotensive with 3.5 L UOP yesterday, turned lasix gtt off, gave 250cc bolus, HDS thereafter. Failed bedside swallow evaluation, SLP ordered; 10 mg Decadron once for edema around neck. 60 mEq K replacement ordered. Removing A line  6/26: Maintaining MAP >65. Easy to chew, thin liquids diet per SLP. Maintaining good UOP, 2200 cc UOP yesterday. Torsemide started per cardiology. 60 mEq K. PT/OT ordered. Precert to [facility] pending.  6/27: ALVERTO with worsening renal function, likely prerenal ISO ongoing diuresis - net volume down by 8.1L. Holding Torsemide. Adding Pulmicort. Zosyn day 6/7. Awaiting precert approval for DC to [facility]  6/28: Creatinine within normal limits today.  Patient net -970 mL yesterday.  Unheld torsemide.  Day 7 of 7 Zosyn.  Continue to await approval to [facility].  6/29: Patient continues to await approval to [facility].  She has no current ICU needs and will be sent to medicine team.    Plan:  NEUROLOGICAL /  PSYCH:  Dx:  Encephalopathy, resolved  Management:  A&O x 3/4, following commands  Delirium precautions    CARDIOVASCULAR:  Dx:  Hypotension  ADHF EF 60-65% in 8/22  Hx paroxysmal atrial fibrillation  Hx mild AVS  Hx DLD, HTN  Management:  Inpatient echo results: LVEF 55-60%, grade 1 impaired relaxation pattern of left ventricular diastolic filling, moderately reduced right ventricular systolic function, aortic valve sclerosis  Home cardiac meds: 10 mg Lipitor, 30 mg Cardizem, 20 mg Lasix, 100 mg Aldactone, 40 mg Demadex  Hold home meds  Telemetry  Maintain K >4, Mg >2  Cardiology on consult  Discontinued Lasix gtt., started PO 40 mg torsemide daily per cardiology  Gave 250 cc LR bolus with improvement in pressures  6/24 3.5L UOP, 6/25 2.2L, 6/26 1L - urinary output decreasing  12.4 L UOP thus far this admission    PULMONARY:  Dx:  Extubated 6/24  Acute on chronic hypercapnic hypoxic respiratory failure  Pulmonary edema with small bilateral pleural effusions  RLL atelectacic collapse  PNA, C/F  COPD on home 5LNC  MARLENY on home CPAP  OHS  Management:  S/p  mg methylprednisolone, multiple IV Lasix doses and a Diamox dose in ED  Intubated s/p AVAPS/BIPAP evening of 6/22 due to worsening mentation, hypercarbia.  Extubated 6/24   One-time 10 mg Decadron 6/25 for neck swelling s/p extubation, no stridors  AVAPS overnight, shorter periods on AVAPS during the day with longer periods on Airvo, stable on Airvo 50% FiO2  Supplemental O2 PRN to maintain SpO2 89-94%  FiO2 (%):  [30 %-50 %] 50 %  S RR:  [16] 16  S VT:  [350 mL-650 mL] 350 mL  MAP (cm H2O):  [13.7] 13.7  ABX: Zosyn. Continue to de-escalate per infectious workup as below  Respiratory and mucolytic treatments: DuoNeb QID, DuoNeb q2h PRN, Mucomyst, IS, Pulmicort, respiratory treatments via AVAPS  Diuresis with torsemide, monitor UOP with strict I/O's    GASTROENTEROLOGY:  Dx:   No acute concern  Management:  Easy to chew solids, thin liquids per SLP    RENAL /  GENITOURINARY:  Dx:   ALVERTO, prerenal ISO diuresis s/p Lasix gtt, torsemide  Management:  Maintain Mccallum, strict I/O's  Trend renal function and metabolic panel  Avoid nephrotoxicity (hypotension, iodinated contrast, NSAIDs, etc.)  Replete electrolytes as needed  Holding torsemide    ENDOCRINOLOGY:  Dx:  Hyperglycemia  Hypothyroidism  Management:  Continue Synthroid  Accu-Cheks + SSI #3 before meals and at bedtime    HEMATOLOGY / ONCOLOGY:  Dx:  Anemia  Management:  DVT ppx with SCD's & Lovenox  Trend CBC    MUSCULOSKELETAL / SKIN:  Dx:  LE wounds  Management:  ICU wound prevention and skin care    INFECTIOUS DISEASE:  Dx:  PNA, C/F  Management:  ABX: Zosyn 7 days completed on 6/28 for PNA coverage  Start 500 mg azithromycin three times weekly (M,W,F) starting next Monday 6/30  Infectious workup: MRSA PCR negative, viral panel with COVID and Flu and RSV negative, respiratory Cx from tracheal aspirate pending collection, PNA urinary antigens negative, Pro-Rodolfo 0.12  Monitor for infectious sequelae    Checklist:  Antimicrobials: azithro starting 6/30  Oxygen: Airvo 50% FiO2  Feeding: Easy to chew solids, thin liquids  Fluids: -  DVT ppx: SCD's & Lovenox  Ulcer ppx: PPI  Glycemic control: Accu-Cheks + SSI #3  Bowel care: PRN  Indwelling catheters: Mccallum  Lines: PIVs  Consults: -  Code Status: DNR    Darvin Bhatt, DO  PGY-1, Internal Medicine    This is a preliminary note, please await attending attestation for final recommendations    Disclaimer: Documentation completed with the information available at the time of input. The times in the chart may not be reflective of actual patient care times, interventions, or procedures. Documentation occurs after the physical care of the patient.     Objective (Vitals, labs, radiological imaging, cardiac work up were personally reviewed)     Physical Exam  Constitutional:       Appearance: She is morbidly obese.   HENT:      Mouth/Throat:      Comments: Wide neck  circumference  Neck:      Vascular: JVD present.   Cardiovascular:      Rate and Rhythm: Normal rate and regular rhythm.      Pulses: Normal pulses.   Pulmonary:      Breath sounds: Decreased air movement present. Decreased breath sounds present.      Comments: 50% FiO2 on Airvo  Abdominal:      General: Abdomen is protuberant.      Palpations: Abdomen is soft.   Genitourinary:     Comments: Mccallum in place  Musculoskeletal:      Right lower leg: Pitting Edema present.      Left lower leg: Pitting Edema present.   Skin:     General: Skin is warm.      Capillary Refill: Capillary refill takes less than 2 seconds.   Neurological:      Mental Status: She is oriented to person, place, and time. She is lethargic.   Psychiatric:         Mood and Affect: Mood is anxious.     Last Recorded Vitals  Vitals:    06/28/25 0531 06/28/25 0600 06/28/25 0700 06/28/25 0703   BP:  141/69 152/82    BP Location:       Patient Position:       Pulse:  83 82    Resp:  (!) 36 (!) 39    Temp:       TempSrc:       SpO2:  94% 91% 95%   Weight: 144 kg (317 lb 0.3 oz)      Height:         Intake/Output last 3 Shifts:  I/O last 3 completed shifts:  In: 980 (6.8 mL/kg) [P.O.:280; IV Piggyback:700]  Out: 2045 (14.2 mL/kg) [Urine:1845 (0.4 mL/kg/hr); Stool:200]  Weight: 143.8 kg     Ventilator/O2 Supply  Oxygen Therapy/Pulse Ox  Medical Gas Therapy: Supplemental oxygen  Medical Gas Delivery Method: High flow nasal cannula (AIRVO 60L)  FiO2 (%): 50 %  SpO2: 95 %  Patient Activity During SpO2 Measurement: At rest  Temp: 36.2 °C (97.2 °F)    Labs:   Results from last 7 days   Lab Units 06/28/25  0530 06/27/25  0606 06/26/25  0519   SODIUM mmol/L 143 140 140   POTASSIUM mmol/L 3.6 3.5 3.4*   CHLORIDE mmol/L 96* 93* 90*   CO2 mmol/L 36* 39* 41*   BUN mg/dL 33* 39* 32*   CREATININE mg/dL 0.98 1.29* 1.15*   GLUCOSE mg/dL 105* 97 121*   CALCIUM mg/dL 9.5 9.6 9.7     Results from last 7 days   Lab Units 06/28/25  0530   WBC AUTO x10*3/uL 8.2   HEMOGLOBIN g/dL  10.7*   HEMATOCRIT % 37.0   PLATELETS AUTO x10*3/uL 231     Results from last 7 days   Lab Units 06/24/25  1407 06/23/25  1204 06/23/25  0632   POCT PH, ARTERIAL pH 7.50* 7.48* 7.42   POCT PCO2, ARTERIAL mm Hg 57* 71* 72*   POCT PO2, ARTERIAL mm Hg 72* 82* 77*   POCT HCO3 CALCULATED, ARTERIAL mmol/L 44.5* 52.9* 46.7*   POCT BASE EXCESS, ARTERIAL mmol/L 18.6* 25.5* 18.2*

## 2025-06-30 LAB
ALBUMIN SERPL BCP-MCNC: 3.7 G/DL (ref 3.4–5)
ANION GAP BLDA CALCULATED.4IONS-SCNC: 3 MMO/L (ref 10–25)
ANION GAP SERPL CALC-SCNC: 16 MMOL/L (ref 10–20)
APPARATUS: ABNORMAL
ARTERIAL PATENCY WRIST A: POSITIVE
BASE EXCESS BLDA CALC-SCNC: 11.9 MMOL/L (ref -2–3)
BASOPHILS # BLD AUTO: 0.04 X10*3/UL (ref 0–0.1)
BASOPHILS NFR BLD AUTO: 0.5 %
BODY TEMPERATURE: 37 DEGREES CELSIUS
BUN SERPL-MCNC: 23 MG/DL (ref 6–23)
CA-I BLDA-SCNC: 1.23 MMOL/L (ref 1.1–1.33)
CALCIUM SERPL-MCNC: 9.5 MG/DL (ref 8.6–10.3)
CHLORIDE BLDA-SCNC: 98 MMOL/L (ref 98–107)
CHLORIDE SERPL-SCNC: 97 MMOL/L (ref 98–107)
CO2 SERPL-SCNC: 35 MMOL/L (ref 21–32)
CREAT SERPL-MCNC: 0.76 MG/DL (ref 0.5–1.05)
EGFRCR SERPLBLD CKD-EPI 2021: 79 ML/MIN/1.73M*2
EOSINOPHIL # BLD AUTO: 0.19 X10*3/UL (ref 0–0.4)
EOSINOPHIL NFR BLD AUTO: 2.4 %
ERYTHROCYTE [DISTWIDTH] IN BLOOD BY AUTOMATED COUNT: 15.1 % (ref 11.5–14.5)
GLUCOSE BLD MANUAL STRIP-MCNC: 131 MG/DL (ref 74–99)
GLUCOSE BLD MANUAL STRIP-MCNC: 141 MG/DL (ref 74–99)
GLUCOSE BLD MANUAL STRIP-MCNC: 185 MG/DL (ref 74–99)
GLUCOSE BLD MANUAL STRIP-MCNC: 97 MG/DL (ref 74–99)
GLUCOSE BLDA-MCNC: 112 MG/DL (ref 74–99)
GLUCOSE SERPL-MCNC: 105 MG/DL (ref 74–99)
HCO3 BLDA-SCNC: 39.3 MMOL/L (ref 22–26)
HCT VFR BLD AUTO: 39.4 % (ref 36–46)
HCT VFR BLD EST: 36 % (ref 36–46)
HGB BLD-MCNC: 11.4 G/DL (ref 12–16)
HGB BLDA-MCNC: 12 G/DL (ref 12–16)
IMM GRANULOCYTES # BLD AUTO: 0.06 X10*3/UL (ref 0–0.5)
IMM GRANULOCYTES NFR BLD AUTO: 0.8 % (ref 0–0.9)
INHALED O2 CONCENTRATION: 40 %
LACTATE BLDA-SCNC: 0.6 MMOL/L (ref 0.4–2)
LYMPHOCYTES # BLD AUTO: 1.02 X10*3/UL (ref 0.8–3)
LYMPHOCYTES NFR BLD AUTO: 13 %
MAGNESIUM SERPL-MCNC: 1.92 MG/DL (ref 1.6–2.4)
MCH RBC QN AUTO: 27.4 PG (ref 26–34)
MCHC RBC AUTO-ENTMCNC: 28.9 G/DL (ref 32–36)
MCV RBC AUTO: 95 FL (ref 80–100)
MONOCYTES # BLD AUTO: 0.53 X10*3/UL (ref 0.05–0.8)
MONOCYTES NFR BLD AUTO: 6.7 %
NEUTROPHILS # BLD AUTO: 6.02 X10*3/UL (ref 1.6–5.5)
NEUTROPHILS NFR BLD AUTO: 76.6 %
NRBC BLD-RTO: 0 /100 WBCS (ref 0–0)
OXYHGB MFR BLDA: 95.2 % (ref 94–98)
PCO2 BLDA: 65 MM HG (ref 38–42)
PH BLDA: 7.39 PH (ref 7.38–7.42)
PHOSPHATE SERPL-MCNC: 3.5 MG/DL (ref 2.5–4.9)
PLATELET # BLD AUTO: 262 X10*3/UL (ref 150–450)
PO2 BLDA: 80 MM HG (ref 85–95)
POTASSIUM BLDA-SCNC: 3.5 MMOL/L (ref 3.5–5.3)
POTASSIUM SERPL-SCNC: 3.5 MMOL/L (ref 3.5–5.3)
RBC # BLD AUTO: 4.16 X10*6/UL (ref 4–5.2)
SAO2 % BLDA: 98 % (ref 94–100)
SODIUM BLDA-SCNC: 137 MMOL/L (ref 136–145)
SODIUM SERPL-SCNC: 144 MMOL/L (ref 136–145)
SPECIMEN DRAWN FROM PATIENT: ABNORMAL
WBC # BLD AUTO: 7.9 X10*3/UL (ref 4.4–11.3)

## 2025-06-30 PROCEDURE — 2500000002 HC RX 250 W HCPCS SELF ADMINISTERED DRUGS (ALT 637 FOR MEDICARE OP, ALT 636 FOR OP/ED)

## 2025-06-30 PROCEDURE — 94660 CPAP INITIATION&MGMT: CPT

## 2025-06-30 PROCEDURE — 85025 COMPLETE CBC W/AUTO DIFF WBC: CPT | Performed by: INTERNAL MEDICINE

## 2025-06-30 PROCEDURE — 2500000001 HC RX 250 WO HCPCS SELF ADMINISTERED DRUGS (ALT 637 FOR MEDICARE OP): Performed by: INTERNAL MEDICINE

## 2025-06-30 PROCEDURE — 2500000004 HC RX 250 GENERAL PHARMACY W/ HCPCS (ALT 636 FOR OP/ED)

## 2025-06-30 PROCEDURE — 2500000001 HC RX 250 WO HCPCS SELF ADMINISTERED DRUGS (ALT 637 FOR MEDICARE OP)

## 2025-06-30 PROCEDURE — 2060000001 HC INTERMEDIATE ICU ROOM DAILY

## 2025-06-30 PROCEDURE — 2500000002 HC RX 250 W HCPCS SELF ADMINISTERED DRUGS (ALT 637 FOR MEDICARE OP, ALT 636 FOR OP/ED): Performed by: STUDENT IN AN ORGANIZED HEALTH CARE EDUCATION/TRAINING PROGRAM

## 2025-06-30 PROCEDURE — 83735 ASSAY OF MAGNESIUM: CPT

## 2025-06-30 PROCEDURE — 36415 COLL VENOUS BLD VENIPUNCTURE: CPT | Performed by: INTERNAL MEDICINE

## 2025-06-30 PROCEDURE — 82947 ASSAY GLUCOSE BLOOD QUANT: CPT

## 2025-06-30 PROCEDURE — 84132 ASSAY OF SERUM POTASSIUM: CPT | Performed by: INTERNAL MEDICINE

## 2025-06-30 PROCEDURE — 2500000002 HC RX 250 W HCPCS SELF ADMINISTERED DRUGS (ALT 637 FOR MEDICARE OP, ALT 636 FOR OP/ED): Performed by: INTERNAL MEDICINE

## 2025-06-30 PROCEDURE — 94640 AIRWAY INHALATION TREATMENT: CPT

## 2025-06-30 PROCEDURE — 2500000005 HC RX 250 GENERAL PHARMACY W/O HCPCS

## 2025-06-30 PROCEDURE — 9420000001 HC RT PATIENT EDUCATION 5 MIN

## 2025-06-30 PROCEDURE — 99233 SBSQ HOSP IP/OBS HIGH 50: CPT

## 2025-06-30 RX ADMIN — Medication 50 L/MIN: at 21:06

## 2025-06-30 RX ADMIN — Medication 40 L/MIN: at 18:41

## 2025-06-30 RX ADMIN — PANTOPRAZOLE SODIUM 20 MG: 20 TABLET, DELAYED RELEASE ORAL at 06:21

## 2025-06-30 RX ADMIN — ENOXAPARIN SODIUM 60 MG: 100 INJECTION SUBCUTANEOUS at 09:11

## 2025-06-30 RX ADMIN — ATORVASTATIN CALCIUM 10 MG: 10 TABLET, FILM COATED ORAL at 09:10

## 2025-06-30 RX ADMIN — Medication: at 00:56

## 2025-06-30 RX ADMIN — AZITHROMYCIN DIHYDRATE 500 MG: 250 TABLET ORAL at 09:10

## 2025-06-30 RX ADMIN — DOCUSATE SODIUM 100 MG: 100 CAPSULE, LIQUID FILLED ORAL at 21:12

## 2025-06-30 RX ADMIN — IPRATROPIUM BROMIDE AND ALBUTEROL SULFATE 3 ML: .5; 3 SOLUTION RESPIRATORY (INHALATION) at 07:29

## 2025-06-30 RX ADMIN — PANTOPRAZOLE SODIUM 40 MG: 40 INJECTION, POWDER, FOR SOLUTION INTRAVENOUS at 09:10

## 2025-06-30 RX ADMIN — PANTOPRAZOLE SODIUM 20 MG: 20 TABLET, DELAYED RELEASE ORAL at 17:54

## 2025-06-30 RX ADMIN — IPRATROPIUM BROMIDE AND ALBUTEROL SULFATE 3 ML: .5; 3 SOLUTION RESPIRATORY (INHALATION) at 12:00

## 2025-06-30 RX ADMIN — Medication 40 L/MIN: at 09:19

## 2025-06-30 RX ADMIN — ACETAMINOPHEN 650 MG: 325 TABLET ORAL at 21:11

## 2025-06-30 RX ADMIN — Medication: at 14:56

## 2025-06-30 RX ADMIN — ACETYLCYSTEINE 400 MG: 200 SOLUTION ORAL; RESPIRATORY (INHALATION) at 07:29

## 2025-06-30 RX ADMIN — ACETYLCYSTEINE 400 MG: 200 SOLUTION ORAL; RESPIRATORY (INHALATION) at 18:39

## 2025-06-30 RX ADMIN — LEVOTHYROXINE SODIUM 100 MCG: 0.1 TABLET ORAL at 06:21

## 2025-06-30 RX ADMIN — BUDESONIDE INHALATION 0.5 MG: 0.5 SUSPENSION RESPIRATORY (INHALATION) at 07:29

## 2025-06-30 RX ADMIN — TORSEMIDE 40 MG: 20 TABLET ORAL at 09:10

## 2025-06-30 RX ADMIN — IPRATROPIUM BROMIDE AND ALBUTEROL SULFATE 3 ML: .5; 3 SOLUTION RESPIRATORY (INHALATION) at 00:17

## 2025-06-30 RX ADMIN — IPRATROPIUM BROMIDE AND ALBUTEROL SULFATE 3 ML: .5; 3 SOLUTION RESPIRATORY (INHALATION) at 18:39

## 2025-06-30 RX ADMIN — ENOXAPARIN SODIUM 60 MG: 100 INJECTION SUBCUTANEOUS at 21:12

## 2025-06-30 RX ADMIN — BUDESONIDE INHALATION 0.5 MG: 0.5 SUSPENSION RESPIRATORY (INHALATION) at 18:40

## 2025-06-30 RX ADMIN — Medication 40 L/MIN: at 09:20

## 2025-06-30 ASSESSMENT — COGNITIVE AND FUNCTIONAL STATUS - GENERAL
WALKING IN HOSPITAL ROOM: TOTAL
MOVING TO AND FROM BED TO CHAIR: TOTAL
DRESSING REGULAR UPPER BODY CLOTHING: TOTAL
DRESSING REGULAR LOWER BODY CLOTHING: TOTAL
MOVING FROM LYING ON BACK TO SITTING ON SIDE OF FLAT BED WITH BEDRAILS: A LOT
TURNING FROM BACK TO SIDE WHILE IN FLAT BAD: A LOT
CLIMB 3 TO 5 STEPS WITH RAILING: TOTAL
PERSONAL GROOMING: TOTAL
HELP NEEDED FOR BATHING: TOTAL
TOILETING: TOTAL
MOBILITY SCORE: 8
STANDING UP FROM CHAIR USING ARMS: TOTAL
DAILY ACTIVITIY SCORE: 9

## 2025-06-30 ASSESSMENT — PAIN SCALES - GENERAL: PAINLEVEL_OUTOF10: 0 - NO PAIN

## 2025-06-30 NOTE — PROGRESS NOTES
Ailyn Banegas is a 80 y.o. female on day 8 of admission presenting with Acute respiratory failure with hypoxia and hypercapnia.      Subjective   Patient seen at bedside today.  On Airvo 40/40.  Discharge planning       Objective     Last Recorded Vitals  /64   Pulse 85   Temp 36.5 °C (97.7 °F)   Resp 24   Wt 144 kg (317 lb 0.3 oz)   SpO2 92%   Intake/Output last 3 Shifts:    Intake/Output Summary (Last 24 hours) at 6/30/2025 1155  Last data filed at 6/30/2025 0600  Gross per 24 hour   Intake --   Output 500 ml   Net -500 ml       Admission Weight  Weight: 125 kg (275 lb) (06/22/25 1746)    Daily Weight  06/28/25 : 144 kg (317 lb 0.3 oz)    Image Results  ECG 12 lead  Sinus rhythm  Prolonged NV interval  Low voltage, precordial leads  Probable anteroseptal infarct, old    See ED provider note for full interpretation and clinical correlation  Confirmed by Ludmila Flowers ([id number]) on 6/28/2025 10:03:55 AM      Physical Exam  General:  Pleasant and cooperative. No apparent distress.  HEENT:  Normocephalic, atraumatic, mucus membranes moist.   Neck:  Trachea midline.  No JVD.    Chest: Reduced breath sounds bilaterally. No wheezes, rales, or rhonchi.  CV:  Regular rate and rhythm.  Positive S1/S2.   Abdomen: Bowel sounds present in all four quadrants, abdomen is soft, non-tender, non-distended.  Extremities:  No lower extremity edema or cyanosis.   Neurological:  AAOx3. No focal deficits.  Skin:  Warm and dry.    Assessment & Plan  Acute respiratory failure with hypoxia and hypercapnia      Acute on chronic hypercapnic hypoxic respiratory failure  Acute metabolic encephalopathy 2/2 above  Decompensated diastolic heart failure  Pulmonary edema with bilateral pleural effusions  Concern for pneumonia  Paroxysmal A-fib  COPD on 5 L nasal cannula baseline    -Patient initially required intubation, extubated 6/24  -Patient was treated with a 7-day course of Zosyn  -On prophylactic azithromycin 3 times weekly  -Was  initially on Lasix drip however transition back to home diuretic of 40 torsemide daily  -Strict I's and O's  -Resume home meds Cardizem, Aldactone as able  - Discharge planning to [facility], pre-CERT pending    Chronic:  Hypothyroidism: Continue home Synthroid  T2DM: SSI #3  Chronic anemia  MARLENY on home CPAP    DVT prophylaxis: Lovenox  CODE STATUS: DNR     Honorio Gamboa,

## 2025-06-30 NOTE — CARE PLAN
The patient's goals for the shift include      The clinical goals for the shift include Monitor oxygen saturation, comfort, safety maintained.      Problem: Discharge Planning  Goal: Discharge to home or other facility with appropriate resources  Outcome: Progressing     Problem: Chronic Conditions and Co-morbidities  Goal: Patient's chronic conditions and co-morbidity symptoms are monitored and maintained or improved  Outcome: Progressing     Problem: Nutrition  Goal: Nutrient intake appropriate for maintaining nutritional needs  Outcome: Progressing     Problem: Diabetes  Goal: Achieve decreasing blood glucose levels by end of shift  Outcome: Progressing  Goal: Increase stability of blood glucose readings by end of shift  Outcome: Progressing  Goal: Maintain electrolyte levels within acceptable range throughout shift  Outcome: Progressing  Goal: Maintain glucose levels >70mg/dl to <250mg/dl throughout shift  Outcome: Progressing  Goal: No changes in neurological exam by end of shift  Outcome: Progressing  Goal: Learn about and adhere to nutrition recommendations by end of shift  Outcome: Progressing  Goal: Vital signs within normal range for age by end of shift  Outcome: Progressing  Goal: Increase self care and/or family involovement by end of shift  Outcome: Progressing  Goal: Receive DSME education by end of shift  Outcome: Progressing     Problem: Skin  Goal: Decreased wound size/increased tissue granulation at next dressing change  Outcome: Progressing  Goal: Participates in plan/prevention/treatment measures  Outcome: Progressing  Flowsheets (Taken 6/30/2025 1524)  Participates in plan/prevention/treatment measures: Elevate heels  Goal: Prevent/manage excess moisture  Outcome: Progressing  Flowsheets (Taken 6/30/2025 1524)  Prevent/manage excess moisture: Cleanse incontinence/protect with barrier cream  Goal: Prevent/minimize sheer/friction injuries  Outcome: Progressing  Flowsheets (Taken 6/30/2025  1524)  Prevent/minimize sheer/friction injuries: Increase activity/out of bed for meals  Goal: Promote/optimize nutrition  Outcome: Progressing  Goal: Promote skin healing  Outcome: Progressing     Problem: Knowledge Deficit  Goal: Patient/family/caregiver demonstrates understanding of disease process, treatment plan, medications, and discharge instructions  Outcome: Progressing     Problem: Pain  Goal: Takes deep breaths with improved pain control throughout the shift  Outcome: Progressing  Goal: Turns in bed with improved pain control throughout the shift  Outcome: Progressing  Goal: Free from opioid side effects throughout the shift  Outcome: Progressing  Goal: Free from acute confusion related to pain meds throughout the shift  Outcome: Progressing     Problem: Fall/Injury  Goal: Be free from injury by end of the shift  Outcome: Progressing  Goal: Verbalize understanding of personal risk factors for fall in the hospital  Outcome: Progressing  Goal: Verbalize understanding of risk factor reduction measures to prevent injury from fall in the home  Outcome: Progressing  Goal: Use assistive devices by end of the shift  Outcome: Progressing  Goal: Pace activities to prevent fatigue by end of the shift  Outcome: Progressing

## 2025-06-30 NOTE — PROGRESS NOTES
Cardiology Progress    Impression:  Acute on chronic hypercapnic, hypoxic respiratory failure.  Now extubated.  Metabolic alkalosis.  Advanced COPD.  On home oxygen.  Right heart failure.  Echo 6/20/2025 showing normal EF, moderate RV dysfunction.  RVSP could not be estimated.  Peripheral fluid overload.  MARLENY.  Struggles with BiPAP.  Obesity hypoventilation syndrome  Anemia  Paroxysmal atrial fibrillation.  Currently sinus rhythm.  Mild aortic stenosis  Hypertension  Hyperlipidemia  Plan:  Continue torsemide 40 daily.  No new recommendations  HPI:  No problems overnight.  Resting comfortably on BiPAP.  Meds:  Scheduled medications  Scheduled Medications[1]  Continuous medications  Continuous Medications[2]  PRN medications  PRN Medications[3]    Physical exam:  Vitals:    06/30/25 0758   BP: 112/66   Pulse: 100   Resp:    Temp: 36 °C (96.8 °F)   SpO2: 93%      JVP not visible.  Decreased breath sounds bilaterally.  Minimal edema.  EKG:  Telemetry shows sinus rhythm.  Echo:  6/20/2025: Normal EF. Moderate RV dysfunction. RVSP could not be obtained.     Labs:  Lab Results   Component Value Date    WBC 7.9 06/30/2025    HGB 11.4 (L) 06/30/2025    HCT 39.4 06/30/2025     06/30/2025    CHOL 133 11/13/2020    TRIG 88 11/13/2020    HDL 55.4 11/13/2020    ALT 17 06/22/2025    AST 15 06/22/2025     06/30/2025    K 3.5 06/30/2025    CL 97 (L) 06/30/2025    CREATININE 0.76 06/30/2025    BUN 23 06/30/2025    CO2 35 (H) 06/30/2025    TSH 3.34 04/06/2022    HGBA1C 5.9 (A) 04/06/2022     par         [1] acetylcysteine, 2 mL, nebulization, BID  ammonium lactate, , Topical, q12h  atorvastatin, 10 mg, oral, Daily  azithromycin, 500 mg, oral, Once per day on Monday Wednesday Friday  budesonide, 0.5 mg, nebulization, BID  docusate sodium, 100 mg, oral, Nightly  enoxaparin, 60 mg, subcutaneous, q12h VLADIMIR  insulin lispro, 0-15 Units, subcutaneous, TID AC  ipratropium-albuteroL, 3 mL, nebulization, TID  ipratropium-albuteroL,  3 mL, nebulization, q6h  levothyroxine, 100 mcg, oral, Daily  oxygen, , inhalation, Continuous - Inhalation  oxygen, , inhalation, Continuous - Inhalation  pantoprazole, 20 mg, oral, BID  pantoprazole, 40 mg, intravenous, Daily  perflutren lipid microspheres, 0.5-10 mL of dilution, intravenous, Once in imaging  perflutren protein A microsphere, 0.5 mL, intravenous, Once in imaging  [Transfer Hold] perflutren protein A microsphere, 0.5 mL, intravenous, Once in imaging  sulfur hexafluoride microsphr, 2 mL, intravenous, Once in imaging  [Transfer Hold] sulfur hexafluoride microsphr, 2 mL, intravenous, Once in imaging  torsemide, 40 mg, oral, Daily  [2]    [3] PRN medications: acetaminophen **OR** acetaminophen **OR** acetaminophen, [Transfer Hold] acetaminophen, dextrose, dextrose, glucagon, glucagon, ipratropium-albuteroL, melatonin, polyethylene glycol

## 2025-06-30 NOTE — PROGRESS NOTES
IV to PO Conversion    Ailyn Banegas is a 80 y.o. female who qualifies for IV to PO therapy conversion.    Inclusion and exclusion criteria have been evaluated. Exisiting order already changed to pantoprazole 20mg PO BID, therefore, pantoprazole 40mg IV daily discontinued per IV to PO interchange protocol.       Please contact Pharmacy with any questions.      Ethel Olmos, PharmD, BCPS   6/30/2025 2:31 PM

## 2025-06-30 NOTE — DOCUMENTATION CLARIFICATION NOTE
"    PATIENT:               AJAY GUZMAN  ACCT #:                  [id number]  MRN:                       [id number]  :                       1945  ADMIT DATE:       2025 5:35 PM  DISCH DATE:  RESPONDING PROVIDER #:        [id number]          PROVIDER RESPONSE TEXT:    Metabolic Encephalopathy 2/2 Respiratory Failure    CDI QUERY TEXT:    Clarification        Instruction:    Based on your assessment of the patient and the clinical information, please provide the requested documentation by clicking on the appropriate radio button and enter any additional information if prompted.    Question: Please further clarify the type of Encephalopathy as    When answering this query, please exercise your independent professional judgment. The fact that a question is being asked, does not imply that any particular answer is desired or expected.    The patient's clinical indicators include:  Clinical Information:  81 yo woman with PMHx s/f reported COPD, on baseline 5L NC, MARLENY/likely OHS, Hypothyroidism, DLD, Morbid Obesity who was brought into the ED for respiratory distress.      Clinical Indicators:  25  Critical Care: \"Encephalopathy-metabolic in setting of hypercapnia\"    25 Cardiology:  \"She comes in now with shortness of breath and confusion\"    Treatment:  25:  IV Lasix infusiuon  25:  IV Lasix 40mg  25:  IV Lasix 40 mg  25:  IV Lasix 60 mg  25:  DuoNeb  25:  DuoNeb  25:  IV Solumedrol  25   intubation    Risk Factors:  COPD, Respiratory Distress, Confusion  Options provided:  -- Metabolic Encephalopathy 2/2 Respiratory Failure  -- Other - I will add my own diagnosis  -- Refer to Clinical Documentation Reviewer    Query created by: Heather Pacheco on 2025 5:12 PM      Electronically signed by:  JAMES CONNER DO 2025 2:57 PM          "

## 2025-06-30 NOTE — CARE PLAN
The patient's goals for the shift include      The clinical goals for the shift include maintain pulse ox 90% or >. Patient will maintain BiPAP overnight.       Problem: Discharge Planning  Goal: Discharge to home or other facility with appropriate resources  Outcome: Progressing     Problem: Chronic Conditions and Co-morbidities  Goal: Patient's chronic conditions and co-morbidity symptoms are monitored and maintained or improved  Outcome: Progressing     Problem: Nutrition  Goal: Nutrient intake appropriate for maintaining nutritional needs  Outcome: Progressing     Problem: Diabetes  Goal: Achieve decreasing blood glucose levels by end of shift  Outcome: Progressing  Goal: Increase stability of blood glucose readings by end of shift  Outcome: Progressing  Goal: Maintain electrolyte levels within acceptable range throughout shift  Outcome: Progressing  Goal: Maintain glucose levels >70mg/dl to <250mg/dl throughout shift  Outcome: Progressing  Goal: No changes in neurological exam by end of shift  Outcome: Progressing  Goal: Learn about and adhere to nutrition recommendations by end of shift  Outcome: Progressing  Goal: Vital signs within normal range for age by end of shift  Outcome: Progressing  Goal: Increase self care and/or family involovement by end of shift  Outcome: Progressing  Goal: Receive DSME education by end of shift  Outcome: Progressing

## 2025-06-30 NOTE — PROGRESS NOTES
Precert for [facility] still pending. Joann will continue to provide support and services as needed.     12:19pm- Joann received a call from pt's  inquiring about the status the [facility]. Sw notified pt's  its still pending and a peer to peer may be needed.  Joann notified pt's  that the TCC will follow up once we hear back from [facility].     2:30pm- Joann was notified that pt's precet was denied for pt to go to [facility]. Joann to see when a peer to peer be needs completed by.  Joann coordinating with [facility].  The number for Fast track appeal [phone].. or fax number [phone]. Gareth ROME [phone]  Joann will continue to provide support and services as needed.

## 2025-06-30 NOTE — NURSING NOTE
Pulmonary Disease Navigator Documentation:    Comments:  Patient continues on Airvo at this time, 40 lpm, 40% and AVAPS at night and PRN.  Settings below.  Per Care Transitions note patient for possible discharge to [facility].  Will continue to follow.    AVAPS:   Vt: 650   Min/Max PS: 20/35   Rate: 16   EPAP:  8

## 2025-07-01 ENCOUNTER — APPOINTMENT (OUTPATIENT)
Dept: CARDIOLOGY | Facility: HOSPITAL | Age: 80
End: 2025-07-01
Payer: MEDICARE

## 2025-07-01 ENCOUNTER — APPOINTMENT (OUTPATIENT)
Dept: CARDIOLOGY | Facility: HOSPITAL | Age: 80
DRG: 208 | End: 2025-07-01
Payer: MEDICARE

## 2025-07-01 LAB
ANION GAP SERPL CALC-SCNC: 16 MMOL/L (ref 10–20)
BUN SERPL-MCNC: 24 MG/DL (ref 6–23)
CALCIUM SERPL-MCNC: 9.7 MG/DL (ref 8.6–10.3)
CHLORIDE SERPL-SCNC: 95 MMOL/L (ref 98–107)
CO2 SERPL-SCNC: 34 MMOL/L (ref 21–32)
CREAT SERPL-MCNC: 0.91 MG/DL (ref 0.5–1.05)
EGFRCR SERPLBLD CKD-EPI 2021: 64 ML/MIN/1.73M*2
ERYTHROCYTE [DISTWIDTH] IN BLOOD BY AUTOMATED COUNT: 15.1 % (ref 11.5–14.5)
GLUCOSE SERPL-MCNC: 113 MG/DL (ref 74–99)
HCT VFR BLD AUTO: 40.4 % (ref 36–46)
HGB BLD-MCNC: 12.1 G/DL (ref 12–16)
MAGNESIUM SERPL-MCNC: 1.91 MG/DL (ref 1.6–2.4)
MCH RBC QN AUTO: 27.9 PG (ref 26–34)
MCHC RBC AUTO-ENTMCNC: 30 G/DL (ref 32–36)
MCV RBC AUTO: 93 FL (ref 80–100)
NRBC BLD-RTO: 0 /100 WBCS (ref 0–0)
PLATELET # BLD AUTO: 295 X10*3/UL (ref 150–450)
POTASSIUM SERPL-SCNC: 3.7 MMOL/L (ref 3.5–5.3)
RBC # BLD AUTO: 4.33 X10*6/UL (ref 4–5.2)
SODIUM SERPL-SCNC: 141 MMOL/L (ref 136–145)
WBC # BLD AUTO: 8.3 X10*3/UL (ref 4.4–11.3)

## 2025-07-01 PROCEDURE — 2500000001 HC RX 250 WO HCPCS SELF ADMINISTERED DRUGS (ALT 637 FOR MEDICARE OP)

## 2025-07-01 PROCEDURE — 97535 SELF CARE MNGMENT TRAINING: CPT | Mod: GP,CQ

## 2025-07-01 PROCEDURE — 9420000001 HC RT PATIENT EDUCATION 5 MIN

## 2025-07-01 PROCEDURE — 2500000001 HC RX 250 WO HCPCS SELF ADMINISTERED DRUGS (ALT 637 FOR MEDICARE OP): Performed by: INTERNAL MEDICINE

## 2025-07-01 PROCEDURE — 2500000004 HC RX 250 GENERAL PHARMACY W/ HCPCS (ALT 636 FOR OP/ED)

## 2025-07-01 PROCEDURE — 2500000005 HC RX 250 GENERAL PHARMACY W/O HCPCS: Performed by: STUDENT IN AN ORGANIZED HEALTH CARE EDUCATION/TRAINING PROGRAM

## 2025-07-01 PROCEDURE — 94640 AIRWAY INHALATION TREATMENT: CPT

## 2025-07-01 PROCEDURE — 2500000002 HC RX 250 W HCPCS SELF ADMINISTERED DRUGS (ALT 637 FOR MEDICARE OP, ALT 636 FOR OP/ED): Performed by: INTERNAL MEDICINE

## 2025-07-01 PROCEDURE — 2500000005 HC RX 250 GENERAL PHARMACY W/O HCPCS

## 2025-07-01 PROCEDURE — 99233 SBSQ HOSP IP/OBS HIGH 50: CPT

## 2025-07-01 PROCEDURE — 94660 CPAP INITIATION&MGMT: CPT

## 2025-07-01 PROCEDURE — 93005 ELECTROCARDIOGRAM TRACING: CPT

## 2025-07-01 PROCEDURE — 36415 COLL VENOUS BLD VENIPUNCTURE: CPT

## 2025-07-01 PROCEDURE — 92526 ORAL FUNCTION THERAPY: CPT | Mod: GN

## 2025-07-01 PROCEDURE — 85027 COMPLETE CBC AUTOMATED: CPT

## 2025-07-01 PROCEDURE — 2500000002 HC RX 250 W HCPCS SELF ADMINISTERED DRUGS (ALT 637 FOR MEDICARE OP, ALT 636 FOR OP/ED)

## 2025-07-01 PROCEDURE — 2060000001 HC INTERMEDIATE ICU ROOM DAILY

## 2025-07-01 PROCEDURE — 97535 SELF CARE MNGMENT TRAINING: CPT | Mod: CO,GO

## 2025-07-01 PROCEDURE — 83735 ASSAY OF MAGNESIUM: CPT

## 2025-07-01 PROCEDURE — 80048 BASIC METABOLIC PNL TOTAL CA: CPT

## 2025-07-01 RX ADMIN — IPRATROPIUM BROMIDE AND ALBUTEROL SULFATE 3 ML: .5; 3 SOLUTION RESPIRATORY (INHALATION) at 06:42

## 2025-07-01 RX ADMIN — IPRATROPIUM BROMIDE AND ALBUTEROL SULFATE 3 ML: .5; 3 SOLUTION RESPIRATORY (INHALATION) at 11:48

## 2025-07-01 RX ADMIN — PANTOPRAZOLE SODIUM 20 MG: 20 TABLET, DELAYED RELEASE ORAL at 06:01

## 2025-07-01 RX ADMIN — ACETAMINOPHEN 650 MG: 325 TABLET ORAL at 12:07

## 2025-07-01 RX ADMIN — Medication: at 01:45

## 2025-07-01 RX ADMIN — BUDESONIDE INHALATION 0.5 MG: 0.5 SUSPENSION RESPIRATORY (INHALATION) at 06:37

## 2025-07-01 RX ADMIN — IPRATROPIUM BROMIDE AND ALBUTEROL SULFATE 3 ML: .5; 3 SOLUTION RESPIRATORY (INHALATION) at 19:18

## 2025-07-01 RX ADMIN — ACETAMINOPHEN 650 MG: 325 TABLET ORAL at 23:12

## 2025-07-01 RX ADMIN — Medication 30 L/MIN: at 20:03

## 2025-07-01 RX ADMIN — DOCUSATE SODIUM 100 MG: 100 CAPSULE, LIQUID FILLED ORAL at 20:16

## 2025-07-01 RX ADMIN — ACETYLCYSTEINE 400 MG: 200 SOLUTION ORAL; RESPIRATORY (INHALATION) at 19:18

## 2025-07-01 RX ADMIN — ACETYLCYSTEINE 400 MG: 200 SOLUTION ORAL; RESPIRATORY (INHALATION) at 06:36

## 2025-07-01 RX ADMIN — Medication 40 L/MIN: at 09:09

## 2025-07-01 RX ADMIN — ENOXAPARIN SODIUM 60 MG: 100 INJECTION SUBCUTANEOUS at 20:16

## 2025-07-01 RX ADMIN — BUDESONIDE INHALATION 0.5 MG: 0.5 SUSPENSION RESPIRATORY (INHALATION) at 19:18

## 2025-07-01 RX ADMIN — Medication 40 PERCENT: at 06:42

## 2025-07-01 RX ADMIN — ATORVASTATIN CALCIUM 10 MG: 10 TABLET, FILM COATED ORAL at 09:04

## 2025-07-01 RX ADMIN — TORSEMIDE 40 MG: 20 TABLET ORAL at 09:04

## 2025-07-01 RX ADMIN — ENOXAPARIN SODIUM 60 MG: 100 INJECTION SUBCUTANEOUS at 09:04

## 2025-07-01 RX ADMIN — LEVOTHYROXINE SODIUM 100 MCG: 0.1 TABLET ORAL at 06:00

## 2025-07-01 RX ADMIN — Medication 30 L/MIN: at 20:02

## 2025-07-01 ASSESSMENT — COGNITIVE AND FUNCTIONAL STATUS - GENERAL
MOBILITY SCORE: 13
DRESSING REGULAR UPPER BODY CLOTHING: TOTAL
MOVING TO AND FROM BED TO CHAIR: TOTAL
MOVING FROM LYING ON BACK TO SITTING ON SIDE OF FLAT BED WITH BEDRAILS: A LOT
PERSONAL GROOMING: TOTAL
DRESSING REGULAR UPPER BODY CLOTHING: A LOT
CLIMB 3 TO 5 STEPS WITH RAILING: TOTAL
WALKING IN HOSPITAL ROOM: TOTAL
HELP NEEDED FOR BATHING: A LOT
CLIMB 3 TO 5 STEPS WITH RAILING: TOTAL
DRESSING REGULAR UPPER BODY CLOTHING: A LITTLE
MOVING FROM LYING ON BACK TO SITTING ON SIDE OF FLAT BED WITH BEDRAILS: A LOT
DAILY ACTIVITIY SCORE: 10
DAILY ACTIVITIY SCORE: 15
STANDING UP FROM CHAIR USING ARMS: A LITTLE
DRESSING REGULAR LOWER BODY CLOTHING: TOTAL
MOBILITY SCORE: 8
DAILY ACTIVITIY SCORE: 9
TURNING FROM BACK TO SIDE WHILE IN FLAT BAD: A LOT
TURNING FROM BACK TO SIDE WHILE IN FLAT BAD: A LOT
TOILETING: TOTAL
HELP NEEDED FOR BATHING: TOTAL
HELP NEEDED FOR BATHING: TOTAL
STANDING UP FROM CHAIR USING ARMS: TOTAL
MOVING TO AND FROM BED TO CHAIR: A LITTLE
PERSONAL GROOMING: A LITTLE
TOILETING: A LOT
PERSONAL GROOMING: TOTAL
WALKING IN HOSPITAL ROOM: A LOT
DRESSING REGULAR LOWER BODY CLOTHING: TOTAL
DRESSING REGULAR LOWER BODY CLOTHING: TOTAL
TOILETING: TOTAL

## 2025-07-01 ASSESSMENT — PAIN SCALES - GENERAL
PAINLEVEL_OUTOF10: 0 - NO PAIN
PAINLEVEL_OUTOF10: 0 - NO PAIN
PAINLEVEL_OUTOF10: 3
PAINLEVEL_OUTOF10: 0 - NO PAIN
PAINLEVEL_OUTOF10: 6
PAINLEVEL_OUTOF10: 3

## 2025-07-01 ASSESSMENT — PAIN DESCRIPTION - DESCRIPTORS
DESCRIPTORS: ACHING

## 2025-07-01 ASSESSMENT — PAIN - FUNCTIONAL ASSESSMENT
PAIN_FUNCTIONAL_ASSESSMENT: 0-10

## 2025-07-01 ASSESSMENT — PAIN DESCRIPTION - LOCATION
LOCATION: LEG
LOCATION: KNEE

## 2025-07-01 ASSESSMENT — ACTIVITIES OF DAILY LIVING (ADL)
HOME_MANAGEMENT_TIME_ENTRY: 15
BATHING_LEVEL_OF_ASSISTANCE: MAXIMUM ASSISTANCE

## 2025-07-01 ASSESSMENT — PAIN DESCRIPTION - ORIENTATION: ORIENTATION: LEFT;RIGHT

## 2025-07-01 NOTE — PROGRESS NOTES
Received update from [facility] with peer to peer information: Peer to Peer can be done by calling [phone] reference number is [id number]. Peer to peer information provided to medical team.

## 2025-07-01 NOTE — NURSING NOTE
Pulmonary Disease Navigator Documentation:    Comments:  Attempted to see patient for COPD education.  Patient sleeping soundly at this time.  Continues on Airvo 40 lpm, 40%.  Per transitional care notes, patient for possible discharge to [facility].  Will continue to follow.

## 2025-07-01 NOTE — PROGRESS NOTES
Therapy Communication Note    Patient Name: Ailyn Banegas  MRN: [id number]  Department: [facility]  Room: [address] [place] [address]  Today's Date: 7/1/2025     Discipline: Occupational Therapy    Missed Visit: OT Missed Visit: Yes     Missed Visit Reason: Missed Visit Reason: Patient placed on medical hold (patient currently on BiPAP per RN, will re-attempt as able.)    Missed Time: Attempt    Comment:

## 2025-07-01 NOTE — PROGRESS NOTES
Occupational Therapy    OT Treatment    Patient Name: Ailyn Banegas  MRN: [id number]  Department: [facility]  Room: [address] [place] [address]  Today's Date: 7/1/2025  Time Calculation  Start Time: 1301  Stop Time: 1331  Time Calculation (min): 30 min        Assessment:  End of Session Patient Position: Up in chair, Alarm on     Plan:  Treatment Interventions: ADL retraining, Functional transfer training, Endurance training, Compensatory technique education  OT Frequency: 3 times per week (during this acute inpatient hospitalization)  OT Discharge Recommendations: Moderate intensity level of continued care (Based on current functional status and rehab potential, patient is anticipated to tolerate and benefit from 5 or more days per week of skilled rehabilitative therapy after discharge from this acute inpatient hospitalization.)  OT Recommended Transfer Status: Assist of 2  OT - OK to Discharge: Yes (to next level of care when cleared by medical team)  Treatment Interventions: ADL retraining, Functional transfer training, Endurance training, Compensatory technique education    Subjective   OT Visit Info:  OT Received On: 07/01/25  General Visit Info:  General  Co-Treatment: PT  Co-Treatment Reason: to maximize patient safety/abilities  Prior to Session Communication: Bedside nurse  Patient Position Received: Up in chair, Alarm off, not on at start of session  General Comment: pt agreeable to participate in therapy  Precautions:  Medical Precautions: Fall precautions, Oxygen therapy device and L/min (airvo 40L 40%)  Precautions Comment: tele; samra      Vital Signs Comment: VSS     Pain:  Pain Assessment  Pain Assessment:  (pt with chronic bilat knee pain; does not quantify at this time)    Objective    Cognition:  Cognition  Overall Cognitive Status: Within Functional Limits       Activities of Daily Living: LE Bathing  LE Bathing Level of Assistance: Maximum assistance  LE Bathing Where Assessed:  (standing at FWW d/t incontinence of  urine)    LE Dressing  LE Dressing: Yes  Sock Level of Assistance: Dependent  LE Dressing Where Assessed: Chair  Functional Standing Tolerance:  Time: 2:00 standing at FWW  Bed Mobility/Transfers: Transfers  Transfer: Yes  Transfer 1  Technique 1: Sit to stand, Stand to sit  Transfer Device 1: Walker  Transfer Level of Assistance 1: Minimum assistance      Functional Mobility:  Functional Mobility  Functional Mobility Performed: Yes  Functional Mobility 1  Device 1: Rolling walker  Assistance 1: Minimum assistance  Comments 1: pt ambulated a few steps from one recliner to another      Outcome Measures:[facility] Daily Activity  Putting on and taking off regular lower body clothing: Total  Bathing (including washing, rinsing, drying): A lot  Putting on and taking off regular upper body clothing: A little  Toileting, which includes using toilet, bedpan or urinal: A lot  Taking care of personal grooming such as brushing teeth: A little  Eating Meals: None  Daily Activity - Total Score: 15        Education Documentation  Precautions, taught by SUZIE Parham at 7/1/2025  2:56 PM.  Learner: Patient  Readiness: Acceptance  Method: Explanation  Response: Verbalizes Understanding    ADL Training, taught by SUZIE Parham at 7/1/2025  2:56 PM.  Learner: Patient  Readiness: Acceptance  Method: Explanation  Response: Verbalizes Understanding    Education Comments  No comments found.             Goals:  Encounter Problems       Encounter Problems (Active)       OT Goals       Increase functional mobility and  functional transfers to supervision for bed/chair/toilet with dme prn   (Progressing)       Start:  06/26/25    Expected End:  07/10/25            increase bue ther ex/activity x 7-10 minutes and increase standing tolerance x 3-5 minutes with supervision to promote greater activity tolerance for assist with adl.   (Progressing)       Start:  06/26/25    Expected End:  07/10/25            Increase ub/lb dressing  to min assist x 1 with dme prn  (Progressing)       Start:  06/26/25    Expected End:  07/10/25            Increase toileting to min assist x 1 with dme prn  (Progressing)       Start:  06/26/25    Expected End:  07/10/25            pt. to apply ec/ws techniques with minimal cues to all mobility/transfer/adl to decrease fatigue/promote efficient use of energy toward completion of functional tasks.  (Progressing)       Start:  06/26/25    Expected End:  07/10/25

## 2025-07-01 NOTE — PROGRESS NOTES
Cardiology Progress    Impression:  Acute on chronic hypercapnic, hypoxic respiratory failure.  Now extubated.  Metabolic alkalosis.  Advanced COPD.  On home oxygen.  Right heart failure.  Echo 6/20/2025 showing normal EF, moderate RV dysfunction.  RVSP could not be estimated.  Peripheral fluid overload.  MARLENY.  Struggles with BiPAP.  Obesity hypoventilation syndrome  Anemia  Paroxysmal atrial fibrillation.  Currently sinus rhythm.  Mild aortic stenosis  Hypertension  Hyperlipidemia  Plan:  Continue torsemide.  HPI:  Sleeping.  On BiPAP.  No new problems.  Meds:  Scheduled medications  Scheduled Medications[1]  Continuous medications  Continuous Medications[2]  PRN medications  PRN Medications[3]    Physical exam:  Vitals:    07/01/25 0858   BP:    Pulse:    Resp: 22   Temp:    SpO2:       JVP not visible.  Decreased breath sounds bilaterally.  Minimal edema.  EKG:  Telemetry shows sinus rhythm.  Echo:  6/20/2025: Normal EF. Moderate RV dysfunction. RVSP could not be obtained.    Labs:  Lab Results   Component Value Date    WBC 8.3 07/01/2025    HGB 12.1 07/01/2025    HCT 40.4 07/01/2025     07/01/2025    CHOL 133 11/13/2020    TRIG 88 11/13/2020    HDL 55.4 11/13/2020    ALT 17 06/22/2025    AST 15 06/22/2025     07/01/2025    K 3.7 07/01/2025    CL 95 (L) 07/01/2025    CREATININE 0.91 07/01/2025    BUN 24 (H) 07/01/2025    CO2 34 (H) 07/01/2025    TSH 3.34 04/06/2022    HGBA1C 5.9 (A) 04/06/2022     par         [1] acetylcysteine, 2 mL, nebulization, BID  ammonium lactate, , Topical, q12h  atorvastatin, 10 mg, oral, Daily  azithromycin, 500 mg, oral, Once per day on Monday Wednesday Friday  budesonide, 0.5 mg, nebulization, BID  docusate sodium, 100 mg, oral, Nightly  enoxaparin, 60 mg, subcutaneous, q12h VLADIMIR  insulin lispro, 0-15 Units, subcutaneous, TID AC  ipratropium-albuteroL, 3 mL, nebulization, TID  levothyroxine, 100 mcg, oral, Daily  oxygen, , inhalation, Continuous - Inhalation  oxygen, ,  inhalation, Continuous - Inhalation  pantoprazole, 20 mg, oral, BID  perflutren lipid microspheres, 0.5-10 mL of dilution, intravenous, Once in imaging  perflutren protein A microsphere, 0.5 mL, intravenous, Once in imaging  [Transfer Hold] perflutren protein A microsphere, 0.5 mL, intravenous, Once in imaging  sulfur hexafluoride microsphr, 2 mL, intravenous, Once in imaging  [Transfer Hold] sulfur hexafluoride microsphr, 2 mL, intravenous, Once in imaging  torsemide, 40 mg, oral, Daily  [2]    [3] PRN medications: acetaminophen **OR** acetaminophen **OR** acetaminophen, [Transfer Hold] acetaminophen, dextrose, dextrose, glucagon, glucagon, ipratropium-albuteroL, melatonin, polyethylene glycol

## 2025-07-01 NOTE — CARE PLAN
The patient's goals for the shift include  tolerate c-pap and rest    The clinical goals for the shift include Patient will tolerate c-pap through the night and will remain safe    Problem: Chronic Conditions and Co-morbidities  Goal: Patient's chronic conditions and co-morbidity symptoms are monitored and maintained or improved  Flowsheets (Taken 6/28/2025 0322 by Neyda Leigh, RN)  Care Plan - Patient's Chronic Conditions and Co-Morbidity Symptoms are Monitored and Maintained or Improved: Monitor and assess patient's chronic conditions and comorbid symptoms for stability, deterioration, or improvement     Problem: Diabetes  Goal: Achieve decreasing blood glucose levels by end of shift  Flowsheets (Taken 6/27/2025 0528 by Neyda Leigh, RN)  Achieve decreasing blood glucose levels by end of shift: Med administration/monitoring of effect     Problem: Diabetes  Goal: Achieve decreasing blood glucose levels by end of shift  Flowsheets (Taken 6/27/2025 0528 by Neyda Leigh, RN)  Achieve decreasing blood glucose levels by end of shift: Med administration/monitoring of effect  Goal: Increase stability of blood glucose readings by end of shift  Flowsheets (Taken 6/26/2025 1847 by Nora Palomo RN)  Increase stability of blood glucose readings by end of shift: Med administration/monitoring of effect  Goal: Maintain electrolyte levels within acceptable range throughout shift  Flowsheets (Taken 7/1/2025 0128)  Maintain electrolyte levels within acceptable range throughout shift:   Med administration/monitoring of effect   Monitor urine output  Goal: Maintain glucose levels >70mg/dl to <250mg/dl throughout shift  Flowsheets (Taken 6/26/2025 0248 by Neyda Leihg RN)  Maintain glucose levels >70mg/dl to <250mg/dl throughout shift: Med administration/monitoring of effect  Goal: Learn about and adhere to nutrition recommendations by end of shift  Flowsheets (Taken 7/1/2025 0128)  Learn about and adhere to nutrition  recommendations by end of shift: Ensure/encourage compliance with appropriate diet  Goal: Vital signs within normal range for age by end of shift  Flowsheets (Taken 7/1/2025 0128)  Vital signs within normal range for age by end of shift: Med administration/monitoring of effect  Goal: Increase self care and/or family involovement by end of shift  Flowsheets (Taken 7/1/2025 0128)  Increase self care and/or family involovement by end of shift: Self monitor blood glucose with staff oversight  Goal: Receive DSME education by end of shift  Flowsheets (Taken 7/1/2025 0128)  Receive DSME education by end of shift: Provide patient centered education on Diabetic Self Management Education     Problem: Skin  Goal: Decreased wound size/increased tissue granulation at next dressing change  Flowsheets (Taken 7/1/2025 0128)  Decreased wound size/increased tissue granulation at next dressing change:   Promote sleep for wound healing   Protective dressings over bony prominences  Goal: Participates in plan/prevention/treatment measures  Flowsheets (Taken 7/1/2025 0128)  Participates in plan/prevention/treatment measures:   Elevate heels   Discuss with provider PT/OT consult  Goal: Prevent/manage excess moisture  Flowsheets (Taken 7/1/2025 0128)  Prevent/manage excess moisture:   Cleanse incontinence/protect with barrier cream   Monitor for/manage infection if present   Moisturize dry skin  Goal: Prevent/minimize sheer/friction injuries  Flowsheets (Taken 7/1/2025 0128)  Prevent/minimize sheer/friction injuries:   Use pull sheet   HOB 30 degrees or less   Increase activity/out of bed for meals  Goal: Promote/optimize nutrition  Flowsheets (Taken 7/1/2025 0128)  Promote/optimize nutrition:   Consume > 50% meals/supplements   Monitor/record intake including meals   Offer water/supplements/favorite foods  Goal: Promote skin healing  Flowsheets (Taken 7/1/2025 0128)  Promote skin healing:   Turn/reposition every 2 hours/use  positioning/transfer devices   Assess skin/pad under line(s)/device(s)

## 2025-07-01 NOTE — PROGRESS NOTES
Speech-Language Pathology    SLP Adult Inpatient  Speech-Language Pathology Treatment     Patient Name: Ailyn Banegas  MRN: [id number]  Today's Date: 7/1/2025  Time Calculation  Start Time: 1112  Stop Time: 1133  Time Calculation (min): 21 min         Current Problem:   1. Acute respiratory failure with hypoxia and hypercapnia  Transthoracic Echo Complete    Transthoracic Echo Complete      2. COPD exacerbation (Multi)        3. Acute on chronic congestive heart failure, unspecified heart failure type  Transthoracic Echo Complete    Transthoracic Echo Complete      4. Heart failure due to valvular disease, acute, diastolic  Transthoracic Echo Complete    Transthoracic Echo Complete    Transthoracic Echo Complete    Transthoracic Echo Complete      5. Unspecified diastolic (congestive) heart failure  Transthoracic Echo Complete      6. Unspecified systolic (congestive) heart failure  Transthoracic Echo Complete      7. Acute and chronic respiratory failure with hypoxia  Transthoracic Echo Complete            SLP tx per POC :  SLP TX Intervention Outcome: Making Progress Towards Goals  Prognosis: Good  Treatment Tolerance: Patient tolerated treatment well  Medical Staff Made Aware: Yes  Strengths: Cognition, Motivation, Family/Caregiver Support  Barriers: Comorbidities      Ongoing swallowing interventions  showing overall WFL oropharyngeal swallow w/o evidence or clinical indicators of aspiration. Pt can proceed to regular diet.  Pt self fed a cookie, muffin, and hard fruit pieces with adequate oral mastication effort with O2 saturation @ 93 % t/o the encounter.     OME intact with pt independent with the implemented swallowing /respiratory controls given pt still on HFNC via Airvo but now titrated to  40% and 40L.         Recommendations:  Regular Solids & thin liquids via small bites/sips , slow pace and no straws.   Upright for all PO intake  Pills per pt preference  If pt presents with any changes to mental,  medical, or respiratory status, please make NPO and alert SLP        Goals ( established 6/26/25)   Pt will tolerate least restrictive diet with adequate oral phase and no s/s of aspiration.  GOAL MET for RG7/thins  7/1/25   Patient will trial upgraded textures  with SLP only for possible diet advancement as indicated given bedside and clinical indicators.  Goal MET for RG7/thins 7/1/25   Pt/family education. Goal Met 7/1/25       Plan:  SLP will sign-off at this time  Inpatient/Swing Bed or Outpatient: Inpatient  Treatment/Interventions: Dysphagia treatment  SLP TX Plan: Discharge from Speech Therapy  SLP Plan: No skilled SLP  SLP Discharge Recommendations: D/C with all goals met  Discussed POC: Patient, Nursing (KELSEA Lazaro)  Discussed Risks/Benefits: Yes, Nursing, Patient  Patient/Caregiver Agreeable: Yes  SLP - OK to Discharge: Yes      Subjective Alert, Ox4 with good awareness and implementation of her swallowing controls.   Current Problem:  COPD     SLP Visit Info:  SLP Received On: 07/01/25    General Visit Information:     Discharge plan LTACH as pt on Airvo and BIPAP at times which she was on at home.   Baseline Assessment:  Respiratory Status:  (HFNC via Airvo at 40L and 40%)  Behavior/Cognition: Alert, Cooperative, Pleasant mood (Ox4)     Pain Assessment:  Pain Assessment  Pain Assessment: 0-10  0-10 (Numeric) Pain Score: 0 - No pain      Objective   LUNGS:  Lungs are clear per last CXR in the EMR completed on 6/25/25        Therapeutic Swallow:  Therapeutic Swallow Intervention : PO Trials, Compensatory Strategies, Medical Caregiver and pt  Education  Solid Diet Recommendations: Regular (IDDSI Level 7)  Liquid Diet Recommendations: Thin (IDDSI Level 0)    Safe functional swallow demonstrated across the LRD -baseline RG7/thin liquid diet.     Inpatient:  Education provided as it relates to POC and progress.  Individual(s) Educated: Patient, KELSEA Lazaro   Verbal Education : yes  Patient/Caregiver Demonstrated  Understanding: yes  Plan of Care Discussed and Agreed Upon: yes        Consultations/Referrals/Coordination of Services: no further SLP services warranted at the next level of care.

## 2025-07-01 NOTE — PROGRESS NOTES
Subjective   Ailyn Banegas is a 80 y.o. female who presents with Shortness of Breath.    Patient remains on 40/40 airvo with BiPAP at night. P2P for needed for patient to return to LTAC. Attempted to call P2P but unable to speak with a physician today, will attempt again tomorrow.    Objective   Vitals:    07/01/25 1646   BP: 117/68   Pulse: 89   Resp: 19   Temp: 35.9 °C (96.6 °F)   SpO2: 93%      Physical Exam  Physical Exam:  Constitutional: obese, awake, alert, no acute distress  ENMT: mucous membranes moist, EOMI, conjunctivae clear  Head/Neck: normocephalic, atraumatic; supple, trachea midline  Respiratory/Thorax: patent airways, CTAB; no wheezes, rales, or rhonchi  Cardiovascular: RRR, no murmur  Gastrointestinal: soft, nondistended, non-tender, bowel sounds appreciated  Extremities: palpable peripheral pulses, no edema or cyanosis  Neurological: AO x3, no focal deficits  Psychological: appropriate mood and behavior  Skin: warm and dry    Assessment/Plan       Ailyn Banegas is a 80 y.o. female with a PMHx of reported COPD (no PFTs on record, on baseline 5L NC), MARLENY/likely OHS, Hypothyroidism, DLD, Morbid Obesity who was brought into the ED for respiratory distress. Admitted to ICU for acute on chronic hypercapnic hypoxic respiratory failure 2/2 ADHF.     Acute medical conditions:  #Acute on chronic hypercapnic hypoxic respiratory failure  #Decompensated diastolic heart failure  #Pulmonary edema with bilateral pleural effusions  #Aspiration pneumonia, completed course of abx  #Paroxysmal A-fib  #COPD on 5 L nasal cannula baseline  -Patient initially required intubation, extubated 6/24  -Patient was treated with a 7-day course of Zosyn for PNA  -On prophylactic azithromycin 3 times weekly  -Was initially on Lasix drip however transition back to home diuretic of 40 torsemide daily  -Strict I's and O's  -Resume home meds Cardizem, Aldactone as able  - Discharge planning to [facility], P2P started    Chronic  medical conditions:  #Hypothyroidism-continue home dose Synthroid  #Hyperglycemia-SSI #3  #Chronic anemia  #Chronic lower extremity wounds  #MARLENY on home CPAP    DVT PPX: Lovenox  Diet: Regular  IVF:  Code Status: DNR    This is a preliminary note written by the resident. Please wait for attending addendum for finalization of note and recommendations.    Rakan Mcgovern DO, PhD  Internal Medicine PGY3

## 2025-07-01 NOTE — PROGRESS NOTES
Physical Therapy    Physical Therapy Treatment    Patient Name: Ailyn Banegas  MRN: [id number]  Department: [facility]  Room: [address] [place] [address]  Today's Date: 7/1/2025  Time Calculation  Start Time: 1300  Stop Time: 1333  Time Calculation (min): 33 min         Assessment/Plan   PT Assessment  End of Session Patient Position: Up in chair, Alarm on     PT Plan  Treatment/Interventions: Bed mobility, Transfer training, Gait training, Balance training, Strengthening, Endurance training, Therapeutic exercise, Therapeutic activity  PT Plan: Ongoing PT  PT Frequency: 3 times per week  PT Discharge Recommendations: Moderate intensity level of continued care  PT Recommended Transfer Status: Assist x2  PT - OK to Discharge: Yes    General Visit Information:   General  Co-Treatment: OT  Co-Treatment Reason: to maximize patient safety/abilities  Prior to Session Communication: Bedside nurse  Patient Position Received: Up in chair, Alarm off, not on at start of session  General Comment: pt agreeable to participate in therapy    Subjective   Precautions:  Precautions  Medical Precautions: Fall precautions, Oxygen therapy device and L/min (airvo 40L 40%)  Precautions Comment: tele; purewick      Vital Signs Comment: VSS throughout session     Objective   Pain:  Pain Assessment  Pain Assessment:  (pt with chronic bilat knee pain; does not quantify at this time)    Cognition:  Cognition  Overall Cognitive Status: Within Functional Limits       Treatments:  Bed Mobility  Bed Mobility:  (NT - pt seated in chair upon therapy arrival and departure)    Ambulation/Gait Training  Ambulation/Gait Training Performed:  (pt able to perform x2 trials static stance of several minutes each at FWW with min A for support/ balance.  pt also able to ambulate several feet with FWW and min A x 1.  cuing for sequencing and safe walker management.  shuffling steps; flexed posture.  pt fatiguing quickly and sitting down abruptly.)    Transfers  Transfer:  (sit <>  stand x2 trials with FWW and min A x 1.  cuing for safe hand placement & sequencing.  pt tends to rest forearms on walker handles upon initial stance.)    Outcome Measures:  [facility] Basic Mobility  Turning from your back to your side while in a flat bed without using bedrails: A lot  Moving from lying on your back to sitting on the side of a flat bed without using bedrails: A lot  Moving to and from bed to chair (including a wheelchair): A little  Standing up from a chair using your arms (e.g. wheelchair or bedside chair): A little  To walk in hospital room: A lot  Climbing 3-5 steps with railing: Total  Basic Mobility - Total Score: 13    Education Documentation  Precautions, taught by Aleshia Muñoz PTA at 7/1/2025  2:34 PM.  Learner: Patient  Readiness: Acceptance  Method: Explanation  Response: Verbalizes Understanding, Needs Reinforcement    Mobility Training, taught by Aleshia Muñoz PTA at 7/1/2025  2:34 PM.  Learner: Patient  Readiness: Acceptance  Method: Explanation  Response: Verbalizes Understanding, Needs Reinforcement    Education Comments  No comments found.        EDUCATION:       Encounter Problems       Encounter Problems (Active)       PT Problem       PT Goal 1 STG - Pt will amb 25' using WW with SBA (Progressing)       Start:  06/26/25    Expected End:  07/10/25            PT Goal 2 STG - Pt will transition supine <> sitting with  SBA (Progressing)       Start:  06/26/25    Expected End:  07/10/25            PT Goal 3 STG - Pt will SPT bed <> chair with  SBA (Progressing)       Start:  06/26/25    Expected End:  07/10/25            PT Goal 4 STG - Pt will transfer STS with  SBA (Progressing)       Start:  06/26/25    Expected End:  07/10/25

## 2025-07-01 NOTE — PROGRESS NOTES
Sw notified [facility] that the most updated Peer to Peer info has been provided Peer to Peer can be done by calling [phone] . [facility] to pass along to the following MD.  The reference number is [id number].

## 2025-07-02 LAB
ANION GAP SERPL CALC-SCNC: 13 MMOL/L (ref 10–20)
ATRIAL RATE: 97 BPM
BUN SERPL-MCNC: 28 MG/DL (ref 6–23)
CALCIUM SERPL-MCNC: 9.5 MG/DL (ref 8.6–10.3)
CHLORIDE SERPL-SCNC: 95 MMOL/L (ref 98–107)
CO2 SERPL-SCNC: 35 MMOL/L (ref 21–32)
CREAT SERPL-MCNC: 0.87 MG/DL (ref 0.5–1.05)
EGFRCR SERPLBLD CKD-EPI 2021: 67 ML/MIN/1.73M*2
ERYTHROCYTE [DISTWIDTH] IN BLOOD BY AUTOMATED COUNT: 15.2 % (ref 11.5–14.5)
GLUCOSE BLD MANUAL STRIP-MCNC: 148 MG/DL (ref 74–99)
GLUCOSE SERPL-MCNC: 116 MG/DL (ref 74–99)
HCT VFR BLD AUTO: 39 % (ref 36–46)
HGB BLD-MCNC: 11.6 G/DL (ref 12–16)
MAGNESIUM SERPL-MCNC: 1.78 MG/DL (ref 1.6–2.4)
MCH RBC QN AUTO: 27.5 PG (ref 26–34)
MCHC RBC AUTO-ENTMCNC: 29.7 G/DL (ref 32–36)
MCV RBC AUTO: 92 FL (ref 80–100)
NRBC BLD-RTO: 0 /100 WBCS (ref 0–0)
P AXIS: 44 DEGREES
P OFFSET: 159 MS
P ONSET: 105 MS
PLATELET # BLD AUTO: 299 X10*3/UL (ref 150–450)
POTASSIUM SERPL-SCNC: 3.4 MMOL/L (ref 3.5–5.3)
PR INTERVAL: 238 MS
Q ONSET: 224 MS
QRS COUNT: 16 BEATS
QRS DURATION: 72 MS
QT INTERVAL: 334 MS
QTC CALCULATION(BAZETT): 424 MS
QTC FREDERICIA: 391 MS
R AXIS: 17 DEGREES
RBC # BLD AUTO: 4.22 X10*6/UL (ref 4–5.2)
SODIUM SERPL-SCNC: 140 MMOL/L (ref 136–145)
T AXIS: 25 DEGREES
T OFFSET: 391 MS
VENTRICULAR RATE: 97 BPM
WBC # BLD AUTO: 7.5 X10*3/UL (ref 4.4–11.3)

## 2025-07-02 PROCEDURE — 2500000004 HC RX 250 GENERAL PHARMACY W/ HCPCS (ALT 636 FOR OP/ED)

## 2025-07-02 PROCEDURE — 82947 ASSAY GLUCOSE BLOOD QUANT: CPT

## 2025-07-02 PROCEDURE — 2060000001 HC INTERMEDIATE ICU ROOM DAILY

## 2025-07-02 PROCEDURE — 2500000002 HC RX 250 W HCPCS SELF ADMINISTERED DRUGS (ALT 637 FOR MEDICARE OP, ALT 636 FOR OP/ED): Performed by: INTERNAL MEDICINE

## 2025-07-02 PROCEDURE — 94660 CPAP INITIATION&MGMT: CPT

## 2025-07-02 PROCEDURE — 2500000002 HC RX 250 W HCPCS SELF ADMINISTERED DRUGS (ALT 637 FOR MEDICARE OP, ALT 636 FOR OP/ED)

## 2025-07-02 PROCEDURE — 2500000001 HC RX 250 WO HCPCS SELF ADMINISTERED DRUGS (ALT 637 FOR MEDICARE OP)

## 2025-07-02 PROCEDURE — 85027 COMPLETE CBC AUTOMATED: CPT

## 2025-07-02 PROCEDURE — 94640 AIRWAY INHALATION TREATMENT: CPT

## 2025-07-02 PROCEDURE — 2500000005 HC RX 250 GENERAL PHARMACY W/O HCPCS

## 2025-07-02 PROCEDURE — 2500000001 HC RX 250 WO HCPCS SELF ADMINISTERED DRUGS (ALT 637 FOR MEDICARE OP): Performed by: INTERNAL MEDICINE

## 2025-07-02 PROCEDURE — 83735 ASSAY OF MAGNESIUM: CPT

## 2025-07-02 PROCEDURE — 36415 COLL VENOUS BLD VENIPUNCTURE: CPT

## 2025-07-02 PROCEDURE — 80048 BASIC METABOLIC PNL TOTAL CA: CPT

## 2025-07-02 PROCEDURE — 2500000005 HC RX 250 GENERAL PHARMACY W/O HCPCS: Performed by: STUDENT IN AN ORGANIZED HEALTH CARE EDUCATION/TRAINING PROGRAM

## 2025-07-02 PROCEDURE — 99233 SBSQ HOSP IP/OBS HIGH 50: CPT

## 2025-07-02 RX ORDER — NYSTATIN 100000 [USP'U]/G
1 POWDER TOPICAL 2 TIMES DAILY
Status: DISCONTINUED | OUTPATIENT
Start: 2025-07-02 | End: 2025-07-02

## 2025-07-02 RX ORDER — MAGNESIUM CHLORIDE 64 MG
64 TABLET, DELAYED RELEASE (ENTERIC COATED) ORAL ONCE
Status: COMPLETED | OUTPATIENT
Start: 2025-07-02 | End: 2025-07-02

## 2025-07-02 RX ORDER — POTASSIUM CHLORIDE 1.5 G/1.58G
40 POWDER, FOR SOLUTION ORAL ONCE
Status: COMPLETED | OUTPATIENT
Start: 2025-07-02 | End: 2025-07-02

## 2025-07-02 RX ORDER — NYSTATIN 100000 U/G
OINTMENT TOPICAL 2 TIMES DAILY
Status: DISCONTINUED | OUTPATIENT
Start: 2025-07-02 | End: 2025-07-07

## 2025-07-02 RX ORDER — ZINC OXIDE 20 G/100G
1 OINTMENT TOPICAL
Status: DISCONTINUED | OUTPATIENT
Start: 2025-07-02 | End: 2025-07-07 | Stop reason: HOSPADM

## 2025-07-02 RX ORDER — EAR PLUGS
1 EACH OTIC (EAR) 3 TIMES DAILY
Status: DISCONTINUED | OUTPATIENT
Start: 2025-07-02 | End: 2025-07-02

## 2025-07-02 RX ADMIN — POTASSIUM CHLORIDE 40 MEQ: 1.5 POWDER, FOR SOLUTION ORAL at 20:47

## 2025-07-02 RX ADMIN — LEVOTHYROXINE SODIUM 100 MCG: 0.1 TABLET ORAL at 05:06

## 2025-07-02 RX ADMIN — NYSTATIN: 100000 OINTMENT TOPICAL at 20:49

## 2025-07-02 RX ADMIN — ACETYLCYSTEINE 400 MG: 200 SOLUTION ORAL; RESPIRATORY (INHALATION) at 19:10

## 2025-07-02 RX ADMIN — IPRATROPIUM BROMIDE AND ALBUTEROL SULFATE 3 ML: .5; 3 SOLUTION RESPIRATORY (INHALATION) at 13:16

## 2025-07-02 RX ADMIN — ENOXAPARIN SODIUM 60 MG: 100 INJECTION SUBCUTANEOUS at 20:48

## 2025-07-02 RX ADMIN — Medication 4 L/MIN: at 09:30

## 2025-07-02 RX ADMIN — AZITHROMYCIN DIHYDRATE 500 MG: 250 TABLET ORAL at 09:27

## 2025-07-02 RX ADMIN — ATORVASTATIN CALCIUM 10 MG: 10 TABLET, FILM COATED ORAL at 09:27

## 2025-07-02 RX ADMIN — MAGNESIUM 64 MG (MAGNESIUM CHLORIDE) TABLET,DELAYED RELEASE 1 TABLET: at 20:47

## 2025-07-02 RX ADMIN — BUDESONIDE INHALATION 0.5 MG: 0.5 SUSPENSION RESPIRATORY (INHALATION) at 19:11

## 2025-07-02 RX ADMIN — ACETYLCYSTEINE 400 MG: 200 SOLUTION ORAL; RESPIRATORY (INHALATION) at 07:27

## 2025-07-02 RX ADMIN — Medication: at 18:45

## 2025-07-02 RX ADMIN — IPRATROPIUM BROMIDE AND ALBUTEROL SULFATE 3 ML: .5; 3 SOLUTION RESPIRATORY (INHALATION) at 19:10

## 2025-07-02 RX ADMIN — Medication 4 L/MIN: at 21:00

## 2025-07-02 RX ADMIN — TORSEMIDE 40 MG: 20 TABLET ORAL at 09:27

## 2025-07-02 RX ADMIN — PANTOPRAZOLE SODIUM 20 MG: 20 TABLET, DELAYED RELEASE ORAL at 18:47

## 2025-07-02 RX ADMIN — IPRATROPIUM BROMIDE AND ALBUTEROL SULFATE 3 ML: .5; 3 SOLUTION RESPIRATORY (INHALATION) at 07:27

## 2025-07-02 RX ADMIN — NYSTATIN: 100000 OINTMENT TOPICAL at 18:45

## 2025-07-02 RX ADMIN — ENOXAPARIN SODIUM 60 MG: 100 INJECTION SUBCUTANEOUS at 09:27

## 2025-07-02 RX ADMIN — Medication 5 L/MIN: at 09:28

## 2025-07-02 RX ADMIN — PANTOPRAZOLE SODIUM 20 MG: 20 TABLET, DELAYED RELEASE ORAL at 05:05

## 2025-07-02 RX ADMIN — BUDESONIDE INHALATION 0.5 MG: 0.5 SUSPENSION RESPIRATORY (INHALATION) at 07:27

## 2025-07-02 RX ADMIN — Medication 4 L/MIN: at 19:11

## 2025-07-02 RX ADMIN — ACETAMINOPHEN 650 MG: 325 TABLET ORAL at 20:49

## 2025-07-02 ASSESSMENT — COGNITIVE AND FUNCTIONAL STATUS - GENERAL
MOVING FROM LYING ON BACK TO SITTING ON SIDE OF FLAT BED WITH BEDRAILS: A LOT
PERSONAL GROOMING: TOTAL
DRESSING REGULAR UPPER BODY CLOTHING: TOTAL
PERSONAL GROOMING: TOTAL
DAILY ACTIVITIY SCORE: 9
DAILY ACTIVITIY SCORE: 9
MOVING TO AND FROM BED TO CHAIR: TOTAL
CLIMB 3 TO 5 STEPS WITH RAILING: TOTAL
TOILETING: TOTAL
DRESSING REGULAR LOWER BODY CLOTHING: TOTAL
TURNING FROM BACK TO SIDE WHILE IN FLAT BAD: A LOT
HELP NEEDED FOR BATHING: TOTAL
CLIMB 3 TO 5 STEPS WITH RAILING: TOTAL
TURNING FROM BACK TO SIDE WHILE IN FLAT BAD: A LOT
DRESSING REGULAR LOWER BODY CLOTHING: TOTAL
MOBILITY SCORE: 8
HELP NEEDED FOR BATHING: TOTAL
MOVING TO AND FROM BED TO CHAIR: TOTAL
MOVING FROM LYING ON BACK TO SITTING ON SIDE OF FLAT BED WITH BEDRAILS: A LOT
TOILETING: TOTAL
WALKING IN HOSPITAL ROOM: TOTAL
DRESSING REGULAR UPPER BODY CLOTHING: TOTAL
MOBILITY SCORE: 8
WALKING IN HOSPITAL ROOM: TOTAL
STANDING UP FROM CHAIR USING ARMS: TOTAL
STANDING UP FROM CHAIR USING ARMS: TOTAL

## 2025-07-02 ASSESSMENT — PAIN - FUNCTIONAL ASSESSMENT
PAIN_FUNCTIONAL_ASSESSMENT: 0-10

## 2025-07-02 ASSESSMENT — PAIN SCALES - GENERAL
PAINLEVEL_OUTOF10: 0 - NO PAIN
PAINLEVEL_OUTOF10: 3

## 2025-07-02 ASSESSMENT — PAIN DESCRIPTION - LOCATION: LOCATION: LEG

## 2025-07-02 ASSESSMENT — PAIN DESCRIPTION - ORIENTATION: ORIENTATION: LEFT;RIGHT

## 2025-07-02 ASSESSMENT — PAIN DESCRIPTION - DESCRIPTORS: DESCRIPTORS: ACHING

## 2025-07-02 NOTE — PROGRESS NOTES
Cardiology Progress    Impression:  Acute on chronic hypercapnic, hypoxic respiratory failure.  Now extubated.  Metabolic alkalosis.  Advanced COPD.  On home oxygen.  Right heart failure.  Echo 6/20/2025 showing normal EF, moderate RV dysfunction.  RVSP could not be estimated.  Peripheral fluid overload.  MARLENY.  Struggles with BiPAP.  Obesity hypoventilation syndrome  Anemia  Paroxysmal atrial fibrillation.  Currently sinus rhythm.  Mild aortic stenosis  Hypertension  Hyperlipidemia  Plan:  Continue torsemide.  No new recommendations.  HPI:  Brighter today.  No shortness of breath at rest.  Meds:  Scheduled medications  Scheduled Medications[1]  Continuous medications  Continuous Medications[2]  PRN medications  PRN Medications[3]    Physical exam:  Vitals:    07/02/25 0727   BP:    Pulse:    Resp:    Temp:    SpO2: 91%      JVP not visible.  Decreased breath sounds at the bases.  Minimal edema.  EKG:  Telemetry shows sinus rhythm.  Echo:  6/20/2025: Normal EF. Moderate RV dysfunction. RVSP could not be obtained.    Labs:  Lab Results   Component Value Date    WBC 7.5 07/02/2025    HGB 11.6 (L) 07/02/2025    HCT 39.0 07/02/2025     07/02/2025    CHOL 133 11/13/2020    TRIG 88 11/13/2020    HDL 55.4 11/13/2020    ALT 17 06/22/2025    AST 15 06/22/2025     07/02/2025    K 3.4 (L) 07/02/2025    CL 95 (L) 07/02/2025    CREATININE 0.87 07/02/2025    BUN 28 (H) 07/02/2025    CO2 35 (H) 07/02/2025    TSH 3.34 04/06/2022    HGBA1C 5.9 (A) 04/06/2022     par         [1] acetylcysteine, 2 mL, nebulization, BID  ammonium lactate, , Topical, q12h  atorvastatin, 10 mg, oral, Daily  azithromycin, 500 mg, oral, Once per day on Monday Wednesday Friday  budesonide, 0.5 mg, nebulization, BID  docusate sodium, 100 mg, oral, Nightly  enoxaparin, 60 mg, subcutaneous, q12h VLADIMIR  insulin lispro, 0-15 Units, subcutaneous, TID AC  ipratropium-albuteroL, 3 mL, nebulization, TID  levothyroxine, 100 mcg, oral, Daily  oxygen, ,  inhalation, Continuous - Inhalation  oxygen, , inhalation, Continuous - Inhalation  pantoprazole, 20 mg, oral, BID  perflutren lipid microspheres, 0.5-10 mL of dilution, intravenous, Once in imaging  perflutren protein A microsphere, 0.5 mL, intravenous, Once in imaging  sulfur hexafluoride microsphr, 2 mL, intravenous, Once in imaging  torsemide, 40 mg, oral, Daily  [2]    [3] PRN medications: acetaminophen **OR** acetaminophen **OR** acetaminophen, dextrose, dextrose, glucagon, glucagon, ipratropium-albuteroL, melatonin, polyethylene glycol

## 2025-07-02 NOTE — CARE PLAN
Problem: Discharge Planning  Goal: Discharge to home or other facility with appropriate resources  Outcome: Progressing     Problem: Chronic Conditions and Co-morbidities  Goal: Patient's chronic conditions and co-morbidity symptoms are monitored and maintained or improved  Outcome: Progressing     Problem: Nutrition  Goal: Nutrient intake appropriate for maintaining nutritional needs  Outcome: Progressing     Problem: Diabetes  Goal: Achieve decreasing blood glucose levels by end of shift  Outcome: Progressing  Goal: Increase stability of blood glucose readings by end of shift  Outcome: Progressing  Goal: Maintain electrolyte levels within acceptable range throughout shift  Outcome: Progressing  Goal: Maintain glucose levels >70mg/dl to <250mg/dl throughout shift  Outcome: Progressing  Goal: No changes in neurological exam by end of shift  Outcome: Progressing  Goal: Learn about and adhere to nutrition recommendations by end of shift  Outcome: Progressing  Goal: Vital signs within normal range for age by end of shift  Outcome: Progressing  Goal: Increase self care and/or family involovement by end of shift  Outcome: Progressing  Goal: Receive DSME education by end of shift  Outcome: Progressing     Problem: Skin  Goal: Decreased wound size/increased tissue granulation at next dressing change  Outcome: Progressing  Goal: Participates in plan/prevention/treatment measures  Outcome: Progressing  Goal: Prevent/manage excess moisture  Outcome: Progressing  Goal: Prevent/minimize sheer/friction injuries  Outcome: Progressing  Goal: Promote/optimize nutrition  Outcome: Progressing  Goal: Promote skin healing  Outcome: Progressing     Problem: Knowledge Deficit  Goal: Patient/family/caregiver demonstrates understanding of disease process, treatment plan, medications, and discharge instructions  Outcome: Progressing     Problem: Pain  Goal: Takes deep breaths with improved pain control throughout the shift  Outcome:  Progressing  Goal: Turns in bed with improved pain control throughout the shift  Outcome: Progressing  Goal: Free from opioid side effects throughout the shift  Outcome: Progressing  Goal: Free from acute confusion related to pain meds throughout the shift  Outcome: Progressing     Problem: Fall/Injury  Goal: Be free from injury by end of the shift  Outcome: Progressing  Goal: Verbalize understanding of personal risk factors for fall in the hospital  Outcome: Progressing  Goal: Verbalize understanding of risk factor reduction measures to prevent injury from fall in the home  Outcome: Progressing  Goal: Use assistive devices by end of the shift  Outcome: Progressing  Goal: Pace activities to prevent fatigue by end of the shift  Outcome: Progressing   The patient's goals for the shift include      The clinical goals for the shift include Rest and comfort

## 2025-07-02 NOTE — PROGRESS NOTES
Subjective   Ailyn Banegas is a 80 y.o. female who presents with Shortness of Breath.    Today the patient reports that she was having a bad day due to poor sleep.  She notes that it was uncomfortable to sleep with the BiPAP mask on overnight.  Respiratory therapy switched her to 5 L O2 nasal cannula today.  She was on 40/40 airvo with BiPAP last night. P2P for needed for patient to return to LTAC. Attempted to call P2P again.    Objective   Vitals:    07/02/25 1456   BP:    Pulse:    Resp:    Temp:    SpO2: 92%      Physical Exam  Physical Exam:  Constitutional: obese habitus, awake, alert, no acute distress  ENMT: mucous membranes moist, EOMI, conjunctivae clear  Head/Neck: normocephalic, atraumatic; supple, trachea midline  Respiratory/Thorax: patent airways, CTAB; no wheezes, rales, or rhonchi  Cardiovascular: RRR, no murmur  Gastrointestinal: soft, nondistended, non-tender, bowel sounds appreciated  Extremities: palpable peripheral pulses, no edema or cyanosis  Neurological: AO x3, no focal deficits  Psychological: appropriate mood and behavior  Skin: warm and dry    Assessment/Plan       Ailyn Banegas is a 80 y.o. female with a PMHx of reported COPD (no PFTs on record, on baseline 5L NC), MARLENY/likely OHS, Hypothyroidism, DLD, Morbid Obesity who was brought into the ED for respiratory distress. Admitted to ICU for acute on chronic hypercapnic hypoxic respiratory failure 2/2 ADHF.     Acute medical conditions:  #Acute on chronic hypercapnic hypoxic respiratory failure  #Decompensated diastolic heart failure  #Pulmonary edema with bilateral pleural effusions  #Aspiration pneumonia, completed course of abx  #Paroxysmal A-fib  #COPD on 5 L nasal cannula baseline  -Patient initially required intubation, extubated 6/24  -Patient was treated with a 7-day course of Zosyn for PNA  -On prophylactic azithromycin 3 times weekly  -Was initially on Lasix drip however transition back to home diuretic of 40 torsemide  daily  -Strict I's and O's  -Resume home meds Cardizem, Aldactone as able  - Discharge planning to [facility], P2P started  #Incontinence associated dermatitis  - Ordered Mycostatin and zinc oxide to be applied topically for her groin.    Chronic medical conditions:  #Hypothyroidism-continue home dose Synthroid  #Hyperglycemia-SSI #3  #Chronic anemia  #Chronic lower extremity wounds  #MARLENY on home CPAP    DVT PPX: Lovenox  Diet: Regular  IVF:  Code Status: DNR    This is a preliminary note written by the resident. Please wait for attending addendum for finalization of note and recommendations.    David Pinzon MD   Internal Medicine PGY-1

## 2025-07-02 NOTE — CARE PLAN
Problem: Skin  Goal: Prevent/manage excess moisture  Outcome: Progressing     Problem: Skin  Goal: Prevent/minimize sheer/friction injuries  Outcome: Progressing     Problem: Skin  Goal: Promote/optimize nutrition  Outcome: Progressing   The patient's goals for the shift include      The clinical goals for the shift include Rest and comfort

## 2025-07-02 NOTE — CARE PLAN
The patient's goals for the shift include      The clinical goals for the shift include Rest and comfort    Over the shift, the patient will maintain skin integrity, she will remain hemodynamically stable and will refrain from further complications

## 2025-07-02 NOTE — NURSING NOTE
"Heart Failure Nurse Navigator      Assessment    I met with Ailyn Banegas at the bedside    Patients Cardiologist(s): Dr. Moon    Patients Primary Care Provider:    1. Medical Domain  What is the patient's most recent LVEF? Normal EF  HFrEF (LVEF <= 40%) Quadruple therapy recommended  HFmrEF (LVEF 41-49%) Quadruple therapy recommended  HFpEF (LVEF >= 50%) Minimum recommendations: SGLT2i and MRA  Is the patient on OP GDMT for their condition?   ARNI/ACEI/ARB: No None  BB: No None  MRA: Yes Spironolactone 100 mg daily  SGLT2i: No None  Is the patient prescribed a diuretic? Yes  Furosemide 20 mg daily  Does this patient have an implanted device (ie cardioMEMS, ICD, CRT-D)?  Device type:   Could this patient have advanced heart failure (Stage D heart failure)?:    If yes, the potential markers of advanced heart failure include:     REFERENCE: Potential markers of advanced HF   Inotrope (dobutamine or milrinone) used during this admission?   LVEF<=25%?   2.   >=2 hospitalizations for ADHF in the last year?   3.   Severe symptoms of HF (fatigue, dyspnea, confusion, edema) despite medical therapy?   4.  Downtitration of GDMT as compared to home medications?   5.  Discontinuation of GDMT because of hypotension or renal intolerance?   6.  Recurrent arrhythmias (AF, VT with ICD shocks)?   7.  Cardiac cachexia (i.e., unintentional weight loss due to HF)?   8.  High-risk biomarker profile (e.g., hyponatremia [Na<135], very elevated BNP, worsening kidney function)   9.  Escalating doses of diuretics or persistent edema despite escalation     2. Mind and Emotion  Does this patient have possible cognitive impairment?: No (The Mini-Cog score )  Ask the patient to memorize these 3 words: banana, sunrise, chair  Ask the patient to draw a clock with hands pointing at \"20 minutes after 8\"  Ask the patient to recall the 3 words  Score: Add number of words recalled + clock drawing (0 points for any errors, 2 points if " correct)  Interpretation: A score of 0-2 suggests cognitive impairment is present, a score of 3-5 suggests cognitive impairment is absent  Does this patient have major depression?: No (PH-Q2 score )  Over the last 2 weeks: Little interest or pleasure in doing things? (Not at all +0, Several days +1, More than half the days +2, Nearly every day +3)  Over the last 2 weeks: Feeling down, depressed or hopeless? (Not at all +0, Several days +1, More than half the days +2, Nearly every day +3)  Score: Add points  Interpretation: A score of 3 or more suggests that major depression is likely.     3. Physical Function  Could this patient be frail?: Yes   Defined by presence of all of these: slowness, weakness, shrinking, inactivity, exhaustion  Is this patient at risk for falling?: Yes   Defined by having experienced a fall in the last 12 months.         I provided the following heart failure education:  - Heart Failure education packet provided.  - HF signs and symptoms, heart failure zones and when to call cardiologist.   - Controlling Heart Failure at Home: medication adherence, following up with cardiologist at least once yearly, staying healthy and active, limiting sodium and fluid intake as directed by cardiologist.  - Daily Weight Education  -Low Sodium Diet Education  -Fluid Restriction Education      *Discharge Appointment Follow-up Plan:        Additional Comments: Family at bedside and participated in teaching. Patient does not have a cardiologist, but would like to become established with Dr. Moon after discharge. Discussed CHF signs and symptoms and when to call Dr. Moon. Discussed low sodium diet and foods to avoid. Patient and family appreciated education and verbalized understanding of teaching provided.

## 2025-07-02 NOTE — PROGRESS NOTES
Received call from patients daughter Tere [phone]. Patient provided permission for [facility] to call her daughter, called and left message requesting for return call.     Addendum 1411: Spoke with patients daughter, provided update that we are awaiting peer to peer for LTACH.

## 2025-07-03 LAB
ANION GAP SERPL CALC-SCNC: 12 MMOL/L (ref 10–20)
BUN SERPL-MCNC: 25 MG/DL (ref 6–23)
CALCIUM SERPL-MCNC: 9.3 MG/DL (ref 8.6–10.3)
CHLORIDE SERPL-SCNC: 97 MMOL/L (ref 98–107)
CO2 SERPL-SCNC: 38 MMOL/L (ref 21–32)
CREAT SERPL-MCNC: 0.91 MG/DL (ref 0.5–1.05)
EGFRCR SERPLBLD CKD-EPI 2021: 64 ML/MIN/1.73M*2
ERYTHROCYTE [DISTWIDTH] IN BLOOD BY AUTOMATED COUNT: 15.1 % (ref 11.5–14.5)
GLUCOSE BLD MANUAL STRIP-MCNC: 109 MG/DL (ref 74–99)
GLUCOSE BLD MANUAL STRIP-MCNC: 129 MG/DL (ref 74–99)
GLUCOSE BLD MANUAL STRIP-MCNC: 144 MG/DL (ref 74–99)
GLUCOSE SERPL-MCNC: 113 MG/DL (ref 74–99)
HCT VFR BLD AUTO: 38.5 % (ref 36–46)
HGB BLD-MCNC: 11.2 G/DL (ref 12–16)
MAGNESIUM SERPL-MCNC: 1.88 MG/DL (ref 1.6–2.4)
MCH RBC QN AUTO: 27.4 PG (ref 26–34)
MCHC RBC AUTO-ENTMCNC: 29.1 G/DL (ref 32–36)
MCV RBC AUTO: 94 FL (ref 80–100)
NRBC BLD-RTO: 0 /100 WBCS (ref 0–0)
PLATELET # BLD AUTO: 319 X10*3/UL (ref 150–450)
POTASSIUM SERPL-SCNC: 3.6 MMOL/L (ref 3.5–5.3)
RBC # BLD AUTO: 4.09 X10*6/UL (ref 4–5.2)
SODIUM SERPL-SCNC: 143 MMOL/L (ref 136–145)
WBC # BLD AUTO: 7.3 X10*3/UL (ref 4.4–11.3)

## 2025-07-03 PROCEDURE — 80048 BASIC METABOLIC PNL TOTAL CA: CPT

## 2025-07-03 PROCEDURE — 2500000001 HC RX 250 WO HCPCS SELF ADMINISTERED DRUGS (ALT 637 FOR MEDICARE OP)

## 2025-07-03 PROCEDURE — 97116 GAIT TRAINING THERAPY: CPT | Mod: GP,CQ

## 2025-07-03 PROCEDURE — 82947 ASSAY GLUCOSE BLOOD QUANT: CPT

## 2025-07-03 PROCEDURE — 2500000002 HC RX 250 W HCPCS SELF ADMINISTERED DRUGS (ALT 637 FOR MEDICARE OP, ALT 636 FOR OP/ED): Performed by: INTERNAL MEDICINE

## 2025-07-03 PROCEDURE — 2060000001 HC INTERMEDIATE ICU ROOM DAILY

## 2025-07-03 PROCEDURE — 2500000004 HC RX 250 GENERAL PHARMACY W/ HCPCS (ALT 636 FOR OP/ED)

## 2025-07-03 PROCEDURE — 83735 ASSAY OF MAGNESIUM: CPT

## 2025-07-03 PROCEDURE — 2500000005 HC RX 250 GENERAL PHARMACY W/O HCPCS

## 2025-07-03 PROCEDURE — 94660 CPAP INITIATION&MGMT: CPT

## 2025-07-03 PROCEDURE — 9420000001 HC RT PATIENT EDUCATION 5 MIN

## 2025-07-03 PROCEDURE — 2500000001 HC RX 250 WO HCPCS SELF ADMINISTERED DRUGS (ALT 637 FOR MEDICARE OP): Performed by: INTERNAL MEDICINE

## 2025-07-03 PROCEDURE — 2500000002 HC RX 250 W HCPCS SELF ADMINISTERED DRUGS (ALT 637 FOR MEDICARE OP, ALT 636 FOR OP/ED)

## 2025-07-03 PROCEDURE — 94640 AIRWAY INHALATION TREATMENT: CPT

## 2025-07-03 PROCEDURE — 99239 HOSP IP/OBS DSCHRG MGMT >30: CPT

## 2025-07-03 PROCEDURE — 2500000005 HC RX 250 GENERAL PHARMACY W/O HCPCS: Performed by: STUDENT IN AN ORGANIZED HEALTH CARE EDUCATION/TRAINING PROGRAM

## 2025-07-03 PROCEDURE — 36415 COLL VENOUS BLD VENIPUNCTURE: CPT

## 2025-07-03 PROCEDURE — 97535 SELF CARE MNGMENT TRAINING: CPT | Mod: GP,CQ

## 2025-07-03 PROCEDURE — 85027 COMPLETE CBC AUTOMATED: CPT

## 2025-07-03 RX ADMIN — RUGBY ZINC OXIDE 20% 1 APPLICATION: 20 OINTMENT TOPICAL at 20:07

## 2025-07-03 RX ADMIN — BUDESONIDE INHALATION 0.5 MG: 0.5 SUSPENSION RESPIRATORY (INHALATION) at 08:04

## 2025-07-03 RX ADMIN — RUGBY ZINC OXIDE 20% 1 APPLICATION: 20 OINTMENT TOPICAL at 00:12

## 2025-07-03 RX ADMIN — LEVOTHYROXINE SODIUM 100 MCG: 0.1 TABLET ORAL at 06:24

## 2025-07-03 RX ADMIN — NYSTATIN: 100000 OINTMENT TOPICAL at 08:09

## 2025-07-03 RX ADMIN — BUDESONIDE INHALATION 0.5 MG: 0.5 SUSPENSION RESPIRATORY (INHALATION) at 19:17

## 2025-07-03 RX ADMIN — ATORVASTATIN CALCIUM 10 MG: 10 TABLET, FILM COATED ORAL at 08:05

## 2025-07-03 RX ADMIN — ENOXAPARIN SODIUM 60 MG: 100 INJECTION SUBCUTANEOUS at 08:05

## 2025-07-03 RX ADMIN — Medication 5 MG: at 21:33

## 2025-07-03 RX ADMIN — IPRATROPIUM BROMIDE AND ALBUTEROL SULFATE 3 ML: .5; 3 SOLUTION RESPIRATORY (INHALATION) at 19:12

## 2025-07-03 RX ADMIN — Medication 4 L/MIN: at 08:06

## 2025-07-03 RX ADMIN — ACETYLCYSTEINE 400 MG: 200 SOLUTION ORAL; RESPIRATORY (INHALATION) at 19:11

## 2025-07-03 RX ADMIN — Medication 4 L/MIN: at 19:18

## 2025-07-03 RX ADMIN — TORSEMIDE 40 MG: 20 TABLET ORAL at 08:04

## 2025-07-03 RX ADMIN — IPRATROPIUM BROMIDE AND ALBUTEROL SULFATE 3 ML: .5; 3 SOLUTION RESPIRATORY (INHALATION) at 13:59

## 2025-07-03 RX ADMIN — Medication: at 14:00

## 2025-07-03 RX ADMIN — RUGBY ZINC OXIDE 20% 1 APPLICATION: 20 OINTMENT TOPICAL at 22:33

## 2025-07-03 RX ADMIN — ENOXAPARIN SODIUM 60 MG: 100 INJECTION SUBCUTANEOUS at 20:08

## 2025-07-03 RX ADMIN — ACETYLCYSTEINE 400 MG: 200 SOLUTION ORAL; RESPIRATORY (INHALATION) at 07:50

## 2025-07-03 RX ADMIN — ACETAMINOPHEN 650 MG: 325 TABLET ORAL at 20:07

## 2025-07-03 RX ADMIN — PANTOPRAZOLE SODIUM 20 MG: 20 TABLET, DELAYED RELEASE ORAL at 06:24

## 2025-07-03 RX ADMIN — IPRATROPIUM BROMIDE AND ALBUTEROL SULFATE 3 ML: .5; 3 SOLUTION RESPIRATORY (INHALATION) at 07:50

## 2025-07-03 RX ADMIN — PANTOPRAZOLE SODIUM 20 MG: 20 TABLET, DELAYED RELEASE ORAL at 17:19

## 2025-07-03 RX ADMIN — NYSTATIN: 100000 OINTMENT TOPICAL at 20:07

## 2025-07-03 RX ADMIN — Medication 4 L/MIN: at 19:37

## 2025-07-03 ASSESSMENT — COGNITIVE AND FUNCTIONAL STATUS - GENERAL
CLIMB 3 TO 5 STEPS WITH RAILING: TOTAL
TURNING FROM BACK TO SIDE WHILE IN FLAT BAD: A LOT
HELP NEEDED FOR BATHING: TOTAL
TOILETING: A LOT
MOBILITY SCORE: 13
MOVING TO AND FROM BED TO CHAIR: A LOT
WALKING IN HOSPITAL ROOM: A LOT
MOVING FROM LYING ON BACK TO SITTING ON SIDE OF FLAT BED WITH BEDRAILS: A LITTLE
DRESSING REGULAR UPPER BODY CLOTHING: A LOT
MOVING TO AND FROM BED TO CHAIR: A LOT
DRESSING REGULAR LOWER BODY CLOTHING: A LOT
STANDING UP FROM CHAIR USING ARMS: A LOT
WALKING IN HOSPITAL ROOM: A LOT
MOVING TO AND FROM BED TO CHAIR: A LITTLE
CLIMB 3 TO 5 STEPS WITH RAILING: A LOT
MOBILITY SCORE: 12
STANDING UP FROM CHAIR USING ARMS: A LOT
PERSONAL GROOMING: A LITTLE
CLIMB 3 TO 5 STEPS WITH RAILING: TOTAL
TURNING FROM BACK TO SIDE WHILE IN FLAT BAD: A LITTLE
TURNING FROM BACK TO SIDE WHILE IN FLAT BAD: A LITTLE
DRESSING REGULAR UPPER BODY CLOTHING: A LOT
MOVING FROM LYING ON BACK TO SITTING ON SIDE OF FLAT BED WITH BEDRAILS: A LITTLE
DAILY ACTIVITIY SCORE: 13
PERSONAL GROOMING: A LITTLE
TOILETING: TOTAL
DRESSING REGULAR LOWER BODY CLOTHING: A LOT
WALKING IN HOSPITAL ROOM: A LOT
DAILY ACTIVITIY SCORE: 15
MOVING FROM LYING ON BACK TO SITTING ON SIDE OF FLAT BED WITH BEDRAILS: A LOT
STANDING UP FROM CHAIR USING ARMS: A LOT
HELP NEEDED FOR BATHING: A LOT
MOBILITY SCORE: 14

## 2025-07-03 ASSESSMENT — PAIN - FUNCTIONAL ASSESSMENT
PAIN_FUNCTIONAL_ASSESSMENT: 0-10
PAIN_FUNCTIONAL_ASSESSMENT: 0-10

## 2025-07-03 ASSESSMENT — PAIN SCALES - GENERAL
PAINLEVEL_OUTOF10: 0 - NO PAIN
PAINLEVEL_OUTOF10: 4

## 2025-07-03 ASSESSMENT — PAIN DESCRIPTION - LOCATION: LOCATION: LEG

## 2025-07-03 ASSESSMENT — PAIN DESCRIPTION - ORIENTATION: ORIENTATION: RIGHT;LEFT

## 2025-07-03 ASSESSMENT — PAIN DESCRIPTION - DESCRIPTORS: DESCRIPTORS: ACHING

## 2025-07-03 NOTE — PROGRESS NOTES
Ailyn Banegas is a 80 y.o. female on day 11 of admission presenting with Acute respiratory failure with hypoxia and hypercapnia.  Record reviewed.  Discharge plan is to [facility].  Peer to Peer was offered. [facility] contacted attending team for updates.  Care Transitions will continue to follow.  Marisela Zhong RN BSN, T[facility]    3:40 pm addendum  Awaiting updates regarding peer to peer.  Care Transitions will continue to follow.

## 2025-07-03 NOTE — PROGRESS NOTES
Physical Therapy    Physical Therapy Treatment    Patient Name: Ailyn Banegas  MRN: [id number]  Department: [facility]  Room: [address] [place] [address]  Today's Date: 7/3/2025  Time Calculation  Start Time: 1300  Stop Time: 1324  Time Calculation (min): 24 min         Assessment/Plan   PT Assessment  End of Session Communication: Bedside nurse, PCT/NA/CTA  End of Session Patient Position: Up in chair, Alarm on     PT Plan  Treatment/Interventions: Bed mobility, Transfer training, Gait training, Balance training, Strengthening, Endurance training, Therapeutic exercise, Therapeutic activity  PT Plan: Ongoing PT  PT Frequency: 3 times per week  PT Discharge Recommendations: Moderate intensity level of continued care  PT Recommended Transfer Status: Assist x2  PT - OK to Discharge: Yes      General Visit Information:   General  Prior to Session Communication: Bedside nurse  Patient Position Received: Up in chair, Alarm off, not on at start of session  General Comment:  (pt pleasant; needing to use bsc upon therapy arrival)    Subjective   Precautions:  Precautions  Medical Precautions: Fall precautions, Oxygen therapy device and L/min (4L)  Precautions Comment: tele; purewick    Vital Signs Comment: SpO2 dropping as low as 88% with mobility, increasing back to 90s after brief sitting rest period     Objective   Pain:  Pain Assessment  Pain Assessment:  (pt with chronic bilat knee pain; does not quantify at this time)    Cognition:  Cognition  Overall Cognitive Status: Within Functional Limits     Treatments:  Bed Mobility  Bed Mobility:  (NT - pt seated in chair upon therapy arrival and departure)    Ambulation/Gait Training  Ambulation/Gait Training Performed:  (pt able to ambulate several feet each chair <> BSC, FWW and min A x 1.  pt also able to perform x2 trials static stance of 2-3 min. each, FWW and CGA.  fwd flexed posture.  decreased endurance noted with pt becoming quickly SOB.)    Transfers  Transfer:  (sit <> stand x3 trials  with FWW and min/mod A x 1.  cuing for sequencing and upright posture.  increasingly more effortful upon fatiguing.)    Outcome Measures:  [facility] Basic Mobility  Turning from your back to your side while in a flat bed without using bedrails: A lot  Moving from lying on your back to sitting on the side of a flat bed without using bedrails: A lot  Moving to and from bed to chair (including a wheelchair): A little  Standing up from a chair using your arms (e.g. wheelchair or bedside chair): A lot  To walk in hospital room: A lot  Climbing 3-5 steps with railing: Total  Basic Mobility - Total Score: 12    Education Documentation  Precautions, taught by Aleshia Muñoz PTA at 7/3/2025  3:13 PM.  Learner: Patient  Readiness: Acceptance  Method: Explanation  Response: Verbalizes Understanding, Needs Reinforcement    Mobility Training, taught by Aleshia Muñoz PTA at 7/3/2025  3:13 PM.  Learner: Patient  Readiness: Acceptance  Method: Explanation  Response: Verbalizes Understanding, Needs Reinforcement    Education Comments  No comments found.        EDUCATION:       Encounter Problems       Encounter Problems (Active)       PT Problem       PT Goal 1 STG - Pt will amb 25' using WW with SBA (Progressing)       Start:  06/26/25    Expected End:  07/10/25            PT Goal 2 STG - Pt will transition supine <> sitting with  SBA (Progressing)       Start:  06/26/25    Expected End:  07/10/25            PT Goal 3 STG - Pt will SPT bed <> chair with  SBA (Progressing)       Start:  06/26/25    Expected End:  07/10/25            PT Goal 4 STG - Pt will transfer STS with  SBA (Progressing)       Start:  06/26/25    Expected End:  07/10/25

## 2025-07-03 NOTE — CARE PLAN
Problem: Discharge Planning  Goal: Discharge to home or other facility with appropriate resources  Outcome: Progressing     Problem: Chronic Conditions and Co-morbidities  Goal: Patient's chronic conditions and co-morbidity symptoms are monitored and maintained or improved  Outcome: Progressing     Problem: Nutrition  Goal: Nutrient intake appropriate for maintaining nutritional needs  Outcome: Progressing     Problem: Diabetes  Goal: Achieve decreasing blood glucose levels by end of shift  Outcome: Progressing  Goal: Increase stability of blood glucose readings by end of shift  Outcome: Progressing  Goal: Maintain electrolyte levels within acceptable range throughout shift  Outcome: Progressing  Goal: Maintain glucose levels >70mg/dl to <250mg/dl throughout shift  Outcome: Progressing  Goal: No changes in neurological exam by end of shift  Outcome: Progressing  Goal: Learn about and adhere to nutrition recommendations by end of shift  Outcome: Progressing  Goal: Vital signs within normal range for age by end of shift  Outcome: Progressing  Goal: Increase self care and/or family involovement by end of shift  Outcome: Progressing  Goal: Receive DSME education by end of shift  Outcome: Progressing     Problem: Skin  Goal: Decreased wound size/increased tissue granulation at next dressing change  Outcome: Progressing  Goal: Participates in plan/prevention/treatment measures  Outcome: Progressing  Goal: Prevent/manage excess moisture  Outcome: Progressing  Goal: Prevent/minimize sheer/friction injuries  Outcome: Progressing  Goal: Promote/optimize nutrition  Outcome: Progressing  Goal: Promote skin healing  Outcome: Progressing     Problem: Knowledge Deficit  Goal: Patient/family/caregiver demonstrates understanding of disease process, treatment plan, medications, and discharge instructions  Outcome: Progressing     Problem: Pain  Goal: Takes deep breaths with improved pain control throughout the shift  Outcome:  Progressing  Goal: Turns in bed with improved pain control throughout the shift  Outcome: Progressing  Goal: Free from opioid side effects throughout the shift  Outcome: Progressing  Goal: Free from acute confusion related to pain meds throughout the shift  Outcome: Progressing     Problem: Fall/Injury  Goal: Be free from injury by end of the shift  Outcome: Progressing  Goal: Verbalize understanding of personal risk factors for fall in the hospital  Outcome: Progressing  Goal: Verbalize understanding of risk factor reduction measures to prevent injury from fall in the home  Outcome: Progressing  Goal: Use assistive devices by end of the shift  Outcome: Progressing  Goal: Pace activities to prevent fatigue by end of the shift  Outcome: Progressing   The patient's goals for the shift include

## 2025-07-03 NOTE — CARE PLAN
The patient's goals for the shift include      The clinical goals for the shift include HDS      Problem: Chronic Conditions and Co-morbidities  Goal: Patient's chronic conditions and co-morbidity symptoms are monitored and maintained or improved  Outcome: Progressing     Problem: Diabetes  Goal: Achieve decreasing blood glucose levels by end of shift  Outcome: Progressing     Problem: Nutrition  Goal: Nutrient intake appropriate for maintaining nutritional needs  Outcome: Progressing     Problem: Diabetes  Goal: Maintain glucose levels >70mg/dl to <250mg/dl throughout shift  Outcome: Progressing

## 2025-07-03 NOTE — DISCHARGE SUMMARY
Discharge Diagnosis  Acute respiratory failure with hypoxia and hypercapnia           Issues Requiring Follow-Up  Acute Hypoxic Respiratory Failure      Discharge Meds     Medication List      CONTINUE taking these medications     acetaminophen 325 mg tablet; Commonly known as: Tylenol   * albuterol 2.5 mg /3 mL (0.083 %) nebulizer solution; Take 3 mL (2.5   mg) by nebulization 4 times a day as needed for wheezing or shortness of   breath.   * albuterol 90 mcg/actuation inhaler; Commonly known as: Proventil HFA;   Inhale 2 puffs every 4 hours if needed for shortness of breath.   atorvastatin 10 mg tablet; Commonly known as: Lipitor; Take 1 tablet (10   mg) by mouth once daily.   budesonide-formoterol 160-4.5 mcg/actuation inhaler; Commonly known as:   Symbicort; Inhale 2 puffs 2 times a day.   Centrum Silver 0.4 mg-300 mcg- 250 mcg; Generic drug: multivitamin with   minerals iron-free   Centrum Silver Women 8 mg iron-400 mcg-50 mcg tablet; Generic drug:   multivit-min-iron-FA-vit K-lut   dilTIAZem SR 60 mg 12 hr capsule; Commonly known as: Cardizem SR; Take 1   capsule (60 mg) by mouth once daily.   furosemide 40 mg tablet; Commonly known as: Lasix; Take 0.5 tablets (20   mg) by mouth once daily.   hydrOXYzine pamoate 25 mg capsule; Commonly known as: Vistaril; Take 1   capsule (25 mg) by mouth 3 times a day as needed for anxiety.   lancets 33 gauge misc; Commonly known as: OneTouch Delica Lancets; Check   sugar once a day   levothyroxine 100 mcg tablet; Commonly known as: Synthroid; Take 1   tablet (100 mcg) by mouth once daily.   * nystatin 100,000 unit/gram powder; Commonly known as: Mycostatin;   Apply topically 2 times a day. For Rash   oxygen gas therapy; Commonly known as: O2   pantoprazole 20 mg EC tablet; Commonly known as: ProtoNix; Take 1 tablet   (20 mg) by mouth 2 times a day. Do not crush, chew, or split.   spironolactone 50 mg tablet; Commonly known as: Aldactone; Take 2   tablets (100 mg) by mouth  once daily.   torsemide 20 mg tablet; Commonly known as: Demadex; Take 2 tablets (40   mg) by mouth once daily.  * This list has 3 medication(s) that are the same as other medications   prescribed for you. Read the directions carefully, and ask your doctor or   other care provider to review them with you.     STOP taking these medications     predniSONE 10 mg tablet; Commonly known as: Deltasone     ASK your doctor about these medications     * nystatin 100,000 unit/gram powder; Commonly known as: Mycostatin;   Apply 1 Application topically 3 times a day.  * This list has 1 medication(s) that are the same as other medications   prescribed for you. Read the directions carefully, and ask your doctor or   other care provider to review them with you.       Test Results Pending At Discharge  Pending Labs       No current pending labs.            Hospital Course   Ailyn Banegas is a 80 y.o. female who initially presented to the hospital with Shortness of Breath.  She has a past medical history of reported COPD (no PFTs on record, on baseline 5 L of oxygen nasal cannula), MARLENY/likely OHS, hypothyroidism, DLD, morbid obesity who was brought into the ED for respiratory distress she was later admitted to the ICU for acute on chronic hypercapnic hypoxic respiratory failure secondary to ADHF.  During the course of her ICU stay the patient initially required intubation and she was extubated on 6/24/2025.  When the patient was medically stable to be discharged and transferred out of the ICU, she was then treated with a 7-day course of Zosyn and prophylactic azithromycin 3 times weekly.  She was also initially on a Lasix drip however transitioned back to home diuretic 40 mg torsemide daily.  On the medical floor the patient was initially on 40/40 Airvo and nightly BiPAP.  However, over the course of her stay on the medical floor, she was able to be transitioned to 4 L oxygen. Patient was evaluated with PT/OT with recommendations  of home-going needs. Patient agreeable to discharge home with home health. Verified patient has BiPAP and necessary oxygen requirements. Patient strongly encouraged to follow up with her pulmonologist and cardiologist within the next 1-2 weeks. Patient is stable enough to be medically cleared for discharge to home.     pertinent Physical Exam At Time of Discharge  Physical Exam  Constitutional: Obese body habitus, A&Ox3, no acute distress, alert and cooperative  Eyes: EOMI, clear sclera  Respiratory/Thorax: CTAB, good chest expansion  Cardiovascular: Regular rate, regular rhythm  Gastrointestinal: Nondistended, soft, non-tender  Extremities: normal extremities, no cyanosis edema  Skin: Dermatitis rash in perineal region      Outpatient Follow-Up  No future appointments.      David Pinzon MD

## 2025-07-03 NOTE — CONSULTS
"Nutrition Follow Up Assessment:   Nutrition Assessment         Patient is a 80 y.o. female presenting with SOB  confusion      Nutrition History:  Food and Nutrient History: Pt is now on a regular diet and eating very well.  She feels maybe  too good.       Anthropometrics:  Height: 157.5 cm (5' 2\")   Weight:  (refused)   BMI (Calculated): 57.97  IBW/kg (Dietitian Calculated): 50 kg  Percent of IBW: 297 %                      Weight History:   Wt Readings from Last 10 Encounters:   06/28/25 144 kg (317 lb 0.3 oz)   07/03/24 125 kg (275 lb)   05/17/24 113 kg (250 lb)   02/16/24 81.6 kg (180 lb)   02/07/24 81.6 kg (180 lb)   11/27/23 122 kg (270 lb)   10/07/22 122 kg (270 lb)   08/17/22 122 kg (270 lb)   04/06/22 127 kg (280 lb)   12/03/21 127 kg (280 lb)         Weight Change %:  Weight History / % Weight Change: wt is down 5 kg  Significant Weight Gain: Fluid related    Nutrition Focused Physical Exam Findings:    Subcutaneous Fat Loss:   Orbital Fat Pads: Well nourished (slightly bulging fat pads)  Buccal Fat Pads: Well nourished (full, rounded cheeks)  Triceps: Well nourished (ample fat tissue)  Muscle Wasting:  Temporalis: Well nourished (well-defined muscle)  Pectoralis (Clavicular Region): Well nourished (clavicle not visible)  Deltoid/Trapezius: Well nourished (rounded appearance at arm, shoulder, neck)  Interosseous: Well nourished (muscle bulges)  Edema:  Edema Location: LE edema  Physical Findings:  Skin: Positive (Rt upper leg and tibial wounds)    Nutrition Significant Labs:  BMP Trend:   Results from last 7 days   Lab Units 07/03/25  0559 07/02/25  0548 07/01/25  0550 06/30/25  0552   GLUCOSE mg/dL 113* 116* 113* 105*   CALCIUM mg/dL 9.3 9.5 9.7 9.5   SODIUM mmol/L 143 140 141 144   POTASSIUM mmol/L 3.6 3.4* 3.7 3.5   CO2 mmol/L 38* 35* 34* 35*   CHLORIDE mmol/L 97* 95* 95* 97*   BUN mg/dL 25* 28* 24* 23   CREATININE mg/dL 0.91 0.87 0.91 0.76        Nutrition Specific Medications:  Lovenox; protonix; " decadron; lasix    I/O:   Last BM Date: 07/02/25; Stool Appearance: Formed (07/02/25 2100)    Dietary Orders (From admission, onward)       Start     Ordered    07/01/25 1324  Adult diet Regular; Thin 0; No straw  Diet effective now        Comments: Pills whole Ok   Question Answer Comment   Diet type Regular    Fluid consistency Thin 0    Select tray type: No straw        07/01/25 1323    06/23/25 1640  May Participate in Room Service  ( ROOM SERVICE MAY PARTICIPATE)  Once        Question:  .  Answer:  Yes    06/23/25 1639                     Estimated Needs:      Method for Estimating Needs: 0881-5936  26-30 sharmaine kg of OBW     Method for Estimating 24 Hour Protein Needs: 50-60  1-1.2 gm kg of IBW     Method for Estimating 24 Hour Fluid Needs: 6573-9995  20-30 ml kg of IBW as medically indicated  Patient on Order Fluid Restriction: No        Nutrition Diagnosis        Nutrition Diagnosis  Patient has Nutrition Diagnosis: Yes  Diagnosis Status (1): Resolved  Nutrition Diagnosis 1: Inadequate oral intake  Related to (1): decreased ability to consume sufficient energy  As Evidenced by (1): Pt is on airvo or bi pap and is unable to participate in a speech eval  Additional Nutrition Diagnosis: Diagnosis 3  Diagnosis Status (2): Resolved  Nutrition Diagnosis 2: Swallowing difficulty  Related to (2): motor causes  As Evidenced by (2): pt is on an easy to chew diet per SLP  Diagnosis Status (3): Active  Nutrition Diagnosis 3: Obesity class III  Related to (3): excessive energy intake and physical inactivity  As Evidenced by (3): obesity grade III   BMI  57.7       Nutrition Interventions/Recommendations   Nutrition prescription for oral nutrition    Nutrition Recommendations:  Individualized Nutrition Prescription Provided for : Continue regular diet to encourage intake,  monitor for possible supplement need    Nutrition Interventions/Goals:   Goal: <75% of meals  Coordination of Care with Providers: Nursing,  Provider              Nutrition Monitoring and Evaluation   Estimated Energy Intake: Energy intake 50 -75% of estimated energy needs  Fluid Intake:  (adequate fluid intake without fluid overload)  Intake / Amount of food: Consumes at least 75% or more of meals/snacks/supplements, Meets > 75% estimated energy needs         Criteria: stable BMP  Criteria: glucose within desired range              Time Spent (min): 30 minutes

## 2025-07-03 NOTE — PROGRESS NOTES
Cardiology Progress    Impression:  Acute on chronic hypercapnic, hypoxic respiratory failure.  Now extubated.  Metabolic alkalosis.  Advanced COPD.  On home oxygen.  Right heart failure.  Echo 6/20/2025 showing normal EF, moderate RV dysfunction.  RVSP could not be estimated.  Peripheral fluid overload.  MARLENY.  Struggles with BiPAP.  Obesity hypoventilation syndrome  Anemia  Paroxysmal atrial fibrillation.  Currently sinus rhythm.  Mild aortic stenosis  Hypertension  Hyperlipidemia  Plan:  Continue torsemide.  No new recommendations.  Awaiting LTAC.  HPI:  No problems overnight.  No shortness of breath at rest.  Meds:  Scheduled medications  Scheduled Medications[1]  Continuous medications  Continuous Medications[2]  PRN medications  PRN Medications[3]    Physical exam:  Vitals:    07/03/25 0806   BP:    Pulse:    Resp:    Temp:    SpO2: 93%      JVP not visible.  Decreased breath sounds at the bases.  Minimal edema.  EKG:  Telemetry shows sinus rhythm.  Echo:  6/20/2025: Normal EF. Moderate RV dysfunction. RVSP could not be obtained.    Labs:  Lab Results   Component Value Date    WBC 7.3 07/03/2025    HGB 11.2 (L) 07/03/2025    HCT 38.5 07/03/2025     07/03/2025    CHOL 133 11/13/2020    TRIG 88 11/13/2020    HDL 55.4 11/13/2020    ALT 17 06/22/2025    AST 15 06/22/2025     07/03/2025    K 3.6 07/03/2025    CL 97 (L) 07/03/2025    CREATININE 0.91 07/03/2025    BUN 25 (H) 07/03/2025    CO2 38 (H) 07/03/2025    TSH 3.34 04/06/2022    HGBA1C 5.9 (A) 04/06/2022     par         [1] acetylcysteine, 2 mL, nebulization, BID  ammonium lactate, , Topical, q12h  atorvastatin, 10 mg, oral, Daily  azithromycin, 500 mg, oral, Once per day on Monday Wednesday Friday  budesonide, 0.5 mg, nebulization, BID  docusate sodium, 100 mg, oral, Nightly  enoxaparin, 60 mg, subcutaneous, q12h VLADIMIR  insulin lispro, 0-15 Units, subcutaneous, TID AC  ipratropium-albuteroL, 3 mL, nebulization, TID  levothyroxine, 100 mcg, oral,  Daily  nystatin, , Topical, BID  oxygen, , inhalation, Continuous - Inhalation  oxygen, , inhalation, Continuous - Inhalation  pantoprazole, 20 mg, oral, BID  perflutren lipid microspheres, 0.5-10 mL of dilution, intravenous, Once in imaging  perflutren protein A microsphere, 0.5 mL, intravenous, Once in imaging  sulfur hexafluoride microsphr, 2 mL, intravenous, Once in imaging  torsemide, 40 mg, oral, Daily  [2]    [3] PRN medications: acetaminophen **OR** acetaminophen **OR** acetaminophen, dextrose, dextrose, glucagon, glucagon, ipratropium-albuteroL, melatonin, polyethylene glycol, zinc oxide

## 2025-07-03 NOTE — NURSING NOTE
-- DO NOT REPLY / DO NOT REPLY ALL --  -- Message is from Engagement Center Operations (ECO) --    Patient has called to Cancel or Reschedule their upcoming appointment. Please contact patient as needed.     Existing appointment date / time: 1/26/2023 @ 11:20am    Provider: Corona Etienne    Appointment Cancelled    Caller Information       Type Contact Phone/Fax    01/23/2023 04:20 PM CST Phone (Incoming) dmitry longoria (Mother) [phone] (M)          Patient requests callback: No   Callback phone number: [phone]    Can a detailed message be left? Yes    Message Turnaround: PEDS SPECIALTY:    Please give this turnaround time to the caller:   \"This message will be sent to [state Provider's name]. The clinical team will fulfill your request as soon as they review your message.\"   Pulmonary Disease Navigator Documentation:    Pulmonary disease education was performed by the Respiratory Therapist with a good understanding: yes  Home oxygen: 5 lpm nc cont (Medical Services)  COPD triggers discussed and when to notify physician? yes  COPD Education booklet given to patient with education? no  Benefits of participating in a Pulmonary Rehab program discussed: Not at this time, patient with need for home health at this time  Pulmonary Rehab Referral written? NA  Home medication usage education done? Yes, Albuterol MDI/Neb; Symbicort  Vaccination status reviewed.  No PFT on file for my review.    Comments: Patient stated she had previously followed with Dr. Fenton, then Dr. Quintero for her outpatient pulmonary needs.  Patient stated at this time she follows only with her PCP, Dr. Crawford, due to changes in her insurance and cost of specialist visits.  Patient denied need for home-going respiratory medication refills at this time.

## 2025-07-04 LAB
ANION GAP SERPL CALC-SCNC: 13 MMOL/L (ref 10–20)
BUN SERPL-MCNC: 21 MG/DL (ref 6–23)
CALCIUM SERPL-MCNC: 9.6 MG/DL (ref 8.6–10.3)
CHLORIDE SERPL-SCNC: 97 MMOL/L (ref 98–107)
CO2 SERPL-SCNC: 34 MMOL/L (ref 21–32)
CREAT SERPL-MCNC: 0.84 MG/DL (ref 0.5–1.05)
EGFRCR SERPLBLD CKD-EPI 2021: 70 ML/MIN/1.73M*2
ERYTHROCYTE [DISTWIDTH] IN BLOOD BY AUTOMATED COUNT: 15.1 % (ref 11.5–14.5)
GLUCOSE BLD MANUAL STRIP-MCNC: 122 MG/DL (ref 74–99)
GLUCOSE BLD MANUAL STRIP-MCNC: 133 MG/DL (ref 74–99)
GLUCOSE BLD MANUAL STRIP-MCNC: 283 MG/DL (ref 74–99)
GLUCOSE SERPL-MCNC: 122 MG/DL (ref 74–99)
HCT VFR BLD AUTO: 40.2 % (ref 36–46)
HGB BLD-MCNC: 11.3 G/DL (ref 12–16)
MAGNESIUM SERPL-MCNC: 1.82 MG/DL (ref 1.6–2.4)
MCH RBC QN AUTO: 27.5 PG (ref 26–34)
MCHC RBC AUTO-ENTMCNC: 28.1 G/DL (ref 32–36)
MCV RBC AUTO: 98 FL (ref 80–100)
NRBC BLD-RTO: 0 /100 WBCS (ref 0–0)
PLATELET # BLD AUTO: 298 X10*3/UL (ref 150–450)
POTASSIUM SERPL-SCNC: 3.8 MMOL/L (ref 3.5–5.3)
RBC # BLD AUTO: 4.11 X10*6/UL (ref 4–5.2)
SODIUM SERPL-SCNC: 140 MMOL/L (ref 136–145)
WBC # BLD AUTO: 7.1 X10*3/UL (ref 4.4–11.3)

## 2025-07-04 PROCEDURE — 94640 AIRWAY INHALATION TREATMENT: CPT

## 2025-07-04 PROCEDURE — 2500000002 HC RX 250 W HCPCS SELF ADMINISTERED DRUGS (ALT 637 FOR MEDICARE OP, ALT 636 FOR OP/ED)

## 2025-07-04 PROCEDURE — 36415 COLL VENOUS BLD VENIPUNCTURE: CPT

## 2025-07-04 PROCEDURE — 80048 BASIC METABOLIC PNL TOTAL CA: CPT

## 2025-07-04 PROCEDURE — 99232 SBSQ HOSP IP/OBS MODERATE 35: CPT

## 2025-07-04 PROCEDURE — 2500000004 HC RX 250 GENERAL PHARMACY W/ HCPCS (ALT 636 FOR OP/ED)

## 2025-07-04 PROCEDURE — 2060000001 HC INTERMEDIATE ICU ROOM DAILY

## 2025-07-04 PROCEDURE — 85027 COMPLETE CBC AUTOMATED: CPT

## 2025-07-04 PROCEDURE — 82947 ASSAY GLUCOSE BLOOD QUANT: CPT

## 2025-07-04 PROCEDURE — 2500000001 HC RX 250 WO HCPCS SELF ADMINISTERED DRUGS (ALT 637 FOR MEDICARE OP)

## 2025-07-04 PROCEDURE — 83735 ASSAY OF MAGNESIUM: CPT

## 2025-07-04 PROCEDURE — 2500000005 HC RX 250 GENERAL PHARMACY W/O HCPCS: Performed by: STUDENT IN AN ORGANIZED HEALTH CARE EDUCATION/TRAINING PROGRAM

## 2025-07-04 PROCEDURE — 2500000002 HC RX 250 W HCPCS SELF ADMINISTERED DRUGS (ALT 637 FOR MEDICARE OP, ALT 636 FOR OP/ED): Performed by: INTERNAL MEDICINE

## 2025-07-04 PROCEDURE — 2500000001 HC RX 250 WO HCPCS SELF ADMINISTERED DRUGS (ALT 637 FOR MEDICARE OP): Performed by: INTERNAL MEDICINE

## 2025-07-04 PROCEDURE — 94660 CPAP INITIATION&MGMT: CPT

## 2025-07-04 RX ADMIN — IPRATROPIUM BROMIDE AND ALBUTEROL SULFATE 3 ML: .5; 3 SOLUTION RESPIRATORY (INHALATION) at 18:59

## 2025-07-04 RX ADMIN — ATORVASTATIN CALCIUM 10 MG: 10 TABLET, FILM COATED ORAL at 08:51

## 2025-07-04 RX ADMIN — ACETYLCYSTEINE 400 MG: 200 SOLUTION ORAL; RESPIRATORY (INHALATION) at 19:00

## 2025-07-04 RX ADMIN — TORSEMIDE 40 MG: 20 TABLET ORAL at 08:51

## 2025-07-04 RX ADMIN — ACETYLCYSTEINE 400 MG: 200 SOLUTION ORAL; RESPIRATORY (INHALATION) at 08:22

## 2025-07-04 RX ADMIN — BUDESONIDE INHALATION 0.5 MG: 0.5 SUSPENSION RESPIRATORY (INHALATION) at 19:00

## 2025-07-04 RX ADMIN — NYSTATIN: 100000 OINTMENT TOPICAL at 08:52

## 2025-07-04 RX ADMIN — IPRATROPIUM BROMIDE AND ALBUTEROL SULFATE 3 ML: .5; 3 SOLUTION RESPIRATORY (INHALATION) at 13:45

## 2025-07-04 RX ADMIN — IPRATROPIUM BROMIDE AND ALBUTEROL SULFATE 3 ML: .5; 3 SOLUTION RESPIRATORY (INHALATION) at 08:22

## 2025-07-04 RX ADMIN — PANTOPRAZOLE SODIUM 20 MG: 20 TABLET, DELAYED RELEASE ORAL at 05:58

## 2025-07-04 RX ADMIN — ENOXAPARIN SODIUM 60 MG: 100 INJECTION SUBCUTANEOUS at 20:45

## 2025-07-04 RX ADMIN — NYSTATIN: 100000 OINTMENT TOPICAL at 20:55

## 2025-07-04 RX ADMIN — Medication 36 PERCENT: at 19:04

## 2025-07-04 RX ADMIN — INSULIN LISPRO 9 UNITS: 100 INJECTION, SOLUTION INTRAVENOUS; SUBCUTANEOUS at 17:00

## 2025-07-04 RX ADMIN — PANTOPRAZOLE SODIUM 20 MG: 20 TABLET, DELAYED RELEASE ORAL at 17:01

## 2025-07-04 RX ADMIN — LEVOTHYROXINE SODIUM 100 MCG: 0.1 TABLET ORAL at 05:58

## 2025-07-04 RX ADMIN — Medication: at 12:53

## 2025-07-04 RX ADMIN — AZITHROMYCIN DIHYDRATE 500 MG: 250 TABLET ORAL at 08:51

## 2025-07-04 RX ADMIN — RUGBY ZINC OXIDE 20% 1 APPLICATION: 20 OINTMENT TOPICAL at 20:45

## 2025-07-04 RX ADMIN — ENOXAPARIN SODIUM 60 MG: 100 INJECTION SUBCUTANEOUS at 08:51

## 2025-07-04 RX ADMIN — BUDESONIDE INHALATION 0.5 MG: 0.5 SUSPENSION RESPIRATORY (INHALATION) at 08:22

## 2025-07-04 ASSESSMENT — COGNITIVE AND FUNCTIONAL STATUS - GENERAL
STANDING UP FROM CHAIR USING ARMS: A LOT
MOVING FROM LYING ON BACK TO SITTING ON SIDE OF FLAT BED WITH BEDRAILS: A LOT
WALKING IN HOSPITAL ROOM: A LOT
CLIMB 3 TO 5 STEPS WITH RAILING: TOTAL
STANDING UP FROM CHAIR USING ARMS: A LOT
MOBILITY SCORE: 13
DAILY ACTIVITIY SCORE: 15
WALKING IN HOSPITAL ROOM: A LOT
MOVING FROM LYING ON BACK TO SITTING ON SIDE OF FLAT BED WITH BEDRAILS: A LITTLE
DAILY ACTIVITIY SCORE: 13
PERSONAL GROOMING: A LITTLE
PERSONAL GROOMING: A LITTLE
DRESSING REGULAR UPPER BODY CLOTHING: A LOT
HELP NEEDED FOR BATHING: TOTAL
DRESSING REGULAR UPPER BODY CLOTHING: A LITTLE
CLIMB 3 TO 5 STEPS WITH RAILING: TOTAL
TURNING FROM BACK TO SIDE WHILE IN FLAT BAD: A LITTLE
MOVING TO AND FROM BED TO CHAIR: A LOT
DRESSING REGULAR LOWER BODY CLOTHING: A LOT
HELP NEEDED FOR BATHING: A LOT
MOVING TO AND FROM BED TO CHAIR: A LOT
EATING MEALS: A LITTLE
MOBILITY SCORE: 11
TOILETING: A LOT
TOILETING: TOTAL
DRESSING REGULAR LOWER BODY CLOTHING: A LOT
TURNING FROM BACK TO SIDE WHILE IN FLAT BAD: A LOT

## 2025-07-04 ASSESSMENT — PAIN - FUNCTIONAL ASSESSMENT: PAIN_FUNCTIONAL_ASSESSMENT: 0-10

## 2025-07-04 ASSESSMENT — PAIN SCALES - GENERAL
PAINLEVEL_OUTOF10: 0 - NO PAIN
PAINLEVEL_OUTOF10: 0 - NO PAIN

## 2025-07-04 NOTE — PROGRESS NOTES
[facility] denied the LTAC, MD asking for follow up.  I met with pt and her  and daughter to discuss dc options.  I provided pt with a skilled nursing list from [facility] .  Pt adamant she does not want to go skilled wants [facility].  Asked for [facility] , her PCP is Dr Moss.   List was also provided for [facility] agencies preference. Pt has home 02  BiPAP, WC walkers BSC and ramp to get into the home.    Family in a agreement with pt going home with [facility].   Daughter is asking for a late --dinner hour so she will be home for pt to help.  Pt and family aware pt will need ambulance for transport and there may be a cost.  Pt's  DME  is medical supplies company.   Per family her BiPAP needs to be reprogrammed, told family to call her medical [employer] as they told me they will do over phone.  Family not able to contact this company today but will try weekend.  Pt will need a function BiPAP at home prior to dc.   Radha Brewster RN [facility]

## 2025-07-04 NOTE — CARE PLAN
Problem: Nutrition  Goal: Nutrient intake appropriate for maintaining nutritional needs  Outcome: Progressing     Problem: Skin  Goal: Decreased wound size/increased tissue granulation at next dressing change  Outcome: Progressing  Flowsheets (Taken 7/4/2025 3441)  Decreased wound size/increased tissue granulation at next dressing change:   Promote sleep for wound healing   Utilize specialty bed per algorithm     Problem: Fall/Injury  Goal: Be free from injury by end of the shift  Outcome: Progressing

## 2025-07-04 NOTE — PROGRESS NOTES
Ailyn Banegas is a 80 y.o. female on day 12 of admission presenting with Acute respiratory failure with hypoxia and hypercapnia.      SUBJECTIVE     Patient evaluated this morning and found to be resting comfortably in her recliner.  She continues to tolerate her 4 L of O2 on nasal cannula well. The patient's family was spoken to last night.  They would like the patient to be discharged on Monday which allows them enough time to make arrangements at home for home health care.  Patient agrees with this discharge plan and she had no new concerns or complaints at this time.    OBJECTIVE     Vitals:    07/04/25 0153 07/04/25 0819 07/04/25 0822 07/04/25 1157   BP:  118/64  115/59   BP Location:       Patient Position:  Sitting     Pulse:  105  93   Resp: (!) 30 20  20   Temp:  36.4 °C (97.5 °F)  36.1 °C (97 °F)   TempSrc:    Temporal   SpO2: 94% 93% 94% 93%   Weight:       Height:          Results from last 7 days   Lab Units 07/04/25  0555 07/01/25  0550 06/30/25  0552   WBC AUTO x10*3/uL 7.1   < > 7.9   HEMOGLOBIN g/dL 11.3*   < > 11.4*   HEMATOCRIT % 40.2   < > 39.4   PLATELETS AUTO x10*3/uL 298   < > 262   NEUTROS PCT AUTO %  --   --  76.6   LYMPHS PCT AUTO %  --   --  13.0   MONOS PCT AUTO %  --   --  6.7   EOS PCT AUTO %  --   --  2.4    < > = values in this interval not displayed.     Results from last 7 days   Lab Units 07/04/25  0555   SODIUM mmol/L 140   POTASSIUM mmol/L 3.8   CHLORIDE mmol/L 97*   CO2 mmol/L 34*   BUN mg/dL 21   CREATININE mg/dL 0.84   CALCIUM mg/dL 9.6   GLUCOSE mg/dL 122*       Scheduled Medications  Scheduled Medications[1]   Physical Exam    Constitutional: Well developed, A&Ox3, no acute distress, alert and cooperative  Eyes: EOMI, clear sclera  Respiratory/Thorax: CTAB, good chest expansion  Cardiovascular: Regular rate, regular rhythm  Gastrointestinal: Nondistended, soft, non-tender  Extremities: normal extremities, no cyanosis edema  Skin: Warm and dry    ASSESSMENT & PLAN      Ailyn Banegas is a 80 y.o. female with a PMHx of reported COPD (no PFTs on record, on baseline 5L NC), MARLENY/likely OHS, Hypothyroidism, DLD, Morbid Obesity who was brought into the ED for respiratory distress. Admitted to ICU for acute on chronic hypercapnic hypoxic respiratory failure 2/2 ADHF.      Acute medical conditions:  #Acute on chronic hypercapnic hypoxic respiratory failure  #Decompensated diastolic heart failure  #Pulmonary edema with bilateral pleural effusions  #Aspiration pneumonia, completed course of abx  #Paroxysmal A-fib  #COPD on 5 L nasal cannula baseline  -Patient initially required intubation, extubated 6/24  -Patient was treated with a 7-day course of Zosyn for PNA  -On prophylactic azithromycin 3 times weekly  -Was initially on Lasix drip however transition back to home diuretic of 40 torsemide daily  -Strict I's and O's  -Resume home meds Cardizem, Aldactone as able  - BiPAP machine and O2 equipment at home confirmed before discharge.   - Patient's AMPAC score of 12 recommended SNF. However, patient's family is requesting C. Tentatively plan on discharge to [facility] on Monday.   #Incontinence associated dermatitis  - Ordered Mycostatin and zinc oxide to be applied topically for her groin.     Chronic medical conditions:  #Hypothyroidism-continue home dose Synthroid  #Hyperglycemia-SSI #3  #Chronic anemia  #Chronic lower extremity wounds  #MARLENY on home CPAP     DVT PPX: Lovenox  Diet: Regular  IVF:  Code Status: DNR     This is a preliminary note written by the resident. Please wait for attending addendum for finalization of note and recommendations.     David Pinzon MD   Internal Medicine PGY-1         [1] acetylcysteine, 2 mL, nebulization, BID  ammonium lactate, , Topical, q12h  atorvastatin, 10 mg, oral, Daily  azithromycin, 500 mg, oral, Once per day on Monday Wednesday Friday  budesonide, 0.5 mg, nebulization, BID  docusate sodium, 100 mg, oral, Nightly  enoxaparin, 60 mg,  subcutaneous, q12h VLADIMIR  insulin lispro, 0-15 Units, subcutaneous, TID AC  ipratropium-albuteroL, 3 mL, nebulization, TID  levothyroxine, 100 mcg, oral, Daily  nystatin, , Topical, BID  oxygen, , inhalation, Continuous - Inhalation  oxygen, , inhalation, Continuous - Inhalation  pantoprazole, 20 mg, oral, BID  perflutren lipid microspheres, 0.5-10 mL of dilution, intravenous, Once in imaging  perflutren protein A microsphere, 0.5 mL, intravenous, Once in imaging  sulfur hexafluoride microsphr, 2 mL, intravenous, Once in imaging  torsemide, 40 mg, oral, Daily

## 2025-07-05 LAB
GLUCOSE BLD MANUAL STRIP-MCNC: 114 MG/DL (ref 74–99)
GLUCOSE BLD MANUAL STRIP-MCNC: 143 MG/DL (ref 74–99)

## 2025-07-05 PROCEDURE — 2500000004 HC RX 250 GENERAL PHARMACY W/ HCPCS (ALT 636 FOR OP/ED)

## 2025-07-05 PROCEDURE — 2500000001 HC RX 250 WO HCPCS SELF ADMINISTERED DRUGS (ALT 637 FOR MEDICARE OP)

## 2025-07-05 PROCEDURE — 2500000002 HC RX 250 W HCPCS SELF ADMINISTERED DRUGS (ALT 637 FOR MEDICARE OP, ALT 636 FOR OP/ED)

## 2025-07-05 PROCEDURE — 94660 CPAP INITIATION&MGMT: CPT

## 2025-07-05 PROCEDURE — 2500000005 HC RX 250 GENERAL PHARMACY W/O HCPCS: Performed by: STUDENT IN AN ORGANIZED HEALTH CARE EDUCATION/TRAINING PROGRAM

## 2025-07-05 PROCEDURE — 2500000001 HC RX 250 WO HCPCS SELF ADMINISTERED DRUGS (ALT 637 FOR MEDICARE OP): Performed by: INTERNAL MEDICINE

## 2025-07-05 PROCEDURE — 99232 SBSQ HOSP IP/OBS MODERATE 35: CPT

## 2025-07-05 PROCEDURE — 94640 AIRWAY INHALATION TREATMENT: CPT

## 2025-07-05 PROCEDURE — 2500000002 HC RX 250 W HCPCS SELF ADMINISTERED DRUGS (ALT 637 FOR MEDICARE OP, ALT 636 FOR OP/ED): Performed by: INTERNAL MEDICINE

## 2025-07-05 PROCEDURE — 82947 ASSAY GLUCOSE BLOOD QUANT: CPT

## 2025-07-05 PROCEDURE — 2060000001 HC INTERMEDIATE ICU ROOM DAILY

## 2025-07-05 RX ORDER — VANCOMYCIN HYDROCHLORIDE 1.25 G/250ML
1250 INJECTION, SOLUTION INTRAVITREAL EVERY 12 HOURS
Status: DISCONTINUED | OUTPATIENT
Start: 2025-07-05 | End: 2025-07-07 | Stop reason: HOSPADM

## 2025-07-05 RX ORDER — VANCOMYCIN HYDROCHLORIDE 1 G/20ML
INJECTION, POWDER, LYOPHILIZED, FOR SOLUTION INTRAVENOUS DAILY PRN
Status: DISCONTINUED | OUTPATIENT
Start: 2025-07-05 | End: 2025-07-07 | Stop reason: HOSPADM

## 2025-07-05 RX ADMIN — Medication 4 L/MIN: at 21:11

## 2025-07-05 RX ADMIN — ACETAMINOPHEN 650 MG: 325 TABLET ORAL at 08:35

## 2025-07-05 RX ADMIN — IPRATROPIUM BROMIDE AND ALBUTEROL SULFATE 3 ML: .5; 3 SOLUTION RESPIRATORY (INHALATION) at 06:32

## 2025-07-05 RX ADMIN — ATORVASTATIN CALCIUM 10 MG: 10 TABLET, FILM COATED ORAL at 08:35

## 2025-07-05 RX ADMIN — Medication 1 APPLICATION: at 13:19

## 2025-07-05 RX ADMIN — IPRATROPIUM BROMIDE AND ALBUTEROL SULFATE 3 ML: .5; 3 SOLUTION RESPIRATORY (INHALATION) at 18:47

## 2025-07-05 RX ADMIN — BUDESONIDE INHALATION 0.5 MG: 0.5 SUSPENSION RESPIRATORY (INHALATION) at 06:32

## 2025-07-05 RX ADMIN — ACETYLCYSTEINE 400 MG: 200 SOLUTION ORAL; RESPIRATORY (INHALATION) at 06:36

## 2025-07-05 RX ADMIN — RUGBY ZINC OXIDE 20% 1 APPLICATION: 20 OINTMENT TOPICAL at 21:08

## 2025-07-05 RX ADMIN — NYSTATIN 1 APPLICATION: 100000 OINTMENT TOPICAL at 08:38

## 2025-07-05 RX ADMIN — NYSTATIN: 100000 OINTMENT TOPICAL at 21:08

## 2025-07-05 RX ADMIN — PANTOPRAZOLE SODIUM 20 MG: 20 TABLET, DELAYED RELEASE ORAL at 17:04

## 2025-07-05 RX ADMIN — ENOXAPARIN SODIUM 60 MG: 100 INJECTION SUBCUTANEOUS at 20:57

## 2025-07-05 RX ADMIN — Medication 4 L/MIN: at 09:44

## 2025-07-05 RX ADMIN — ACETAMINOPHEN 650 MG: 325 TABLET ORAL at 21:10

## 2025-07-05 RX ADMIN — ENOXAPARIN SODIUM 60 MG: 100 INJECTION SUBCUTANEOUS at 08:36

## 2025-07-05 RX ADMIN — Medication 36 PERCENT: at 06:32

## 2025-07-05 RX ADMIN — LEVOTHYROXINE SODIUM 100 MCG: 0.1 TABLET ORAL at 06:30

## 2025-07-05 RX ADMIN — ACETYLCYSTEINE 400 MG: 200 SOLUTION ORAL; RESPIRATORY (INHALATION) at 18:47

## 2025-07-05 RX ADMIN — PANTOPRAZOLE SODIUM 20 MG: 20 TABLET, DELAYED RELEASE ORAL at 06:30

## 2025-07-05 RX ADMIN — BUDESONIDE INHALATION 0.5 MG: 0.5 SUSPENSION RESPIRATORY (INHALATION) at 18:47

## 2025-07-05 RX ADMIN — VANCOMYCIN HYDROCHLORIDE 1250 MG: 1.25 INJECTION, SOLUTION INTRAVITREAL at 20:57

## 2025-07-05 RX ADMIN — TORSEMIDE 40 MG: 20 TABLET ORAL at 08:35

## 2025-07-05 RX ADMIN — IPRATROPIUM BROMIDE AND ALBUTEROL SULFATE 3 ML: .5; 3 SOLUTION RESPIRATORY (INHALATION) at 11:09

## 2025-07-05 ASSESSMENT — COGNITIVE AND FUNCTIONAL STATUS - GENERAL
TURNING FROM BACK TO SIDE WHILE IN FLAT BAD: A LOT
MOVING TO AND FROM BED TO CHAIR: A LOT
MOVING TO AND FROM BED TO CHAIR: A LOT
MOBILITY SCORE: 11
DRESSING REGULAR LOWER BODY CLOTHING: A LOT
HELP NEEDED FOR BATHING: A LOT
CLIMB 3 TO 5 STEPS WITH RAILING: TOTAL
DRESSING REGULAR UPPER BODY CLOTHING: A LOT
TURNING FROM BACK TO SIDE WHILE IN FLAT BAD: A LOT
DRESSING REGULAR LOWER BODY CLOTHING: A LOT
DRESSING REGULAR UPPER BODY CLOTHING: A LOT
DAILY ACTIVITIY SCORE: 12
CLIMB 3 TO 5 STEPS WITH RAILING: TOTAL
STANDING UP FROM CHAIR USING ARMS: A LOT
PERSONAL GROOMING: A LOT
HELP NEEDED FOR BATHING: A LOT
EATING MEALS: A LOT
EATING MEALS: A LOT
DAILY ACTIVITIY SCORE: 12
MOVING FROM LYING ON BACK TO SITTING ON SIDE OF FLAT BED WITH BEDRAILS: A LOT
WALKING IN HOSPITAL ROOM: A LOT
PERSONAL GROOMING: A LOT
TOILETING: A LOT
TOILETING: A LOT
WALKING IN HOSPITAL ROOM: A LOT
STANDING UP FROM CHAIR USING ARMS: A LOT

## 2025-07-05 ASSESSMENT — PAIN DESCRIPTION - LOCATION: LOCATION: KNEE

## 2025-07-05 ASSESSMENT — PAIN SCALES - GENERAL
PAINLEVEL_OUTOF10: 2
PAINLEVEL_OUTOF10: 0 - NO PAIN
PAINLEVEL_OUTOF10: 3
PAINLEVEL_OUTOF10: 4

## 2025-07-05 ASSESSMENT — PAIN DESCRIPTION - ORIENTATION: ORIENTATION: LEFT;RIGHT

## 2025-07-05 ASSESSMENT — PAIN DESCRIPTION - DESCRIPTORS
DESCRIPTORS: ACHING
DESCRIPTORS: THROBBING;TIGHTNESS

## 2025-07-05 ASSESSMENT — PAIN - FUNCTIONAL ASSESSMENT
PAIN_FUNCTIONAL_ASSESSMENT: 0-10

## 2025-07-05 NOTE — PROGRESS NOTES
Ailyn Banegas is a 80 y.o. female on day 13 of admission presenting with Acute respiratory failure with hypoxia and hypercapnia.      SUBJECTIVE     No acute overnight events, on 4 L nasal cannula.  Planning to discharge on Monday    OBJECTIVE     Vitals:    07/05/25 0632 07/05/25 0741 07/05/25 1057 07/05/25 1109   BP:  133/70 129/85    BP Location:  Right arm Right arm    Patient Position:  Sitting Sitting    Pulse:  99 103    Resp:  17 16    Temp:  35.6 °C (96.1 °F) 35.5 °C (95.9 °F)    TempSrc:  Temporal Temporal    SpO2: 94% 96% 92% 93%   Weight:       Height:          Results from last 7 days   Lab Units 07/04/25  0555 07/01/25  0550 06/30/25  0552   WBC AUTO x10*3/uL 7.1   < > 7.9   HEMOGLOBIN g/dL 11.3*   < > 11.4*   HEMATOCRIT % 40.2   < > 39.4   PLATELETS AUTO x10*3/uL 298   < > 262   NEUTROS PCT AUTO %  --   --  76.6   LYMPHS PCT AUTO %  --   --  13.0   MONOS PCT AUTO %  --   --  6.7   EOS PCT AUTO %  --   --  2.4    < > = values in this interval not displayed.     Results from last 7 days   Lab Units 07/04/25  0555   SODIUM mmol/L 140   POTASSIUM mmol/L 3.8   CHLORIDE mmol/L 97*   CO2 mmol/L 34*   BUN mg/dL 21   CREATININE mg/dL 0.84   CALCIUM mg/dL 9.6   GLUCOSE mg/dL 122*       Scheduled Medications  Scheduled Medications[1]   Physical Exam    Constitutional: Well developed, A&Ox3, no acute distress, alert and cooperative  Eyes: EOMI, clear sclera  Respiratory/Thorax: CTAB, good chest expansion  Cardiovascular: Regular rate, regular rhythm  Gastrointestinal: Nondistended, soft, non-tender  Extremities: normal extremities, no cyanosis edema  Skin: Warm and dry    ASSESSMENT & PLAN     Ailyn Banegas is a 80 y.o. female with a PMHx of reported COPD (no PFTs on record, on baseline 5L NC), MARLENY/likely OHS, Hypothyroidism, DLD, Morbid Obesity who was brought into the ED for respiratory distress. Admitted to ICU for acute on chronic hypercapnic hypoxic respiratory failure 2/2 ADHF.      Acute medical  conditions:  #Acute on chronic hypercapnic hypoxic respiratory failure  #Decompensated diastolic heart failure  #Pulmonary edema with bilateral pleural effusions  #Aspiration pneumonia, completed course of abx  #Paroxysmal A-fib  #COPD on 5 L nasal cannula baseline  -Patient initially required intubation, extubated 6/24  -Patient was treated with a 7-day course of Zosyn for PNA  -On prophylactic azithromycin 3 times weekly  -Was initially on Lasix drip however transition back to home diuretic of 40 torsemide daily  -Strict I's and O's  -Resume home meds Cardizem, Aldactone as able  - BiPAP machine and O2 equipment at home confirmed before discharge.   - Patient's AMPAC score of 12 recommended SNF. However, patient's family is requesting C. Tentatively plan on discharge to [facility] on Monday.   #Incontinence associated dermatitis  - Ordered Mycostatin and zinc oxide to be applied topically for her groin.     Chronic medical conditions:  #Hypothyroidism-continue home dose Synthroid  #Hyperglycemia-SSI #3  #Chronic anemia  #Chronic lower extremity wounds  #MARLENY on home CPAP     DVT PPX: Lovenox  Diet: Regular  IVF:  Code Status: DNR     This is a preliminary note written by the resident. Please wait for attending addendum for finalization of note and recommendations.    Dr. Ravinder Mohr  Internal Medicine             [1] acetylcysteine, 2 mL, nebulization, BID  ammonium lactate, , Topical, q12h  atorvastatin, 10 mg, oral, Daily  azithromycin, 500 mg, oral, Once per day on Monday Wednesday Friday  budesonide, 0.5 mg, nebulization, BID  docusate sodium, 100 mg, oral, Nightly  enoxaparin, 60 mg, subcutaneous, q12h VLADIMIR  insulin lispro, 0-15 Units, subcutaneous, TID AC  ipratropium-albuteroL, 3 mL, nebulization, TID  levothyroxine, 100 mcg, oral, Daily  nystatin, , Topical, BID  oxygen, , inhalation, Continuous - Inhalation  pantoprazole, 20 mg, oral, BID  perflutren lipid microspheres, 0.5-10 mL of dilution, intravenous, Once  in imaging  perflutren protein A microsphere, 0.5 mL, intravenous, Once in imaging  sulfur hexafluoride microsphr, 2 mL, intravenous, Once in imaging  torsemide, 40 mg, oral, Daily

## 2025-07-05 NOTE — CARE PLAN
The patient's goals for the shift include  Maintain safety and comfort    The clinical goals for the shift include Monitor SpO2    Problem: Discharge Planning  Goal: Discharge to home or other facility with appropriate resources  Outcome: Progressing     Problem: Diabetes  Goal: Achieve decreasing blood glucose levels by end of shift  Outcome: Progressing  Goal: Increase stability of blood glucose readings by end of shift  Outcome: Progressing     Problem: Skin  Goal: Decreased wound size/increased tissue granulation at next dressing change  Outcome: Progressing  Flowsheets (Taken 7/5/2025 0436)  Decreased wound size/increased tissue granulation at next dressing change:   Promote sleep for wound healing   Protective dressings over bony prominences  Goal: Participates in plan/prevention/treatment measures  Outcome: Progressing  Flowsheets (Taken 7/5/2025 0436)  Participates in plan/prevention/treatment measures:   Discuss with provider PT/OT consult   Elevate heels  Goal: Prevent/manage excess moisture  Outcome: Progressing  Flowsheets (Taken 7/5/2025 0436)  Prevent/manage excess moisture:   Cleanse incontinence/protect with barrier cream   Moisturize dry skin  Goal: Prevent/minimize sheer/friction injuries  Outcome: Progressing  Flowsheets (Taken 7/5/2025 0436)  Prevent/minimize sheer/friction injuries:   HOB 30 degrees or less   Turn/reposition every 2 hours/use positioning/transfer devices   Use pull sheet  Goal: Promote/optimize nutrition  Outcome: Progressing  Flowsheets (Taken 7/5/2025 0436)  Promote/optimize nutrition:   Assist with feeding   Consume > 50% meals/supplements   Monitor/record intake including meals     Problem: Pain  Goal: Takes deep breaths with improved pain control throughout the shift  Outcome: Progressing

## 2025-07-05 NOTE — CARE PLAN
Problem: Discharge Planning  Goal: Discharge to home or other facility with appropriate resources  Outcome: Progressing     Problem: Chronic Conditions and Co-morbidities  Goal: Patient's chronic conditions and co-morbidity symptoms are monitored and maintained or improved  Outcome: Progressing     Problem: Nutrition  Goal: Nutrient intake appropriate for maintaining nutritional needs  Outcome: Progressing     Problem: Diabetes  Goal: Achieve decreasing blood glucose levels by end of shift  Outcome: Progressing  Goal: Increase stability of blood glucose readings by end of shift  Outcome: Progressing  Goal: Maintain electrolyte levels within acceptable range throughout shift  Outcome: Progressing  Goal: Maintain glucose levels >70mg/dl to <250mg/dl throughout shift  Outcome: Progressing  Goal: No changes in neurological exam by end of shift  Outcome: Progressing  Goal: Learn about and adhere to nutrition recommendations by end of shift  Outcome: Progressing  Goal: Vital signs within normal range for age by end of shift  Outcome: Progressing  Goal: Increase self care and/or family involovement by end of shift  Outcome: Progressing  Goal: Receive DSME education by end of shift  Outcome: Progressing     Problem: Skin  Goal: Decreased wound size/increased tissue granulation at next dressing change  Outcome: Progressing  Goal: Participates in plan/prevention/treatment measures  Outcome: Progressing  Goal: Prevent/manage excess moisture  Outcome: Progressing  Flowsheets (Taken 7/5/2025 1128)  Prevent/manage excess moisture: Moisturize dry skin  Goal: Prevent/minimize sheer/friction injuries  Outcome: Progressing  Goal: Promote/optimize nutrition  Outcome: Progressing  Goal: Promote skin healing  Outcome: Progressing     Problem: Knowledge Deficit  Goal: Patient/family/caregiver demonstrates understanding of disease process, treatment plan, medications, and discharge instructions  Outcome: Progressing     Problem:  Fall/Injury  Goal: Be free from injury by end of the shift  Outcome: Progressing  Goal: Verbalize understanding of personal risk factors for fall in the hospital  Outcome: Progressing  Goal: Verbalize understanding of risk factor reduction measures to prevent injury from fall in the home  Outcome: Progressing  Goal: Use assistive devices by end of the shift  Outcome: Progressing  Goal: Pace activities to prevent fatigue by end of the shift  Outcome: Progressing

## 2025-07-06 VITALS
RESPIRATION RATE: 33 BRPM | DIASTOLIC BLOOD PRESSURE: 70 MMHG | WEIGHT: 293 LBS | TEMPERATURE: 96.4 F | BODY MASS INDEX: 53.92 KG/M2 | HEIGHT: 62 IN | OXYGEN SATURATION: 95 % | HEART RATE: 89 BPM | SYSTOLIC BLOOD PRESSURE: 138 MMHG

## 2025-07-06 LAB
CREAT SERPL-MCNC: 0.81 MG/DL (ref 0.5–1.05)
EGFRCR SERPLBLD CKD-EPI 2021: 73 ML/MIN/1.73M*2
GLUCOSE BLD MANUAL STRIP-MCNC: 121 MG/DL (ref 74–99)
HOLD SPECIMEN: NORMAL

## 2025-07-06 PROCEDURE — 2500000001 HC RX 250 WO HCPCS SELF ADMINISTERED DRUGS (ALT 637 FOR MEDICARE OP): Performed by: INTERNAL MEDICINE

## 2025-07-06 PROCEDURE — 94640 AIRWAY INHALATION TREATMENT: CPT

## 2025-07-06 PROCEDURE — 2500000004 HC RX 250 GENERAL PHARMACY W/ HCPCS (ALT 636 FOR OP/ED)

## 2025-07-06 PROCEDURE — 2500000001 HC RX 250 WO HCPCS SELF ADMINISTERED DRUGS (ALT 637 FOR MEDICARE OP)

## 2025-07-06 PROCEDURE — 2500000002 HC RX 250 W HCPCS SELF ADMINISTERED DRUGS (ALT 637 FOR MEDICARE OP, ALT 636 FOR OP/ED): Performed by: INTERNAL MEDICINE

## 2025-07-06 PROCEDURE — 82947 ASSAY GLUCOSE BLOOD QUANT: CPT

## 2025-07-06 PROCEDURE — 94660 CPAP INITIATION&MGMT: CPT

## 2025-07-06 PROCEDURE — 82565 ASSAY OF CREATININE: CPT

## 2025-07-06 PROCEDURE — 36415 COLL VENOUS BLD VENIPUNCTURE: CPT

## 2025-07-06 PROCEDURE — 2500000002 HC RX 250 W HCPCS SELF ADMINISTERED DRUGS (ALT 637 FOR MEDICARE OP, ALT 636 FOR OP/ED)

## 2025-07-06 PROCEDURE — 2500000005 HC RX 250 GENERAL PHARMACY W/O HCPCS: Performed by: STUDENT IN AN ORGANIZED HEALTH CARE EDUCATION/TRAINING PROGRAM

## 2025-07-06 PROCEDURE — 36415 COLL VENOUS BLD VENIPUNCTURE: CPT | Performed by: STUDENT IN AN ORGANIZED HEALTH CARE EDUCATION/TRAINING PROGRAM

## 2025-07-06 PROCEDURE — 2060000001 HC INTERMEDIATE ICU ROOM DAILY

## 2025-07-06 RX ORDER — ALBUTEROL SULFATE 0.83 MG/ML
2.5 SOLUTION RESPIRATORY (INHALATION) 4 TIMES DAILY PRN
Qty: 90 ML | Refills: 1 | Status: SHIPPED | OUTPATIENT
Start: 2025-07-06

## 2025-07-06 RX ORDER — ATORVASTATIN CALCIUM 10 MG/1
10 TABLET, FILM COATED ORAL DAILY
Qty: 30 TABLET | Refills: 1 | Status: SHIPPED | OUTPATIENT
Start: 2025-07-06

## 2025-07-06 RX ORDER — DILTIAZEM HYDROCHLORIDE 60 MG/1
60 CAPSULE, EXTENDED RELEASE ORAL DAILY
Qty: 60 CAPSULE | Refills: 1 | Status: SHIPPED | OUTPATIENT
Start: 2025-07-06

## 2025-07-06 RX ORDER — DOXYCYCLINE 100 MG/1
100 CAPSULE ORAL 2 TIMES DAILY
Qty: 10 CAPSULE | Refills: 0 | Status: SHIPPED | OUTPATIENT
Start: 2025-07-06 | End: 2025-07-07

## 2025-07-06 RX ORDER — HYDROXYZINE PAMOATE 25 MG/1
25 CAPSULE ORAL 3 TIMES DAILY PRN
Qty: 90 CAPSULE | Refills: 0 | Status: SHIPPED | OUTPATIENT
Start: 2025-07-06 | End: 2025-08-06

## 2025-07-06 RX ORDER — ALBUTEROL SULFATE 90 UG/1
2 INHALANT RESPIRATORY (INHALATION) EVERY 4 HOURS PRN
Qty: 18 G | Refills: 2 | Status: SHIPPED | OUTPATIENT
Start: 2025-07-06

## 2025-07-06 RX ORDER — TORSEMIDE 20 MG/1
40 TABLET ORAL DAILY
Qty: 60 TABLET | Refills: 1 | Status: SHIPPED | OUTPATIENT
Start: 2025-07-06

## 2025-07-06 RX ORDER — LEVOTHYROXINE SODIUM 100 UG/1
100 TABLET ORAL DAILY
Qty: 30 TABLET | Refills: 0 | Status: SHIPPED | OUTPATIENT
Start: 2025-07-06 | End: 2025-08-05

## 2025-07-06 RX ORDER — BUDESONIDE AND FORMOTEROL FUMARATE DIHYDRATE 160; 4.5 UG/1; UG/1
2 AEROSOL RESPIRATORY (INHALATION) 2 TIMES DAILY PRN
Qty: 10.2 G | Refills: 0 | Status: SHIPPED | OUTPATIENT
Start: 2025-07-06 | End: 2025-08-06

## 2025-07-06 RX ORDER — PANTOPRAZOLE SODIUM 20 MG/1
20 TABLET, DELAYED RELEASE ORAL AS NEEDED
Qty: 60 TABLET | Refills: 1 | Status: SHIPPED | OUTPATIENT
Start: 2025-07-06 | End: 2025-08-06

## 2025-07-06 RX ORDER — NYSTATIN 100000 [USP'U]/G
1 POWDER TOPICAL 2 TIMES DAILY
Qty: 60 G | Refills: 1 | Status: SHIPPED | OUTPATIENT
Start: 2025-07-06 | End: 2025-08-06

## 2025-07-06 RX ORDER — SPIRONOLACTONE 100 MG/1
100 TABLET, FILM COATED ORAL DAILY
Qty: 30 TABLET | Refills: 1 | Status: SHIPPED | OUTPATIENT
Start: 2025-07-06

## 2025-07-06 RX ADMIN — Medication: at 00:28

## 2025-07-06 RX ADMIN — IPRATROPIUM BROMIDE AND ALBUTEROL SULFATE 3 ML: .5; 3 SOLUTION RESPIRATORY (INHALATION) at 06:56

## 2025-07-06 RX ADMIN — ACETYLCYSTEINE 400 MG: 200 SOLUTION ORAL; RESPIRATORY (INHALATION) at 07:00

## 2025-07-06 RX ADMIN — VANCOMYCIN HYDROCHLORIDE 1250 MG: 1.25 INJECTION, SOLUTION INTRAVITREAL at 20:21

## 2025-07-06 RX ADMIN — IPRATROPIUM BROMIDE AND ALBUTEROL SULFATE 3 ML: .5; 3 SOLUTION RESPIRATORY (INHALATION) at 19:19

## 2025-07-06 RX ADMIN — IPRATROPIUM BROMIDE AND ALBUTEROL SULFATE 3 ML: .5; 3 SOLUTION RESPIRATORY (INHALATION) at 13:59

## 2025-07-06 RX ADMIN — BUDESONIDE INHALATION 0.5 MG: 0.5 SUSPENSION RESPIRATORY (INHALATION) at 19:19

## 2025-07-06 RX ADMIN — BUDESONIDE INHALATION 0.5 MG: 0.5 SUSPENSION RESPIRATORY (INHALATION) at 06:56

## 2025-07-06 RX ADMIN — LEVOTHYROXINE SODIUM 100 MCG: 0.1 TABLET ORAL at 05:31

## 2025-07-06 RX ADMIN — ACETYLCYSTEINE 400 MG: 200 SOLUTION ORAL; RESPIRATORY (INHALATION) at 19:19

## 2025-07-06 RX ADMIN — Medication 36 PERCENT: at 06:56

## 2025-07-06 RX ADMIN — TORSEMIDE 40 MG: 20 TABLET ORAL at 08:17

## 2025-07-06 RX ADMIN — PANTOPRAZOLE SODIUM 20 MG: 20 TABLET, DELAYED RELEASE ORAL at 05:31

## 2025-07-06 RX ADMIN — Medication 1 APPLICATION: at 13:20

## 2025-07-06 RX ADMIN — ENOXAPARIN SODIUM 60 MG: 100 INJECTION SUBCUTANEOUS at 08:18

## 2025-07-06 RX ADMIN — NYSTATIN 1 APPLICATION: 100000 OINTMENT TOPICAL at 08:18

## 2025-07-06 RX ADMIN — VANCOMYCIN HYDROCHLORIDE 1250 MG: 1.25 INJECTION, SOLUTION INTRAVITREAL at 08:17

## 2025-07-06 RX ADMIN — ACETAMINOPHEN 650 MG: 325 TABLET ORAL at 08:18

## 2025-07-06 RX ADMIN — ATORVASTATIN CALCIUM 10 MG: 10 TABLET, FILM COATED ORAL at 08:17

## 2025-07-06 RX ADMIN — NYSTATIN: 100000 OINTMENT TOPICAL at 20:30

## 2025-07-06 RX ADMIN — DOCUSATE SODIUM 100 MG: 100 CAPSULE, LIQUID FILLED ORAL at 20:20

## 2025-07-06 RX ADMIN — ENOXAPARIN SODIUM 60 MG: 100 INJECTION SUBCUTANEOUS at 20:20

## 2025-07-06 RX ADMIN — ACETAMINOPHEN 650 MG: 325 TABLET ORAL at 17:30

## 2025-07-06 RX ADMIN — PANTOPRAZOLE SODIUM 20 MG: 20 TABLET, DELAYED RELEASE ORAL at 16:32

## 2025-07-06 RX ADMIN — Medication 4 L/MIN: at 23:26

## 2025-07-06 ASSESSMENT — PAIN SCALES - GENERAL
PAINLEVEL_OUTOF10: 1
PAINLEVEL_OUTOF10: 0 - NO PAIN
PAINLEVEL_OUTOF10: 2
PAINLEVEL_OUTOF10: 3
PAINLEVEL_OUTOF10: 3

## 2025-07-06 ASSESSMENT — PAIN DESCRIPTION - LOCATION
LOCATION: BACK
LOCATION: BACK

## 2025-07-06 ASSESSMENT — PAIN - FUNCTIONAL ASSESSMENT
PAIN_FUNCTIONAL_ASSESSMENT: 0-10

## 2025-07-06 ASSESSMENT — PAIN DESCRIPTION - ORIENTATION: ORIENTATION: MID

## 2025-07-06 NOTE — CARE PLAN
Problem: Nutrition  Goal: Nutrient intake appropriate for maintaining nutritional needs  Outcome: Progressing     Problem: Diabetes  Goal: Achieve decreasing blood glucose levels by end of shift  Outcome: Progressing  Goal: Increase stability of blood glucose readings by end of shift  Outcome: Progressing  Goal: Maintain electrolyte levels within acceptable range throughout shift  Outcome: Progressing  Goal: Maintain glucose levels >70mg/dl to <250mg/dl throughout shift  Outcome: Progressing  Goal: No changes in neurological exam by end of shift  Outcome: Progressing  Goal: Learn about and adhere to nutrition recommendations by end of shift  Outcome: Progressing  Goal: Vital signs within normal range for age by end of shift  Outcome: Progressing  Goal: Increase self care and/or family involovement by end of shift  Outcome: Progressing  Goal: Receive DSME education by end of shift  Outcome: Progressing     Problem: Skin  Goal: Decreased wound size/increased tissue granulation at next dressing change  Outcome: Progressing  Goal: Participates in plan/prevention/treatment measures  Outcome: Progressing  Goal: Prevent/manage excess moisture  Outcome: Progressing  Goal: Prevent/minimize sheer/friction injuries  Outcome: Progressing  Goal: Promote/optimize nutrition  Outcome: Progressing  Goal: Promote skin healing  Outcome: Progressing  Flowsheets (Taken 7/6/2025 6269)  Promote skin healing: Assess skin/pad under line(s)/device(s)   Problem: Discharge Planning  Goal: Discharge to home or other facility with appropriate resources  Outcome: Progressing     Problem: Chronic Conditions and Co-morbidities  Goal: Patient's chronic conditions and co-morbidity symptoms are monitored and maintained or improved  Outcome: Progressing

## 2025-07-06 NOTE — PROGRESS NOTES
Creatinine ordered for 7/6 and no change in renal  function  , no change in dose , still   Loading dose: N/A  Regimen: 1250 mg IV every 12 hours.  Start time: 20:17 on 07/06/2025  Exposure target: AUC24 (range) 400-600 mg/L.hr   [id number]: 535 mg/L.hr  AUC24,ss: 542 mg/L.hr  Probability of AUC24 > 400: 72 %  Ctrough,ss: 14.9 mg/L  Probability of Ctrough,ss > 20: 36 %    Shruthi Arora, [facility]

## 2025-07-06 NOTE — CARE PLAN
Problem: Chronic Conditions and Co-morbidities  Goal: Patient's chronic conditions and co-morbidity symptoms are monitored and maintained or improved  Outcome: Progressing     Problem: Nutrition  Goal: Nutrient intake appropriate for maintaining nutritional needs  Outcome: Progressing     Problem: Diabetes  Goal: Maintain glucose levels >70mg/dl to <250mg/dl throughout shift  Outcome: Progressing     Problem: Skin  Goal: Prevent/manage excess moisture  Outcome: Progressing  Goal: Prevent/minimize sheer/friction injuries  Outcome: Progressing  Flowsheets (Taken 7/5/2025 2211)  Prevent/minimize sheer/friction injuries:   Turn/reposition every 2 hours/use positioning/transfer devices   HOB 30 degrees or less   The patient's goals for the shift include      The clinical goals for the shift include control pain

## 2025-07-06 NOTE — PROGRESS NOTES
07/06/25 1324   Discharge Planning   Home or Post Acute Services In home services   Type of Home Care Services Home nursing visits;Home OT;Home PT   Expected Discharge Disposition Home H   Does the patient need discharge transport arranged? Yes   [facility] coordination needed? Yes   Has discharge transport been arranged? No     Care transitions reviewed chart, plan MD note, plan is for discharge Monday with [facility].  Care transitions to follow.

## 2025-07-07 ENCOUNTER — PHARMACY VISIT (OUTPATIENT)
Dept: PHARMACY | Facility: CLINIC | Age: 80
End: 2025-07-07
Payer: COMMERCIAL

## 2025-07-07 VITALS
HEIGHT: 62 IN | HEART RATE: 97 BPM | SYSTOLIC BLOOD PRESSURE: 132 MMHG | DIASTOLIC BLOOD PRESSURE: 78 MMHG | BODY MASS INDEX: 53.92 KG/M2 | OXYGEN SATURATION: 91 % | TEMPERATURE: 96.8 F | RESPIRATION RATE: 17 BRPM | WEIGHT: 293 LBS

## 2025-07-07 LAB
GLUCOSE BLD MANUAL STRIP-MCNC: 133 MG/DL (ref 74–99)
HOLD SPECIMEN: NORMAL

## 2025-07-07 PROCEDURE — 2500000004 HC RX 250 GENERAL PHARMACY W/ HCPCS (ALT 636 FOR OP/ED)

## 2025-07-07 PROCEDURE — 2500000002 HC RX 250 W HCPCS SELF ADMINISTERED DRUGS (ALT 637 FOR MEDICARE OP, ALT 636 FOR OP/ED)

## 2025-07-07 PROCEDURE — 94640 AIRWAY INHALATION TREATMENT: CPT

## 2025-07-07 PROCEDURE — 2500000002 HC RX 250 W HCPCS SELF ADMINISTERED DRUGS (ALT 637 FOR MEDICARE OP, ALT 636 FOR OP/ED): Performed by: INTERNAL MEDICINE

## 2025-07-07 PROCEDURE — 2500000001 HC RX 250 WO HCPCS SELF ADMINISTERED DRUGS (ALT 637 FOR MEDICARE OP)

## 2025-07-07 PROCEDURE — 2500000001 HC RX 250 WO HCPCS SELF ADMINISTERED DRUGS (ALT 637 FOR MEDICARE OP): Performed by: INTERNAL MEDICINE

## 2025-07-07 PROCEDURE — 99233 SBSQ HOSP IP/OBS HIGH 50: CPT

## 2025-07-07 PROCEDURE — 9420000001 HC RT PATIENT EDUCATION 5 MIN

## 2025-07-07 PROCEDURE — 82947 ASSAY GLUCOSE BLOOD QUANT: CPT

## 2025-07-07 PROCEDURE — RXMED WILLOW AMBULATORY MEDICATION CHARGE

## 2025-07-07 PROCEDURE — 2500000005 HC RX 250 GENERAL PHARMACY W/O HCPCS: Performed by: STUDENT IN AN ORGANIZED HEALTH CARE EDUCATION/TRAINING PROGRAM

## 2025-07-07 PROCEDURE — 94660 CPAP INITIATION&MGMT: CPT

## 2025-07-07 RX ORDER — DOXYCYCLINE 100 MG/1
100 CAPSULE ORAL 2 TIMES DAILY
Qty: 18 CAPSULE | Refills: 0 | Status: SHIPPED | OUTPATIENT
Start: 2025-07-07 | End: 2025-07-16

## 2025-07-07 RX ORDER — NYSTATIN 100000 U/G
CREAM TOPICAL 2 TIMES DAILY
Status: DISCONTINUED | OUTPATIENT
Start: 2025-07-07 | End: 2025-07-07 | Stop reason: HOSPADM

## 2025-07-07 RX ADMIN — ACETYLCYSTEINE 400 MG: 200 SOLUTION ORAL; RESPIRATORY (INHALATION) at 06:59

## 2025-07-07 RX ADMIN — Medication 40 PERCENT: at 00:47

## 2025-07-07 RX ADMIN — Medication 4 L/MIN: at 08:56

## 2025-07-07 RX ADMIN — PANTOPRAZOLE SODIUM 20 MG: 20 TABLET, DELAYED RELEASE ORAL at 05:28

## 2025-07-07 RX ADMIN — AZITHROMYCIN DIHYDRATE 500 MG: 250 TABLET ORAL at 08:55

## 2025-07-07 RX ADMIN — IPRATROPIUM BROMIDE AND ALBUTEROL SULFATE 3 ML: .5; 3 SOLUTION RESPIRATORY (INHALATION) at 06:59

## 2025-07-07 RX ADMIN — Medication: at 08:56

## 2025-07-07 RX ADMIN — PANTOPRAZOLE SODIUM 20 MG: 20 TABLET, DELAYED RELEASE ORAL at 16:44

## 2025-07-07 RX ADMIN — Medication 4 L/MIN: at 06:59

## 2025-07-07 RX ADMIN — ATORVASTATIN CALCIUM 10 MG: 10 TABLET, FILM COATED ORAL at 08:55

## 2025-07-07 RX ADMIN — LEVOTHYROXINE SODIUM 100 MCG: 0.1 TABLET ORAL at 05:28

## 2025-07-07 RX ADMIN — IPRATROPIUM BROMIDE AND ALBUTEROL SULFATE 3 ML: .5; 3 SOLUTION RESPIRATORY (INHALATION) at 11:45

## 2025-07-07 RX ADMIN — VANCOMYCIN HYDROCHLORIDE 1250 MG: 1.25 INJECTION, SOLUTION INTRAVITREAL at 08:55

## 2025-07-07 RX ADMIN — BUDESONIDE INHALATION 0.5 MG: 0.5 SUSPENSION RESPIRATORY (INHALATION) at 06:59

## 2025-07-07 RX ADMIN — ACETAMINOPHEN 650 MG: 325 TABLET ORAL at 02:43

## 2025-07-07 RX ADMIN — TORSEMIDE 40 MG: 20 TABLET ORAL at 08:55

## 2025-07-07 RX ADMIN — ENOXAPARIN SODIUM 60 MG: 100 INJECTION SUBCUTANEOUS at 08:55

## 2025-07-07 RX ADMIN — Medication 4 L/MIN: at 08:54

## 2025-07-07 ASSESSMENT — COGNITIVE AND FUNCTIONAL STATUS - GENERAL
WALKING IN HOSPITAL ROOM: A LOT
TOILETING: A LOT
DRESSING REGULAR LOWER BODY CLOTHING: A LOT
EATING MEALS: A LOT
HELP NEEDED FOR BATHING: A LOT
TURNING FROM BACK TO SIDE WHILE IN FLAT BAD: A LOT
PERSONAL GROOMING: A LOT
CLIMB 3 TO 5 STEPS WITH RAILING: TOTAL
STANDING UP FROM CHAIR USING ARMS: A LOT
DRESSING REGULAR UPPER BODY CLOTHING: A LOT
MOBILITY SCORE: 11
DAILY ACTIVITIY SCORE: 12
MOVING TO AND FROM BED TO CHAIR: A LOT
MOVING FROM LYING ON BACK TO SITTING ON SIDE OF FLAT BED WITH BEDRAILS: A LOT

## 2025-07-07 ASSESSMENT — PAIN - FUNCTIONAL ASSESSMENT: PAIN_FUNCTIONAL_ASSESSMENT: 0-10

## 2025-07-07 ASSESSMENT — PAIN SCALES - GENERAL
PAINLEVEL_OUTOF10: 3
PAINLEVEL_OUTOF10: 1
PAINLEVEL_OUTOF10: 0 - NO PAIN

## 2025-07-07 NOTE — PROGRESS NOTES
Ailyn Banegas is a 80 y.o. female on day 15 of admission presenting with Acute respiratory failure with hypoxia and hypercapnia.      SUBJECTIVE     Patient evaluated this morning and found to be resting comfortably in bed. Today the patient reports she continues to do well in the interim. She expresses concerns about her discharge planning and wishes to be discharged around 4 pm.     OBJECTIVE     Vitals:    07/07/25 0856 07/07/25 0906 07/07/25 1145 07/07/25 1157   BP:    132/78   BP Location:    Left arm   Patient Position:    Lying   Pulse:    97   Resp:    17   Temp:    36 °C (96.8 °F)   TempSrc:    Temporal   SpO2: 90% 96% 95% 91%   Weight:       Height:          Results from last 7 days   Lab Units 07/04/25  0555   WBC AUTO x10*3/uL 7.1   HEMOGLOBIN g/dL 11.3*   HEMATOCRIT % 40.2   PLATELETS AUTO x10*3/uL 298     Results from last 7 days   Lab Units 07/06/25  1219 07/04/25  0555   SODIUM mmol/L  --  140   POTASSIUM mmol/L  --  3.8   CHLORIDE mmol/L  --  97*   CO2 mmol/L  --  34*   BUN mg/dL  --  21   CREATININE mg/dL 0.81 0.84   CALCIUM mg/dL  --  9.6   GLUCOSE mg/dL  --  122*       Scheduled Medications  Scheduled Medications[1]   Physical Exam    Constitutional: Well developed, A&Ox3, no acute distress, alert and cooperative  Eyes: EOMI, clear sclera  Respiratory/Thorax: CTAB, good chest expansion  Cardiovascular: Regular rate, regular rhythm  Gastrointestinal: Nondistended, soft, non-tender  Extremities: mild redness, warmth, swelling and tenderness in BLE with sharp margins, no cyanosis   Skin: Warm and dry      ASSESSMENT & PLAN     Ailyn Banegas is a 80 y.o. female with a PMHx of reported COPD (no PFTs on record, on baseline 5L NC), MARLENY/likely OHS, Hypothyroidism, DLD, Morbid Obesity who was brought into the ED for respiratory distress. Admitted to ICU for acute on chronic hypercapnic hypoxic respiratory failure 2/2 ADHF.      Acute medical conditions:  #Acute on chronic hypercapnic hypoxic  respiratory failure  #Decompensated diastolic heart failure  #Pulmonary edema with bilateral pleural effusions  #Aspiration pneumonia, completed course of abx  #Paroxysmal A-fib  #COPD on 4 L nasal cannula baseline  -Patient initially required intubation, extubated 6/24  -Patient was treated with a 7-day course of Zosyn for PNA  -On prophylactic azithromycin 3 times weekly  -Was initially on Lasix drip however transition back to home diuretic of 40 torsemide daily  -Strict I's and O's  -Resume home meds Cardizem, Aldactone as able  - BiPAP machine and O2 equipment at home confirmed before discharge.   - Patient's AMPAC score of 12 recommended SNF. However, patient's family is requesting C. Tentatively plan on discharge to [facility] on today.   #Incontinence associated dermatitis  - Ordered Mycostatin and zinc oxide to be applied topically for her groin.     Chronic medical conditions:  #Hypothyroidism-continue home dose Synthroid  #Hyperglycemia-SSI #3  #Chronic anemia  #Chronic lower extremity wounds - pt started on doxycycline yesterday for her chronic LE cellulitis. Recommended to continue the 10 day course in totality.   #MARLENY on home CPAP     DVT PPX: Lovenox  Diet: Regular  IVF:  Code Status: DNR    David Pinzon MD  PGY-1, Internal Medicine  Please SecureChat for any further questions  This is a preliminary note, please await attending attestation for final A/P             [1] acetylcysteine, 2 mL, nebulization, BID  ammonium lactate, , Topical, q12h  atorvastatin, 10 mg, oral, Daily  azithromycin, 500 mg, oral, Once per day on Monday Wednesday Friday  budesonide, 0.5 mg, nebulization, BID  docusate sodium, 100 mg, oral, Nightly  enoxaparin, 60 mg, subcutaneous, q12h VLADIMIR  insulin lispro, 0-15 Units, subcutaneous, TID AC  ipratropium-albuteroL, 3 mL, nebulization, TID  levothyroxine, 100 mcg, oral, Daily  nystatin, , Topical, BID  oxygen, , inhalation, Continuous - Inhalation  pantoprazole, 20 mg, oral,  BID  perflutren lipid microspheres, 0.5-10 mL of dilution, intravenous, Once in imaging  perflutren protein A microsphere, 0.5 mL, intravenous, Once in imaging  sulfur hexafluoride microsphr, 2 mL, intravenous, Once in imaging  torsemide, 40 mg, oral, Daily  vancomycin, 1,250 mg, intravenous, q12h

## 2025-07-07 NOTE — NURSING NOTE
Pulmonary Disease Navigator Documentation:    Comments:  Met with patient to re-enforce education, answer questions and discuss any concerns regarding discharge.  Patient for discharge today home with [facility].  Patient has home oxygen and avaps through [facility].

## 2025-07-07 NOTE — PROGRESS NOTES
Vancomycin Dosing by Pharmacy- FOLLOW UP    Ailyn Banegas is a 80 y.o. year old female who Pharmacy has been consulted for vancomycin dosing for cellulitis, skin and soft tissue. Based on the patient's indication and renal status this patient is being dosed based on a goal AUC of 400-600.     Renal function is currently stable.    Current vancomycin dose: 1250 mg given every 12 hours    Estimated vancomycin AUC on current dose: 542 mg/L.hr     Visit Vitals  /64 (BP Location: Left arm, Patient Position: Lying)   Pulse 105   Temp 36.2 °C (97.2 °F) (Temporal)   Resp 20        Lab Results   Component Value Date    CREATININE 0.81 2025    CREATININE 0.84 2025    CREATININE 0.91 2025    CREATININE 0.87 2025        Patient weight is as follows:   Vitals:       Cultures:  No results found for the encounter in last 14 days.       I/O last 3 completed shifts:  In: 870 (5.8 mL/kg) [P.O.:120; IV Piggyback:750]  Out: - (0 mL/kg)   Dosing Weight: 151.3 kg   I/O during current shift:  I/O this shift:  In: 490 [P.O.:240; IV Piggyback:250]  Out: -     Temp (24hrs), Av °C (96.8 °F), Min:35.8 °C (96.4 °F), Max:36.2 °C (97.2 °F)      Assessment/Plan    Day #3 of vancomycin therapy for indication of cellulitis, skin and soft tissue infection. Predicted AUC of 542mg/L.hr on vancomycin 1250mg IV Q12 hours is Within goal AUC range. Continue current vancomycin regimen.    This dosing regimen is predicted by [employer] to result in the following pharmacokinetic parameters:  Regimen: 1250 mg IV every 12 hours.  Exposure target: AUC24 (range) 400-600 mg/L.hr   FVF99-01: 540 mg/L.hr  AUC24,ss: 542 mg/L.hr  Probability of AUC24 > 400: 72 %  Ctrough,ss: 14.9 mg/L  Probability of Ctrough,ss > 20: 36 %      The next level will be obtained on 25 with AM labs. May be obtained sooner if clinically indicated.   Will continue to monitor renal function daily while on vancomycin and order serum creatinine at least  every 48 hours if not already ordered.  Follow for continued vancomycin needs, clinical response, and signs/symptoms of toxicity.     Ethel Olmos, PharmD, BCPS   7/7/2025 11:02 AM

## 2025-07-07 NOTE — PROGRESS NOTES
07/07/25 1101   Discharge Planning   Home or Post Acute Services In home services   Expected Discharge Disposition Home H   Does the patient need discharge transport arranged? Yes   [facility] coordination needed? Yes   Has discharge transport been arranged? No     Met with patient at bedside to follow up on discharge plan.  Plan to discharge home with [facility]. Patient states she has home oxygen and bipap at home. Patient states she will need transport arranged for home, patient aware of potential cost. Bedside nurse updated.  [facility] updated on plan for discharge home today.

## 2025-07-07 NOTE — CARE PLAN
Problem: Chronic Conditions and Co-morbidities  Goal: Patient's chronic conditions and co-morbidity symptoms are monitored and maintained or improved  Outcome: Progressing     Problem: Diabetes  Goal: Learn about and adhere to nutrition recommendations by end of shift  Outcome: Progressing     Problem: Skin  Goal: Prevent/manage excess moisture  Outcome: Progressing  Flowsheets (Taken 7/6/2025 2247)  Prevent/manage excess moisture:   Moisturize dry skin   Cleanse incontinence/protect with barrier cream   The patient's goals for the shift include  rest    The clinical goals for the shift include tolerate sitting in the chair and ambulate as tolerated

## 2025-07-08 ENCOUNTER — DOCUMENTATION (OUTPATIENT)
Dept: HOME HEALTH SERVICES | Facility: HOME HEALTH | Age: 80
End: 2025-07-08
Payer: MEDICARE

## 2025-07-08 NOTE — HH CARE COORDINATION
Home Care received a Referral to Resume Care for Nursing, Physical Therapy, and Occupational Therapy. We have processed the referral for a Resumption of Care on 07/09/2025.     If you have any questions or concerns, please feel free to contact us at [phone]. Follow the prompts, enter your five digit zip code, and you will be directed to your care team on WEST 3.

## 2025-07-09 ENCOUNTER — HOME CARE VISIT (OUTPATIENT)
Dept: HOME HEALTH SERVICES | Facility: HOME HEALTH | Age: 80
End: 2025-07-09
Payer: MEDICARE

## 2025-07-09 VITALS
HEART RATE: 94 BPM | SYSTOLIC BLOOD PRESSURE: 120 MMHG | TEMPERATURE: 98 F | RESPIRATION RATE: 19 BRPM | OXYGEN SATURATION: 90 % | DIASTOLIC BLOOD PRESSURE: 70 MMHG

## 2025-07-09 LAB
ATRIAL RATE: 97 BPM
P AXIS: 44 DEGREES
P OFFSET: 159 MS
P ONSET: 105 MS
PR INTERVAL: 238 MS
Q ONSET: 224 MS
QRS COUNT: 16 BEATS
QRS DURATION: 72 MS
QT INTERVAL: 334 MS
QTC CALCULATION(BAZETT): 424 MS
QTC FREDERICIA: 391 MS
R AXIS: 17 DEGREES
T AXIS: 25 DEGREES
T OFFSET: 391 MS
VENTRICULAR RATE: 97 BPM

## 2025-07-09 PROCEDURE — G0299 HHS/HOSPICE OF RN EA 15 MIN: HCPCS

## 2025-07-09 SDOH — ECONOMIC STABILITY: HOUSING INSECURITY: EVIDENCE OF SMOKING MATERIAL: 0

## 2025-07-09 SDOH — HEALTH STABILITY: MENTAL HEALTH: SMOKING IN HOME: 0

## 2025-07-09 ASSESSMENT — ENCOUNTER SYMPTOMS
LOWEST PAIN SEVERITY IN PAST 24 HOURS: 6/10
PAIN SEVERITY GOAL: 0/10
APPETITE LEVEL: FAIR
MUSCLE WEAKNESS: 1
LOWER EXTREMITY EDEMA: 1
LIMITED RANGE OF MOTION: 1
HIGHEST PAIN SEVERITY IN PAST 24 HOURS: 8/10

## 2025-07-09 ASSESSMENT — ACTIVITIES OF DAILY LIVING (ADL)
ENTERING_EXITING_HOME: STAND BY ASSIST
OASIS_M1830: 03

## 2025-07-09 ASSESSMENT — PAIN SCALES - PAIN ASSESSMENT IN ADVANCED DEMENTIA (PAINAD): BREATHING: 1

## 2025-07-10 ENCOUNTER — HOME CARE VISIT (OUTPATIENT)
Dept: HOME HEALTH SERVICES | Facility: HOME HEALTH | Age: 80
End: 2025-07-10
Payer: MEDICARE

## 2025-07-10 VITALS
OXYGEN SATURATION: 92 % | DIASTOLIC BLOOD PRESSURE: 70 MMHG | TEMPERATURE: 98.4 F | HEART RATE: 91 BPM | SYSTOLIC BLOOD PRESSURE: 110 MMHG

## 2025-07-10 PROCEDURE — G0152 HHCP-SERV OF OT,EA 15 MIN: HCPCS

## 2025-07-10 PROCEDURE — G0151 HHCP-SERV OF PT,EA 15 MIN: HCPCS

## 2025-07-10 SDOH — ECONOMIC STABILITY: HOUSING INSECURITY: EVIDENCE OF SMOKING MATERIAL: 0

## 2025-07-10 SDOH — HEALTH STABILITY: MENTAL HEALTH: SMOKING IN HOME: 0

## 2025-07-10 ASSESSMENT — ACTIVITIES OF DAILY LIVING (ADL)
CURRENT_FUNCTION: MODERATE ASSIST
DRESSING_UB_CURRENT_FUNCTION: MINIMUM ASSIST
DRESSING_LB_CURRENT_FUNCTION: MAXIMUM ASSIST
LAUNDRY ASSESSED: 1
BATHING_CURRENT_FUNCTION: MAXIMUM ASSIST
GROOMING ASSESSED: 1
PHYSICAL TRANSFERS ASSESSED: 1
GROOMING_CURRENT_FUNCTION: MAXIMUM ASSIST
LAUNDRY: DEPENDENT
TOILETING: MAXIMUM ASSIST
FEEDING ASSESSED: 1
PREPARING MEALS: DEPENDENT
BATHING ASSESSED: 1
TOILETING: 1
FEEDING: INDEPENDENT

## 2025-07-10 ASSESSMENT — ENCOUNTER SYMPTOMS
LOWEST PAIN SEVERITY IN PAST 24 HOURS: 0/10
HIGHEST PAIN SEVERITY IN PAST 24 HOURS: 8/10
PAIN LOCATION - PAIN SEVERITY: 8/10
PERSON REPORTING PAIN: PATIENT
PAIN: 1
PERSON REPORTING PAIN: PATIENT
PAIN: 1
PAIN LOCATION: LEFT KNEE

## 2025-07-10 NOTE — HOME HEALTH
"Patient seen with spouse for OT evaluation visit. Patient was initially referred to [facility] services due to recent decline in function and mobility, and decubitis ulcer (states it is on her upper thighs). She states she was in the hospital 4x last year (spouse states she only left the house to go to the hospital or the doctor). She was rehospitalized with acute respiratory failure with hypoxia and hypercapnia. Spouse states she initially went to [facility], then was in a SNF. Then she was rehospitalized at [place], then came home on 7.7.25. She states when she was in [place] she was able to take 19 steps on the day she was rehospitalized. She is supposed to see Dr. Paul next week (pulmonology)-daughter has to make an appointment. She states she is using the BiPap machine but is supposed to get a different machine (spouse states it is a stronger machine-spouse states it is called an AVAP).     Lives with spouse in a ranch home, with a small ramp and then 1 step to enter the front door. She stays on 1st floor with bedroom (sleeps in lift chair) and full bathroom with a walk-in shower.     Medical history of COPD, incontinence of urine, obesity, OA (severe OA in both knees), DJD, hypothyroid. BiPap use, bilateral THR.     Patient has a wheeled walker with basket, O2, lift chair, wc, reacher, cane, wall mounted grab bars, Pure Wick system (uses at night time), bariatric BSC, shower chair.     At her baseline, she states she was able to walk household distances \"months\" ago with a walker. She states she was able to shower with her daughter's assistance (it has been months since she's showered). She needed assistance with dressing and toileting. She uses bariatric BSC for bowel movements. She could get into the bathroom to perform grooming tasks. Spouse performs IADLs.     UE AROM WNL except shoulder flex/abd to approximately 40 degrees. UE strength-4/5 except shoulders 3M/5. This is her UE baseline functioning. Patient/spouse in " agreement with OT POC. Plan to see 1w5. Share case with KARO Estrada. OT to address     Medication Reconciliation:    Demonstrates Adherence: Yes  Issues/Concerns: No  Provider Notified of Issues/Concerns: N/A  Caregiver Notified of Issues/Concerns: N/A  Pill bottles visualized during treatment: No, Explain: No med changes.

## 2025-07-15 ENCOUNTER — HOME CARE VISIT (OUTPATIENT)
Dept: HOME HEALTH SERVICES | Facility: HOME HEALTH | Age: 80
End: 2025-07-15
Payer: MEDICARE

## 2025-07-15 ENCOUNTER — PATIENT OUTREACH (OUTPATIENT)
Dept: CARE COORDINATION | Facility: CLINIC | Age: 80
End: 2025-07-15

## 2025-07-15 VITALS
DIASTOLIC BLOOD PRESSURE: 68 MMHG | RESPIRATION RATE: 20 BRPM | SYSTOLIC BLOOD PRESSURE: 122 MMHG | OXYGEN SATURATION: 92 % | TEMPERATURE: 97.7 F

## 2025-07-15 PROCEDURE — G0300 HHS/HOSPICE OF LPN EA 15 MIN: HCPCS

## 2025-07-15 PROCEDURE — G0156 HHCP-SVS OF AIDE,EA 15 MIN: HCPCS

## 2025-07-15 SDOH — HEALTH STABILITY: MENTAL HEALTH: SMOKING IN HOME: 0

## 2025-07-15 SDOH — ECONOMIC STABILITY: HOUSING INSECURITY: EVIDENCE OF SMOKING MATERIAL: 0

## 2025-07-15 ASSESSMENT — ENCOUNTER SYMPTOMS
FATIGUES EASILY: 1
DENIES PAIN: 1
BOWEL PATTERN NORMAL: 1
DYSPNEA ON EXERTION: 1
LIMITED RANGE OF MOTION: 1
DRY SKIN: 1
LOWER EXTREMITY EDEMA: 1
MUSCLE WEAKNESS: 1
APPETITE LEVEL: GOOD
LAST BOWEL MOVEMENT: 67401
DYSPNEA ACTIVITY LEVEL: AFTER AMBULATING LESS THAN 10 FT
SHORTNESS OF BREATH: 1
DYSPNEA ACTIVITY LEVEL: WHILE SPEAKING
CHANGE IN APPETITE: UNCHANGED

## 2025-07-15 ASSESSMENT — ACTIVITIES OF DAILY LIVING (ADL)
BATHING_CURRENT_FUNCTION: MAXIMUM ASSIST
BATHING ASSESSED: 1
DRESSING_UB_CURRENT_FUNCTION: MAXIMUM ASSIST
AMBULATION ASSISTANCE: NON-AMBULATORY
FEEDING ASSESSED: 1
FEEDING: CONTACT GUARD ASSIST
TOILETING: MODERATE ASSIST
DRESSING_LB_CURRENT_FUNCTION: MAXIMUM ASSIST
TOILETING: 1
AMBULATION ASSISTANCE: 1

## 2025-07-16 ENCOUNTER — HOME CARE VISIT (OUTPATIENT)
Dept: HOME HEALTH SERVICES | Facility: HOME HEALTH | Age: 80
End: 2025-07-16
Payer: MEDICARE

## 2025-07-16 ENCOUNTER — TELEPHONE (OUTPATIENT)
Dept: PRIMARY CARE | Facility: CLINIC | Age: 80
End: 2025-07-16
Payer: MEDICARE

## 2025-07-16 VITALS — SYSTOLIC BLOOD PRESSURE: 123 MMHG | OXYGEN SATURATION: 94 % | HEART RATE: 88 BPM | DIASTOLIC BLOOD PRESSURE: 76 MMHG

## 2025-07-16 VITALS
OXYGEN SATURATION: 94 % | HEART RATE: 96 BPM | TEMPERATURE: 98.5 F | SYSTOLIC BLOOD PRESSURE: 120 MMHG | DIASTOLIC BLOOD PRESSURE: 60 MMHG | RESPIRATION RATE: 18 BRPM

## 2025-07-16 DIAGNOSIS — B37.9 YEAST INFECTION: ICD-10-CM

## 2025-07-16 DIAGNOSIS — L22 DIAPER RASH: ICD-10-CM

## 2025-07-16 PROCEDURE — G0157 HHC PT ASSISTANT EA 15: HCPCS | Mod: CQ

## 2025-07-16 PROCEDURE — G0158 HHC OT ASSISTANT EA 15: HCPCS | Mod: CO

## 2025-07-16 RX ORDER — FLUCONAZOLE 150 MG/1
150 TABLET ORAL DAILY
Qty: 7 TABLET | Refills: 0 | Status: SHIPPED | OUTPATIENT
Start: 2025-07-16 | End: 2025-07-23

## 2025-07-16 RX ORDER — NYSTATIN 100000 U/G
CREAM TOPICAL 2 TIMES DAILY
Qty: 30 G | Refills: 2 | Status: SHIPPED | OUTPATIENT
Start: 2025-07-16 | End: 2025-07-23

## 2025-07-16 SDOH — ECONOMIC STABILITY: HOUSING INSECURITY: EVIDENCE OF SMOKING MATERIAL: 0

## 2025-07-16 SDOH — HEALTH STABILITY: MENTAL HEALTH: SMOKING IN HOME: 0

## 2025-07-16 SDOH — ECONOMIC STABILITY: GENERAL: WOULD YOU LIKE HELP WITH ANY OF THE FOLLOWING NEEDS?: I DONT NEED HELP WITH ANY OF THESE

## 2025-07-16 SDOH — ECONOMIC STABILITY: FOOD INSECURITY
ARE ANY OF YOUR NEEDS URGENT? FOR EXAMPLE, UNCERTAINTY OF WHERE YOU WILL GET YOUR NEXT MEAL OR NOT HAVING THE MEDICATIONS YOU NEED TO TAKE TOMORROW.: NO

## 2025-07-16 ASSESSMENT — ENCOUNTER SYMPTOMS
PERSON REPORTING PAIN: PATIENT
PERSON REPORTING PAIN: PATIENT
PAIN LOCATION - PAIN SEVERITY: 7/10
PAIN LOCATION - EXACERBATING FACTORS: STANDING
PAIN LOCATION: RIGHT KNEE
PAIN LOCATION - RELIEVING FACTORS: REST
PAIN LOCATION - PAIN QUALITY: ACHY
PAIN SEVERITY GOAL: 0/10
HIGHEST PAIN SEVERITY IN PAST 24 HOURS: 4/10
PAIN LOCATION - PAIN FREQUENCY: CONSTANT
PAIN LOCATION - PAIN SEVERITY: 10/10
AGGRESSION WITHIN DEFINED LIMITS: 1
LOWEST PAIN SEVERITY IN PAST 24 HOURS: 4/10
PAIN: 1
PAIN LOCATION - EXACERBATING FACTORS: NOTHING IN PARTICULAR
PAIN LOCATION: LEFT KNEE
LOWEST PAIN SEVERITY IN PAST 24 HOURS: 4/10
HIGHEST PAIN SEVERITY IN PAST 24 HOURS: 10/10
PAIN LOCATION - PAIN FREQUENCY: CONSTANT
ANGER WITHIN DEFINED LIMITS: 1
PAIN LOCATION: LEFT KNEE
PAIN LOCATION: RIGHT KNEE
PAIN: 1
SLEEP QUALITY: FAIR
SUBJECTIVE PAIN PROGRESSION: WAXING AND WANING

## 2025-07-16 NOTE — PROGRESS NOTES
Inpatient Facility: [facility]  Admission: 6/22/2025  Discharge: 7/7/2025  Discharge Diagnosis: Acute Respiratory Failure with hypoxia and hypercapnia, Decompensated Diastolic HF, Pulmonary edema bilateral lungs, Aspiration PNA, Paroxysmal A fib, COPD, Incontinence associated dermatitis.      Outreach successful,  This CM spoke with patient who states she is doing okay at home, some sob, uses 4L/min O2 via NC, and wears C-pap at night patient states she has a hard time with it at night and was told that she needs a new machine and her settings need to be changed, This CM will send a message Claus Hidalgo in regards to her current machine and settings per patient this is her pulmonologist.  Patient states she has decreased swelling in bilateral legs when she places on her BARBARA hoes first thing in the morning.  Patient has no c/o of urinary or bowels, appetite is good.  Patient is awaiting on pharmacy to complete her order for her nystatin cream and Diflucan from PCP office.  Patient had no other questions or needs.  CM will continue to follow.    Wrap Up  Wrap Up Additional Comments: CM will continue to follow up (7/16/2025  1:05 PM)  Call End Time: 1305 (7/16/2025  1:05 PM)    Engagement  Call Start Time: 1300 (7/16/2025  1:05 PM)    Medications  Medications reviewed with patient/caregiver?: Yes (7/16/2025  1:05 PM)  Is the patient having any side effects they believe may be caused by any medication additions or changes?: No (7/16/2025  1:05 PM)  Does the patient have all medications ordered at discharge?: Yes (7/16/2025  1:05 PM)  Is the patient taking all medications as directed (includes completed medication regime)?: Yes (7/16/2025  1:05 PM)    Appointments  Does the patient have a primary care provider?: Yes (7/16/2025  1:05 PM)  Care Management Interventions: Advised patient to make appointment (7/16/2025  1:05 PM)  Has the patient kept scheduled appointments due by today?: Yes (7/16/2025  1:05  PM)    Self Management  What is the home health agency?:  Home [facility] (7/16/2025  1:05 PM)  Has home health visited the patient within 72 hours of discharge?: Yes (7/16/2025  1:05 PM)  What Durable Medical Equipment (DME) was ordered?: None (7/16/2025  1:05 PM)    Patient Teaching  Does the patient have access to their discharge instructions?: Yes (7/16/2025  1:05 PM)  Care Management Interventions: Reviewed instructions with patient (7/16/2025  1:05 PM)  What is the patient's perception of their health status since discharge?: Improving (7/16/2025  1:05 PM)  Is the patient/caregiver able to teach back the hierarchy of who to call/visit for symptoms/problems? PCP, Specialist, Home Health nurse, Urgent Care, ED, 911: Yes (7/16/2025  1:05 PM)      JAIME Osorio, RN   [phone]   ACO Department

## 2025-07-16 NOTE — SIGNIFICANT EVENT
07/16/25 1314   Social Determinants of Health- Help Requested   Would you like help with any of the following needs? I dont need help with any of these   Are any of your needs urgent? For example, uncertainty of where you will get your next meal or not having the medications you need to take tomorrow. N

## 2025-07-16 NOTE — TELEPHONE ENCOUNTER
Pt would like to know if you can prescribe nystatin cream for her 100,000units for her urine burns. Please advice

## 2025-07-21 ENCOUNTER — APPOINTMENT (OUTPATIENT)
Dept: CARDIOLOGY | Facility: HOSPITAL | Age: 80
DRG: 189 | End: 2025-07-21
Payer: MEDICARE

## 2025-07-21 ENCOUNTER — APPOINTMENT (OUTPATIENT)
Dept: RADIOLOGY | Facility: HOSPITAL | Age: 80
DRG: 189 | End: 2025-07-21
Payer: MEDICARE

## 2025-07-21 ENCOUNTER — HOSPITAL ENCOUNTER (INPATIENT)
Facility: HOSPITAL | Age: 80
DRG: 189 | End: 2025-07-21
Admitting: INTERNAL MEDICINE
Payer: MEDICARE

## 2025-07-21 DIAGNOSIS — J44.1 COPD EXACERBATION (MULTI): Primary | ICD-10-CM

## 2025-07-21 DIAGNOSIS — E66.01 MORBID OBESITY (MULTI): ICD-10-CM

## 2025-07-21 DIAGNOSIS — R26.2 DIFFICULTY IN WALKING, NOT ELSEWHERE CLASSIFIED: ICD-10-CM

## 2025-07-21 LAB
ALBUMIN SERPL BCP-MCNC: 3.9 G/DL (ref 3.4–5)
ALP SERPL-CCNC: 78 U/L (ref 33–136)
ALT SERPL W P-5'-P-CCNC: 10 U/L (ref 7–45)
ANION GAP BLDV CALCULATED.4IONS-SCNC: -1 MMOL/L (ref 10–25)
ANION GAP SERPL CALC-SCNC: ABNORMAL MMOL/L
AST SERPL W P-5'-P-CCNC: 15 U/L (ref 9–39)
BASE EXCESS BLDV CALC-SCNC: 22.3 MMOL/L (ref -2–3)
BASOPHILS # BLD AUTO: 0.05 X10*3/UL (ref 0–0.1)
BASOPHILS NFR BLD AUTO: 0.6 %
BILIRUB SERPL-MCNC: 0.7 MG/DL (ref 0–1.2)
BNP SERPL-MCNC: 46 PG/ML (ref 0–99)
BODY TEMPERATURE: 37 DEGREES CELSIUS
BUN SERPL-MCNC: 24 MG/DL (ref 6–23)
CA-I BLDV-SCNC: 1.32 MMOL/L (ref 1.1–1.33)
CALCIUM SERPL-MCNC: 9.9 MG/DL (ref 8.6–10.3)
CHLORIDE BLDV-SCNC: 86 MMOL/L (ref 98–107)
CHLORIDE SERPL-SCNC: 86 MMOL/L (ref 98–107)
CO2 SERPL-SCNC: >45 MMOL/L (ref 21–32)
CREAT SERPL-MCNC: 0.78 MG/DL (ref 0.5–1.05)
CRITICAL CALL TIME: 1347
CRITICAL CALLED BY: ABNORMAL
CRITICAL CALLED TO: ABNORMAL
CRITICAL READ BACK: ABNORMAL
EGFRCR SERPLBLD CKD-EPI 2021: 76 ML/MIN/1.73M*2
EOSINOPHIL # BLD AUTO: 0.19 X10*3/UL (ref 0–0.4)
EOSINOPHIL NFR BLD AUTO: 2.4 %
ERYTHROCYTE [DISTWIDTH] IN BLOOD BY AUTOMATED COUNT: 14.9 % (ref 11.5–14.5)
GLUCOSE BLDV-MCNC: 117 MG/DL (ref 74–99)
GLUCOSE SERPL-MCNC: 118 MG/DL (ref 74–99)
HCO3 BLDV-SCNC: 55.1 MMOL/L (ref 22–26)
HCT VFR BLD AUTO: 38.7 % (ref 36–46)
HCT VFR BLD EST: 41 % (ref 36–46)
HGB BLD-MCNC: 11.3 G/DL (ref 12–16)
HGB BLDV-MCNC: 13.7 G/DL (ref 12–16)
IMM GRANULOCYTES # BLD AUTO: 0.06 X10*3/UL (ref 0–0.5)
IMM GRANULOCYTES NFR BLD AUTO: 0.8 % (ref 0–0.9)
INHALED O2 CONCENTRATION: 40 %
LACTATE BLDV-SCNC: 1.7 MMOL/L (ref 0.4–2)
LYMPHOCYTES # BLD AUTO: 1.07 X10*3/UL (ref 0.8–3)
LYMPHOCYTES NFR BLD AUTO: 13.7 %
MCH RBC QN AUTO: 28.1 PG (ref 26–34)
MCHC RBC AUTO-ENTMCNC: 29.2 G/DL (ref 32–36)
MCV RBC AUTO: 96 FL (ref 80–100)
MONOCYTES # BLD AUTO: 0.6 X10*3/UL (ref 0.05–0.8)
MONOCYTES NFR BLD AUTO: 7.7 %
NEUTROPHILS # BLD AUTO: 5.86 X10*3/UL (ref 1.6–5.5)
NEUTROPHILS NFR BLD AUTO: 74.8 %
NRBC BLD-RTO: 0 /100 WBCS (ref 0–0)
OXYHGB MFR BLDV: 41.1 % (ref 45–75)
PCO2 BLDV: 112 MM HG (ref 41–51)
PH BLDV: 7.3 PH (ref 7.33–7.43)
PLATELET # BLD AUTO: 213 X10*3/UL (ref 150–450)
PO2 BLDV: 29 MM HG (ref 35–45)
POTASSIUM BLDV-SCNC: 4.8 MMOL/L (ref 3.5–5.3)
POTASSIUM SERPL-SCNC: 4.8 MMOL/L (ref 3.5–5.3)
PROT SERPL-MCNC: 7.2 G/DL (ref 6.4–8.2)
RBC # BLD AUTO: 4.02 X10*6/UL (ref 4–5.2)
SAO2 % BLDV: 42 % (ref 45–75)
SODIUM BLDV-SCNC: 135 MMOL/L (ref 136–145)
SODIUM SERPL-SCNC: 137 MMOL/L (ref 136–145)
TEST COMMENT: ABNORMAL
WBC # BLD AUTO: 7.8 X10*3/UL (ref 4.4–11.3)

## 2025-07-21 PROCEDURE — 36415 COLL VENOUS BLD VENIPUNCTURE: CPT

## 2025-07-21 PROCEDURE — 84132 ASSAY OF SERUM POTASSIUM: CPT

## 2025-07-21 PROCEDURE — 2500000002 HC RX 250 W HCPCS SELF ADMINISTERED DRUGS (ALT 637 FOR MEDICARE OP, ALT 636 FOR OP/ED)

## 2025-07-21 PROCEDURE — 99285 EMERGENCY DEPT VISIT HI MDM: CPT | Mod: 25

## 2025-07-21 PROCEDURE — 5A09357 ASSISTANCE WITH RESPIRATORY VENTILATION, LESS THAN 24 CONSECUTIVE HOURS, CONTINUOUS POSITIVE AIRWAY PRESSURE: ICD-10-PCS

## 2025-07-21 PROCEDURE — 94660 CPAP INITIATION&MGMT: CPT

## 2025-07-21 PROCEDURE — 93005 ELECTROCARDIOGRAM TRACING: CPT

## 2025-07-21 PROCEDURE — 71046 X-RAY EXAM CHEST 2 VIEWS: CPT

## 2025-07-21 PROCEDURE — 2500000004 HC RX 250 GENERAL PHARMACY W/ HCPCS (ALT 636 FOR OP/ED)

## 2025-07-21 PROCEDURE — 96375 TX/PRO/DX INJ NEW DRUG ADDON: CPT

## 2025-07-21 PROCEDURE — 83880 ASSAY OF NATRIURETIC PEPTIDE: CPT

## 2025-07-21 PROCEDURE — 2500000004 HC RX 250 GENERAL PHARMACY W/ HCPCS (ALT 636 FOR OP/ED): Mod: JZ

## 2025-07-21 PROCEDURE — 84145 PROCALCITONIN (PCT): CPT | Mod: PARLAB

## 2025-07-21 PROCEDURE — 2500000001 HC RX 250 WO HCPCS SELF ADMINISTERED DRUGS (ALT 637 FOR MEDICARE OP)

## 2025-07-21 PROCEDURE — 94640 AIRWAY INHALATION TREATMENT: CPT

## 2025-07-21 PROCEDURE — 96365 THER/PROPH/DIAG IV INF INIT: CPT

## 2025-07-21 PROCEDURE — 94799 UNLISTED PULMONARY SVC/PX: CPT

## 2025-07-21 PROCEDURE — 71046 X-RAY EXAM CHEST 2 VIEWS: CPT | Performed by: STUDENT IN AN ORGANIZED HEALTH CARE EDUCATION/TRAINING PROGRAM

## 2025-07-21 PROCEDURE — 2060000001 HC INTERMEDIATE ICU ROOM DAILY

## 2025-07-21 PROCEDURE — 2500000005 HC RX 250 GENERAL PHARMACY W/O HCPCS

## 2025-07-21 PROCEDURE — 85025 COMPLETE CBC W/AUTO DIFF WBC: CPT

## 2025-07-21 RX ORDER — FLUCONAZOLE 100 MG/1
150 TABLET ORAL DAILY
Status: DISCONTINUED | OUTPATIENT
Start: 2025-07-21 | End: 2025-07-23

## 2025-07-21 RX ORDER — ALBUTEROL SULFATE 0.83 MG/ML
2.5 SOLUTION RESPIRATORY (INHALATION) 4 TIMES DAILY PRN
Status: DISCONTINUED | OUTPATIENT
Start: 2025-07-21 | End: 2025-07-21

## 2025-07-21 RX ORDER — PANTOPRAZOLE SODIUM 20 MG/1
20 TABLET, DELAYED RELEASE ORAL AS NEEDED
Status: DISCONTINUED | OUTPATIENT
Start: 2025-07-21 | End: 2025-08-01 | Stop reason: HOSPADM

## 2025-07-21 RX ORDER — TORSEMIDE 20 MG/1
40 TABLET ORAL DAILY
Status: DISCONTINUED | OUTPATIENT
Start: 2025-07-21 | End: 2025-07-30

## 2025-07-21 RX ORDER — ALBUTEROL SULFATE 0.83 MG/ML
2.5 SOLUTION RESPIRATORY (INHALATION) EVERY 2 HOUR PRN
Status: DISCONTINUED | OUTPATIENT
Start: 2025-07-21 | End: 2025-08-01 | Stop reason: HOSPADM

## 2025-07-21 RX ORDER — NYSTATIN 100000 U/G
1 CREAM TOPICAL AS NEEDED
Status: DISCONTINUED | OUTPATIENT
Start: 2025-07-21 | End: 2025-08-01 | Stop reason: HOSPADM

## 2025-07-21 RX ORDER — ALBUTEROL SULFATE 0.83 MG/ML
2.5 SOLUTION RESPIRATORY (INHALATION)
Status: DISCONTINUED | OUTPATIENT
Start: 2025-07-21 | End: 2025-08-01 | Stop reason: HOSPADM

## 2025-07-21 RX ORDER — POLYETHYLENE GLYCOL 3350 17 G/17G
17 POWDER, FOR SOLUTION ORAL DAILY
Status: DISCONTINUED | OUTPATIENT
Start: 2025-07-21 | End: 2025-08-01 | Stop reason: HOSPADM

## 2025-07-21 RX ORDER — SPIRONOLACTONE 25 MG/1
100 TABLET ORAL DAILY
Status: DISCONTINUED | OUTPATIENT
Start: 2025-07-21 | End: 2025-08-01 | Stop reason: HOSPADM

## 2025-07-21 RX ORDER — LEVOTHYROXINE SODIUM 100 UG/1
100 TABLET ORAL DAILY
Status: DISCONTINUED | OUTPATIENT
Start: 2025-07-22 | End: 2025-08-01 | Stop reason: HOSPADM

## 2025-07-21 RX ORDER — FLUTICASONE FUROATE AND VILANTEROL 200; 25 UG/1; UG/1
1 POWDER RESPIRATORY (INHALATION) DAILY
Status: DISCONTINUED | OUTPATIENT
Start: 2025-07-21 | End: 2025-07-22

## 2025-07-21 RX ORDER — IPRATROPIUM BROMIDE AND ALBUTEROL SULFATE 2.5; .5 MG/3ML; MG/3ML
3 SOLUTION RESPIRATORY (INHALATION) EVERY 20 MIN
Status: COMPLETED | OUTPATIENT
Start: 2025-07-21 | End: 2025-07-21

## 2025-07-21 RX ORDER — HYDROXYZINE HYDROCHLORIDE 25 MG/1
25 TABLET, FILM COATED ORAL 3 TIMES DAILY PRN
Status: DISCONTINUED | OUTPATIENT
Start: 2025-07-21 | End: 2025-08-01 | Stop reason: HOSPADM

## 2025-07-21 RX ORDER — BUDESONIDE AND FORMOTEROL FUMARATE DIHYDRATE 160; 4.5 UG/1; UG/1
2 AEROSOL RESPIRATORY (INHALATION) 2 TIMES DAILY PRN
Status: DISCONTINUED | OUTPATIENT
Start: 2025-07-21 | End: 2025-07-21 | Stop reason: SDUPTHER

## 2025-07-21 RX ORDER — DILTIAZEM HYDROCHLORIDE 60 MG/1
60 CAPSULE, EXTENDED RELEASE ORAL DAILY
Status: DISCONTINUED | OUTPATIENT
Start: 2025-07-21 | End: 2025-07-21

## 2025-07-21 RX ORDER — DILTIAZEM HYDROCHLORIDE 60 MG/1
30 TABLET, FILM COATED ORAL EVERY 12 HOURS
Status: DISCONTINUED | OUTPATIENT
Start: 2025-07-22 | End: 2025-08-01 | Stop reason: HOSPADM

## 2025-07-21 RX ORDER — ENOXAPARIN SODIUM 100 MG/ML
60 INJECTION SUBCUTANEOUS EVERY 12 HOURS SCHEDULED
Status: DISCONTINUED | OUTPATIENT
Start: 2025-07-21 | End: 2025-08-01 | Stop reason: HOSPADM

## 2025-07-21 RX ORDER — ALBUTEROL SULFATE 90 UG/1
2 INHALANT RESPIRATORY (INHALATION) EVERY 4 HOURS PRN
Status: DISCONTINUED | OUTPATIENT
Start: 2025-07-21 | End: 2025-08-01 | Stop reason: HOSPADM

## 2025-07-21 RX ORDER — MAGNESIUM SULFATE HEPTAHYDRATE 40 MG/ML
2 INJECTION, SOLUTION INTRAVENOUS ONCE
Status: COMPLETED | OUTPATIENT
Start: 2025-07-21 | End: 2025-07-21

## 2025-07-21 RX ORDER — NYSTATIN 100000 [USP'U]/G
1 POWDER TOPICAL AS NEEDED
Status: DISCONTINUED | OUTPATIENT
Start: 2025-07-21 | End: 2025-08-01 | Stop reason: HOSPADM

## 2025-07-21 RX ORDER — ATORVASTATIN CALCIUM 10 MG/1
10 TABLET, FILM COATED ORAL DAILY
Status: DISCONTINUED | OUTPATIENT
Start: 2025-07-21 | End: 2025-08-01 | Stop reason: HOSPADM

## 2025-07-21 RX ADMIN — MAGNESIUM SULFATE HEPTAHYDRATE 2 G: 40 INJECTION, SOLUTION INTRAVENOUS at 14:22

## 2025-07-21 RX ADMIN — TORSEMIDE 40 MG: 20 TABLET ORAL at 21:50

## 2025-07-21 RX ADMIN — FLUCONAZOLE 150 MG: 100 TABLET ORAL at 21:50

## 2025-07-21 RX ADMIN — METHYLPREDNISOLONE SODIUM SUCCINATE 125 MG: 125 INJECTION, POWDER, FOR SOLUTION INTRAMUSCULAR; INTRAVENOUS at 14:24

## 2025-07-21 RX ADMIN — SPIRONOLACTONE 100 MG: 25 TABLET, FILM COATED ORAL at 21:50

## 2025-07-21 RX ADMIN — ENOXAPARIN SODIUM 60 MG: 60 INJECTION SUBCUTANEOUS at 20:28

## 2025-07-21 RX ADMIN — ALBUTEROL SULFATE 2.5 MG: 2.5 SOLUTION RESPIRATORY (INHALATION) at 18:38

## 2025-07-21 RX ADMIN — AZITHROMYCIN MONOHYDRATE 500 MG: 500 INJECTION, POWDER, LYOPHILIZED, FOR SOLUTION INTRAVENOUS at 22:28

## 2025-07-21 RX ADMIN — HYDROXYZINE HYDROCHLORIDE 25 MG: 25 TABLET ORAL at 20:28

## 2025-07-21 RX ADMIN — IPRATROPIUM BROMIDE AND ALBUTEROL SULFATE 3 ML: .5; 3 SOLUTION RESPIRATORY (INHALATION) at 14:50

## 2025-07-21 RX ADMIN — Medication 5 L/MIN: at 14:50

## 2025-07-21 RX ADMIN — ATORVASTATIN CALCIUM 10 MG: 10 TABLET, FILM COATED ORAL at 21:50

## 2025-07-21 RX ADMIN — IPRATROPIUM BROMIDE AND ALBUTEROL SULFATE 3 ML: .5; 3 SOLUTION RESPIRATORY (INHALATION) at 14:15

## 2025-07-21 RX ADMIN — Medication 60 PERCENT: at 18:38

## 2025-07-21 RX ADMIN — IPRATROPIUM BROMIDE AND ALBUTEROL SULFATE 3 ML: .5; 3 SOLUTION RESPIRATORY (INHALATION) at 13:45

## 2025-07-21 SDOH — SOCIAL STABILITY: SOCIAL INSECURITY
WITHIN THE LAST YEAR, HAVE YOU BEEN KICKED, HIT, SLAPPED, OR OTHERWISE PHYSICALLY HURT BY YOUR PARTNER OR EX-PARTNER?: NO

## 2025-07-21 SDOH — SOCIAL STABILITY: SOCIAL INSECURITY: HAVE YOU HAD ANY THOUGHTS OF HARMING ANYONE ELSE?: NO

## 2025-07-21 SDOH — HEALTH STABILITY: MENTAL HEALTH: HOW OFTEN DO YOU HAVE SIX OR MORE DRINKS ON ONE OCCASION?: NEVER

## 2025-07-21 SDOH — ECONOMIC STABILITY: FOOD INSECURITY: WITHIN THE PAST 12 MONTHS, YOU WORRIED THAT YOUR FOOD WOULD RUN OUT BEFORE YOU GOT THE MONEY TO BUY MORE.: NEVER TRUE

## 2025-07-21 SDOH — SOCIAL STABILITY: SOCIAL INSECURITY: HAS ANYONE EVER THREATENED TO HURT YOUR FAMILY OR YOUR PETS?: NO

## 2025-07-21 SDOH — SOCIAL STABILITY: SOCIAL INSECURITY: WITHIN THE LAST YEAR, HAVE YOU BEEN AFRAID OF YOUR PARTNER OR EX-PARTNER?: NO

## 2025-07-21 SDOH — SOCIAL STABILITY: SOCIAL INSECURITY: ARE YOU OR HAVE YOU BEEN THREATENED OR ABUSED PHYSICALLY, EMOTIONALLY, OR SEXUALLY BY ANYONE?: NO

## 2025-07-21 SDOH — ECONOMIC STABILITY: INCOME INSECURITY: IN THE PAST 12 MONTHS HAS THE ELECTRIC, GAS, OIL, OR WATER COMPANY THREATENED TO SHUT OFF SERVICES IN YOUR HOME?: NO

## 2025-07-21 SDOH — SOCIAL STABILITY: SOCIAL INSECURITY: WERE YOU ABLE TO COMPLETE ALL THE BEHAVIORAL HEALTH SCREENINGS?: YES

## 2025-07-21 SDOH — SOCIAL STABILITY: SOCIAL INSECURITY: DO YOU FEEL ANYONE HAS EXPLOITED OR TAKEN ADVANTAGE OF YOU FINANCIALLY OR OF YOUR PERSONAL PROPERTY?: NO

## 2025-07-21 SDOH — SOCIAL STABILITY: SOCIAL INSECURITY: HAVE YOU HAD THOUGHTS OF HARMING ANYONE ELSE?: NO

## 2025-07-21 SDOH — SOCIAL STABILITY: SOCIAL INSECURITY
WITHIN THE LAST YEAR, HAVE YOU BEEN RAPED OR FORCED TO HAVE ANY KIND OF SEXUAL ACTIVITY BY YOUR PARTNER OR EX-PARTNER?: NO

## 2025-07-21 SDOH — ECONOMIC STABILITY: FOOD INSECURITY: WITHIN THE PAST 12 MONTHS, THE FOOD YOU BOUGHT JUST DIDN'T LAST AND YOU DIDN'T HAVE MONEY TO GET MORE.: NEVER TRUE

## 2025-07-21 SDOH — SOCIAL STABILITY: SOCIAL INSECURITY: ARE THERE ANY APPARENT SIGNS OF INJURIES/BEHAVIORS THAT COULD BE RELATED TO ABUSE/NEGLECT?: NO

## 2025-07-21 SDOH — HEALTH STABILITY: MENTAL HEALTH: HOW MANY DRINKS CONTAINING ALCOHOL DO YOU HAVE ON A TYPICAL DAY WHEN YOU ARE DRINKING?: PATIENT DOES NOT DRINK

## 2025-07-21 SDOH — HEALTH STABILITY: MENTAL HEALTH: HOW OFTEN DO YOU HAVE A DRINK CONTAINING ALCOHOL?: NEVER

## 2025-07-21 SDOH — SOCIAL STABILITY: SOCIAL INSECURITY: WITHIN THE LAST YEAR, HAVE YOU BEEN HUMILIATED OR EMOTIONALLY ABUSED IN OTHER WAYS BY YOUR PARTNER OR EX-PARTNER?: NO

## 2025-07-21 SDOH — SOCIAL STABILITY: SOCIAL INSECURITY: DO YOU FEEL UNSAFE GOING BACK TO THE PLACE WHERE YOU ARE LIVING?: NO

## 2025-07-21 SDOH — SOCIAL STABILITY: SOCIAL INSECURITY: ABUSE: ADULT

## 2025-07-21 SDOH — SOCIAL STABILITY: SOCIAL INSECURITY: DOES ANYONE TRY TO KEEP YOU FROM HAVING/CONTACTING OTHER FRIENDS OR DOING THINGS OUTSIDE YOUR HOME?: NO

## 2025-07-21 ASSESSMENT — ACTIVITIES OF DAILY LIVING (ADL)
HEARING - RIGHT EAR: FUNCTIONAL
DRESSING YOURSELF: NEEDS ASSISTANCE
LACK_OF_TRANSPORTATION: NO
TOILETING: NEEDS ASSISTANCE
WALKS IN HOME: DEPENDENT
ADEQUATE_TO_COMPLETE_ADL: YES
ASSISTIVE_DEVICE: OTHER (COMMENT)
BATHING: NEEDS ASSISTANCE
PATIENT'S MEMORY ADEQUATE TO SAFELY COMPLETE DAILY ACTIVITIES?: YES
HEARING - LEFT EAR: FUNCTIONAL
FEEDING YOURSELF: NEEDS ASSISTANCE
JUDGMENT_ADEQUATE_SAFELY_COMPLETE_DAILY_ACTIVITIES: YES
GROOMING: NEEDS ASSISTANCE

## 2025-07-21 ASSESSMENT — COGNITIVE AND FUNCTIONAL STATUS - GENERAL
EATING MEALS: A LITTLE
HELP NEEDED FOR BATHING: A LOT
TOILETING: A LOT
DAILY ACTIVITIY SCORE: 13
PATIENT BASELINE BEDBOUND: NO
MOBILITY SCORE: 10
DRESSING REGULAR LOWER BODY CLOTHING: A LOT
STANDING UP FROM CHAIR USING ARMS: A LOT
MOVING FROM LYING ON BACK TO SITTING ON SIDE OF FLAT BED WITH BEDRAILS: A LOT
DRESSING REGULAR UPPER BODY CLOTHING: A LOT
MOVING TO AND FROM BED TO CHAIR: A LOT
CLIMB 3 TO 5 STEPS WITH RAILING: TOTAL
WALKING IN HOSPITAL ROOM: TOTAL
TURNING FROM BACK TO SIDE WHILE IN FLAT BAD: A LOT
PERSONAL GROOMING: A LOT

## 2025-07-21 ASSESSMENT — PAIN - FUNCTIONAL ASSESSMENT: PAIN_FUNCTIONAL_ASSESSMENT: 0-10

## 2025-07-21 ASSESSMENT — PAIN SCALES - GENERAL: PAINLEVEL_OUTOF10: 0 - NO PAIN

## 2025-07-21 ASSESSMENT — LIFESTYLE VARIABLES
SKIP TO QUESTIONS 9-10: 1
AUDIT-C TOTAL SCORE: 0

## 2025-07-21 NOTE — ED TRIAGE NOTES
Pt to ED via EMS from home due to shortness of breath. Per EMS, pt has hx of COPD and is on 6 L NC at baseline

## 2025-07-21 NOTE — H&P
SUBJECTIVE     HPI:  Ailyn Banegas is a 80 y.o. year old female with a history of COPD (on baseline 4L NC), MARLENY/likely OHS, Hypothyroidism, DLD, Morbid Obesity who was brought into the ED by family for respiratory distress, confusion, and daytime somnolence. The pt was recently admitted to ICU for acute on chronic hypercapnic hypoxic respiratory failure 2/2 ADHF on 6/22/25, requiring intubation for no improvement of blood gases and worsening mental status. The patient's daughter is present during this visit who mentions the patient does not have the proper equipment at home to administer BiPAP effectively. The daughter mentions that the patient hsa BiPAP S/T at home but her new settings require AVAP settings which they do not have the device. She also reports the patient's O2 on pulse oximetry has often been in the mid-high 80s. The pt reports increased daytime somnolence. The daughter reports intermittent periods of confusion. The pt denies recent fever, chills, n/v/d, urinary frequency, and recent sick contacts. However, she endorses a cough productive of clear sputum s/p using BiPAP. She was discharged home with home health care upon her prior hospitalization. She states that she completed her antibiotic course as instructed.     In the ED: Vitals - 95% SpO2 on 5L NC, otherwise WNL   Labs: BNP: 46, Bicarbonate >45, WBC: 7.8  Venous Blood Gas: pCO2 Venous: 112  CXR 7/21/25: No evidence of acute cardiopulmonary process.     PMH: as above  PSH: as above  SH: denies smoking, denies EtOH, denies illicit drug use    Medical History[1]  Surgical History[2]  Family History[3]    OBJECTIVE     Vitals:    07/21/25 1450 07/21/25 1500 07/21/25 1530 07/21/25 1600   BP:  141/72 128/73 137/70   Pulse:  89 94 90   Resp:  15 20 18   Temp:       TempSrc:       SpO2: 95% 97% 94% 94%   Weight:       Height:          Results from last 7 days   Lab Units 07/21/25  1329   WBC AUTO x10*3/uL 7.8   HEMOGLOBIN g/dL 11.3*   HEMATOCRIT %  38.7   PLATELETS AUTO x10*3/uL 213   NEUTROS PCT AUTO % 74.8   LYMPHS PCT AUTO % 13.7   MONOS PCT AUTO % 7.7   EOS PCT AUTO % 2.4     Results from last 7 days   Lab Units 07/21/25  1329   SODIUM mmol/L 137   POTASSIUM mmol/L 4.8   CHLORIDE mmol/L 86*   CO2 mmol/L >45*   BUN mg/dL 24*   CREATININE mg/dL 0.78   CALCIUM mg/dL 9.9   PROTEIN TOTAL g/dL 7.2   BILIRUBIN TOTAL mg/dL 0.7   ALK PHOS U/L 78   ALT U/L 10   AST U/L 15   GLUCOSE mg/dL 118*       Scheduled Medications  Scheduled Medications[4]   Physical Exam    Constitutional: obese habitus, awake, alert, no acute distress  ENMT: mucous membranes moist, EOMI, conjunctivae clear  Head/Neck: normocephalic, atraumatic; supple, trachea midline  Respiratory/Thorax: patent airways, CTAB; no wheezes, rales, or rhonchi  Cardiovascular: RRR, no murmur  Gastrointestinal: soft, nondistended, non-tender, bowel sounds appreciated  Extremities: palpable peripheral pulses, no edema or cyanosis, BLE cellulitis  Neurological: AO x3, no focal deficits  Psychological: appropriate mood and behavior  Skin: warm and dry    Pertinent Imaging    CXR 7/21/25:   No evidence of acute cardiopulmonary process.       ASSESSMENT & PLAN     Ailyn Banegas is a 80 y.o. female with a PMHx COPD (on baseline 4L NC), MARLENY/likely OHS, Hypothyroidism, DLD, Morbid Obesity who was brought into the ED by family for respiratory distress, confusion, and daytime somnolence.     #Acute on chronic hypercapnic HRF ISO #MARLENY/OHS and #COPD  -High 80s low 90s SpO2 on 5L NC  PLAN:  -IV Azithromycin 500 BID for 3 days (then continue M,W,F home regimen)  -IV Solu-Medrol 40 mg BID  -BiPAP    Chronic Problems:    #Hypothyroidism-continue home dose Synthroid  #Hyperglycemia-SSI #3  #Chronic anemia  #Chronic lower extremity wounds  #MARLENY    #Chronic BLE Cellulitis    DVT PPX: Lovenox  Diet: Regular  IVF:  Code Status: DNR    David Pinzon MD  PGY-1 Internal Medicine  Please SecureChat for any further questions  This  is a preliminary note, please await attending attestation for final A/P         [1]   Past Medical History:  Diagnosis Date    Abnormal weight gain     Weight gain    Essential (primary) hypertension     HTN (hypertension), benign    Localized edema     Edema extremities    Old myocardial infarction     History of myocardial infarction    Old myocardial infarction 02/27/2017    History of non-ST elevation myocardial infarction (NSTEMI)    Other specified postprocedural states     History of repair of both hip joints    Other symptoms and signs involving the genitourinary system     Urinary problem    Pain due to internal orthopedic prosthetic devices, implants and grafts, initial encounter     Artificial joint pain    Personal history of other diseases of the circulatory system     History of hypertension    Personal history of other diseases of the musculoskeletal system and connective tissue     Personal history of gout    Personal history of other diseases of the musculoskeletal system and connective tissue     Personal history of arthritis    Personal history of other diseases of the musculoskeletal system and connective tissue     Personal history of fibromyalgia    Personal history of other diseases of the musculoskeletal system and connective tissue     History of muscle pain    Personal history of other diseases of the respiratory system 02/12/2018    History of chronic respiratory failure    Personal history of other endocrine, nutritional and metabolic disease     History of thyroid disease    Personal history of other endocrine, nutritional and metabolic disease     History of obesity    Personal history of other endocrine, nutritional and metabolic disease     History of high cholesterol    Personal history of other endocrine, nutritional and metabolic disease 02/28/2014    History of hypothyroidism    Personal history of other medical treatment     History of mammogram    Personal history of other  specified conditions     H/O shortness of breath    Personal history of pneumonia (recurrent)     History of pneumonia    Presence of artificial hip joint, bilateral     Status post total replacement of both hips    Pure hypercholesterolemia, unspecified     High cholesterol    Unspecified disorder of nose and nasal sinuses     Sinus problem   [2]   Past Surgical History:  Procedure Laterality Date    CARDIAC CATHETERIZATION  01/25/2018    Cardiac Cath Procedure Outcome:    CARPAL TUNNEL RELEASE  02/27/2014    Neuroplasty Median Nerve At Carpal Tunnel    HIP SURGERY  03/03/2018    Hip Surgery    MOUTH SURGERY  04/14/2015    Oral Surgery Tooth Extraction    THYROID SURGERY  04/14/2015    Thyroid Surgery    TONSILLECTOMY  04/14/2015    Tonsillectomy    TOTAL HIP ARTHROPLASTY  01/25/2018    Hip Replacement   [3] No family history on file.  [4] oxygen, , inhalation, Continuous - Inhalation

## 2025-07-21 NOTE — ED PROVIDER NOTES
Emergency Department Provider Note        History of Present Illness     History provided by: Patient  Limitations to History: None  External Records Reviewed with Brief Summary: Discharge Summary from 2025 which showed patient was admitted to the ICU for acute respiratory failure with hypoxia and hypercapnia, patient required intubation     HPI:  Ailyn Banegas is a 80 y.o. female past medical history of COPD (no PFTs on record, on baseline 5L NC), MARLENY/likely OHS, Hypothyroidism, DLD, Morbid Obesity, ED with hypoxia.  Patient reports she is on 5 L at home.  She felt slightly more short of breath this morning, when she checked her oxygen she was in the 80s, on her baseline 5 L.  She called EMS as a result.  Patient has a history of COPD, but has not used her DuoNeb or albuterol inhaler today.  She is afebrile, no chest pain.  No abdominal pain.  No bilateral lower extremity edema.  Prior to today, she was feeling well.    Physical Exam   Triage vitals:  T 36.4 °C (97.5 °F)  HR 94  /62  RR 20  O2 95 % Supplemental oxygen    General: Awake, alert, in no acute distress  Eyes: Gaze conjugate.  No scleral icterus or injection  HENT: Normo-cephalic, atraumatic. No stridor  CV: Regular rate, regular rhythm. Radial pulses 2+ bilaterally  Resp: Breathing non-labored, speaking in full sentences.  Diffuse expiratory wheezing bilaterally  GI: Soft, non-distended, non-tender. No rebound or guarding.  MSK/Extremities: No gross bony deformities. Moving all extremities  Skin: Warm. Appropriate color  Neuro: Alert. Oriented. Face symmetric. Speech is fluent.  Gross strength and sensation intact in b/l UE and LEs  Psych: Appropriate mood and affect    Medical Decision Making & ED Course   Medical Decision Makin y.o. female with past medical history as noted above, presenting the ED with hypoxia.  Patient is satting 95% on 6 L nasal cannula.  On exam, patient has diffuse expiratory wheezing.  Otherwise exam is  grossly unremarkable.  I suspect patient has a COPD exacerbation, will obtain basic labs including VBG, and give patient a DuoNeb x 3, IV Solu-Medrol, and IV magnesium.  Lower concern for PE, patient is not tachycardic, feels like this shortness of breath is similar to her COPD.  I do not feel a CT PE is warranted at this time, low probability with Wells score.    Presentation consistent with COPD exacerbation.  Patient admitted to the MICU stepdown unit in stable condition, pending repeat VBG post COPD treatment..  ----      Differential diagnoses considered include but are not limited to: COPD exacerbation, pneumonia, PE,     Social Determinants of Health which Significantly Impact Care: None identified     EKG Independent Interpretation: None obtained    Independent Result Review and Interpretation: Relevant laboratory and radiographic results were reviewed and independently interpreted by myself.  As necessary, they are commented on in the ED Course.    Chronic conditions affecting the patient's care: As documented above in MDM    The patient was discussed with the following consultants/services: Hospitalist/Admitting Provider who accepted the patient for admission to the stepdown unit.    Care Considerations: As documented above in MDM    ED Course:  ED Course as of 07/21/25 1531   Mon Jul 21, 2025   1349 CBC and Auto Differential(!)  Leukocytosis.  Hemoglobin consistent with baseline. [NM]   1356 POCT pCO2, Venous(!!): 112  I suspect patient's pCO2 is elevated at baseline, likely 70 to 80s.  As patient does not have a significantly increased work of breathing, will treat patient with DuoNebs and reassess. [NM]   1427 XR chest 2 views  IMPRESSION:  1.  No evidence of acute cardiopulmonary process.       [NM]   1531 Bicarbonate(!!): >45 [NM]      ED Course User Index  [NM] Glenys Momin DO         Diagnoses as of 07/21/25 1531   COPD exacerbation (Multi)     Disposition   As a result of their workup, the  patient will require admission to the hospital.  The patient was informed of her diagnosis.  The patient was given the opportunity to ask questions and I answered them. The patient agreed to be admitted to the hospital.    Procedures   Procedures    This was a shared visit with an ED attending.  The patient was seen and discussed with the ED attending    Glenys Momin DO  Emergency Medicine     Glenys Momin DO  Resident  07/21/25 7380

## 2025-07-22 ENCOUNTER — HOME CARE VISIT (OUTPATIENT)
Dept: HOME HEALTH SERVICES | Facility: HOME HEALTH | Age: 80
End: 2025-07-22
Payer: MEDICARE

## 2025-07-22 LAB
ANION GAP SERPL CALC-SCNC: ABNORMAL MMOL/L
BUN SERPL-MCNC: 27 MG/DL (ref 6–23)
CALCIUM SERPL-MCNC: 9.7 MG/DL (ref 8.6–10.3)
CHLORIDE SERPL-SCNC: 85 MMOL/L (ref 98–107)
CO2 SERPL-SCNC: >45 MMOL/L (ref 21–32)
CREAT SERPL-MCNC: 0.84 MG/DL (ref 0.5–1.05)
EGFRCR SERPLBLD CKD-EPI 2021: 70 ML/MIN/1.73M*2
ERYTHROCYTE [DISTWIDTH] IN BLOOD BY AUTOMATED COUNT: 14.5 % (ref 11.5–14.5)
GLUCOSE SERPL-MCNC: 147 MG/DL (ref 74–99)
HCT VFR BLD AUTO: 35.5 % (ref 36–46)
HGB BLD-MCNC: 10.4 G/DL (ref 12–16)
MAGNESIUM SERPL-MCNC: 2.16 MG/DL (ref 1.6–2.4)
MCH RBC QN AUTO: 27.9 PG (ref 26–34)
MCHC RBC AUTO-ENTMCNC: 29.3 G/DL (ref 32–36)
MCV RBC AUTO: 95 FL (ref 80–100)
NRBC BLD-RTO: 0 /100 WBCS (ref 0–0)
PLATELET # BLD AUTO: 205 X10*3/UL (ref 150–450)
POTASSIUM SERPL-SCNC: 4.8 MMOL/L (ref 3.5–5.3)
PROCALCITONIN SERPL-MCNC: 0.05 NG/ML
RBC # BLD AUTO: 3.73 X10*6/UL (ref 4–5.2)
SODIUM SERPL-SCNC: 138 MMOL/L (ref 136–145)
WBC # BLD AUTO: 6.6 X10*3/UL (ref 4.4–11.3)

## 2025-07-22 PROCEDURE — 9420000001 HC RT PATIENT EDUCATION 5 MIN

## 2025-07-22 PROCEDURE — 2500000005 HC RX 250 GENERAL PHARMACY W/O HCPCS: Performed by: INTERNAL MEDICINE

## 2025-07-22 PROCEDURE — 2500000001 HC RX 250 WO HCPCS SELF ADMINISTERED DRUGS (ALT 637 FOR MEDICARE OP): Performed by: INTERNAL MEDICINE

## 2025-07-22 PROCEDURE — 94640 AIRWAY INHALATION TREATMENT: CPT

## 2025-07-22 PROCEDURE — 2500000005 HC RX 250 GENERAL PHARMACY W/O HCPCS

## 2025-07-22 PROCEDURE — 94660 CPAP INITIATION&MGMT: CPT

## 2025-07-22 PROCEDURE — 85027 COMPLETE CBC AUTOMATED: CPT

## 2025-07-22 PROCEDURE — 97165 OT EVAL LOW COMPLEX 30 MIN: CPT | Mod: GO

## 2025-07-22 PROCEDURE — 97530 THERAPEUTIC ACTIVITIES: CPT | Mod: GP

## 2025-07-22 PROCEDURE — 97161 PT EVAL LOW COMPLEX 20 MIN: CPT | Mod: GP

## 2025-07-22 PROCEDURE — 2500000001 HC RX 250 WO HCPCS SELF ADMINISTERED DRUGS (ALT 637 FOR MEDICARE OP)

## 2025-07-22 PROCEDURE — 36415 COLL VENOUS BLD VENIPUNCTURE: CPT

## 2025-07-22 PROCEDURE — 2500000002 HC RX 250 W HCPCS SELF ADMINISTERED DRUGS (ALT 637 FOR MEDICARE OP, ALT 636 FOR OP/ED)

## 2025-07-22 PROCEDURE — 80048 BASIC METABOLIC PNL TOTAL CA: CPT

## 2025-07-22 PROCEDURE — 2500000004 HC RX 250 GENERAL PHARMACY W/ HCPCS (ALT 636 FOR OP/ED)

## 2025-07-22 PROCEDURE — 5A0945A ASSISTANCE WITH RESPIRATORY VENTILATION, 24-96 CONSECUTIVE HOURS, HIGH NASAL FLOW/VELOCITY: ICD-10-PCS

## 2025-07-22 PROCEDURE — 2060000001 HC INTERMEDIATE ICU ROOM DAILY

## 2025-07-22 PROCEDURE — 83735 ASSAY OF MAGNESIUM: CPT

## 2025-07-22 RX ORDER — ACETAMINOPHEN 160 MG/5ML
650 SOLUTION ORAL EVERY 4 HOURS PRN
Status: DISCONTINUED | OUTPATIENT
Start: 2025-07-22 | End: 2025-08-01 | Stop reason: HOSPADM

## 2025-07-22 RX ORDER — AZITHROMYCIN 250 MG/1
500 TABLET, FILM COATED ORAL EVERY 24 HOURS
Status: COMPLETED | OUTPATIENT
Start: 2025-07-22 | End: 2025-07-23

## 2025-07-22 RX ORDER — ACETAMINOPHEN 650 MG/1
650 SUPPOSITORY RECTAL EVERY 4 HOURS PRN
Status: DISCONTINUED | OUTPATIENT
Start: 2025-07-22 | End: 2025-08-01 | Stop reason: HOSPADM

## 2025-07-22 RX ORDER — ACETAMINOPHEN 325 MG/1
650 TABLET ORAL EVERY 4 HOURS PRN
Status: DISCONTINUED | OUTPATIENT
Start: 2025-07-22 | End: 2025-08-01 | Stop reason: HOSPADM

## 2025-07-22 RX ORDER — FLUTICASONE FUROATE AND VILANTEROL 200; 25 UG/1; UG/1
1 POWDER RESPIRATORY (INHALATION)
Status: DISCONTINUED | OUTPATIENT
Start: 2025-07-22 | End: 2025-08-01 | Stop reason: HOSPADM

## 2025-07-22 RX ADMIN — ACETAMINOPHEN 650 MG: 325 TABLET ORAL at 20:15

## 2025-07-22 RX ADMIN — TORSEMIDE 40 MG: 20 TABLET ORAL at 09:26

## 2025-07-22 RX ADMIN — Medication 60 PERCENT: at 09:28

## 2025-07-22 RX ADMIN — ENOXAPARIN SODIUM 60 MG: 60 INJECTION SUBCUTANEOUS at 20:11

## 2025-07-22 RX ADMIN — Medication 50 L/MIN: at 19:35

## 2025-07-22 RX ADMIN — ALBUTEROL SULFATE 2.5 MG: 2.5 SOLUTION RESPIRATORY (INHALATION) at 19:03

## 2025-07-22 RX ADMIN — METHYLPREDNISOLONE SODIUM SUCCINATE 40 MG: 40 INJECTION, POWDER, LYOPHILIZED, FOR SOLUTION INTRAMUSCULAR; INTRAVENOUS at 03:47

## 2025-07-22 RX ADMIN — POLYETHYLENE GLYCOL 3350 17 G: 17 POWDER, FOR SOLUTION ORAL at 09:27

## 2025-07-22 RX ADMIN — METHYLPREDNISOLONE SODIUM SUCCINATE 40 MG: 40 INJECTION, POWDER, LYOPHILIZED, FOR SOLUTION INTRAMUSCULAR; INTRAVENOUS at 16:08

## 2025-07-22 RX ADMIN — DILTIAZEM HYDROCHLORIDE 30 MG: 60 TABLET, FILM COATED ORAL at 09:26

## 2025-07-22 RX ADMIN — FLUTICASONE FUROATE AND VILANTEROL TRIFENATATE 1 PUFF: 200; 25 POWDER RESPIRATORY (INHALATION) at 07:02

## 2025-07-22 RX ADMIN — ENOXAPARIN SODIUM 60 MG: 60 INJECTION SUBCUTANEOUS at 09:25

## 2025-07-22 RX ADMIN — FLUCONAZOLE 150 MG: 100 TABLET ORAL at 09:26

## 2025-07-22 RX ADMIN — ATORVASTATIN CALCIUM 10 MG: 10 TABLET, FILM COATED ORAL at 09:27

## 2025-07-22 RX ADMIN — ALBUTEROL SULFATE 2.5 MG: 2.5 SOLUTION RESPIRATORY (INHALATION) at 06:56

## 2025-07-22 RX ADMIN — LEVOTHYROXINE SODIUM 100 MCG: 0.1 TABLET ORAL at 09:26

## 2025-07-22 RX ADMIN — ALBUTEROL SULFATE 2.5 MG: 2.5 SOLUTION RESPIRATORY (INHALATION) at 11:15

## 2025-07-22 RX ADMIN — HYDROXYZINE HYDROCHLORIDE 25 MG: 25 TABLET ORAL at 20:19

## 2025-07-22 RX ADMIN — AZITHROMYCIN DIHYDRATE 500 MG: 250 TABLET ORAL at 20:11

## 2025-07-22 RX ADMIN — DILTIAZEM HYDROCHLORIDE 30 MG: 60 TABLET, FILM COATED ORAL at 20:11

## 2025-07-22 RX ADMIN — SPIRONOLACTONE 100 MG: 25 TABLET, FILM COATED ORAL at 09:25

## 2025-07-22 ASSESSMENT — COGNITIVE AND FUNCTIONAL STATUS - GENERAL
TURNING FROM BACK TO SIDE WHILE IN FLAT BAD: A LOT
TURNING FROM BACK TO SIDE WHILE IN FLAT BAD: A LOT
DAILY ACTIVITIY SCORE: 13
MOBILITY SCORE: 11
DRESSING REGULAR LOWER BODY CLOTHING: A LOT
MOBILITY SCORE: 10
MOVING FROM LYING ON BACK TO SITTING ON SIDE OF FLAT BED WITH BEDRAILS: A LOT
STANDING UP FROM CHAIR USING ARMS: A LOT
MOVING FROM LYING ON BACK TO SITTING ON SIDE OF FLAT BED WITH BEDRAILS: A LITTLE
DRESSING REGULAR LOWER BODY CLOTHING: A LOT
MOVING TO AND FROM BED TO CHAIR: A LOT
HELP NEEDED FOR BATHING: A LOT
PERSONAL GROOMING: A LOT
DAILY ACTIVITIY SCORE: 13
EATING MEALS: A LITTLE
STANDING UP FROM CHAIR USING ARMS: A LOT
DRESSING REGULAR LOWER BODY CLOTHING: A LOT
WALKING IN HOSPITAL ROOM: TOTAL
HELP NEEDED FOR BATHING: A LOT
TOILETING: A LOT
DAILY ACTIVITIY SCORE: 16
WALKING IN HOSPITAL ROOM: TOTAL
CLIMB 3 TO 5 STEPS WITH RAILING: TOTAL
MOBILITY SCORE: 10
CLIMB 3 TO 5 STEPS WITH RAILING: TOTAL
EATING MEALS: A LITTLE
DRESSING REGULAR UPPER BODY CLOTHING: A LOT
STANDING UP FROM CHAIR USING ARMS: A LOT
MOVING TO AND FROM BED TO CHAIR: A LOT
MOVING TO AND FROM BED TO CHAIR: A LOT
TURNING FROM BACK TO SIDE WHILE IN FLAT BAD: A LOT
HELP NEEDED FOR BATHING: A LOT
PERSONAL GROOMING: A LOT
CLIMB 3 TO 5 STEPS WITH RAILING: TOTAL
MOVING FROM LYING ON BACK TO SITTING ON SIDE OF FLAT BED WITH BEDRAILS: A LOT
DRESSING REGULAR UPPER BODY CLOTHING: A LOT
TOILETING: A LOT
DRESSING REGULAR UPPER BODY CLOTHING: A LITTLE
WALKING IN HOSPITAL ROOM: TOTAL
TOILETING: TOTAL

## 2025-07-22 ASSESSMENT — ACTIVITIES OF DAILY LIVING (ADL)
BATHING_ASSISTANCE: MODERATE
LACK_OF_TRANSPORTATION: NO

## 2025-07-22 ASSESSMENT — PAIN SCALES - GENERAL
PAINLEVEL_OUTOF10: 3
PAINLEVEL_OUTOF10: 0 - NO PAIN

## 2025-07-22 ASSESSMENT — PAIN DESCRIPTION - LOCATION: LOCATION: GENERALIZED

## 2025-07-22 ASSESSMENT — PAIN - FUNCTIONAL ASSESSMENT
PAIN_FUNCTIONAL_ASSESSMENT: 0-10

## 2025-07-22 NOTE — CARE PLAN
The patient's goals for the shift include      The clinical goals for the shift include decreased SOB      Problem: Skin  Goal: Decreased wound size/increased tissue granulation at next dressing change  Flowsheets (Taken 7/21/2025 2248)  Decreased wound size/increased tissue granulation at next dressing change: Promote sleep for wound healing  Goal: Participates in plan/prevention/treatment measures  Flowsheets (Taken 7/21/2025 2248)  Participates in plan/prevention/treatment measures: Elevate heels  Goal: Prevent/manage excess moisture  Flowsheets (Taken 7/21/2025 2248)  Prevent/manage excess moisture: Cleanse incontinence/protect with barrier cream  Goal: Prevent/minimize sheer/friction injuries  Flowsheets (Taken 7/21/2025 2248)  Prevent/minimize sheer/friction injuries: Turn/reposition every 2 hours/use positioning/transfer devices  Goal: Promote/optimize nutrition  Flowsheets (Taken 7/21/2025 2248)  Promote/optimize nutrition: Consume > 50% meals/supplements  Goal: Promote skin healing  Flowsheets (Taken 7/21/2025 2248)  Promote skin healing: Turn/reposition every 2 hours/use positioning/transfer devices

## 2025-07-22 NOTE — PROGRESS NOTES
Ailyn Banegas is a 80 y.o. female on day 1 of admission presenting with COPD exacerbation (Multi).      SUBJECTIVE     Patient evaluated this morning and found to be resting comfortably in bed. Today the patient reports intermittent episodes of visual hallucinations and confusion that's ongoing since her admission yesterday. In addition, she also reports B/L knee pain, ongoing restless leg syndrome, and agitation due to these conditions.     OBJECTIVE     Vitals:    07/22/25 1254 07/22/25 1255 07/22/25 1443 07/22/25 1638   BP:    100/55   BP Location:       Patient Position:       Pulse:       Resp:       Temp:       TempSrc:       SpO2: (!) 88% (!) 88% 90%    Weight:       Height:          Results from last 7 days   Lab Units 07/22/25  0555 07/21/25  1329   WBC AUTO x10*3/uL 6.6 7.8   HEMOGLOBIN g/dL 10.4* 11.3*   HEMATOCRIT % 35.5* 38.7   PLATELETS AUTO x10*3/uL 205 213   NEUTROS PCT AUTO %  --  74.8   LYMPHS PCT AUTO %  --  13.7   MONOS PCT AUTO %  --  7.7   EOS PCT AUTO %  --  2.4     Results from last 7 days   Lab Units 07/22/25  0555 07/21/25  1329   SODIUM mmol/L 138 137   POTASSIUM mmol/L 4.8 4.8   CHLORIDE mmol/L 85* 86*   CO2 mmol/L >45* >45*   BUN mg/dL 27* 24*   CREATININE mg/dL 0.84 0.78   CALCIUM mg/dL 9.7 9.9   PROTEIN TOTAL g/dL  --  7.2   BILIRUBIN TOTAL mg/dL  --  0.7   ALK PHOS U/L  --  78   ALT U/L  --  10   AST U/L  --  15   GLUCOSE mg/dL 147* 118*       Scheduled Medications  Scheduled Medications[1]   Physical Exam    Constitutional: obese habitus, awake, alert, no acute distress  ENMT: mucous membranes moist, EOMI, conjunctivae clear  Head/Neck: normocephalic, atraumatic; supple, trachea midline  Respiratory/Thorax: patent airways, CTAB; no wheezes, rales, or rhonchi  Cardiovascular: RRR, no murmur  Gastrointestinal: soft, nondistended, non-tender, bowel sounds appreciated  Extremities: palpable peripheral pulses, no edema or cyanosis, BLE cellulitis  Neurological: AO x3, no focal  deficits  Psychological: appropriate mood and behavior  Skin: warm and dry    ASSESSMENT & PLAN     Ailyn Banegas is a 80 y.o. female with a PMHx COPD (on baseline 4L NC), MARLENY/likely OHS, Hypothyroidism, DLD, Morbid Obesity who was brought into the ED by family for respiratory distress, confusion, and daytime somnolence.     Daily Progress  -Pt SpO2 high 80s - low 90s on Airvo 60 L/min / 40% O2  -C/w 500 mg Azithromycin  -Yvette and Dr. aPul trying to get home AVAPS approved through her insurance and DME     #Acute on chronic hypercapnic HRF ISO #MARLENY/OHS and #COPD  -High 80s low 90s SpO2 on 5L NC  PLAN:  -C/w IV Azithromycin 500 BID for 3 days (then continue M,W,F home regimen)  -C/w IV Solu-Medrol 40 mg BID  -BiPAP     Chronic Problems:     #Hypothyroidism-continue home dose Synthroid  #Hyperglycemia-SSI #3  #Chronic anemia  #Chronic lower extremity wounds  #MARLENY    #Chronic BLE Cellulitis     DVT PPX: Lovenox  Diet: Regular  IVF:  Code Status: DNR    David Pinzon MD  PGY-1, Internal Medicine  Please SecureChat for any further questions  This is a preliminary note, please await attending attestation for final A/P         [1] albuterol, 2.5 mg, nebulization, TID  atorvastatin, 10 mg, oral, Daily  azithromycin, 500 mg, oral, q24h  dilTIAZem, 30 mg, oral, q12h  enoxaparin, 60 mg, subcutaneous, q12h VLADIMIR  fluconazole, 150 mg, oral, Daily  fluticasone furoate-vilanteroL, 1 puff, inhalation, Daily  levothyroxine, 100 mcg, oral, Daily  methylPREDNISolone sodium succinate (PF), 40 mg, intravenous, q12h  oxygen, , inhalation, Continuous - Inhalation  oxygen, , inhalation, Continuous - Inhalation  polyethylene glycol, 17 g, oral, Daily  spironolactone, 100 mg, oral, Daily  torsemide, 40 mg, oral, Daily

## 2025-07-22 NOTE — PROGRESS NOTES
IV to PO Conversion    Ailyn Banegas is a 80 y.o. female who qualifies for IV to PO therapy conversion.    Inclusion and exclusion criteria have been evaluated. Will change existing order for azithromycin 500 mg IV every 24 hours  x 3 total doses to azithromycin 500 mg PO every 24 hours  x 2 more doses (received 1 dose IV on 7/21/25) per protocol/policy.      Please contact Pharmacy with any questions.      Ethel Olmos, PharmD, BCPS   7/22/2025 2:39 PM

## 2025-07-22 NOTE — PROGRESS NOTES
Occupational Therapy  Evaluation    Patient Name: Ailyn Banegas  MRN: [id number]  Today's Date: 7/22/2025  Time Calculation  Start Time: 1254  Stop Time: 1325  Time Calculation (min): 31 min    Current Problem:   1. COPD exacerbation (Multi)        OT order: OT eval and treat   Referred by:  Froilan  Reason for referral: OT and treat; admitted for SOB/COPD  exacerbation  Past medical history related to rehab: Medical History[1]      Precautions:   Medical Precautions: Fall precautions, Oxygen therapy device and L/min  Precautions Comment: Airvo 60L/50%    ASSESSMENT  OT Assessment: Pt is a 80 year old female presenting with SOB. Prior to admit, pt  reports being requiring A for ADLs, and iADLs, did uses FWW. Upon eval pt required mod A x2 for bed mobility, max A for LB dressing, Pt able to complete transfers with mod A x2 and FWW. Pt appears below functional baseline and would benefit from acute skilled OT services and moderate intensity therapy when medically stable for d/c.. Pt with Decreased ADL status, Decreased safe judgment during ADL, Decreased endurance, Decreased functional mobility  Prognosis: Good  Barriers to discharge home: Caregiver assistance, Physical needs  Caregiver assistance needed per identified barriers - however, level of patient's required assistance exceeds assistance available at home  Intermittent mobility assistance needed, Intermittent ADL assistance needed, High falls risk due to function or environment     Tolerance: Patient limited by fatigue    PLAN  Frequency: 3 times per week (During this acute hospitalization)  Treatment Interventions: ADL retraining, Functional transfer training, Endurance training  Discharge Recommendations: Moderate intensity level of continued care (Based on current functional status and rehab potential, patient is anticipated to tolerate and benefit from 5 or more days per week of skilled rehabilitative therapy after discharge from this acute inpatient  hospitalization.)  OT OK to discharge: Yes (when medically cleared)    GENERAL VISIT INFORMATION   Start of session communication: Bedside nurse  End of session communication: Bedside nurse  Family/caregiver present: No  Caregiver feedback:    Co-Treatment: PT  Reason for co-treatment: To maximize pt safety while focusing on specifc discipline goals   Position Pt Received:  Bed, 3 rail up, Alarm on  End of session position: Bed, 3 rail up, Alarm on    SUBJECTIVE  Home Living:  Type of Home: House  Lives With: Spouse  Home Adaptive Equipment: Walker rolling or standard, Reacher  Home Layout: Laundry in basement, Able to live on main level with bedroom/bathroom ( completes laundry')  Home Access: Ramped entrance  Bathroom Shower/Tub: Walk-in shower (pt sponge bathes)  Bathroom Toilet: Other (Comment) (uses BSC)  Bathroom Equipment: Tub transfer bench, Grab bars in shower, Shower chair with back, Bedside commode, Grab bars around toilet, Hand-held shower hose  Home Living Comments: Sleeps in lift chair, uses BSC.     Prior Level of Function:  Level of [place]: Needs assistance with ADLs, Needs assistance with homemaking  Prior Function Comments:  complete iADLs, although pt states she assists with cooking. Assistance with bathing, and toileting, uses reacher for LB dressing    IADL History:  Homemaking Responsibilities: No  Current License: No    Pain:  Assessment: 0-10  Score: 0 - No pain      OBJECTIVE  Vital Signs:  SpO2: (!) 88 % (88-91% throughout session. Dropping to high 80's with mobiity.)    Cognition:  Overall Cognitive Status: Within Functional Limits  Orientation Level: Oriented X4  Insight: Mild  Impulsive: Mildly  Processing Speed: Delayed             Current ADL function:   EATING:  Independent     GROOMING: Stand by     BATHING: Moderate     UB DRESSING: Minimal     LB DRESSING: Maximal Don/doff R sock, Don/doff L sock   TOILETING: Total    ADL comments:  anticipated otherwise  stated    Activity Tolerance:  Endurance: Decreased tolerance for upright activites    Bed Mobility/Transfers:   Bed Mobility  Bed Mobility: Yes  Bed Mobility 1  Bed Mobility 1: Supine to sitting  Level of Assistance 1: Moderate assistance (x2)  Bed Mobility Comments 1: BLE and trunk mangement  Transfers  Transfer: Yes  Transfer 1  Technique 1: Sit to stand, Stand to sit  Transfer Device 1: Walker  Transfer Level of Assistance 1: Moderate assistance (x2)  Trials/Comments 1: From EOB to chair with FWW x 2 reps    Ambulation/Gait Training:  Functional Mobility  Functional Mobility Performed: Yes  Functional Mobility 1  Surface 1: Level tile  Device 1: Rolling walker  Assistance 1: Minimum assistance  Comments 1: stand stepping from EOB to recliner    Sitting Balance:  Static Sitting Balance  Static Sitting-Level of Assistance: Close supervision  Dynamic Sitting Balance  Dynamic Sitting-Level of Assistance: Close supervision    Standing Balance:  Static Standing Balance  Static Standing-Level of Assistance: Minimum assistance  Dynamic Standing Balance  Dynamic Standing-Level of Assistance: Minimum assistance    Vision: Vision - Basic Assessment  Current Vision: No visual deficits   and      Sensation:  Light Touch: No apparent deficits    Strength:  Strength Comments: B UE grossly 3/5    Perception:  Inattention/Neglect: Appears intact    Coordination:  Movements are Fluid and Coordinated: Yes     Hand Function:  Hand Function  Gross Grasp: Functional  Coordination: Functional    Extremities: RUE   RUE : Exceptions to WFL and LUE   LUE: Exceptions to WFL    Outcome Measures: [facility] Daily Activity   Putting on and taking off regular lower body clothing: A lot  Bathing (including washing, rinsing, drying): A lot  Putting on and taking off regular upper body clothing: A little  Toileting, which includes using toilet, bedpan or urinal: Total  Taking care of personal grooming such as brushing teeth: None   Eating Meals: None    Daily Activity - Total Score: 16    EDUCATION:     Education Documentation  Body Mechanics, taught by Marla Pretty OT at 7/22/2025  2:09 PM.  Learner: Family, Patient  Readiness: Acceptance  Method: Explanation  Response: Needs Reinforcement  Comment: ADL and functional mobility safety/retraining    ADL Training, taught by Marla Pretty OT at 7/22/2025  2:09 PM.  Learner: Family, Patient  Readiness: Acceptance  Method: Explanation  Response: Needs Reinforcement  Comment: ADL and functional mobility safety/retraining    Education Comments  No comments found.        Goals:   Encounter Problems       Encounter Problems (Active)       ADLs       Patient with complete upper body dressing with modified independent level of assistance       Start:  07/22/25    Expected End:  08/04/25            Patient with complete lower body dressing with minimal assist  level of assistance with PRN adaptive equipment while supported sitting       Start:  07/22/25    Expected End:  08/04/25            Patient will complete toileting including hygiene clothing management/hygiene with maximal assist level of assistance and bedside commode.       Start:  07/22/25    Expected End:  08/04/25               MOBILITY       Patient will perform Functional mobility mod  Household distances/Community Distances with stand by assist level of assistance and front wheeled walker in order to improve safety and functional mobility.       Start:  07/22/25    Expected End:  08/04/25               TRANSFERS       Patient will perform bed mobility minimal assist  level of assistance x1 in order to improve safety and independence with mobility       Start:  07/22/25    Expected End:  08/04/25            Patient will complete functional transfer to toilet  with front wheeled walker with stand by assist level of assistance.       Start:  07/22/25    Expected End:  08/04/25            Patient will complete sit to stand transfer with stand by assist level of  assistance and front wheeled walker in order to improve safety and prepare for out of bed mobility.       Start:  07/22/25    Expected End:  08/04/25                        [1]   Past Medical History:  Diagnosis Date    Abnormal weight gain     Weight gain    Essential (primary) hypertension     HTN (hypertension), benign    Localized edema     Edema extremities    Old myocardial infarction     History of myocardial infarction    Old myocardial infarction 02/27/2017    History of non-ST elevation myocardial infarction (NSTEMI)    Other specified postprocedural states     History of repair of both hip joints    Other symptoms and signs involving the genitourinary system     Urinary problem    Pain due to internal orthopedic prosthetic devices, implants and grafts, initial encounter     Artificial joint pain    Personal history of other diseases of the circulatory system     History of hypertension    Personal history of other diseases of the musculoskeletal system and connective tissue     Personal history of gout    Personal history of other diseases of the musculoskeletal system and connective tissue     Personal history of arthritis    Personal history of other diseases of the musculoskeletal system and connective tissue     Personal history of fibromyalgia    Personal history of other diseases of the musculoskeletal system and connective tissue     History of muscle pain    Personal history of other diseases of the respiratory system 02/12/2018    History of chronic respiratory failure    Personal history of other endocrine, nutritional and metabolic disease     History of thyroid disease    Personal history of other endocrine, nutritional and metabolic disease     History of obesity    Personal history of other endocrine, nutritional and metabolic disease     History of high cholesterol    Personal history of other endocrine, nutritional and metabolic disease 02/28/2014    History of hypothyroidism    Personal  history of other medical treatment     History of mammogram    Personal history of other specified conditions     H/O shortness of breath    Personal history of pneumonia (recurrent)     History of pneumonia    Presence of artificial hip joint, bilateral     Status post total replacement of both hips    Pure hypercholesterolemia, unspecified     High cholesterol    Unspecified disorder of nose and nasal sinuses     Sinus problem

## 2025-07-22 NOTE — PROGRESS NOTES
07/22/25 1507   Discharge Planning   Living Arrangements Spouse/significant other   Support Systems Spouse/significant other   Assistance Needed ADLs/IADLs   Type of Residence Private residence   Number of Stairs to Enter Residence 0  (ramp)   Who is requesting discharge planning? Provider   Home or Post Acute Services In home services   Type of Home Care Services DME or oxygen;Home health aide;Home nursing visits;Home OT;Home PT   Expected Discharge Disposition Home Health   Does the patient need discharge transport arranged? No   Financial Resource Strain   How hard is it for you to pay for the very basics like food, housing, medical care, and heating? Not very   Housing Stability   In the last 12 months, was there a time when you were not able to pay the mortgage or rent on time? N   In the past 12 months, how many times have you moved where you were living? 0   At any time in the past 12 months, were you homeless or living in a shelter (including now)? N   Transportation Needs   In the past 12 months, has lack of transportation kept you from medical appointments or from getting medications? no   In the past 12 months, has lack of transportation kept you from meetings, work, or from getting things needed for daily living? No   Stroke Family Assessment   Stroke Family Assessment Needed No   Intensity of Service   Intensity of Service 0-30 min     Spoke with pt explained TCC role in Care Transitions and verified address, phone number and emergency contact information. PCP is yT last seen this month in a virtual visit and preferred pharmacy is [facility], denies issues obtaining or affording medications and takes as ordered. Pt is dependent, lives at home with  and feels safe. Pt wants to return home with her  and resume her he [facility] she had PT/OT/SN/STNA.  She declined SNF at this time. She has home oxygen at 4L through [facility]. She has a wheelchair, walk-in-shower w/ bench, lift chair,  walker, cane and BSC. Pt is aware to reach out to CT if needs should change. [facility] 16/10 pending PT eval. ADOD 7/23. CT to follow. Natali Quan BSN/RN-TCC

## 2025-07-22 NOTE — NURSING NOTE
Pulmonary Disease Navigator Documentation:    Comments:  Working with Dr. Paul to attempt to get patient home AVAPS machine through their current DME, [facility].  AVAPS settings needed per Dr. Paul order:     Vt: 650   PS Min: 20   PS Max: 35   EPAP Min: 6   EPAP Max: 8   O2 Bleed:  5 lpm    Medical team notified of process and will continue to update.

## 2025-07-22 NOTE — CONSULTS
Assessment and Plan  Patient is 80 y.o. female with the following medical Problems:    Acute on chronic hypercapnic hypoxic respiratory failure  Underlying severe COPD   Progressive deconditioning   Obstructive sleep apnea  Class III obesity  Likely right heart failure with peripheral edema 2/2 secondary pulmonary hypertension  Mild aortic stenosis  Hypothyroidism  Chronic pneumonia  Paroxysmal A-fib  Chronic BLE cellulitis    Plan of Care:  -Continue AVAPS  -AVAPS setting as 650 target tidal volume with inspiratory pressure range of 20-35 and PEEP of 8  -Judicious diuresis with torsemide, monitor net fluid balance.   -Continue azithromycin, Diflucan, DuoNeb, and Solu-Medrol twice daily.  - Bronchopulmonary hygiene.  -Aspiration precaution and head of bed elevation at 30 degrees.  -Patient is currently on BiPAP at home recommend switching her to AVAPS if she goes back home/LTAC for better control of her respiratory function.  -Long-term prognosis is guarded due to complexity of patient respiratory failure        History of Present Illness: Patient is a 80-year-old female with a PMH of COPD on (baseline 4 L NC), MARLENY/OHS, hypothyroidism, DLD, morbid obesity, chronic BLE cellulitis, chronic anemia and hyperglycemia who presented to the ED by her family for respiratory distress, confusion, and daytime somnolence.  The patient was recently admitted to the ICU for acute on chronic hypercapnic hypoxic respiratory failure secondary to ADHF on 6/22/2025, requiring intubation for no improvement in blood gases and worsening mental status.  The daughter mentioned that the patient does not have the proper equipment at home to administer BiPAP effectively.  The patient's BiPAP at home requires new settings with AVAP for which they do not have the device.  She also reported that the patient's O2 on pulse oximetry was in the mid to high 80s.  The patient has also been experiencing increased daytime somnolence, along with  intermittent periods of confusion.  She denied any fever, chills, and recent sick contacts.  However, she does have a cough (productive, clear sputum) s/p using BiPAP.  She is on 4 L of home oxygen.    Daily progress:  7/22/2025: Patient was evaluated and was lying in bed without any significant respiratory distress.  No acute events overnight.  She is still coughing up but with very little phlegm.  She is currently breathing on AVAPS.  She denies any chest pain, fever, chills or shortness of breath.    Past Medical/Surgical History:   Medical History[1]  Surgical History[2]    Family History:   No relevant family history has been documented for this patient.    Allergies:     Allergies[3]      Social history:   reports that she has never smoked. She has never used smokeless tobacco. She reports that she does not currently use alcohol. She reports that she does not currently use drugs.    Current Medications:   No recently discontinued medications to reconcile    Review of Systems:   General: denies weight gain, denies loss of appetite, fever, chills, night sweats.  HEENT: denies headaches, dizziness, head trauma, visual changes, eye pain, tinnitus, nosebleeds, hoarseness or throat pain    Respiratory: denies chest pain, dyspnea, +ve cough and no hemoptysis  Cardiovascular: denies orthopnea, paroxysmal nocturnal dyspnea, leg swelling, and previous heart attack.    Gastrointestinal: denies pain, nausea vomiting, diarrhea, constipation, melena or bleeding.  Genitourinary: denies hematuria, frequency, urgency or dysuria  Neurology: Intermittently confused but denies syncope, seizures, paralysis, paraesthesia   Endocrine: denies polyuria, polydipsia, skin or hair changes, and heat or cold intolerance  Musculoskeletal: denies joint pain, swelling, arthritis or myalgia  Hematologic: denies bleeding, adenopathy and easy bruising  Skin: denies rashes and skin discoloration  Psychiatry: denies depression    Vital Signs:    Vitals:    07/22/25 1144   BP: 118/62   Pulse: 77   Resp: 20   Temp: 36.6 °C (97.9 °F)   SpO2: 90%       Input/Output:    Intake/Output Summary (Last 24 hours) at 7/22/2025 1338  Last data filed at 7/22/2025 0816  Gross per 24 hour   Intake 660 ml   Output 1100 ml   Net -440 ml       Oxygen requirements: Patient is currently on AVAPS..    Ventilator Information:  FiO2 (%):  [50 %-60 %] 60 %  S RR:  [16-20] 16  S VT:  [600 mL] 600 mL  MAP (cm H2O):  [13.2-15.7] 13.2          General appearance: Obese, not in any significant respiratory distress.  HEENT: Atraumatic/normocephalic, EOMI, SHANTHI, pharynx clear, moist mucosa, redness of the uvula appreciated,   Neck: Supple, no jugular venous distension, lymphadenopathy, thyromegaly or carotid bruits  Chest: +ve wheezing in upper lobes, decreased breath sounds, bilateral pitting edema no crackles and no tenderness over ribs   Cardiovascular: Normal S1, S2, regular rate and rhythm, no murmur, rub or gallop  Abdomen: Normal sounds present, soft, lax with no tenderness, no hepatosplenomegaly, and no masses.  Extremities: Pulses are equally present.  Bilateral pitting edema  Skin: intact, no rashes   Neurologic: Alert and oriented x 3, No focal deficit     Investigations:  Labs, radiological imaging and cardiac work up were personally reviewed      Rosamaria Cortez MD  PGY-1, Pulmonology    .       STAFF PHYSICIAN NOTE OF PERSONAL INVOLVEMENT IN CARE  As the attending physician, I certify that I personally reviewed the patient's history and personally examined the patient to confirm the physical findings described above, and that I reviewed the relevant imaging studies and available reports.  I also discussed the differential diagnosis and all of the proposed management plans with the patient and individuals accompanying the patient to this visit.  They had the opportunity to ask questions about the proposed management plans and to have those questions answered.            [1]    Past Medical History:  Diagnosis Date    Abnormal weight gain     Weight gain    Essential (primary) hypertension     HTN (hypertension), benign    Localized edema     Edema extremities    Old myocardial infarction     History of myocardial infarction    Old myocardial infarction 02/27/2017    History of non-ST elevation myocardial infarction (NSTEMI)    Other specified postprocedural states     History of repair of both hip joints    Other symptoms and signs involving the genitourinary system     Urinary problem    Pain due to internal orthopedic prosthetic devices, implants and grafts, initial encounter     Artificial joint pain    Personal history of other diseases of the circulatory system     History of hypertension    Personal history of other diseases of the musculoskeletal system and connective tissue     Personal history of gout    Personal history of other diseases of the musculoskeletal system and connective tissue     Personal history of arthritis    Personal history of other diseases of the musculoskeletal system and connective tissue     Personal history of fibromyalgia    Personal history of other diseases of the musculoskeletal system and connective tissue     History of muscle pain    Personal history of other diseases of the respiratory system 02/12/2018    History of chronic respiratory failure    Personal history of other endocrine, nutritional and metabolic disease     History of thyroid disease    Personal history of other endocrine, nutritional and metabolic disease     History of obesity    Personal history of other endocrine, nutritional and metabolic disease     History of high cholesterol    Personal history of other endocrine, nutritional and metabolic disease 02/28/2014    History of hypothyroidism    Personal history of other medical treatment     History of mammogram    Personal history of other specified conditions     H/O shortness of breath    Personal history of pneumonia  (recurrent)     History of pneumonia    Presence of artificial hip joint, bilateral     Status post total replacement of both hips    Pure hypercholesterolemia, unspecified     High cholesterol    Unspecified disorder of nose and nasal sinuses     Sinus problem   [2]   Past Surgical History:  Procedure Laterality Date    CARDIAC CATHETERIZATION  01/25/2018    Cardiac Cath Procedure Outcome:    CARPAL TUNNEL RELEASE  02/27/2014    Neuroplasty Median Nerve At Carpal Tunnel    HIP SURGERY  03/03/2018    Hip Surgery    MOUTH SURGERY  04/14/2015    Oral Surgery Tooth Extraction    THYROID SURGERY  04/14/2015    Thyroid Surgery    TONSILLECTOMY  04/14/2015    Tonsillectomy    TOTAL HIP ARTHROPLASTY  01/25/2018    Hip Replacement   [3] No Known Allergies

## 2025-07-22 NOTE — CARE PLAN
Problem: Pain - Adult  Goal: Verbalizes/displays adequate comfort level or baseline comfort level  Outcome: Progressing     Problem: Safety - Adult  Goal: Free from fall injury  Outcome: Progressing     Problem: Discharge Planning  Goal: Discharge to home or other facility with appropriate resources  Outcome: Progressing     Problem: Chronic Conditions and Co-morbidities  Goal: Patient's chronic conditions and co-morbidity symptoms are monitored and maintained or improved  Outcome: Progressing     Problem: Nutrition  Goal: Nutrient intake appropriate for maintaining nutritional needs  Outcome: Progressing     Problem: Skin  Goal: Prevent/minimize sheer/friction injuries  Outcome: Progressing  Flowsheets (Taken 7/22/2025 1951)  Prevent/minimize sheer/friction injuries:   Turn/reposition every 2 hours/use positioning/transfer devices   Complete micro-shifts as needed if patient unable. Adjust patient position to relieve pressure points, not a full turn   The patient's goals for the shift include comfort and safety    The clinical goals for the shift include comfort and safety

## 2025-07-22 NOTE — CARE PLAN
The patient's goals for the shift include comfort and safety    The clinical goals for the shift include comfort and safety

## 2025-07-22 NOTE — PROGRESS NOTES
Physical Therapy    Physical Therapy Evaluation    Patient Name: Ailyn Banegas  MRN: [id number]  Today's Date: 7/22/2025   Time Calculation  Start Time: 1255  Stop Time: 1325  Time Calculation (min): 30 min  816/816-A    Assessment/Plan   PT Assessment  PT Assessment Results: Decreased strength, Decreased range of motion, Decreased endurance, Impaired balance, Decreased mobility, Decreased safety awareness  Rehab Prognosis: Fair  Barriers to Discharge Home: Caregiver assistance  Caregiver Assistance: Caregiver assistance needed per identified barriers - however, level of patient's required assistance exceeds assistance available at home  Evaluation/Treatment Tolerance: Patient limited by fatigue  Strengths: Ability to acquire knowledge, Rehab experience  Barriers to Participation: Comorbidities  End of Session Communication: Bedside nurse  Assessment Comment: Pt admitted 7/21 with COPD exacerbation and requiring Airvo. Pt demos decreased activity tolerance, decreased strength, endurance and balance. Pt is below baseline LOF and is a high fall risk. Recommend moderate intensity therapy.  End of Session Patient Position: Bed, 3 rail up, Alarm on  IP OR SWING BED PT PLAN  Inpatient or Swing Bed: Inpatient  PT Plan  Treatment/Interventions: Bed mobility, Transfer training, Balance training, Strengthening, Endurance training, Range of motion, Therapeutic exercise, Home exercise program, Therapeutic activity, Positioning  PT Plan: Ongoing PT  PT Frequency: 3 times per week (During this acute inpatient hospitalization)  PT Discharge Recommendations: Moderate intensity level of continued care (Based on current functional status and rehab potential, patient is anticipated to tolerate and benefit from 5 or more days per week of skilled rehabilitative therapy after discharge from this acute inpatient hospitilization.)  PT Recommended Transfer Status: Assist x2  PT - OK to Discharge: Yes (once medically cleared for next level  of care)    Subjective     Current Problem:  1. COPD exacerbation (Multi)          Problem List[1]    General Visit Information:  General  Reason for Referral: OT and treat; admitted for SOB/COPD  exacerbation  Past Medical History Relevant to Rehab: Medical History[2]   Family/Caregiver Present: No  Co-Treatment: OT  Co-Treatment Reason: To maximize pt safety while focusing on specifc discipline goals  Prior to Session Communication: Bedside nurse  Patient Position Received: Bed, 3 rail up, Alarm on  General Comment: Pt agreeable to therapy    Home Living:  Home Living  Type of Home: House  Lives With: Spouse  Home Adaptive Equipment: Walker rolling or standard, Reacher  Home Layout: Laundry in basement, Able to live on main level with bedroom/bathroom ( completes laundry')  Home Access: Ramped entrance  Bathroom Shower/Tub: Walk-in shower (pt sponge bathes)  Bathroom Toilet: Other (Comment) (uses BSC)  Bathroom Equipment: Tub transfer bench, Grab bars in shower, Shower chair with back, Bedside commode, Grab bars around toilet, Hand-held shower hose  Home Living Comments: Sleeps in lift chair, uses BSC.    Prior Level of Function:  Prior Function Per Pt/Caregiver Report  Level of [place]: Needs assistance with ADLs, Needs assistance with homemaking  Prior Function Comments:  complete iADLs, although pt states she assists with cooking. Assistance with bathing, and toileting, uses reacher for LB dressing    Precautions:  Precautions  Medical Precautions: Fall precautions, Oxygen therapy device and L/min  Precautions Comment: Airvo 60L/50%    Vital Signs:  Vital Signs  SpO2: (!) 88 % (88-91% on airvo and drops to high 80s with exertion)  Objective     Pain:  Pain Assessment  Pain Assessment: 0-10  0-10 (Numeric) Pain Score: 0 - No pain    Cognition:  Cognition  Overall Cognitive Status: Within Functional Limits  Orientation Level: Oriented X4  Insight: Mild  Processing Speed: Delayed    General  Assessments:      Activity Tolerance  Endurance: Decreased tolerance for upright activites  Sensation  Light Touch: No apparent deficits  Strength  Strength Comments: BLE WFL at least 3/5 with mobility  Perception  Inattention/Neglect: Appears intact  Coordination  Movements are Fluid and Coordinated: Yes             Functional Assessments:     Bed Mobility  Bed Mobility: Yes  Bed Mobility 1  Bed Mobility 1: Supine to sitting  Level of Assistance 1: Moderate assistance (x2)  Bed Mobility Comments 1: BLE and trunk mangement  Transfers  Transfer: Yes  Transfer 1  Technique 1: Sit to stand, Stand to sit  Transfer Device 1: Walker  Transfer Level of Assistance 1: Moderate assistance (x2)  Trials/Comments 1: From EOB to chair with FWW x 2 reps  Ambulation/Gait Training  Ambulation/Gait Training Performed:  (Pt completed ~2 steps from EOB to chair with min ax1)     Extremity/Trunk Assessments:  RLE   RLE : Within Functional Limits  LLE   LLE : Within Functional Limits    Outcome Measures:     [facility] Basic Mobility  Turning from your back to your side while in a flat bed without using bedrails: A little  Moving from lying on your back to sitting on the side of a flat bed without using bedrails: A lot  Moving to and from bed to chair (including a wheelchair): A lot  Standing up from a chair using your arms (e.g. wheelchair or bedside chair): A lot  To walk in hospital room: Total  Climbing 3-5 steps with railing: Total  Basic Mobility - Total Score: 11     Goals:  Encounter Problems       Encounter Problems (Active)       PT Problem       PT Goal 1 STG - Pt will transition supine <> sitting with Margarita x1  (Progressing)       Start:  07/22/25    Expected End:  08/05/25            PT Goal 2 STG - Pt will transfer STS with Margarita x1  (Progressing)       Start:  07/22/25    Expected End:  08/05/25            PT Goal 3 STG - Pt will amb 10' using FWW with Margarita x1  (Progressing)       Start:  07/22/25    Expected End:  08/05/25             PT Goal 4 STG - Pt will perform a B LE ther ex program of 2-3 sets of 10  (Progressing)       Start:  07/22/25    Expected End:  08/05/25                 Education Documentation  Body Mechanics, taught by Cora Mars PT at 7/22/2025  3:52 PM.  Learner: Patient  Readiness: Acceptance  Method: Explanation  Response: Verbalizes Understanding, Needs Reinforcement    Precautions, taught by Cora Mars PT at 7/22/2025  3:52 PM.  Learner: Patient  Readiness: Acceptance  Method: Explanation  Response: Verbalizes Understanding, Needs Reinforcement    Mobility Training, taught by Cora Mars PT at 7/22/2025  3:52 PM.  Learner: Patient  Readiness: Acceptance  Method: Explanation  Response: Verbalizes Understanding, Needs Reinforcement    Education Comments  No comments found.              [1]   Patient Active Problem List  Diagnosis    Acute and chronic respiratory failure with hypercapnia    Chronic obstructive pulmonary disease with (acute) exacerbation (Multi)    Atherosclerotic heart disease of native coronary artery without angina pectoris    Calculus of gallbladder without cholecystitis without obstruction    Dependence on other enabling machines and devices    Dependence on supplemental oxygen    Diabetes type 2, controlled (Multi)    Difficulty in walking, not elsewhere classified    Gastro-esophageal reflux disease without esophagitis    Hyperlipidemia associated with type 2 diabetes mellitus    Hypertension associated with diabetes    Hypothyroidism    Anxiety associated with depression    Morbid obesity (Multi)    Old myocardial infarction    Unspecified atrial fibrillation (Multi)    Polyosteoarthritis, unspecified    Pulmonary hypertension, unspecified (Multi)    Acute respiratory failure with hypoxia and hypercapnia    COPD exacerbation (Multi)   [2]   Past Medical History:  Diagnosis Date    Abnormal weight gain     Weight gain    Essential (primary) hypertension     HTN (hypertension), benign     Localized edema     Edema extremities    Old myocardial infarction     History of myocardial infarction    Old myocardial infarction 02/27/2017    History of non-ST elevation myocardial infarction (NSTEMI)    Other specified postprocedural states     History of repair of both hip joints    Other symptoms and signs involving the genitourinary system     Urinary problem    Pain due to internal orthopedic prosthetic devices, implants and grafts, initial encounter     Artificial joint pain    Personal history of other diseases of the circulatory system     History of hypertension    Personal history of other diseases of the musculoskeletal system and connective tissue     Personal history of gout    Personal history of other diseases of the musculoskeletal system and connective tissue     Personal history of arthritis    Personal history of other diseases of the musculoskeletal system and connective tissue     Personal history of fibromyalgia    Personal history of other diseases of the musculoskeletal system and connective tissue     History of muscle pain    Personal history of other diseases of the respiratory system 02/12/2018    History of chronic respiratory failure    Personal history of other endocrine, nutritional and metabolic disease     History of thyroid disease    Personal history of other endocrine, nutritional and metabolic disease     History of obesity    Personal history of other endocrine, nutritional and metabolic disease     History of high cholesterol    Personal history of other endocrine, nutritional and metabolic disease 02/28/2014    History of hypothyroidism    Personal history of other medical treatment     History of mammogram    Personal history of other specified conditions     H/O shortness of breath    Personal history of pneumonia (recurrent)     History of pneumonia    Presence of artificial hip joint, bilateral     Status post total replacement of both hips    Pure  hypercholesterolemia, unspecified     High cholesterol    Unspecified disorder of nose and nasal sinuses     Sinus problem

## 2025-07-23 ENCOUNTER — HOME CARE VISIT (OUTPATIENT)
Dept: HOME HEALTH SERVICES | Facility: HOME HEALTH | Age: 80
End: 2025-07-23
Payer: MEDICARE

## 2025-07-23 ENCOUNTER — APPOINTMENT (OUTPATIENT)
Dept: RADIOLOGY | Facility: HOSPITAL | Age: 80
DRG: 189 | End: 2025-07-23
Payer: MEDICARE

## 2025-07-23 LAB
ANION GAP SERPL CALC-SCNC: 13 MMOL/L (ref 10–20)
BUN SERPL-MCNC: 54 MG/DL (ref 6–23)
CALCIUM SERPL-MCNC: 10.1 MG/DL (ref 8.6–10.3)
CHLORIDE SERPL-SCNC: 85 MMOL/L (ref 98–107)
CO2 SERPL-SCNC: 44 MMOL/L (ref 21–32)
CREAT SERPL-MCNC: 1.25 MG/DL (ref 0.5–1.05)
EGFRCR SERPLBLD CKD-EPI 2021: 43 ML/MIN/1.73M*2
ERYTHROCYTE [DISTWIDTH] IN BLOOD BY AUTOMATED COUNT: 14.8 % (ref 11.5–14.5)
GLUCOSE SERPL-MCNC: 150 MG/DL (ref 74–99)
HCT VFR BLD AUTO: 38.3 % (ref 36–46)
HGB BLD-MCNC: 11.4 G/DL (ref 12–16)
MAGNESIUM SERPL-MCNC: 2.22 MG/DL (ref 1.6–2.4)
MCH RBC QN AUTO: 27.8 PG (ref 26–34)
MCHC RBC AUTO-ENTMCNC: 29.8 G/DL (ref 32–36)
MCV RBC AUTO: 93 FL (ref 80–100)
NRBC BLD-RTO: 0 /100 WBCS (ref 0–0)
PLATELET # BLD AUTO: 209 X10*3/UL (ref 150–450)
POTASSIUM SERPL-SCNC: 4.6 MMOL/L (ref 3.5–5.3)
RBC # BLD AUTO: 4.1 X10*6/UL (ref 4–5.2)
SODIUM SERPL-SCNC: 137 MMOL/L (ref 136–145)
WBC # BLD AUTO: 10.3 X10*3/UL (ref 4.4–11.3)

## 2025-07-23 PROCEDURE — 2060000001 HC INTERMEDIATE ICU ROOM DAILY

## 2025-07-23 PROCEDURE — 2500000004 HC RX 250 GENERAL PHARMACY W/ HCPCS (ALT 636 FOR OP/ED)

## 2025-07-23 PROCEDURE — 85027 COMPLETE CBC AUTOMATED: CPT

## 2025-07-23 PROCEDURE — 2500000001 HC RX 250 WO HCPCS SELF ADMINISTERED DRUGS (ALT 637 FOR MEDICARE OP)

## 2025-07-23 PROCEDURE — 9420000001 HC RT PATIENT EDUCATION 5 MIN

## 2025-07-23 PROCEDURE — 2500000005 HC RX 250 GENERAL PHARMACY W/O HCPCS: Performed by: INTERNAL MEDICINE

## 2025-07-23 PROCEDURE — 80051 ELECTROLYTE PANEL: CPT

## 2025-07-23 PROCEDURE — 36415 COLL VENOUS BLD VENIPUNCTURE: CPT

## 2025-07-23 PROCEDURE — 71046 X-RAY EXAM CHEST 2 VIEWS: CPT

## 2025-07-23 PROCEDURE — 2500000002 HC RX 250 W HCPCS SELF ADMINISTERED DRUGS (ALT 637 FOR MEDICARE OP, ALT 636 FOR OP/ED)

## 2025-07-23 PROCEDURE — 83735 ASSAY OF MAGNESIUM: CPT

## 2025-07-23 PROCEDURE — 94660 CPAP INITIATION&MGMT: CPT

## 2025-07-23 PROCEDURE — 2500000004 HC RX 250 GENERAL PHARMACY W/ HCPCS (ALT 636 FOR OP/ED): Mod: JZ

## 2025-07-23 PROCEDURE — 71046 X-RAY EXAM CHEST 2 VIEWS: CPT | Performed by: RADIOLOGY

## 2025-07-23 PROCEDURE — 94640 AIRWAY INHALATION TREATMENT: CPT

## 2025-07-23 RX ORDER — FUROSEMIDE 10 MG/ML
40 INJECTION INTRAMUSCULAR; INTRAVENOUS ONCE
Status: COMPLETED | OUTPATIENT
Start: 2025-07-23 | End: 2025-07-23

## 2025-07-23 RX ADMIN — Medication: at 16:53

## 2025-07-23 RX ADMIN — LEVOTHYROXINE SODIUM 100 MCG: 0.1 TABLET ORAL at 05:56

## 2025-07-23 RX ADMIN — ENOXAPARIN SODIUM 60 MG: 60 INJECTION SUBCUTANEOUS at 21:35

## 2025-07-23 RX ADMIN — Medication: at 23:03

## 2025-07-23 RX ADMIN — HYDROXYZINE HYDROCHLORIDE 25 MG: 25 TABLET ORAL at 23:55

## 2025-07-23 RX ADMIN — FLUCONAZOLE 150 MG: 100 TABLET ORAL at 08:56

## 2025-07-23 RX ADMIN — ALBUTEROL SULFATE 2.5 MG: 2.5 SOLUTION RESPIRATORY (INHALATION) at 12:15

## 2025-07-23 RX ADMIN — Medication: at 19:21

## 2025-07-23 RX ADMIN — DILTIAZEM HYDROCHLORIDE 30 MG: 60 TABLET, FILM COATED ORAL at 21:35

## 2025-07-23 RX ADMIN — FLUTICASONE FUROATE AND VILANTEROL TRIFENATATE 1 PUFF: 200; 25 POWDER RESPIRATORY (INHALATION) at 06:54

## 2025-07-23 RX ADMIN — SPIRONOLACTONE 100 MG: 25 TABLET, FILM COATED ORAL at 08:56

## 2025-07-23 RX ADMIN — ALBUTEROL SULFATE 2.5 MG: 2.5 SOLUTION RESPIRATORY (INHALATION) at 19:19

## 2025-07-23 RX ADMIN — DILTIAZEM HYDROCHLORIDE 30 MG: 60 TABLET, FILM COATED ORAL at 08:56

## 2025-07-23 RX ADMIN — ALBUTEROL SULFATE 2.5 MG: 2.5 SOLUTION RESPIRATORY (INHALATION) at 06:52

## 2025-07-23 RX ADMIN — Medication: at 06:55

## 2025-07-23 RX ADMIN — METHYLPREDNISOLONE SODIUM SUCCINATE 40 MG: 40 INJECTION, POWDER, LYOPHILIZED, FOR SOLUTION INTRAMUSCULAR; INTRAVENOUS at 04:21

## 2025-07-23 RX ADMIN — METHYLPREDNISOLONE SODIUM SUCCINATE 40 MG: 40 INJECTION, POWDER, LYOPHILIZED, FOR SOLUTION INTRAMUSCULAR; INTRAVENOUS at 17:14

## 2025-07-23 RX ADMIN — FUROSEMIDE 40 MG: 10 INJECTION, SOLUTION INTRAMUSCULAR; INTRAVENOUS at 17:14

## 2025-07-23 RX ADMIN — ACETAMINOPHEN 650 MG: 325 TABLET ORAL at 21:40

## 2025-07-23 RX ADMIN — Medication 50 DOSE: at 20:00

## 2025-07-23 RX ADMIN — AZITHROMYCIN DIHYDRATE 500 MG: 250 TABLET ORAL at 21:35

## 2025-07-23 RX ADMIN — ENOXAPARIN SODIUM 60 MG: 60 INJECTION SUBCUTANEOUS at 08:56

## 2025-07-23 RX ADMIN — POLYETHYLENE GLYCOL 3350 17 G: 17 POWDER, FOR SOLUTION ORAL at 08:56

## 2025-07-23 RX ADMIN — ATORVASTATIN CALCIUM 10 MG: 10 TABLET, FILM COATED ORAL at 08:56

## 2025-07-23 ASSESSMENT — COGNITIVE AND FUNCTIONAL STATUS - GENERAL
HELP NEEDED FOR BATHING: A LOT
DRESSING REGULAR UPPER BODY CLOTHING: A LOT
TOILETING: A LOT
STANDING UP FROM CHAIR USING ARMS: A LOT
CLIMB 3 TO 5 STEPS WITH RAILING: TOTAL
DRESSING REGULAR UPPER BODY CLOTHING: A LOT
MOBILITY SCORE: 10
WALKING IN HOSPITAL ROOM: TOTAL
MOVING FROM LYING ON BACK TO SITTING ON SIDE OF FLAT BED WITH BEDRAILS: A LOT
STANDING UP FROM CHAIR USING ARMS: A LOT
EATING MEALS: A LITTLE
HELP NEEDED FOR BATHING: A LOT
PERSONAL GROOMING: A LOT
WALKING IN HOSPITAL ROOM: TOTAL
MOVING FROM LYING ON BACK TO SITTING ON SIDE OF FLAT BED WITH BEDRAILS: A LOT
DAILY ACTIVITIY SCORE: 13
DAILY ACTIVITIY SCORE: 13
MOBILITY SCORE: 10
PERSONAL GROOMING: A LOT
MOVING TO AND FROM BED TO CHAIR: A LOT
TURNING FROM BACK TO SIDE WHILE IN FLAT BAD: A LOT
MOVING TO AND FROM BED TO CHAIR: A LOT
DRESSING REGULAR LOWER BODY CLOTHING: A LOT
TOILETING: A LOT
DRESSING REGULAR LOWER BODY CLOTHING: A LOT
TURNING FROM BACK TO SIDE WHILE IN FLAT BAD: A LOT
CLIMB 3 TO 5 STEPS WITH RAILING: TOTAL
EATING MEALS: A LITTLE

## 2025-07-23 ASSESSMENT — PAIN - FUNCTIONAL ASSESSMENT
PAIN_FUNCTIONAL_ASSESSMENT: 0-10

## 2025-07-23 ASSESSMENT — PAIN DESCRIPTION - ORIENTATION: ORIENTATION: RIGHT;LEFT

## 2025-07-23 ASSESSMENT — PAIN SCALES - GENERAL
PAINLEVEL_OUTOF10: 0 - NO PAIN
PAINLEVEL_OUTOF10: 0 - NO PAIN
PAINLEVEL_OUTOF10: 3

## 2025-07-23 ASSESSMENT — PAIN DESCRIPTION - LOCATION: LOCATION: KNEE

## 2025-07-23 ASSESSMENT — PAIN DESCRIPTION - DESCRIPTORS: DESCRIPTORS: ACHING

## 2025-07-23 NOTE — NURSING NOTE
Pulmonary Disease Navigator Documentation:    Comments:  Met with patient to discuss our current status with trying to obtain home AVAPS machine.  Per patient's DME, [facility], they have submitted all of her paperwork to insurance.  Insurance may take up to 72 hours to approve/deny per DME.  Care team updated as well.

## 2025-07-23 NOTE — PROGRESS NOTES
Assessment and Plan  Patient is a 80 y.o. female with the following medical problems:    Acute on chronic hypercapnic hypoxic respiratory failure  Underlying severe COPD   Progressive deconditioning   Obstructive sleep apnea  Class III obesity  Likely right heart failure with peripheral edema 2/2 secondary pulmonary hypertension  Mild aortic stenosis  Hypothyroidism  Chronic pneumonia  Paroxysmal A-fib  Chronic BLE cellulitis    Plan of Care:  -Continue AVAPS  -AVAPS setting as 650 target tidal volume with inspiratory pressure range of 20-35 and PEEP of 8.   -Judicious diuresis with torsemide, monitor net fluid balance.   -Continue azithromycin, Diflucan, DuoNeb, and Solu-Medrol twice daily.  -Bronchopulmonary hygiene.  -Recommend 40 mg IV lasix one time dose due to lower extremity edema.  -Repeat chest x-ray.  -Aspiration precautions and head of bed elevation at 30 degrees.  -Patient will be switched to AVAPS after discharge for better long term control of her respiratory function.  -Long-term prognosis is guarded due to complexity of patient respiratory failure.        History of Present Illness: Patient is a 80-year-old female with a PMH of COPD on (baseline 4 L NC), MARLENY/OHS, hypothyroidism, DLD, morbid obesity, chronic BLE cellulitis, chronic anemia and hyperglycemia who presented to the ED by her family for respiratory distress, confusion, and daytime somnolence.  The patient was recently admitted to the ICU for acute on chronic hypercapnic hypoxic respiratory failure secondary to ADHF on 6/22/2025, requiring intubation for no improvement in blood gases and worsening mental status.  The daughter mentioned that the patient does not have the proper equipment at home to administer BiPAP effectively.  The patient's BiPAP at home requires new settings with AVAP for which they do not have the device.  She also reported that the patient's O2 on pulse oximetry was in the mid to high 80s.  The patient has also been  experiencing increased daytime somnolence, along with intermittent periods of confusion.  She denied any fever, chills, and recent sick contacts.  However, she does have a cough (productive, clear sputum) s/p using BiPAP.  She is on 4 L of home oxygen.    Daily progress:  7/22/2025: Patient was evaluated and was lying in bed without any significant respiratory distress. No acute events overnight.  She is still coughing up but with very little phlegm.  She is currently breathing on AVAPS. She denies any chest pain, fever, chills or shortness of breath.    7/23/2025: Patient is feeling better than before.  No acute events overnight.  She is still breathing on AVAPS.  She denies any chest pain, fever, chills or shortness of breath.    Past Medical/Surgical History:   Medical History[1]  Surgical History[2]    Family History:   No relevant family history has been documented for this patient.    Allergies:     Allergies[3]      Social history:   reports that she has never smoked. She has never used smokeless tobacco. She reports that she does not currently use alcohol. She reports that she does not currently use drugs.    Current Medications:   No recently discontinued medications to reconcile    Review of Systems:   General: denies weight gain, denies loss of appetite, fever, chills, night sweats.  HEENT: denies headaches, dizziness, head trauma, visual changes, eye pain, tinnitus, nosebleeds, hoarseness or throat pain    Respiratory: denies chest pain, dyspnea, +ve cough and no hemoptysis  Cardiovascular: denies orthopnea, paroxysmal nocturnal dyspnea, leg swelling, and previous heart attack.    Gastrointestinal: denies pain, nausea vomiting, diarrhea, constipation, melena or bleeding.  Genitourinary: denies hematuria, frequency, urgency or dysuria  Neurology: Intermittently confused but denies syncope, seizures, paralysis, paraesthesia   Endocrine: denies polyuria, polydipsia, skin or hair changes, and heat or cold  intolerance  Musculoskeletal: denies joint pain, swelling, arthritis or myalgia  Hematologic: denies bleeding, adenopathy and easy bruising  Skin: denies rashes and skin discoloration  Psychiatry: denies depression    Vital Signs:   Vitals:    07/23/25 1600   BP:    Pulse:    Resp:    Temp:    SpO2: 91%       Input/Output:    Intake/Output Summary (Last 24 hours) at 7/23/2025 1611  Last data filed at 7/23/2025 0316  Gross per 24 hour   Intake 240 ml   Output 550 ml   Net -310 ml       Oxygen requirements: Patient is currently on AVAPS.    Ventilator Information:  FiO2 (%):  [50 %-60 %] 50 %  S RR:  [16] 16  S VT:  [600 mL] 600 mL  MAP (cm H2O):  [14.9-15.2] 15.2          General appearance: Obese, not in any significant respiratory distress.  HEENT: Atraumatic/normocephalic, EOMI, SHANTHI, pharynx clear, moist mucosa, redness of the uvula appreciated,   Neck: Supple, no jugular venous distension, lymphadenopathy, thyromegaly or carotid bruits  Chest:  decreased breath sounds, no crackles and no tenderness over ribs   Cardiovascular: Normal S1, S2, regular rate and rhythm, no murmur, rub or gallop  Abdomen: Normal sounds present, soft, lax with no tenderness, no hepatosplenomegaly, and no masses.  Extremities: Pulses are equally present.  Bilateral pitting edema  Skin: intact, no rashes   Neurologic: Alert and oriented x 3, No focal deficit     Investigations:  Labs, radiological imaging and cardiac work up were personally reviewed      Rosamaria Cortez MD  PGY-1, Pulmonology    .       STAFF PHYSICIAN NOTE OF PERSONAL INVOLVEMENT IN CARE  As the attending physician, I certify that I personally reviewed the patient's history and personally examined the patient to confirm the physical findings described above, and that I reviewed the relevant imaging studies and available reports.  I also discussed the differential diagnosis and all of the proposed management plans with the patient and individuals accompanying the patient  to this visit.  They had the opportunity to ask questions about the proposed management plans and to have those questions answered.              [1]   Past Medical History:  Diagnosis Date    Abnormal weight gain     Weight gain    Essential (primary) hypertension     HTN (hypertension), benign    Localized edema     Edema extremities    Old myocardial infarction     History of myocardial infarction    Old myocardial infarction 02/27/2017    History of non-ST elevation myocardial infarction (NSTEMI)    Other specified postprocedural states     History of repair of both hip joints    Other symptoms and signs involving the genitourinary system     Urinary problem    Pain due to internal orthopedic prosthetic devices, implants and grafts, initial encounter     Artificial joint pain    Personal history of other diseases of the circulatory system     History of hypertension    Personal history of other diseases of the musculoskeletal system and connective tissue     Personal history of gout    Personal history of other diseases of the musculoskeletal system and connective tissue     Personal history of arthritis    Personal history of other diseases of the musculoskeletal system and connective tissue     Personal history of fibromyalgia    Personal history of other diseases of the musculoskeletal system and connective tissue     History of muscle pain    Personal history of other diseases of the respiratory system 02/12/2018    History of chronic respiratory failure    Personal history of other endocrine, nutritional and metabolic disease     History of thyroid disease    Personal history of other endocrine, nutritional and metabolic disease     History of obesity    Personal history of other endocrine, nutritional and metabolic disease     History of high cholesterol    Personal history of other endocrine, nutritional and metabolic disease 02/28/2014    History of hypothyroidism    Personal history of other medical  treatment     History of mammogram    Personal history of other specified conditions     H/O shortness of breath    Personal history of pneumonia (recurrent)     History of pneumonia    Presence of artificial hip joint, bilateral     Status post total replacement of both hips    Pure hypercholesterolemia, unspecified     High cholesterol    Unspecified disorder of nose and nasal sinuses     Sinus problem   [2]   Past Surgical History:  Procedure Laterality Date    CARDIAC CATHETERIZATION  01/25/2018    Cardiac Cath Procedure Outcome:    CARPAL TUNNEL RELEASE  02/27/2014    Neuroplasty Median Nerve At Carpal Tunnel    HIP SURGERY  03/03/2018    Hip Surgery    MOUTH SURGERY  04/14/2015    Oral Surgery Tooth Extraction    THYROID SURGERY  04/14/2015    Thyroid Surgery    TONSILLECTOMY  04/14/2015    Tonsillectomy    TOTAL HIP ARTHROPLASTY  01/25/2018    Hip Replacement   [3] No Known Allergies

## 2025-07-23 NOTE — CARE PLAN
The patient's goals for the shift include res    The clinical goals for the shift include maintain SPO2 >90%      Problem: Chronic Conditions and Co-morbidities  Goal: Patient's chronic conditions and co-morbidity symptoms are monitored and maintained or improved  Outcome: Progressing     Problem: Skin  Goal: Prevent/minimize sheer/friction injuries  Outcome: Progressing     Problem: Skin  Goal: Promote/optimize nutrition  Outcome: Progressing

## 2025-07-23 NOTE — PROGRESS NOTES
Ailyn Banegas is a 80 y.o. female on day 2 of admission presenting with COPD exacerbation (Multi).      SUBJECTIVE     Patient evaluated this morning and found to be resting comfortably in bed.  Patient reports her breathing is significantly improved from yesterday.    OBJECTIVE     Vitals:    07/23/25 1205 07/23/25 1215 07/23/25 1217 07/23/25 1600   BP: 119/79      BP Location:       Patient Position:       Pulse: 67      Resp: 18      Temp: 36.1 °C (97 °F)      TempSrc:       SpO2: 99% 96% 96% 91%   Weight:       Height:          Results from last 7 days   Lab Units 07/23/25  0550 07/22/25  0555 07/21/25  1329   WBC AUTO x10*3/uL 10.3   < > 7.8   HEMOGLOBIN g/dL 11.4*   < > 11.3*   HEMATOCRIT % 38.3   < > 38.7   PLATELETS AUTO x10*3/uL 209   < > 213   NEUTROS PCT AUTO %  --   --  74.8   LYMPHS PCT AUTO %  --   --  13.7   MONOS PCT AUTO %  --   --  7.7   EOS PCT AUTO %  --   --  2.4    < > = values in this interval not displayed.     Results from last 7 days   Lab Units 07/23/25  0550 07/22/25  0555 07/21/25  1329   SODIUM mmol/L 137   < > 137   POTASSIUM mmol/L 4.6   < > 4.8   CHLORIDE mmol/L 85*   < > 86*   CO2 mmol/L 44*   < > >45*   BUN mg/dL 54*   < > 24*   CREATININE mg/dL 1.25*   < > 0.78   CALCIUM mg/dL 10.1   < > 9.9   PROTEIN TOTAL g/dL  --   --  7.2   BILIRUBIN TOTAL mg/dL  --   --  0.7   ALK PHOS U/L  --   --  78   ALT U/L  --   --  10   AST U/L  --   --  15   GLUCOSE mg/dL 150*   < > 118*    < > = values in this interval not displayed.       Scheduled Medications  Scheduled Medications[1]   Physical Exam    Constitutional: obese habitus, awake, alert, no acute distress  ENMT: mucous membranes moist, EOMI, conjunctivae clear  Head/Neck: normocephalic, atraumatic; supple, trachea midline  Respiratory/Thorax: patent airways, CTAB; no wheezes, rales, or rhonchi  Cardiovascular: RRR, no murmur  Gastrointestinal: soft, nondistended, non-tender, bowel sounds appreciated  Extremities: palpable peripheral  pulses, BLE pitting edema or cyanosis, BLE cellulitis  Neurological: AO x3, no focal deficits  Psychological: appropriate mood and behavior  Skin: warm and dry    Pertinent Imaging    XR chest 2 views 7/23/25:  Pending    ASSESSMENT & PLAN   Ailyn Banegas is a 80 y.o. female with a PMHx COPD (on baseline 4L NC), MARLENY/likely OHS, Hypothyroidism, DLD, Morbid Obesity who was brought into the ED by family for respiratory distress, confusion, and daytime somnolence.     Daily Progress  -C/w Azithromycin  -IV lasix 40 mg one time push  -[place] in contact with [employer] insurance. 72 hrs for insurance to review and approve/deny AVAPS machine for pt.    #Acute on chronic hypercapnic HRF ISO #MARLENY/OHS and #COPD  -High 80s low 90s SpO2 on 5L NC  PLAN:  -C/w IV Azithromycin 500 BID for 3 days (then continue M,W,F home regimen)  -C/w IV Solu-Medrol 40 mg BID  -BiPAP  -1 time 40mg IV Lasix push given 7/23/25 by pulmonlogy     Chronic Problems:     #Hypothyroidism-continue home dose Synthroid  #Hyperglycemia-SSI #3  #Chronic anemia  #Chronic lower extremity wounds  #MARLENY    #Chronic BLE Cellulitis     DVT PPX: Lovenox  Diet: Regular  IVF:  Code Status: DNR    David Pinzon MD  PGY-1, Internal Medicine  Please SecureChat for any further questions  This is a preliminary note, please await attending attestation for final A/P         [1] albuterol, 2.5 mg, nebulization, TID  atorvastatin, 10 mg, oral, Daily  azithromycin, 500 mg, oral, q24h  dilTIAZem, 30 mg, oral, q12h  enoxaparin, 60 mg, subcutaneous, q12h VLADIMIR  fluticasone furoate-vilanteroL, 1 puff, inhalation, Daily  furosemide, 40 mg, intravenous, Once  levothyroxine, 100 mcg, oral, Daily  methylPREDNISolone sodium succinate (PF), 40 mg, intravenous, q12h  oxygen, , inhalation, Continuous - Inhalation  polyethylene glycol, 17 g, oral, Daily  spironolactone, 100 mg, oral, Daily  [Held by provider] torsemide, 40 mg, oral, Daily

## 2025-07-24 LAB
ANION GAP SERPL CALC-SCNC: 14 MMOL/L (ref 10–20)
BUN SERPL-MCNC: 54 MG/DL (ref 6–23)
CALCIUM SERPL-MCNC: 9.9 MG/DL (ref 8.6–10.3)
CHLORIDE SERPL-SCNC: 86 MMOL/L (ref 98–107)
CO2 SERPL-SCNC: 42 MMOL/L (ref 21–32)
CREAT SERPL-MCNC: 1.07 MG/DL (ref 0.5–1.05)
EGFRCR SERPLBLD CKD-EPI 2021: 52 ML/MIN/1.73M*2
ERYTHROCYTE [DISTWIDTH] IN BLOOD BY AUTOMATED COUNT: 14.7 % (ref 11.5–14.5)
GLUCOSE SERPL-MCNC: 147 MG/DL (ref 74–99)
HCT VFR BLD AUTO: 37.8 % (ref 36–46)
HGB BLD-MCNC: 11.3 G/DL (ref 12–16)
MAGNESIUM SERPL-MCNC: 2.33 MG/DL (ref 1.6–2.4)
MCH RBC QN AUTO: 28.2 PG (ref 26–34)
MCHC RBC AUTO-ENTMCNC: 29.9 G/DL (ref 32–36)
MCV RBC AUTO: 94 FL (ref 80–100)
NRBC BLD-RTO: 0 /100 WBCS (ref 0–0)
PHOSPHATE SERPL-MCNC: 4.3 MG/DL (ref 2.5–4.9)
PLATELET # BLD AUTO: 201 X10*3/UL (ref 150–450)
POTASSIUM SERPL-SCNC: 4.6 MMOL/L (ref 3.5–5.3)
RBC # BLD AUTO: 4.01 X10*6/UL (ref 4–5.2)
SODIUM SERPL-SCNC: 137 MMOL/L (ref 136–145)
WBC # BLD AUTO: 10.7 X10*3/UL (ref 4.4–11.3)

## 2025-07-24 PROCEDURE — 80051 ELECTROLYTE PANEL: CPT

## 2025-07-24 PROCEDURE — 2060000001 HC INTERMEDIATE ICU ROOM DAILY

## 2025-07-24 PROCEDURE — 84100 ASSAY OF PHOSPHORUS: CPT

## 2025-07-24 PROCEDURE — 2500000004 HC RX 250 GENERAL PHARMACY W/ HCPCS (ALT 636 FOR OP/ED)

## 2025-07-24 PROCEDURE — 36415 COLL VENOUS BLD VENIPUNCTURE: CPT

## 2025-07-24 PROCEDURE — 2500000004 HC RX 250 GENERAL PHARMACY W/ HCPCS (ALT 636 FOR OP/ED): Mod: JZ

## 2025-07-24 PROCEDURE — 2500000001 HC RX 250 WO HCPCS SELF ADMINISTERED DRUGS (ALT 637 FOR MEDICARE OP)

## 2025-07-24 PROCEDURE — 94640 AIRWAY INHALATION TREATMENT: CPT

## 2025-07-24 PROCEDURE — 83735 ASSAY OF MAGNESIUM: CPT

## 2025-07-24 PROCEDURE — 2500000002 HC RX 250 W HCPCS SELF ADMINISTERED DRUGS (ALT 637 FOR MEDICARE OP, ALT 636 FOR OP/ED)

## 2025-07-24 PROCEDURE — 9420000001 HC RT PATIENT EDUCATION 5 MIN

## 2025-07-24 PROCEDURE — 94660 CPAP INITIATION&MGMT: CPT

## 2025-07-24 PROCEDURE — 2500000005 HC RX 250 GENERAL PHARMACY W/O HCPCS: Performed by: INTERNAL MEDICINE

## 2025-07-24 PROCEDURE — 85027 COMPLETE CBC AUTOMATED: CPT

## 2025-07-24 RX ORDER — FUROSEMIDE 10 MG/ML
40 INJECTION INTRAMUSCULAR; INTRAVENOUS EVERY 12 HOURS
Status: COMPLETED | OUTPATIENT
Start: 2025-07-24 | End: 2025-07-25

## 2025-07-24 RX ORDER — PREDNISONE 20 MG/1
40 TABLET ORAL DAILY
Status: DISCONTINUED | OUTPATIENT
Start: 2025-07-24 | End: 2025-07-29

## 2025-07-24 RX ORDER — FUROSEMIDE 10 MG/ML
20 INJECTION INTRAMUSCULAR; INTRAVENOUS ONCE
Status: DISCONTINUED | OUTPATIENT
Start: 2025-07-24 | End: 2025-07-24

## 2025-07-24 RX ORDER — AZITHROMYCIN 250 MG/1
500 TABLET, FILM COATED ORAL 3 TIMES WEEKLY
Status: DISCONTINUED | OUTPATIENT
Start: 2025-07-25 | End: 2025-08-01 | Stop reason: HOSPADM

## 2025-07-24 RX ADMIN — Medication: at 06:44

## 2025-07-24 RX ADMIN — SPIRONOLACTONE 100 MG: 25 TABLET, FILM COATED ORAL at 09:44

## 2025-07-24 RX ADMIN — DILTIAZEM HYDROCHLORIDE 30 MG: 60 TABLET, FILM COATED ORAL at 09:43

## 2025-07-24 RX ADMIN — FUROSEMIDE 40 MG: 10 INJECTION, SOLUTION INTRAMUSCULAR; INTRAVENOUS at 14:14

## 2025-07-24 RX ADMIN — ENOXAPARIN SODIUM 60 MG: 60 INJECTION SUBCUTANEOUS at 09:44

## 2025-07-24 RX ADMIN — HYDROXYZINE HYDROCHLORIDE 25 MG: 25 TABLET ORAL at 20:45

## 2025-07-24 RX ADMIN — Medication: at 07:00

## 2025-07-24 RX ADMIN — Medication: at 20:00

## 2025-07-24 RX ADMIN — DILTIAZEM HYDROCHLORIDE 30 MG: 60 TABLET, FILM COATED ORAL at 20:27

## 2025-07-24 RX ADMIN — Medication: at 08:20

## 2025-07-24 RX ADMIN — Medication 1 DOSE: at 12:38

## 2025-07-24 RX ADMIN — ENOXAPARIN SODIUM 60 MG: 60 INJECTION SUBCUTANEOUS at 20:27

## 2025-07-24 RX ADMIN — ALBUTEROL SULFATE 2.5 MG: 2.5 SOLUTION RESPIRATORY (INHALATION) at 06:44

## 2025-07-24 RX ADMIN — POLYETHYLENE GLYCOL 3350 17 G: 17 POWDER, FOR SOLUTION ORAL at 09:47

## 2025-07-24 RX ADMIN — PREDNISONE 40 MG: 20 TABLET ORAL at 14:14

## 2025-07-24 RX ADMIN — Medication: at 18:51

## 2025-07-24 RX ADMIN — ATORVASTATIN CALCIUM 10 MG: 10 TABLET, FILM COATED ORAL at 09:44

## 2025-07-24 RX ADMIN — LEVOTHYROXINE SODIUM 100 MCG: 0.1 TABLET ORAL at 06:37

## 2025-07-24 RX ADMIN — ACETAMINOPHEN 650 MG: 325 TABLET ORAL at 20:27

## 2025-07-24 RX ADMIN — ALBUTEROL SULFATE 2.5 MG: 2.5 SOLUTION RESPIRATORY (INHALATION) at 18:50

## 2025-07-24 RX ADMIN — Medication: at 02:29

## 2025-07-24 RX ADMIN — METHYLPREDNISOLONE SODIUM SUCCINATE 40 MG: 40 INJECTION, POWDER, LYOPHILIZED, FOR SOLUTION INTRAMUSCULAR; INTRAVENOUS at 03:48

## 2025-07-24 RX ADMIN — ALBUTEROL SULFATE 2.5 MG: 2.5 SOLUTION RESPIRATORY (INHALATION) at 12:37

## 2025-07-24 RX ADMIN — FLUTICASONE FUROATE AND VILANTEROL TRIFENATATE 1 PUFF: 200; 25 POWDER RESPIRATORY (INHALATION) at 06:43

## 2025-07-24 ASSESSMENT — PAIN - FUNCTIONAL ASSESSMENT
PAIN_FUNCTIONAL_ASSESSMENT: 0-10

## 2025-07-24 ASSESSMENT — COGNITIVE AND FUNCTIONAL STATUS - GENERAL
WALKING IN HOSPITAL ROOM: TOTAL
STANDING UP FROM CHAIR USING ARMS: A LOT
TURNING FROM BACK TO SIDE WHILE IN FLAT BAD: A LOT
CLIMB 3 TO 5 STEPS WITH RAILING: TOTAL
TOILETING: A LOT
DAILY ACTIVITIY SCORE: 13
PERSONAL GROOMING: A LOT
DRESSING REGULAR UPPER BODY CLOTHING: A LOT
DRESSING REGULAR LOWER BODY CLOTHING: A LOT
MOBILITY SCORE: 10
HELP NEEDED FOR BATHING: A LOT
MOVING TO AND FROM BED TO CHAIR: A LOT
EATING MEALS: A LITTLE
MOVING FROM LYING ON BACK TO SITTING ON SIDE OF FLAT BED WITH BEDRAILS: A LOT

## 2025-07-24 ASSESSMENT — PAIN SCALES - GENERAL
PAINLEVEL_OUTOF10: 0 - NO PAIN
PAINLEVEL_OUTOF10: 3
PAINLEVEL_OUTOF10: 0 - NO PAIN

## 2025-07-24 ASSESSMENT — PAIN DESCRIPTION - ORIENTATION: ORIENTATION: RIGHT;LEFT

## 2025-07-24 ASSESSMENT — PAIN DESCRIPTION - LOCATION: LOCATION: KNEE

## 2025-07-24 ASSESSMENT — PAIN DESCRIPTION - DESCRIPTORS: DESCRIPTORS: ACHING

## 2025-07-24 NOTE — PROGRESS NOTES
Ailyn Banegas is a 80 y.o. female on day 3 of admission presenting with COPD exacerbation (Multi).      SUBJECTIVE     Patient evaluated this morning and found to be resting comfortably in bed.  The patient reports her breathing is better when compared to yesterday and the day prior.  Patient's family was present in the room and her daughter reports AVAPS was denied by [employer] insurance.    OBJECTIVE     Vitals:    07/24/25 0820 07/24/25 1238 07/24/25 1300 07/24/25 1603   BP:   141/60 132/69   BP Location:   Right arm    Patient Position:   Lying    Pulse:   57 92   Resp:   20    Temp:   36.7 °C (98.1 °F) 36.6 °C (97.9 °F)   TempSrc:   Temporal    SpO2: 92% 98% 96% 94%   Weight:       Height:          Results from last 7 days   Lab Units 07/24/25  0531 07/22/25  0555 07/21/25  1329   WBC AUTO x10*3/uL 10.7   < > 7.8   HEMOGLOBIN g/dL 11.3*   < > 11.3*   HEMATOCRIT % 37.8   < > 38.7   PLATELETS AUTO x10*3/uL 201   < > 213   NEUTROS PCT AUTO %  --   --  74.8   LYMPHS PCT AUTO %  --   --  13.7   MONOS PCT AUTO %  --   --  7.7   EOS PCT AUTO %  --   --  2.4    < > = values in this interval not displayed.     Results from last 7 days   Lab Units 07/24/25  0531 07/22/25  0555 07/21/25  1329   SODIUM mmol/L 137   < > 137   POTASSIUM mmol/L 4.6   < > 4.8   CHLORIDE mmol/L 86*   < > 86*   CO2 mmol/L 42*   < > >45*   BUN mg/dL 54*   < > 24*   CREATININE mg/dL 1.07*   < > 0.78   CALCIUM mg/dL 9.9   < > 9.9   PROTEIN TOTAL g/dL  --   --  7.2   BILIRUBIN TOTAL mg/dL  --   --  0.7   ALK PHOS U/L  --   --  78   ALT U/L  --   --  10   AST U/L  --   --  15   GLUCOSE mg/dL 147*   < > 118*    < > = values in this interval not displayed.       Scheduled Medications  Scheduled Medications[1]   Physical Exam    Constitutional: obese habitus, awake, alert, no acute distress  ENMT: mucous membranes moist, EOMI, conjunctivae clear  Head/Neck: normocephalic, atraumatic; supple, trachea midline  Respiratory/Thorax: patent airways, CTAB; no  wheezes, rales, or rhonchi  Cardiovascular: RRR, no murmur  Gastrointestinal: soft, nondistended, non-tender, bowel sounds appreciated  Extremities: palpable peripheral pulses, BLE pitting edema or cyanosis, BLE cellulitis  Neurological: AO x3, no focal deficits  Psychological: appropriate mood and behavior  Skin: warm and dry    Pertinent Imaging     XR chest 2 views 7/23/25:  1.  Patchy bilateral mid to lower lung and lung base opacities and  mild pulmonary vascular congestion and diffuse interstitial  prominence may reflect component of edema, overall similar to prior.  Slight blunting costophrenic angles may indicate small effusions.  Clinical correlation and follow up advised.        ASSESSMENT & PLAN     Ailyn Banegas is a 80 y.o. female with a PMHx COPD (on baseline 4L NC), MARLENY/likely OHS, Hypothyroidism, DLD, Morbid Obesity who was brought into the ED by family for respiratory distress, confusion, and daytime somnolence.      Daily Progress  -IV lasix 40 mg infusion q6  -CXR gradually improving  -Pt remains on high flow O2 at 50-60% FiO2  -[employer] in contact with [employer] insurance.   -[employer] denied AVAPS approval. Gillian and Lucinda will try alternative methods     #Acute on chronic hypercapnic HRF ISO #MARLENY/OHS and #COPD  -High 80s low 90s SpO2 on 5L NC  PLAN:  -C/w IV Azithromycin 500 BID for 3 days (then continue M,W,F home regimen)  -C/w IV Solu-Medrol 40 mg BID  -BiPAP  -1 time 40mg IV Lasix push given 7/23/25 by pulmonlogy     Chronic Problems:     #Hypothyroidism-continue home dose Synthroid  #Hyperglycemia-SSI #3  #Chronic anemia  #Chronic lower extremity wounds  #MARLENY    #Chronic BLE Cellulitis     DVT PPX: Lovenox  Diet: Regular  IVF:  Code Status: DNR    David Pinzon MD  PGY-1, Internal Medicine  Please SecureChat for any further questions  This is a preliminary note, please await attending attestation for final A/P         [1] albuterol, 2.5 mg, nebulization, TID  atorvastatin, 10 mg, oral,  Daily  [START ON 7/25/2025] azithromycin, 500 mg, oral, Once per day on Monday Wednesday Friday  dilTIAZem, 30 mg, oral, q12h  enoxaparin, 60 mg, subcutaneous, q12h VLADIMIR  fluticasone furoate-vilanteroL, 1 puff, inhalation, Daily  furosemide, 40 mg, intravenous, q12h  levothyroxine, 100 mcg, oral, Daily  oxygen, , inhalation, Continuous - Inhalation  polyethylene glycol, 17 g, oral, Daily  predniSONE, 40 mg, oral, Daily  spironolactone, 100 mg, oral, Daily  [Held by provider] torsemide, 40 mg, oral, Daily

## 2025-07-24 NOTE — PROGRESS NOTES
07/24/25 1428   Discharge Planning   Home or Post Acute Services In home services   Expected Discharge Disposition Home Health   Does the patient need discharge transport arranged? Yes   [facility] coordination needed? Yes   Has discharge transport been arranged? No     Met with patient at bedside to follow up on discharge plan.  Patient lives at home with her .  Prior to admission patient had home health care through [facility]. Confirmed plan to resume [facility] at discharge. Patient states she has home oxygen.  Patient is currently on Airvo.

## 2025-07-24 NOTE — PROGRESS NOTES
Assessment and Plan  Patient is a 80 y.o. female with the following medical problems:    Acute on chronic hypercapnic hypoxic respiratory failure  Underlying severe COPD   Progressive deconditioning   Obstructive sleep apnea  Class III obesity  Likely right heart failure with peripheral edema 2/2 secondary pulmonary hypertension  Mild aortic stenosis  Hypothyroidism  Chronic pneumonia  Paroxysmal A-fib  Chronic BLE cellulitis    Plan of Care:  -She remains on high flow oxygen at 50-60% FiO2  -Patient has chronic respiratory failure secondary due to COPD, she requires home ventilator to maintain acceptable PaCO2 levels to minimize hospital readmission, if PaCO2 levels are not maintained this may cause harm and possible deterioration including death of this patient.  Sleep apnea has been ruled out.  -As a primary cause of the hypercapnia, the patient had tried and failed BiPAP ST therapy and did require intubation recently due to the failure of the BiPAP.   -AVAPS setting as 650 target tidal volume with inspiratory pressure range of 20-35 and PEEP of 8.  -Give PO azithromycin 3x weekly for COPD exacerbation.  -Recommend another day of lasix 40 mg IV BID due to progressively increasing lower extremity edema.  -Recommend switching solumedrol to PO prednisone 40 mg now to gradually taper.  -Continue Diflucan, DuoNeb twice daily for COPD exacerbation.  -Bronchopulmonary hygiene.  -Documentation was submitted to DME company who will submit to the insurance company to obtain AVAPS for the patient at home if possible.  -Aspiration precautions and head of bed elevation at 30 degrees.  -Long-term prognosis is guarded due to complexity of patient respiratory failure.        History of Present Illness: Patient is a 80-year-old female with a PMH of COPD on (baseline 4 L NC), MARLENY/OHS, hypothyroidism, DLD, morbid obesity, chronic BLE cellulitis, chronic anemia and hyperglycemia who presented to the ED by her family for respiratory  distress, confusion, and daytime somnolence.  The patient was recently admitted to the ICU for acute on chronic hypercapnic hypoxic respiratory failure secondary to ADHF on 6/22/2025, requiring intubation for no improvement in blood gases and worsening mental status.  The daughter mentioned that the patient does not have the proper equipment at home to administer BiPAP effectively.  The patient's BiPAP at home requires new settings with AVAP for which they do not have the device.  She also reported that the patient's O2 on pulse oximetry was in the mid to high 80s.  The patient has also been experiencing increased daytime somnolence, along with intermittent periods of confusion.  She denied any fever, chills, and recent sick contacts.  However, she does have a cough (productive, clear sputum) s/p using BiPAP.  She is on 4 L of home oxygen.    Daily progress:  7/22/2025: Patient was evaluated and was lying in bed without any significant respiratory distress. No acute events overnight.  She is still coughing up but with very little phlegm.  She is currently breathing on AVAPS. She denies any chest pain, fever, chills or shortness of breath.    7/23/2025: Patient is feeling better than before.  No acute events overnight.  She is still breathing on AVAPS.  She denies any chest pain, fever, chills or shortness of breath.    07/24/2025: Patient is feeling better than before.  No acute events overnight.  She is still breathing on AVAPS.  She denies any chest pain, fever, chills or shortness of breath. Extremities continue to be edematous.    Past Medical/Surgical History:   Medical History[1]  Surgical History[2]    Family History:   No relevant family history has been documented for this patient.    Allergies:     Allergies[3]      Social history:   reports that she has never smoked. She has never used smokeless tobacco. She reports that she does not currently use alcohol. She reports that she does not currently use  drugs.    Current Medications:   No recently discontinued medications to reconcile    Review of Systems:   General: denies weight gain, denies loss of appetite, fever, chills, night sweats.  HEENT: denies headaches, dizziness, head trauma, visual changes, eye pain, tinnitus, nosebleeds, hoarseness or throat pain    Respiratory: denies chest pain, dyspnea, +ve cough and no hemoptysis  Cardiovascular: denies orthopnea, paroxysmal nocturnal dyspnea, leg swelling, and previous heart attack.    Gastrointestinal: denies pain, nausea vomiting, diarrhea, constipation, melena or bleeding.  Genitourinary: denies hematuria, frequency, urgency or dysuria  Neurology: Intermittently confused but denies syncope, seizures, paralysis, paraesthesia   Endocrine: denies polyuria, polydipsia, skin or hair changes, and heat or cold intolerance  Musculoskeletal: denies joint pain, swelling, arthritis or myalgia  Hematologic: denies bleeding, adenopathy and easy bruising  Skin: denies rashes and skin discoloration  Psychiatry: denies depression    Vital Signs:   Vitals:    07/24/25 0820   BP:    Pulse:    Resp:    Temp:    SpO2: 92%       Input/Output:    Intake/Output Summary (Last 24 hours) at 7/24/2025 1033  Last data filed at 7/24/2025 0644  Gross per 24 hour   Intake --   Output 1650 ml   Net -1650 ml       Oxygen requirements: Patient is currently on AVAPS.    Ventilator Information:  FiO2 (%):  [50 %] 50 %  S RR:  [16] 16  S VT:  [600 mL] 600 mL  MAP (cm H2O):  [13.4] 13.4          General appearance: Obese, not in any significant respiratory distress.  HEENT: Atraumatic/normocephalic, EOMI, SHANTHI, pharynx clear, moist mucosa, redness of the uvula appreciated,   Neck: Supple, no jugular venous distension, lymphadenopathy, thyromegaly or carotid bruits  Chest:  decreased breath sounds, +ve rales, no crackles and no tenderness over ribs   Cardiovascular: Normal S1, S2, regular rate and rhythm, no murmur, rub or gallop  Abdomen: Normal  sounds present, soft, lax with no tenderness, no hepatosplenomegaly, and no masses.  Extremities: Pulses are equally present.  Bilateral pitting edema  Skin: intact, no rashes   Neurologic: Alert and oriented x 3, No focal deficit     Investigations:  Labs, radiological imaging and cardiac work up were personally reviewed      Rosamaria Cortez MD  PGY-1, Pulmonology    .       STAFF PHYSICIAN NOTE OF PERSONAL INVOLVEMENT IN CARE  As the attending physician, I certify that I personally reviewed the patient's history and personally examined the patient to confirm the physical findings described above, and that I reviewed the relevant imaging studies and available reports.  I also discussed the differential diagnosis and all of the proposed management plans with the patient and individuals accompanying the patient to this visit.  They had the opportunity to ask questions about the proposed management plans and to have those questions answered.                [1]   Past Medical History:  Diagnosis Date    Abnormal weight gain     Weight gain    Essential (primary) hypertension     HTN (hypertension), benign    Localized edema     Edema extremities    Old myocardial infarction     History of myocardial infarction    Old myocardial infarction 02/27/2017    History of non-ST elevation myocardial infarction (NSTEMI)    Other specified postprocedural states     History of repair of both hip joints    Other symptoms and signs involving the genitourinary system     Urinary problem    Pain due to internal orthopedic prosthetic devices, implants and grafts, initial encounter     Artificial joint pain    Personal history of other diseases of the circulatory system     History of hypertension    Personal history of other diseases of the musculoskeletal system and connective tissue     Personal history of gout    Personal history of other diseases of the musculoskeletal system and connective tissue     Personal history of arthritis     Personal history of other diseases of the musculoskeletal system and connective tissue     Personal history of fibromyalgia    Personal history of other diseases of the musculoskeletal system and connective tissue     History of muscle pain    Personal history of other diseases of the respiratory system 02/12/2018    History of chronic respiratory failure    Personal history of other endocrine, nutritional and metabolic disease     History of thyroid disease    Personal history of other endocrine, nutritional and metabolic disease     History of obesity    Personal history of other endocrine, nutritional and metabolic disease     History of high cholesterol    Personal history of other endocrine, nutritional and metabolic disease 02/28/2014    History of hypothyroidism    Personal history of other medical treatment     History of mammogram    Personal history of other specified conditions     H/O shortness of breath    Personal history of pneumonia (recurrent)     History of pneumonia    Presence of artificial hip joint, bilateral     Status post total replacement of both hips    Pure hypercholesterolemia, unspecified     High cholesterol    Unspecified disorder of nose and nasal sinuses     Sinus problem   [2]   Past Surgical History:  Procedure Laterality Date    CARDIAC CATHETERIZATION  01/25/2018    Cardiac Cath Procedure Outcome:    CARPAL TUNNEL RELEASE  02/27/2014    Neuroplasty Median Nerve At Carpal Tunnel    HIP SURGERY  03/03/2018    Hip Surgery    MOUTH SURGERY  04/14/2015    Oral Surgery Tooth Extraction    THYROID SURGERY  04/14/2015    Thyroid Surgery    TONSILLECTOMY  04/14/2015    Tonsillectomy    TOTAL HIP ARTHROPLASTY  01/25/2018    Hip Replacement   [3] No Known Allergies

## 2025-07-24 NOTE — CARE PLAN
Problem: Safety - Adult  Goal: Free from fall injury  Outcome: Progressing     Problem: Pain - Adult  Goal: Verbalizes/displays adequate comfort level or baseline comfort level  Outcome: Progressing     Problem: Chronic Conditions and Co-morbidities  Goal: Patient's chronic conditions and co-morbidity symptoms are monitored and maintained or improved  Outcome: Progressing     Problem: Skin  Goal: Prevent/manage excess moisture  Outcome: Progressing  Prevent/manage excess moisture: Cleanse incontinence/protect with barrier cream     The patient's goals for the shift include for the patient to be comfortable & have a towel placed under her legs while she is sitting in the recliner.    The clinical goals for the shift include the patient's oxygen saturation will be above 92% and she will have stable vital signs during this shift.

## 2025-07-24 NOTE — NURSING NOTE
Pulmonary Disease Navigator Documentation:    Comments:  Discussed with medical team, we are still awaiting response from insurance/DME for approval/denial of  home AVAPS.

## 2025-07-24 NOTE — CARE PLAN
The clinical goals for the shift include patient will wean supplemental oxygen at tolerated.      Problem: Safety - Adult  Goal: Free from fall injury  Outcome: Progressing     Problem: Skin  Goal: Prevent/minimize sheer/friction injuries  Outcome: Progressing  Flowsheets (Taken 7/24/2025 1718)  Prevent/minimize sheer/friction injuries:   Complete micro-shifts as needed if patient unable. Adjust patient position to relieve pressure points, not a full turn   Increase activity/out of bed for meals  Goal: Promote skin healing  Outcome: Progressing

## 2025-07-25 LAB
ANION GAP SERPL CALC-SCNC: ABNORMAL MMOL/L
BUN SERPL-MCNC: 48 MG/DL (ref 6–23)
CALCIUM SERPL-MCNC: 10.2 MG/DL (ref 8.6–10.3)
CHLORIDE SERPL-SCNC: 85 MMOL/L (ref 98–107)
CO2 SERPL-SCNC: >45 MMOL/L (ref 21–32)
CREAT SERPL-MCNC: 1.08 MG/DL (ref 0.5–1.05)
EGFRCR SERPLBLD CKD-EPI 2021: 52 ML/MIN/1.73M*2
ERYTHROCYTE [DISTWIDTH] IN BLOOD BY AUTOMATED COUNT: 14.5 % (ref 11.5–14.5)
GLUCOSE SERPL-MCNC: 134 MG/DL (ref 74–99)
HCT VFR BLD AUTO: 41 % (ref 36–46)
HGB BLD-MCNC: 11.9 G/DL (ref 12–16)
MAGNESIUM SERPL-MCNC: 2.44 MG/DL (ref 1.6–2.4)
MCH RBC QN AUTO: 27.2 PG (ref 26–34)
MCHC RBC AUTO-ENTMCNC: 29 G/DL (ref 32–36)
MCV RBC AUTO: 94 FL (ref 80–100)
NRBC BLD-RTO: 0 /100 WBCS (ref 0–0)
PLATELET # BLD AUTO: 256 X10*3/UL (ref 150–450)
POTASSIUM SERPL-SCNC: 4.6 MMOL/L (ref 3.5–5.3)
RBC # BLD AUTO: 4.37 X10*6/UL (ref 4–5.2)
SODIUM SERPL-SCNC: 138 MMOL/L (ref 136–145)
WBC # BLD AUTO: 9.5 X10*3/UL (ref 4.4–11.3)

## 2025-07-25 PROCEDURE — 2500000002 HC RX 250 W HCPCS SELF ADMINISTERED DRUGS (ALT 637 FOR MEDICARE OP, ALT 636 FOR OP/ED)

## 2025-07-25 PROCEDURE — 94640 AIRWAY INHALATION TREATMENT: CPT

## 2025-07-25 PROCEDURE — 2500000005 HC RX 250 GENERAL PHARMACY W/O HCPCS: Performed by: INTERNAL MEDICINE

## 2025-07-25 PROCEDURE — 2060000001 HC INTERMEDIATE ICU ROOM DAILY

## 2025-07-25 PROCEDURE — 2500000004 HC RX 250 GENERAL PHARMACY W/ HCPCS (ALT 636 FOR OP/ED)

## 2025-07-25 PROCEDURE — 80048 BASIC METABOLIC PNL TOTAL CA: CPT

## 2025-07-25 PROCEDURE — 94660 CPAP INITIATION&MGMT: CPT

## 2025-07-25 PROCEDURE — 2500000001 HC RX 250 WO HCPCS SELF ADMINISTERED DRUGS (ALT 637 FOR MEDICARE OP)

## 2025-07-25 PROCEDURE — 36415 COLL VENOUS BLD VENIPUNCTURE: CPT

## 2025-07-25 PROCEDURE — 85027 COMPLETE CBC AUTOMATED: CPT

## 2025-07-25 PROCEDURE — 83735 ASSAY OF MAGNESIUM: CPT

## 2025-07-25 RX ADMIN — DILTIAZEM HYDROCHLORIDE 30 MG: 60 TABLET, FILM COATED ORAL at 08:43

## 2025-07-25 RX ADMIN — FUROSEMIDE 40 MG: 10 INJECTION, SOLUTION INTRAMUSCULAR; INTRAVENOUS at 00:38

## 2025-07-25 RX ADMIN — ALBUTEROL SULFATE 2.5 MG: 2.5 SOLUTION RESPIRATORY (INHALATION) at 18:55

## 2025-07-25 RX ADMIN — POLYETHYLENE GLYCOL 3350 17 G: 17 POWDER, FOR SOLUTION ORAL at 08:43

## 2025-07-25 RX ADMIN — ALBUTEROL SULFATE 2.5 MG: 2.5 SOLUTION RESPIRATORY (INHALATION) at 13:03

## 2025-07-25 RX ADMIN — Medication: at 20:26

## 2025-07-25 RX ADMIN — FLUTICASONE FUROATE AND VILANTEROL TRIFENATATE 1 PUFF: 200; 25 POWDER RESPIRATORY (INHALATION) at 07:09

## 2025-07-25 RX ADMIN — ALBUTEROL SULFATE 2.5 MG: 2.5 SOLUTION RESPIRATORY (INHALATION) at 07:08

## 2025-07-25 RX ADMIN — SPIRONOLACTONE 100 MG: 25 TABLET, FILM COATED ORAL at 08:43

## 2025-07-25 RX ADMIN — LEVOTHYROXINE SODIUM 100 MCG: 0.1 TABLET ORAL at 06:11

## 2025-07-25 RX ADMIN — ATORVASTATIN CALCIUM 10 MG: 10 TABLET, FILM COATED ORAL at 08:43

## 2025-07-25 RX ADMIN — ENOXAPARIN SODIUM 60 MG: 60 INJECTION SUBCUTANEOUS at 08:43

## 2025-07-25 RX ADMIN — DILTIAZEM HYDROCHLORIDE 30 MG: 60 TABLET, FILM COATED ORAL at 20:27

## 2025-07-25 RX ADMIN — ENOXAPARIN SODIUM 60 MG: 60 INJECTION SUBCUTANEOUS at 20:26

## 2025-07-25 RX ADMIN — AZITHROMYCIN DIHYDRATE 500 MG: 250 TABLET ORAL at 08:43

## 2025-07-25 RX ADMIN — PREDNISONE 40 MG: 20 TABLET ORAL at 08:43

## 2025-07-25 RX ADMIN — Medication: at 08:45

## 2025-07-25 RX ADMIN — ACETAMINOPHEN 650 MG: 325 TABLET ORAL at 20:26

## 2025-07-25 RX ADMIN — Medication 1 DOSE: at 22:53

## 2025-07-25 ASSESSMENT — COGNITIVE AND FUNCTIONAL STATUS - GENERAL
TURNING FROM BACK TO SIDE WHILE IN FLAT BAD: A LOT
STANDING UP FROM CHAIR USING ARMS: A LOT
TURNING FROM BACK TO SIDE WHILE IN FLAT BAD: A LOT
WALKING IN HOSPITAL ROOM: TOTAL
EATING MEALS: A LITTLE
DRESSING REGULAR UPPER BODY CLOTHING: A LOT
EATING MEALS: A LITTLE
CLIMB 3 TO 5 STEPS WITH RAILING: TOTAL
WALKING IN HOSPITAL ROOM: TOTAL
MOVING FROM LYING ON BACK TO SITTING ON SIDE OF FLAT BED WITH BEDRAILS: A LOT
TOILETING: A LOT
MOBILITY SCORE: 10
DAILY ACTIVITIY SCORE: 15
MOVING FROM LYING ON BACK TO SITTING ON SIDE OF FLAT BED WITH BEDRAILS: A LOT
HELP NEEDED FOR BATHING: A LOT
MOVING TO AND FROM BED TO CHAIR: A LOT
DAILY ACTIVITIY SCORE: 13
CLIMB 3 TO 5 STEPS WITH RAILING: TOTAL
STANDING UP FROM CHAIR USING ARMS: A LOT
DRESSING REGULAR LOWER BODY CLOTHING: A LOT
PERSONAL GROOMING: A LITTLE
MOBILITY SCORE: 10
MOVING TO AND FROM BED TO CHAIR: A LOT
HELP NEEDED FOR BATHING: A LOT
DRESSING REGULAR LOWER BODY CLOTHING: A LOT
PERSONAL GROOMING: A LOT
DRESSING REGULAR UPPER BODY CLOTHING: A LITTLE
TOILETING: A LOT

## 2025-07-25 ASSESSMENT — PAIN SCALES - GENERAL
PAINLEVEL_OUTOF10: 0 - NO PAIN
PAINLEVEL_OUTOF10: 3
PAINLEVEL_OUTOF10: 3

## 2025-07-25 ASSESSMENT — PAIN - FUNCTIONAL ASSESSMENT
PAIN_FUNCTIONAL_ASSESSMENT: 0-10

## 2025-07-25 ASSESSMENT — PAIN DESCRIPTION - DESCRIPTORS: DESCRIPTORS: ACHING

## 2025-07-25 NOTE — PROGRESS NOTES
Ailyn Banegas is a 80 y.o. female on day 4 of admission presenting with COPD exacerbation (Multi).      SUBJECTIVE     Patient evaluated this morning and found to be resting comfortably in bed.  The patient reports improvement in her breathing.  She continues to tolerate Airvo 40/40 today.  Patient has no new concerns or complaints at this time.    OBJECTIVE     Vitals:    07/25/25 0845 07/25/25 1106 07/25/25 1303 07/25/25 1517   BP:  117/79  117/62   BP Location:  Left arm  Right arm   Patient Position:  Sitting  Sitting   Pulse:  78  70   Resp:  19  19   Temp:  36.1 °C (97 °F)  36.3 °C (97.3 °F)   TempSrc:  Temporal  Temporal   SpO2: (!) 40% 92% 92% 95%   Weight:       Height:          Results from last 7 days   Lab Units 07/25/25  0541 07/22/25  0555 07/21/25  1329   WBC AUTO x10*3/uL 9.5   < > 7.8   HEMOGLOBIN g/dL 11.9*   < > 11.3*   HEMATOCRIT % 41.0   < > 38.7   PLATELETS AUTO x10*3/uL 256   < > 213   NEUTROS PCT AUTO %  --   --  74.8   LYMPHS PCT AUTO %  --   --  13.7   MONOS PCT AUTO %  --   --  7.7   EOS PCT AUTO %  --   --  2.4    < > = values in this interval not displayed.     Results from last 7 days   Lab Units 07/25/25  0541 07/22/25  0555 07/21/25  1329   SODIUM mmol/L 138   < > 137   POTASSIUM mmol/L 4.6   < > 4.8   CHLORIDE mmol/L 85*   < > 86*   CO2 mmol/L >45*   < > >45*   BUN mg/dL 48*   < > 24*   CREATININE mg/dL 1.08*   < > 0.78   CALCIUM mg/dL 10.2   < > 9.9   PROTEIN TOTAL g/dL  --   --  7.2   BILIRUBIN TOTAL mg/dL  --   --  0.7   ALK PHOS U/L  --   --  78   ALT U/L  --   --  10   AST U/L  --   --  15   GLUCOSE mg/dL 134*   < > 118*    < > = values in this interval not displayed.       Scheduled Medications  Scheduled Medications[1]   Physical Exam    Constitutional: obese habitus, awake, alert, no acute distress  ENMT: mucous membranes moist, EOMI, conjunctivae clear  Head/Neck: normocephalic, atraumatic; supple, trachea midline  Respiratory/Thorax: patent airways, CTAB; no wheezes,  rales, or rhonchi  Cardiovascular: RRR, no murmur  Gastrointestinal: soft, nondistended, non-tender, bowel sounds appreciated  Extremities: palpable peripheral pulses, BLE pitting edema or cyanosis, BLE cellulitis  Neurological: AO x3, no focal deficits  Psychological: appropriate mood and behavior  Skin: warm and dry    Pertinent Imaging    XR chest 2 views 7/23/25:  1.  Patchy bilateral mid to lower lung and lung base opacities and  mild pulmonary vascular congestion and diffuse interstitial  prominence may reflect component of edema, overall similar to prior.  Slight blunting costophrenic angles may indicate small effusions.  Clinical correlation and follow up advised.     ASSESSMENT & PLAN   Ailyn Banegas is a 80 y.o. female with a PMHx COPD (on baseline 4L NC), MARLENY/likely OHS, Hypothyroidism, DLD, Morbid Obesity who was brought into the ED by family for respiratory distress, confusion, and daytime somnolence.      Daily Progress  -CXR gradually improving  -Pt satting in low 90s w/ Airvo 40/40 (roughly 10% improvement in O2 req)   -[employer] denied AVAPS approval.   -Trying to get IVAPS approved by insurance     #Acute on chronic hypercapnic HRF ISO #MARLENY/OHS and #COPD  -High 80s low 90s SpO2 on 5L NC  PLAN:  -C/w IV Azithromycin 500 BID for 3 days (then continue M,W,F home regimen)  -C/w IV Solu-Medrol 40 mg BID  -BiPAP  -1 time 40mg IV Lasix push given 7/23/25 by pulmonlogy     Chronic Problems:     #Hypothyroidism-continue home dose Synthroid  #Hyperglycemia-SSI #3  #Chronic anemia  #Chronic lower extremity wounds  #MARLENY    #Chronic BLE Cellulitis     DVT PPX: Lovenox  Diet: Regular  IVF:  Code Status: DNR    David Pinzon MD  PGY-1, Internal Medicine  Please SecureChat for any further questions  This is a preliminary note, please await attending attestation for final A/P         [1] albuterol, 2.5 mg, nebulization, TID  atorvastatin, 10 mg, oral, Daily  azithromycin, 500 mg, oral, Once per day on Monday  Wednesday Friday  dilTIAZem, 30 mg, oral, q12h  enoxaparin, 60 mg, subcutaneous, q12h VLADIMIR  fluticasone furoate-vilanteroL, 1 puff, inhalation, Daily  levothyroxine, 100 mcg, oral, Daily  oxygen, , inhalation, Continuous - Inhalation  polyethylene glycol, 17 g, oral, Daily  predniSONE, 40 mg, oral, Daily  spironolactone, 100 mg, oral, Daily  [Held by provider] torsemide, 40 mg, oral, Daily

## 2025-07-25 NOTE — CARE PLAN
The patient's goals for the shift include comfort and safety    The clinical goals for the shift include patient will have stable vital signs and be able to tolerate wearing her bipap during this shift.    Over the shift, the patient will maintain comfort and refrain from further complications

## 2025-07-25 NOTE — CARE PLAN
Problem: Respiratory  Goal: Wean oxygen to maintain O2 saturation per order/standard this shift  Outcome: Progressing     Problem: Safety - Adult  Goal: Free from fall injury  Outcome: Progressing     Problem: Pain - Adult  Goal: Verbalizes/displays adequate comfort level or baseline comfort level  Outcome: Progressing     Problem: Skin  Goal: Prevent/manage excess moisture  Outcome: Progressing  Prevent/manage excess moisture:   Cleanse incontinence/protect with barrier cream   Moisturize dry skin     The patient's goals for the shift include being comfortable while sleeping in the recliner by having a pad under her legs and a towel under her chin.    The clinical goals for the shift include to keep her oxygen saturation above 92%, have stable vital signs, and be able to wear her bipap during this shift.

## 2025-07-25 NOTE — PROGRESS NOTES
Today's date:07/25/25  Provider's name:Claus Paul MD  Patient medical record number:[id number]    Pulmonary critical care note:    Subjective:      Interval history was reviewed, discussed with patient RN and respiratory therapist  Subjective     WE WERE notified that patien AVAPS application was denied by the insurance, I was provided with fast-track appeal, I spent significant mount of time on the phone without response hence a letter was generated, copy was given to the patient and it was faxed to the insurance company       We were assessment and Plan  Patient is a 80 y.o. female with the following medical problems:    Acute on chronic hypercapnic hypoxic respiratory failure  Underlying severe COPD   Progressive deconditioning   Obstructive sleep apnea  Class III obesity  Likely right heart failure with peripheral edema 2/2 secondary pulmonary hypertension  Mild aortic stenosis  Hypothyroidism  Chronic pneumonia  Paroxysmal A-fib  Chronic BLE cellulitis    Plan of Care:  -E WERE notified that patien AVAPS application was denied by the insurance, I was provided with fast-track appeal, I spent significant mount of time on the phone without response hence a letter was generated, copy was given to the patient and it was faxed to the insurance company   She remains on high flow oxygen at 50-60% FiO2  -Patient has chronic respiratory failure secondary due to COPD, she requires home ventilator to maintain acceptable PaCO2 levels to minimize hospital readmission, if PaCO2 levels are not maintained this may cause harm and possible deterioration including death of this patient.  Sleep apnea has been ruled out.  -As a primary cause of the hypercapnia, the patient had tried and failed BiPAP ST therapy and did require intubation recently due to the failure of the BiPAP.   -AVAPS setting as 650 target tidal volume with inspiratory pressure range of 20-35 and PEEP of 8.  -Give PO azithromycin 3x weekly for COPD  exacerbation.  -Recommend another day of lasix 40 mg IV BID due to progressively increasing lower extremity edema.  -Recommend switching solumedrol to PO prednisone 40 mg now to gradually taper.  -Continue Diflucan, DuoNeb twice daily for COPD exacerbation.  -Bronchopulmonary hygiene.  -Documentation was submitted to DME company who will submit to the insurance company to obtain AVAPS for the patient at home if possible.  -Aspiration precautions and head of bed elevation at 30 degrees.  -Long-term prognosis is guarded due to complexity of patient respiratory failure.        History of Present Illness: Patient is a 80-year-old female with a PMH of COPD on (baseline 4 L NC), MARLENY/OHS, hypothyroidism, DLD, morbid obesity, chronic BLE cellulitis, chronic anemia and hyperglycemia who presented to the ED by her family for respiratory distress, confusion, and daytime somnolence.  The patient was recently admitted to the ICU for acute on chronic hypercapnic hypoxic respiratory failure secondary to ADHF on 6/22/2025, requiring intubation for no improvement in blood gases and worsening mental status.  The daughter mentioned that the patient does not have the proper equipment at home to administer BiPAP effectively.  The patient's BiPAP at home requires new settings with AVAP for which they do not have the device.  She also reported that the patient's O2 on pulse oximetry was in the mid to high 80s.  The patient has also been experiencing increased daytime somnolence, along with intermittent periods of confusion.  She denied any fever, chills, and recent sick contacts.  However, she does have a cough (productive, clear sputum) s/p using BiPAP.  She is on 4 L of home oxygen.    Daily progress:  7/22/2025: Patient was evaluated and was lying in bed without any significant respiratory distress. No acute events overnight.  She is still coughing up but with very little phlegm.  She is currently breathing on AVAPS. She denies any  chest pain, fever, chills or shortness of breath.    7/23/2025: Patient is feeling better than before.  No acute events overnight.  She is still breathing on AVAPS.  She denies any chest pain, fever, chills or shortness of breath.    07/24/2025: Patient is feeling better than before.  No acute events overnight.  She is still breathing on AVAPS.  She denies any chest pain, fever, chills or shortness of breath. Extremities continue to be edematous.    Past Medical/Surgical History:   Medical History[1]  Surgical History[2]    Family History:   No relevant family history has been documented for this patient.    Allergies:     Allergies[3]      Social history:   reports that she has never smoked. She has never used smokeless tobacco. She reports that she does not currently use alcohol. She reports that she does not currently use drugs.    Current Medications:   No recently discontinued medications to reconcile    Review of Systems:   General: denies weight gain, denies loss of appetite, fever, chills, night sweats.  HEENT: denies headaches, dizziness, head trauma, visual changes, eye pain, tinnitus, nosebleeds, hoarseness or throat pain    Respiratory: denies chest pain, dyspnea, +ve cough and no hemoptysis  Cardiovascular: denies orthopnea, paroxysmal nocturnal dyspnea, leg swelling, and previous heart attack.    Gastrointestinal: denies pain, nausea vomiting, diarrhea, constipation, melena or bleeding.  Genitourinary: denies hematuria, frequency, urgency or dysuria  Neurology: Intermittently confused but denies syncope, seizures, paralysis, paraesthesia   Endocrine: denies polyuria, polydipsia, skin or hair changes, and heat or cold intolerance  Musculoskeletal: denies joint pain, swelling, arthritis or myalgia  Hematologic: denies bleeding, adenopathy and easy bruising  Skin: denies rashes and skin discoloration  Psychiatry: denies depression    Vital Signs:   Vitals:    07/25/25 1517   BP: 117/62   Pulse: 70   Resp:  19   Temp: 36.3 °C (97.3 °F)   SpO2: 95%       Input/Output:    Intake/Output Summary (Last 24 hours) at 7/25/2025 1641  Last data filed at 7/25/2025 1106  Gross per 24 hour   Intake 822 ml   Output 2250 ml   Net -1428 ml       Oxygen requirements: Patient is currently on AVAPS.    Ventilator Information:  FiO2 (%):  [40 %] 40 %  S RR:  [16] 16  S VT:  [600 mL] 600 mL  MAP (cm H2O):  [12] 12          General appearance: Obese, not in any significant respiratory distress.  HEENT: Atraumatic/normocephalic, EOMI, SHANTHI, pharynx clear, moist mucosa, redness of the uvula appreciated,   Neck: Supple, no jugular venous distension, lymphadenopathy, thyromegaly or carotid bruits  Chest:  decreased breath sounds, +ve rales, no crackles and no tenderness over ribs   Cardiovascular: Normal S1, S2, regular rate and rhythm, no murmur, rub or gallop  Abdomen: Normal sounds present, soft, lax with no tenderness, no hepatosplenomegaly, and no masses.  Extremities: Pulses are equally present.  Bilateral pitting edema  Skin: intact, no rashes   Neurologic: Alert and oriented x 3, No focal deficit     Investigations:  Labs, radiological imaging and cardiac work up were personally reviewed    I have reviewed and interpreted all lab test imaging studies and documentations from other healthcare providers.     Claus Paul MD  Pulmonary critical care consultant  07/25/25  4:42 PM         STAFF PHYSICIAN NOTE OF PERSONAL INVOLVEMENT IN CARE  As the attending physician, I certify that I personally reviewed the patient's history and personally examined the patient to confirm the physical findings described above, and that I reviewed the relevant imaging studies and available reports.  I also discussed the differential diagnosis and all of the proposed management plans with the patient and individuals accompanying the patient to this visit.  They had the opportunity to ask questions about the proposed management plans and to have those  questions answered.                  [1]   Past Medical History:  Diagnosis Date    Abnormal weight gain     Weight gain    Essential (primary) hypertension     HTN (hypertension), benign    Localized edema     Edema extremities    Old myocardial infarction     History of myocardial infarction    Old myocardial infarction 02/27/2017    History of non-ST elevation myocardial infarction (NSTEMI)    Other specified postprocedural states     History of repair of both hip joints    Other symptoms and signs involving the genitourinary system     Urinary problem    Pain due to internal orthopedic prosthetic devices, implants and grafts, initial encounter     Artificial joint pain    Personal history of other diseases of the circulatory system     History of hypertension    Personal history of other diseases of the musculoskeletal system and connective tissue     Personal history of gout    Personal history of other diseases of the musculoskeletal system and connective tissue     Personal history of arthritis    Personal history of other diseases of the musculoskeletal system and connective tissue     Personal history of fibromyalgia    Personal history of other diseases of the musculoskeletal system and connective tissue     History of muscle pain    Personal history of other diseases of the respiratory system 02/12/2018    History of chronic respiratory failure    Personal history of other endocrine, nutritional and metabolic disease     History of thyroid disease    Personal history of other endocrine, nutritional and metabolic disease     History of obesity    Personal history of other endocrine, nutritional and metabolic disease     History of high cholesterol    Personal history of other endocrine, nutritional and metabolic disease 02/28/2014    History of hypothyroidism    Personal history of other medical treatment     History of mammogram    Personal history of other specified conditions     H/O shortness of breath     Personal history of pneumonia (recurrent)     History of pneumonia    Presence of artificial hip joint, bilateral     Status post total replacement of both hips    Pure hypercholesterolemia, unspecified     High cholesterol    Unspecified disorder of nose and nasal sinuses     Sinus problem   [2]   Past Surgical History:  Procedure Laterality Date    CARDIAC CATHETERIZATION  01/25/2018    Cardiac Cath Procedure Outcome:    CARPAL TUNNEL RELEASE  02/27/2014    Neuroplasty Median Nerve At Carpal Tunnel    HIP SURGERY  03/03/2018    Hip Surgery    MOUTH SURGERY  04/14/2015    Oral Surgery Tooth Extraction    THYROID SURGERY  04/14/2015    Thyroid Surgery    TONSILLECTOMY  04/14/2015    Tonsillectomy    TOTAL HIP ARTHROPLASTY  01/25/2018    Hip Replacement   [3] No Known Allergies

## 2025-07-26 LAB
ANION GAP SERPL CALC-SCNC: 12 MMOL/L (ref 10–20)
ATRIAL RATE: 83 BPM
BUN SERPL-MCNC: 44 MG/DL (ref 6–23)
CALCIUM SERPL-MCNC: 10.7 MG/DL (ref 8.6–10.3)
CHLORIDE SERPL-SCNC: 88 MMOL/L (ref 98–107)
CO2 SERPL-SCNC: 44 MMOL/L (ref 21–32)
CREAT SERPL-MCNC: 0.94 MG/DL (ref 0.5–1.05)
EGFRCR SERPLBLD CKD-EPI 2021: 61 ML/MIN/1.73M*2
ERYTHROCYTE [DISTWIDTH] IN BLOOD BY AUTOMATED COUNT: 14.7 % (ref 11.5–14.5)
GLUCOSE SERPL-MCNC: 95 MG/DL (ref 74–99)
HCT VFR BLD AUTO: 41.5 % (ref 36–46)
HGB BLD-MCNC: 12.1 G/DL (ref 12–16)
HOLD SPECIMEN: NORMAL
MAGNESIUM SERPL-MCNC: 2.52 MG/DL (ref 1.6–2.4)
MCH RBC QN AUTO: 27.5 PG (ref 26–34)
MCHC RBC AUTO-ENTMCNC: 29.2 G/DL (ref 32–36)
MCV RBC AUTO: 94 FL (ref 80–100)
NRBC BLD-RTO: 0 /100 WBCS (ref 0–0)
P AXIS: 44 DEGREES
PLATELET # BLD AUTO: 257 X10*3/UL (ref 150–450)
POTASSIUM SERPL-SCNC: 4.5 MMOL/L (ref 3.5–5.3)
PR INTERVAL: 231 MS
Q ONSET: 253 MS
QRS COUNT: 13 BEATS
QRS DURATION: 76 MS
QT INTERVAL: 339 MS
QTC CALCULATION(BAZETT): 399 MS
QTC FREDERICIA: 377 MS
R AXIS: -15 DEGREES
RBC # BLD AUTO: 4.4 X10*6/UL (ref 4–5.2)
SODIUM SERPL-SCNC: 139 MMOL/L (ref 136–145)
T AXIS: 22 DEGREES
T OFFSET: 423 MS
VENTRICULAR RATE: 83 BPM
WBC # BLD AUTO: 11.1 X10*3/UL (ref 4.4–11.3)

## 2025-07-26 PROCEDURE — 85027 COMPLETE CBC AUTOMATED: CPT

## 2025-07-26 PROCEDURE — 83735 ASSAY OF MAGNESIUM: CPT

## 2025-07-26 PROCEDURE — 2500000004 HC RX 250 GENERAL PHARMACY W/ HCPCS (ALT 636 FOR OP/ED)

## 2025-07-26 PROCEDURE — 2500000001 HC RX 250 WO HCPCS SELF ADMINISTERED DRUGS (ALT 637 FOR MEDICARE OP)

## 2025-07-26 PROCEDURE — 80048 BASIC METABOLIC PNL TOTAL CA: CPT

## 2025-07-26 PROCEDURE — 94660 CPAP INITIATION&MGMT: CPT

## 2025-07-26 PROCEDURE — 94640 AIRWAY INHALATION TREATMENT: CPT

## 2025-07-26 PROCEDURE — 36415 COLL VENOUS BLD VENIPUNCTURE: CPT

## 2025-07-26 PROCEDURE — 2500000002 HC RX 250 W HCPCS SELF ADMINISTERED DRUGS (ALT 637 FOR MEDICARE OP, ALT 636 FOR OP/ED)

## 2025-07-26 PROCEDURE — 2500000005 HC RX 250 GENERAL PHARMACY W/O HCPCS: Performed by: INTERNAL MEDICINE

## 2025-07-26 PROCEDURE — 2060000001 HC INTERMEDIATE ICU ROOM DAILY

## 2025-07-26 RX ADMIN — FLUTICASONE FUROATE AND VILANTEROL TRIFENATATE 1 PUFF: 200; 25 POWDER RESPIRATORY (INHALATION) at 06:49

## 2025-07-26 RX ADMIN — ACETAMINOPHEN 650 MG: 325 TABLET ORAL at 21:38

## 2025-07-26 RX ADMIN — LEVOTHYROXINE SODIUM 100 MCG: 0.1 TABLET ORAL at 05:03

## 2025-07-26 RX ADMIN — DILTIAZEM HYDROCHLORIDE 30 MG: 60 TABLET, FILM COATED ORAL at 20:26

## 2025-07-26 RX ADMIN — SPIRONOLACTONE 100 MG: 25 TABLET, FILM COATED ORAL at 09:01

## 2025-07-26 RX ADMIN — ENOXAPARIN SODIUM 60 MG: 60 INJECTION SUBCUTANEOUS at 20:26

## 2025-07-26 RX ADMIN — ENOXAPARIN SODIUM 60 MG: 60 INJECTION SUBCUTANEOUS at 09:01

## 2025-07-26 RX ADMIN — ATORVASTATIN CALCIUM 10 MG: 10 TABLET, FILM COATED ORAL at 09:02

## 2025-07-26 RX ADMIN — DILTIAZEM HYDROCHLORIDE 30 MG: 60 TABLET, FILM COATED ORAL at 09:02

## 2025-07-26 RX ADMIN — ALBUTEROL SULFATE 2.5 MG: 2.5 SOLUTION RESPIRATORY (INHALATION) at 19:03

## 2025-07-26 RX ADMIN — PREDNISONE 40 MG: 20 TABLET ORAL at 09:01

## 2025-07-26 RX ADMIN — ALBUTEROL SULFATE 2.5 MG: 2.5 SOLUTION RESPIRATORY (INHALATION) at 14:12

## 2025-07-26 RX ADMIN — Medication: at 15:42

## 2025-07-26 RX ADMIN — ALBUTEROL SULFATE 2.5 MG: 2.5 SOLUTION RESPIRATORY (INHALATION) at 06:48

## 2025-07-26 RX ADMIN — Medication: at 19:04

## 2025-07-26 RX ADMIN — Medication: at 06:50

## 2025-07-26 RX ADMIN — ACETAMINOPHEN 650 MG: 325 TABLET ORAL at 09:09

## 2025-07-26 ASSESSMENT — PAIN - FUNCTIONAL ASSESSMENT
PAIN_FUNCTIONAL_ASSESSMENT: 0-10

## 2025-07-26 ASSESSMENT — COGNITIVE AND FUNCTIONAL STATUS - GENERAL
TOILETING: A LOT
WALKING IN HOSPITAL ROOM: TOTAL
EATING MEALS: A LITTLE
TOILETING: A LOT
DRESSING REGULAR UPPER BODY CLOTHING: A LITTLE
CLIMB 3 TO 5 STEPS WITH RAILING: TOTAL
MOVING TO AND FROM BED TO CHAIR: A LOT
TURNING FROM BACK TO SIDE WHILE IN FLAT BAD: A LOT
DRESSING REGULAR LOWER BODY CLOTHING: A LOT
DAILY ACTIVITIY SCORE: 15
PERSONAL GROOMING: A LITTLE
STANDING UP FROM CHAIR USING ARMS: A LOT
MOVING TO AND FROM BED TO CHAIR: A LOT
MOVING FROM LYING ON BACK TO SITTING ON SIDE OF FLAT BED WITH BEDRAILS: A LOT
WALKING IN HOSPITAL ROOM: TOTAL
TURNING FROM BACK TO SIDE WHILE IN FLAT BAD: A LOT
PERSONAL GROOMING: A LITTLE
HELP NEEDED FOR BATHING: A LOT
HELP NEEDED FOR BATHING: A LOT
MOBILITY SCORE: 10
DRESSING REGULAR LOWER BODY CLOTHING: A LOT
DAILY ACTIVITIY SCORE: 15
MOVING FROM LYING ON BACK TO SITTING ON SIDE OF FLAT BED WITH BEDRAILS: A LOT
CLIMB 3 TO 5 STEPS WITH RAILING: TOTAL
DRESSING REGULAR UPPER BODY CLOTHING: A LITTLE
STANDING UP FROM CHAIR USING ARMS: A LOT
EATING MEALS: A LITTLE
MOBILITY SCORE: 10

## 2025-07-26 ASSESSMENT — PAIN SCALES - GENERAL
PAINLEVEL_OUTOF10: 1
PAINLEVEL_OUTOF10: 3
PAINLEVEL_OUTOF10: 3
PAINLEVEL_OUTOF10: 8
PAINLEVEL_OUTOF10: 0 - NO PAIN
PAINLEVEL_OUTOF10: 0 - NO PAIN

## 2025-07-26 ASSESSMENT — PAIN DESCRIPTION - DESCRIPTORS
DESCRIPTORS: ACHING
DESCRIPTORS: ACHING

## 2025-07-26 ASSESSMENT — PAIN DESCRIPTION - LOCATION: LOCATION: KNEE

## 2025-07-26 NOTE — PROGRESS NOTES
Ailyn Banegas is a 80 y.o. female on day 5 of admission presenting with COPD exacerbation (Multi).      SUBJECTIVE     Patient evaluated this morning and found to be resting comfortably in bed.  The patient reports no change in her breathing.  She continues to tolerate Airvo 40/40 today.  Patient has no new concerns or complaints at this time.    OBJECTIVE     Vitals:    07/26/25 1055 07/26/25 1413 07/26/25 1552 07/26/25 1554   BP: 117/58  130/63    Patient Position:       Pulse: 79  73    Resp: 18  18    Temp: 36 °C (96.8 °F)  36.4 °C (97.5 °F)    TempSrc:   Temporal    SpO2: 95% 96% 95% 94%   Weight:       Height:          Results from last 7 days   Lab Units 07/26/25 0603 07/22/25  0555 07/21/25  1329   WBC AUTO x10*3/uL 11.1   < > 7.8   HEMOGLOBIN g/dL 12.1   < > 11.3*   HEMATOCRIT % 41.5   < > 38.7   PLATELETS AUTO x10*3/uL 257   < > 213   NEUTROS PCT AUTO %  --   --  74.8   LYMPHS PCT AUTO %  --   --  13.7   MONOS PCT AUTO %  --   --  7.7   EOS PCT AUTO %  --   --  2.4    < > = values in this interval not displayed.     Results from last 7 days   Lab Units 07/26/25 0603 07/22/25  0555 07/21/25  1329   SODIUM mmol/L 139   < > 137   POTASSIUM mmol/L 4.5   < > 4.8   CHLORIDE mmol/L 88*   < > 86*   CO2 mmol/L 44*   < > >45*   BUN mg/dL 44*   < > 24*   CREATININE mg/dL 0.94   < > 0.78   CALCIUM mg/dL 10.7*   < > 9.9   PROTEIN TOTAL g/dL  --   --  7.2   BILIRUBIN TOTAL mg/dL  --   --  0.7   ALK PHOS U/L  --   --  78   ALT U/L  --   --  10   AST U/L  --   --  15   GLUCOSE mg/dL 95   < > 118*    < > = values in this interval not displayed.       Scheduled Medications  Scheduled Medications[1]   Physical Exam    Constitutional: obese habitus, awake, alert, no acute distress  ENMT: mucous membranes moist, EOMI, conjunctivae clear  Head/Neck: normocephalic, atraumatic; supple, trachea midline  Respiratory/Thorax: patent airways, CTAB; no wheezes, rales, or rhonchi  Cardiovascular: RRR, no murmur  Gastrointestinal:  soft, nondistended, non-tender, bowel sounds appreciated  Extremities: palpable peripheral pulses, BLE pitting edema or cyanosis, BLE cellulitis  Neurological: AO x3, no focal deficits  Psychological: appropriate mood and behavior  Skin: warm and dry    Pertinent Imaging    XR chest 2 views 7/23/25:  1.  Patchy bilateral mid to lower lung and lung base opacities and  mild pulmonary vascular congestion and diffuse interstitial  prominence may reflect component of edema, overall similar to prior.  Slight blunting costophrenic angles may indicate small effusions.  Clinical correlation and follow up advised.     ASSESSMENT & PLAN   Ailyn Banegas is a 80 y.o. female with a PMHx COPD (on baseline 4L NC), MARLENY/likely OHS, Hypothyroidism, DLD, Morbid Obesity who was brought into the ED by family for respiratory distress, confusion, and daytime somnolence.      Daily Progress  -Pt satting well into higher 90s w/ Airvo 40/40.  -In contact with eighth floor respiratory therapist to possibly transition patient onto 5 L O2 NC  - Still awaiting insurance approval for IAVAPS device     #Acute on chronic hypercapnic HRF ISO #MARLENY/OHS and #COPD  -High 80s low 90s SpO2 on 5L NC  PLAN:  -C/w IV Azithromycin 500 BID for 3 days (then continue M,W,F home regimen)  -C/w IV Solu-Medrol 40 mg BID  -BiPAP  -1 time 40mg IV Lasix push given 7/23/25 by pulmonlogy     Chronic Problems:     #Hypothyroidism-continue home dose Synthroid  #Hyperglycemia-SSI #3  #Chronic anemia  #Chronic lower extremity wounds  #MARLENY    #Chronic BLE Cellulitis     DVT PPX: Lovenox  Diet: Regular  IVF:  Code Status: DNR    David Pinzon MD  PGY-1, Internal Medicine  Please SecureChat for any further questions  This is a preliminary note, please await attending attestation for final A/P           [1] albuterol, 2.5 mg, nebulization, TID  atorvastatin, 10 mg, oral, Daily  azithromycin, 500 mg, oral, Once per day on Monday Wednesday Friday  dilTIAZem, 30 mg, oral,  q12h  enoxaparin, 60 mg, subcutaneous, q12h VLADIMIR  fluticasone furoate-vilanteroL, 1 puff, inhalation, Daily  levothyroxine, 100 mcg, oral, Daily  oxygen, , inhalation, Continuous - Inhalation  polyethylene glycol, 17 g, oral, Daily  predniSONE, 40 mg, oral, Daily  spironolactone, 100 mg, oral, Daily  [Held by provider] torsemide, 40 mg, oral, Daily

## 2025-07-26 NOTE — CARE PLAN
The patient's goals for the shift include comfort and safety    The clinical goals for the shift include pain management, SPO2>90% & for pt to remain HDS/safe throughout the shift      Problem: Pain - Adult  Goal: Verbalizes/displays adequate comfort level or baseline comfort level  Outcome: Progressing     Problem: Safety - Adult  Goal: Free from fall injury  Outcome: Progressing     Problem: Skin  Goal: Prevent/manage excess moisture  Flowsheets (Taken 7/25/2025 7681)  Prevent/manage excess moisture:   Cleanse incontinence/protect with barrier cream   Monitor for/manage infection if present   Moisturize dry skin

## 2025-07-26 NOTE — CARE PLAN
The patient's goals for the shift include comfort and safety    The clinical goals for the shift include pain management, SPO2>90% & for pt to remain HDS/safe throughout the shift    Over the shift, the patient will maintain safety and have decreased shortness of breath and refrain from further complications

## 2025-07-27 VITALS
TEMPERATURE: 96.1 F | BODY MASS INDEX: 53.92 KG/M2 | OXYGEN SATURATION: 96 % | SYSTOLIC BLOOD PRESSURE: 148 MMHG | RESPIRATION RATE: 19 BRPM | HEIGHT: 62 IN | DIASTOLIC BLOOD PRESSURE: 73 MMHG | WEIGHT: 293 LBS | HEART RATE: 75 BPM

## 2025-07-27 LAB
ANION GAP SERPL CALC-SCNC: 11 MMOL/L (ref 10–20)
BUN SERPL-MCNC: 38 MG/DL (ref 6–23)
CALCIUM SERPL-MCNC: 10 MG/DL (ref 8.6–10.3)
CHLORIDE SERPL-SCNC: 91 MMOL/L (ref 98–107)
CO2 SERPL-SCNC: 42 MMOL/L (ref 21–32)
CREAT SERPL-MCNC: 1 MG/DL (ref 0.5–1.05)
EGFRCR SERPLBLD CKD-EPI 2021: 57 ML/MIN/1.73M*2
ERYTHROCYTE [DISTWIDTH] IN BLOOD BY AUTOMATED COUNT: 14.7 % (ref 11.5–14.5)
GLUCOSE SERPL-MCNC: 100 MG/DL (ref 74–99)
HCT VFR BLD AUTO: 38 % (ref 36–46)
HGB BLD-MCNC: 11.4 G/DL (ref 12–16)
MAGNESIUM SERPL-MCNC: 2.37 MG/DL (ref 1.6–2.4)
MCH RBC QN AUTO: 28.1 PG (ref 26–34)
MCHC RBC AUTO-ENTMCNC: 30 G/DL (ref 32–36)
MCV RBC AUTO: 94 FL (ref 80–100)
NRBC BLD-RTO: 0 /100 WBCS (ref 0–0)
PLATELET # BLD AUTO: 221 X10*3/UL (ref 150–450)
POTASSIUM SERPL-SCNC: 4.5 MMOL/L (ref 3.5–5.3)
RBC # BLD AUTO: 4.06 X10*6/UL (ref 4–5.2)
SODIUM SERPL-SCNC: 139 MMOL/L (ref 136–145)
WBC # BLD AUTO: 9.4 X10*3/UL (ref 4.4–11.3)

## 2025-07-27 PROCEDURE — 2500000004 HC RX 250 GENERAL PHARMACY W/ HCPCS (ALT 636 FOR OP/ED)

## 2025-07-27 PROCEDURE — 94640 AIRWAY INHALATION TREATMENT: CPT

## 2025-07-27 PROCEDURE — 36415 COLL VENOUS BLD VENIPUNCTURE: CPT

## 2025-07-27 PROCEDURE — 83735 ASSAY OF MAGNESIUM: CPT

## 2025-07-27 PROCEDURE — 2500000002 HC RX 250 W HCPCS SELF ADMINISTERED DRUGS (ALT 637 FOR MEDICARE OP, ALT 636 FOR OP/ED)

## 2025-07-27 PROCEDURE — 82374 ASSAY BLOOD CARBON DIOXIDE: CPT

## 2025-07-27 PROCEDURE — 2500000005 HC RX 250 GENERAL PHARMACY W/O HCPCS: Performed by: INTERNAL MEDICINE

## 2025-07-27 PROCEDURE — 2060000001 HC INTERMEDIATE ICU ROOM DAILY

## 2025-07-27 PROCEDURE — 94660 CPAP INITIATION&MGMT: CPT

## 2025-07-27 PROCEDURE — 2500000001 HC RX 250 WO HCPCS SELF ADMINISTERED DRUGS (ALT 637 FOR MEDICARE OP)

## 2025-07-27 PROCEDURE — 85027 COMPLETE CBC AUTOMATED: CPT

## 2025-07-27 RX ADMIN — ENOXAPARIN SODIUM 60 MG: 60 INJECTION SUBCUTANEOUS at 09:36

## 2025-07-27 RX ADMIN — ACETAMINOPHEN 650 MG: 325 TABLET ORAL at 16:18

## 2025-07-27 RX ADMIN — ALBUTEROL SULFATE 2.5 MG: 2.5 SOLUTION RESPIRATORY (INHALATION) at 18:42

## 2025-07-27 RX ADMIN — Medication: at 09:36

## 2025-07-27 RX ADMIN — FLUTICASONE FUROATE AND VILANTEROL TRIFENATATE 1 PUFF: 200; 25 POWDER RESPIRATORY (INHALATION) at 06:36

## 2025-07-27 RX ADMIN — Medication: at 18:43

## 2025-07-27 RX ADMIN — DILTIAZEM HYDROCHLORIDE 30 MG: 60 TABLET, FILM COATED ORAL at 20:31

## 2025-07-27 RX ADMIN — ATORVASTATIN CALCIUM 10 MG: 10 TABLET, FILM COATED ORAL at 09:35

## 2025-07-27 RX ADMIN — ALBUTEROL SULFATE 2.5 MG: 2.5 SOLUTION RESPIRATORY (INHALATION) at 13:42

## 2025-07-27 RX ADMIN — ALBUTEROL SULFATE 2.5 MG: 2.5 SOLUTION RESPIRATORY (INHALATION) at 06:33

## 2025-07-27 RX ADMIN — LEVOTHYROXINE SODIUM 100 MCG: 0.1 TABLET ORAL at 06:04

## 2025-07-27 RX ADMIN — ACETAMINOPHEN 650 MG: 325 TABLET ORAL at 06:04

## 2025-07-27 RX ADMIN — ENOXAPARIN SODIUM 60 MG: 60 INJECTION SUBCUTANEOUS at 20:31

## 2025-07-27 RX ADMIN — Medication: at 06:35

## 2025-07-27 RX ADMIN — DILTIAZEM HYDROCHLORIDE 30 MG: 60 TABLET, FILM COATED ORAL at 09:35

## 2025-07-27 RX ADMIN — SPIRONOLACTONE 100 MG: 25 TABLET, FILM COATED ORAL at 09:35

## 2025-07-27 RX ADMIN — PREDNISONE 40 MG: 20 TABLET ORAL at 09:35

## 2025-07-27 ASSESSMENT — PAIN DESCRIPTION - LOCATION: LOCATION: KNEE

## 2025-07-27 ASSESSMENT — COGNITIVE AND FUNCTIONAL STATUS - GENERAL
CLIMB 3 TO 5 STEPS WITH RAILING: TOTAL
DAILY ACTIVITIY SCORE: 15
MOVING TO AND FROM BED TO CHAIR: A LOT
HELP NEEDED FOR BATHING: A LOT
MOBILITY SCORE: 10
TOILETING: A LOT
DRESSING REGULAR UPPER BODY CLOTHING: A LITTLE
PERSONAL GROOMING: A LITTLE
DRESSING REGULAR LOWER BODY CLOTHING: A LOT
MOVING FROM LYING ON BACK TO SITTING ON SIDE OF FLAT BED WITH BEDRAILS: A LOT
WALKING IN HOSPITAL ROOM: TOTAL
TURNING FROM BACK TO SIDE WHILE IN FLAT BAD: A LOT
STANDING UP FROM CHAIR USING ARMS: A LOT
EATING MEALS: A LITTLE

## 2025-07-27 ASSESSMENT — PAIN - FUNCTIONAL ASSESSMENT
PAIN_FUNCTIONAL_ASSESSMENT: 0-10

## 2025-07-27 ASSESSMENT — PAIN DESCRIPTION - DESCRIPTORS
DESCRIPTORS: ACHING
DESCRIPTORS: ACHING

## 2025-07-27 ASSESSMENT — PAIN SCALES - GENERAL
PAINLEVEL_OUTOF10: 0 - NO PAIN
PAINLEVEL_OUTOF10: 5 - MODERATE PAIN
PAINLEVEL_OUTOF10: 0 - NO PAIN
PAINLEVEL_OUTOF10: 3
PAINLEVEL_OUTOF10: 9

## 2025-07-27 NOTE — PROGRESS NOTES
Ailyn Banegas is a 80 y.o. female on day 6 of admission presenting with COPD exacerbation (Multi).      SUBJECTIVE     Patient evaluated this morning and found to be resting comfortably in bed.  The patient reports no new change in her breathing.  She was recently switched to 4 L O2 NC and is tolerating well.  Patient has no new concerns or complaints at this time.    OBJECTIVE     Vitals:    07/27/25 0634 07/27/25 0745 07/27/25 1110 07/27/25 1141   BP:  125/58  110/61   BP Location:  Right arm  Right arm   Patient Position:  Lying  Lying   Pulse:  86  68   Resp:  18  18   Temp:  36 °C (96.8 °F)  36 °C (96.8 °F)   TempSrc:       SpO2: 94% 94% 96% 95%   Weight:       Height:          Results from last 7 days   Lab Units 07/27/25  0505 07/22/25  0555 07/21/25  1329   WBC AUTO x10*3/uL 9.4   < > 7.8   HEMOGLOBIN g/dL 11.4*   < > 11.3*   HEMATOCRIT % 38.0   < > 38.7   PLATELETS AUTO x10*3/uL 221   < > 213   NEUTROS PCT AUTO %  --   --  74.8   LYMPHS PCT AUTO %  --   --  13.7   MONOS PCT AUTO %  --   --  7.7   EOS PCT AUTO %  --   --  2.4    < > = values in this interval not displayed.     Results from last 7 days   Lab Units 07/27/25  0505 07/22/25  0555 07/21/25  1329   SODIUM mmol/L 139   < > 137   POTASSIUM mmol/L 4.5   < > 4.8   CHLORIDE mmol/L 91*   < > 86*   CO2 mmol/L 42*   < > >45*   BUN mg/dL 38*   < > 24*   CREATININE mg/dL 1.00   < > 0.78   CALCIUM mg/dL 10.0   < > 9.9   PROTEIN TOTAL g/dL  --   --  7.2   BILIRUBIN TOTAL mg/dL  --   --  0.7   ALK PHOS U/L  --   --  78   ALT U/L  --   --  10   AST U/L  --   --  15   GLUCOSE mg/dL 100*   < > 118*    < > = values in this interval not displayed.       Scheduled Medications  Scheduled Medications[1]   Physical Exam    Constitutional: obese habitus, awake, alert, no acute distress  ENMT: mucous membranes moist, EOMI, conjunctivae clear  Head/Neck: normocephalic, atraumatic; supple, trachea midline  Respiratory/Thorax: patent airways, CTAB; no wheezes, rales, or  rhonchi  Cardiovascular: RRR, no murmur  Gastrointestinal: soft, nondistended, non-tender, bowel sounds appreciated  Extremities: palpable peripheral pulses, BLE pitting edema or cyanosis, BLE cellulitis  Neurological: AO x3, no focal deficits  Psychological: appropriate mood and behavior  Skin: warm and dry    Pertinent Imaging    XR chest 2 views 7/23/25:  1.  Patchy bilateral mid to lower lung and lung base opacities and  mild pulmonary vascular congestion and diffuse interstitial  prominence may reflect component of edema, overall similar to prior.  Slight blunting costophrenic angles may indicate small effusions.  Clinical correlation and follow up advised.     ASSESSMENT & PLAN   Ailyn Banegas is a 80 y.o. female with a PMHx COPD (on baseline 4L NC), MARLENY/likely OHS, Hypothyroidism, DLD, Morbid Obesity who was brought into the ED by family for respiratory distress, confusion, and daytime somnolence.      Daily Progress  - Patient switched to 4 L O2 NC; well-tolerated  - Still awaiting insurance approval for IAVAPS device     #Acute on chronic hypercapnic HRF ISO #MARLENY/OHS and #COPD  -High 80s low 90s SpO2 on 5L NC  PLAN:  -C/w IV Azithromycin 500 BID for 3 days (then continue M,W,F home regimen)  -C/w PO deltasone (Prednisone) tablets  -BiPAP  -1 time 40mg IV Lasix push given 7/23/25 by pulmonlogy     Chronic Problems:     #Hypothyroidism-continue home dose Synthroid  #Hyperglycemia-SSI #3  #Chronic anemia  #Chronic lower extremity wounds  #MARLENY    #Chronic BLE Cellulitis     DVT PPX: Lovenox  Diet: Regular  IVF:  Code Status: DNR    David Pinzon MD  PGY-1, Internal Medicine  Please SecureChat for any further questions  This is a preliminary note, please await attending attestation for final A/P             [1] albuterol, 2.5 mg, nebulization, TID  atorvastatin, 10 mg, oral, Daily  azithromycin, 500 mg, oral, Once per day on Monday Wednesday Friday  dilTIAZem, 30 mg, oral, q12h  enoxaparin, 60 mg,  subcutaneous, q12h [facility]  fluticasone furoate-vilanteroL, 1 puff, inhalation, Daily  levothyroxine, 100 mcg, oral, Daily  oxygen, , inhalation, Continuous - Inhalation  polyethylene glycol, 17 g, oral, Daily  predniSONE, 40 mg, oral, Daily  spironolactone, 100 mg, oral, Daily  [Held by provider] torsemide, 40 mg, oral, Daily

## 2025-07-27 NOTE — CARE PLAN
The patient's goals for the shift include comfort and safety    The clinical goals for the shift include safety and comfort

## 2025-07-27 NOTE — PROGRESS NOTES
Ailyn Banegas is a 80 y.o. female on day 6 of admission presenting with COPD exacerbation (Multi).    Subjective   Patient feels better today.  She is sitting in a chair.  Last night she used noninvasive ventilation.  Currently she is on supplemental oxygen nasal cannula.  Denies dyspnea at rest.  She has mild nonproductive cough.       Objective   Head and face no deformities  Oropharynx normal mucosa  Neck is supple no thyromegaly  Chest is symmetric no crackles  Heart is regular no murmurs  Abdomen is soft and nontender  Skin is intact  Joints are normal  Neurologically patient is moving all 4 limbs  Physical Exam    Assessment:    Respiratory failure with hypoxia respiratory acidosis which is multifactorial.  Obesity with obstructive sleep apnea and obesity hypoventilation syndrome.  Underlying COPD.  Muscle weakness and deconditioning.    Plan:  Encourage use of noninvasive ventilation at night and as needed during the day.  Physical therapy and ambulation.  Bronchodilators as needed.  Titrate oxygen for saturation between 89 and 95%.  DVT prophylaxis.  Taper of steroids.      Abram Mallory MD

## 2025-07-27 NOTE — CARE PLAN
The patient's goals for the shift include comfort and safety    The clinical goals for the shift include safety and comfort    Over the shift, the patient will maintain safety and refrain from further complications

## 2025-07-28 LAB
ANION GAP SERPL CALC-SCNC: 9 MMOL/L (ref 10–20)
BUN SERPL-MCNC: 34 MG/DL (ref 6–23)
CALCIUM SERPL-MCNC: 9.5 MG/DL (ref 8.6–10.3)
CHLORIDE SERPL-SCNC: 94 MMOL/L (ref 98–107)
CO2 SERPL-SCNC: 38 MMOL/L (ref 21–32)
CREAT SERPL-MCNC: 0.92 MG/DL (ref 0.5–1.05)
EGFRCR SERPLBLD CKD-EPI 2021: 63 ML/MIN/1.73M*2
ERYTHROCYTE [DISTWIDTH] IN BLOOD BY AUTOMATED COUNT: 14.6 % (ref 11.5–14.5)
GLUCOSE SERPL-MCNC: 86 MG/DL (ref 74–99)
HCT VFR BLD AUTO: 39 % (ref 36–46)
HGB BLD-MCNC: 11.4 G/DL (ref 12–16)
HOLD SPECIMEN: NORMAL
MAGNESIUM SERPL-MCNC: 2.26 MG/DL (ref 1.6–2.4)
MCH RBC QN AUTO: 27.2 PG (ref 26–34)
MCHC RBC AUTO-ENTMCNC: 29.2 G/DL (ref 32–36)
MCV RBC AUTO: 93 FL (ref 80–100)
NRBC BLD-RTO: 0 /100 WBCS (ref 0–0)
PLATELET # BLD AUTO: 232 X10*3/UL (ref 150–450)
POTASSIUM SERPL-SCNC: 4.6 MMOL/L (ref 3.5–5.3)
RBC # BLD AUTO: 4.19 X10*6/UL (ref 4–5.2)
SODIUM SERPL-SCNC: 136 MMOL/L (ref 136–145)
WBC # BLD AUTO: 10.6 X10*3/UL (ref 4.4–11.3)

## 2025-07-28 PROCEDURE — 2500000001 HC RX 250 WO HCPCS SELF ADMINISTERED DRUGS (ALT 637 FOR MEDICARE OP)

## 2025-07-28 PROCEDURE — 85027 COMPLETE CBC AUTOMATED: CPT

## 2025-07-28 PROCEDURE — 2500000002 HC RX 250 W HCPCS SELF ADMINISTERED DRUGS (ALT 637 FOR MEDICARE OP, ALT 636 FOR OP/ED)

## 2025-07-28 PROCEDURE — 94640 AIRWAY INHALATION TREATMENT: CPT

## 2025-07-28 PROCEDURE — 2500000005 HC RX 250 GENERAL PHARMACY W/O HCPCS: Performed by: INTERNAL MEDICINE

## 2025-07-28 PROCEDURE — 97530 THERAPEUTIC ACTIVITIES: CPT | Mod: GP,CQ

## 2025-07-28 PROCEDURE — 84132 ASSAY OF SERUM POTASSIUM: CPT

## 2025-07-28 PROCEDURE — 97110 THERAPEUTIC EXERCISES: CPT | Mod: GP,CQ

## 2025-07-28 PROCEDURE — 2500000004 HC RX 250 GENERAL PHARMACY W/ HCPCS (ALT 636 FOR OP/ED)

## 2025-07-28 PROCEDURE — 2060000001 HC INTERMEDIATE ICU ROOM DAILY

## 2025-07-28 PROCEDURE — 94660 CPAP INITIATION&MGMT: CPT

## 2025-07-28 PROCEDURE — 83735 ASSAY OF MAGNESIUM: CPT

## 2025-07-28 PROCEDURE — 36415 COLL VENOUS BLD VENIPUNCTURE: CPT

## 2025-07-28 RX ADMIN — ALBUTEROL SULFATE 2.5 MG: 2.5 SOLUTION RESPIRATORY (INHALATION) at 05:06

## 2025-07-28 RX ADMIN — ACETAMINOPHEN 650 MG: 325 TABLET ORAL at 20:57

## 2025-07-28 RX ADMIN — SPIRONOLACTONE 100 MG: 25 TABLET, FILM COATED ORAL at 08:30

## 2025-07-28 RX ADMIN — ALBUTEROL SULFATE 2.5 MG: 2.5 SOLUTION RESPIRATORY (INHALATION) at 12:34

## 2025-07-28 RX ADMIN — ALBUTEROL SULFATE 2.5 MG: 2.5 SOLUTION RESPIRATORY (INHALATION) at 19:18

## 2025-07-28 RX ADMIN — AZITHROMYCIN DIHYDRATE 500 MG: 250 TABLET ORAL at 08:30

## 2025-07-28 RX ADMIN — Medication: at 19:18

## 2025-07-28 RX ADMIN — POLYETHYLENE GLYCOL 3350 17 G: 17 POWDER, FOR SOLUTION ORAL at 08:30

## 2025-07-28 RX ADMIN — ENOXAPARIN SODIUM 60 MG: 60 INJECTION SUBCUTANEOUS at 08:30

## 2025-07-28 RX ADMIN — PREDNISONE 40 MG: 20 TABLET ORAL at 08:31

## 2025-07-28 RX ADMIN — ACETAMINOPHEN 650 MG: 325 TABLET ORAL at 06:12

## 2025-07-28 RX ADMIN — Medication: at 20:58

## 2025-07-28 RX ADMIN — Medication: at 05:07

## 2025-07-28 RX ADMIN — ATORVASTATIN CALCIUM 10 MG: 10 TABLET, FILM COATED ORAL at 08:31

## 2025-07-28 RX ADMIN — ENOXAPARIN SODIUM 60 MG: 60 INJECTION SUBCUTANEOUS at 20:57

## 2025-07-28 RX ADMIN — LEVOTHYROXINE SODIUM 100 MCG: 0.1 TABLET ORAL at 06:13

## 2025-07-28 RX ADMIN — DILTIAZEM HYDROCHLORIDE 30 MG: 60 TABLET, FILM COATED ORAL at 20:57

## 2025-07-28 RX ADMIN — DILTIAZEM HYDROCHLORIDE 30 MG: 60 TABLET, FILM COATED ORAL at 08:31

## 2025-07-28 RX ADMIN — FLUTICASONE FUROATE AND VILANTEROL TRIFENATATE 1 PUFF: 200; 25 POWDER RESPIRATORY (INHALATION) at 05:09

## 2025-07-28 ASSESSMENT — COGNITIVE AND FUNCTIONAL STATUS - GENERAL
DAILY ACTIVITIY SCORE: 15
CLIMB 3 TO 5 STEPS WITH RAILING: TOTAL
TOILETING: A LOT
MOVING TO AND FROM BED TO CHAIR: A LOT
TURNING FROM BACK TO SIDE WHILE IN FLAT BAD: A LOT
HELP NEEDED FOR BATHING: A LOT
TURNING FROM BACK TO SIDE WHILE IN FLAT BAD: A LOT
CLIMB 3 TO 5 STEPS WITH RAILING: TOTAL
WALKING IN HOSPITAL ROOM: TOTAL
STANDING UP FROM CHAIR USING ARMS: A LOT
MOBILITY SCORE: 12
STANDING UP FROM CHAIR USING ARMS: A LITTLE
PERSONAL GROOMING: A LITTLE
EATING MEALS: A LITTLE
MOVING FROM LYING ON BACK TO SITTING ON SIDE OF FLAT BED WITH BEDRAILS: A LITTLE
MOBILITY SCORE: 10
WALKING IN HOSPITAL ROOM: TOTAL
DRESSING REGULAR UPPER BODY CLOTHING: A LITTLE
MOVING FROM LYING ON BACK TO SITTING ON SIDE OF FLAT BED WITH BEDRAILS: A LOT
DRESSING REGULAR LOWER BODY CLOTHING: A LOT
MOVING TO AND FROM BED TO CHAIR: A LOT

## 2025-07-28 ASSESSMENT — PAIN - FUNCTIONAL ASSESSMENT
PAIN_FUNCTIONAL_ASSESSMENT: 0-10

## 2025-07-28 ASSESSMENT — PAIN SCALES - GENERAL
PAINLEVEL_OUTOF10: 0 - NO PAIN
PAINLEVEL_OUTOF10: 3
PAINLEVEL_OUTOF10: 0 - NO PAIN

## 2025-07-28 NOTE — PROGRESS NOTES
Physical Therapy    Physical Therapy Treatment    Patient Name: Ailyn Banegas  MRN: [id number]  Department: [facility]  Room: [address] [place] [address]  Today's Date: 7/28/2025  Time Calculation  Start Time: 1534  Stop Time: 1605  Time Calculation (min): 31 min         Assessment/Plan   PT Assessment  End of Session Communication: Bedside nurse  End of Session Patient Position: Up in chair, Alarm off, not on at start of session (Seated in recliner with BLE elevated ~80%, call bell in reach.)     PT Plan  Treatment/Interventions: Bed mobility, Transfer training, Balance training, Strengthening, Endurance training, Range of motion, Therapeutic exercise, Home exercise program, Therapeutic activity, Positioning  PT Plan: Ongoing PT  PT Frequency: 3 times per week (During this acute inpatient hospitalization)  PT Discharge Recommendations: Moderate intensity level of continued care (Based on current functional status and rehab potential, patient is anticipated to tolerate and benefit from 5 or more days per week of skilled rehabilitative therapy after discharge from this acute inpatient hospitilization.)  PT Recommended Transfer Status: Assist x2  PT - OK to Discharge: Yes (once medically cleared for next level of care)    PT Visit Info:  PT Received On: 07/28/25     General Visit Information:   General  Prior to Session Communication: Bedside nurse  Patient Position Received: Up in chair, Alarm off, not on at start of session  General Comment:  (Pt relaxing in recliner upon arrival; pleasant and agreeablel to PT session.)    Subjective   Precautions:  Precautions  Medical Precautions: Fall precautions, Oxygen therapy device and L/min  Precautions Comment: Falls, tele, 4LO2 via NC.     Date/Time Vitals Session Patient Position Pulse Resp SpO2 BP MAP (mmHg)    07/28/25 16:09:26 --  --  69  18  95 %  127/59  85            Objective   Pain:  Pain Assessment  Pain Assessment: 0-10  0-10 (Numeric) Pain Score:  (Pt did not quantify; communicated  with RN Petra).)  Pain Location: Knee  Pain Orientation: Left  Cognition:     Coordination:     Postural Control:  Static Standing Balance  Static Standing-Comment/Number of Minutes:  (Pt tolerated static standing balance x2 trials (3:00 and 2:00 respectively) with FWW and CGA.)     Treatments:  Therapeutic Exercise  Therapeutic Exercise Performed: Yes (Pt performed sitting BLE therex consisting of: heel/toe raises, hip flexion, LAQ, HS, hip ADD isometrics with pillow 2x10 reps each.)         Transfers  Transfer: Yes (Pt performed sit<>stand from recliner x2 with FWW and CGA; pt able to stand using momentum while RUE on FWW, LUE on recliner armrest and PTA blocking FWW.)    Outcome Measures:  [facility] Basic Mobility  Turning from your back to your side while in a flat bed without using bedrails: A little  Moving from lying on your back to sitting on the side of a flat bed without using bedrails: A lot  Moving to and from bed to chair (including a wheelchair): A lot  Standing up from a chair using your arms (e.g. wheelchair or bedside chair): A little  To walk in hospital room: Total  Climbing 3-5 steps with railing: Total  Basic Mobility - Total Score: 12    Education Documentation  Handouts, taught by Jas Thomas PTA at 7/28/2025  4:39 PM.  Learner: Patient  Readiness: Acceptance  Method: Explanation  Response: Verbalizes Understanding    Body Mechanics, taught by Jas Thomas PTA at 7/28/2025  4:39 PM.  Learner: Patient  Readiness: Acceptance  Method: Explanation  Response: Verbalizes Understanding    Home Exercise Program, taught by Jas Thomas PTA at 7/28/2025  4:39 PM.  Learner: Patient  Readiness: Acceptance  Method: Explanation  Response: Verbalizes Understanding    Precautions, taught by Jas Thomas PTA at 7/28/2025  4:39 PM.  Learner: Patient  Readiness: Acceptance  Method: Explanation  Response: Verbalizes Understanding    Mobility Training, taught by Jas Thomas PTA at 7/28/2025  4:39  PM.  Learner: Patient  Readiness: Acceptance  Method: Explanation  Response: Verbalizes Understanding    Education Comments  No comments found.        OP EDUCATION:       Encounter Problems       Encounter Problems (Active)       PT Problem       PT Goal 1 STG - Pt will transition supine <> sitting with Margarita x1  (Progressing)       Start:  07/22/25    Expected End:  08/05/25            PT Goal 2 STG - Pt will transfer STS with Margarita x1  (Progressing)       Start:  07/22/25    Expected End:  08/05/25            PT Goal 3 STG - Pt will amb 10' using FWW with Margarita x1  (Not Progressing)       Start:  07/22/25    Expected End:  08/05/25            PT Goal 4 STG - Pt will perform a B LE ther ex program of 2-3 sets of 10  (Progressing)       Start:  07/22/25    Expected End:  08/05/25

## 2025-07-28 NOTE — PROGRESS NOTES
07/28/25 1526   Discharge Planning   Home or Post Acute Services In home services   Expected Discharge Disposition Home Health     Spoke with nursing, patient is on 4L oxygen.  Confirmed plan to discharge home and resume [facility] at discharge.

## 2025-07-28 NOTE — PROGRESS NOTES
Ailyn Banegas is a 80 y.o. female on day 7 of admission presenting with COPD exacerbation (Multi).    Subjective   Patient seen and examined at bedside.  Patient feels improved from yesterday.  No acute events overnight.  She is on nasal cannula oxygen.       Objective   Head and face no deformities  Oropharynx normal mucosa  Neck is supple no thyromegaly  Chest is symmetric no crackles  Heart is regular no murmurs  Abdomen is soft and nontender  Skin is intact  Joints are normal  Neurologically patient is moving all 4 limbs  Physical Exam    Assessment:    Respiratory failure with hypoxia respiratory acidosis which is multifactorial.  Obesity with obstructive sleep apnea and obesity hypoventilation syndrome.  Underlying COPD.  Muscle weakness and deconditioning.    Plan:  -Continue to encourage use of noninvasive ventilation at night and as needed during the day.  -Physical therapy and ambulation.  Bronchodilators as needed.  Titrate oxygen for saturation between 89 and 95%.  -DVT prophylaxis.  Taper of steroids.  Recommend 40 mg p.o. prednisone for 3 days, 30 mg p.o. prednisone for 3 days, 20 mg p.o. prednisone for 3 days, 10 mg p.o. prednisone for 3 days, then stop.  - Avoid benzodiazepines and narcotics      Nella Espinoza MD  PGY 3 Pulmonology

## 2025-07-28 NOTE — PROGRESS NOTES
Ailyn Banegas is a 80 y.o. female on day 7 of admission presenting with COPD exacerbation (Multi).      SUBJECTIVE     Patient evaluated this morning and found to be resting comfortably in chair. She reports no shortness of breath and cough. Tolerating well her switch of oxygen to her baseline @4L NC. She doesn't have any concerns except waiting her device approval from insurance and shows her keen interest meeting Dr Paul or pulmonology team to discuss regarding the breathing device. She vocals her concern of getting the device before going home to prevent future exacerbation episodes.    OBJECTIVE     Vitals:    07/28/25 0750 07/28/25 1138 07/28/25 1235 07/28/25 1609   BP: 118/67 128/65  127/59   BP Location:       Patient Position:       Pulse: 73 69  69   Resp: 18 18  18   Temp: 36 °C (96.8 °F) 36.2 °C (97.2 °F)  36.5 °C (97.7 °F)   TempSrc:       SpO2: 96% 94% 94% 95%   Weight:       Height:          Results from last 7 days   Lab Units 07/28/25  0546   WBC AUTO x10*3/uL 10.6   HEMOGLOBIN g/dL 11.4*   HEMATOCRIT % 39.0   PLATELETS AUTO x10*3/uL 232     Results from last 7 days   Lab Units 07/28/25  0546   SODIUM mmol/L 136   POTASSIUM mmol/L 4.6   CHLORIDE mmol/L 94*   CO2 mmol/L 38*   BUN mg/dL 34*   CREATININE mg/dL 0.92   CALCIUM mg/dL 9.5   GLUCOSE mg/dL 86       Scheduled Medications  Scheduled Medications[1]   Physical Exam    Constitutional: Well developed, A&Ox3, no acute distress, alert and cooperative  Respiratory/Thorax: CTAB, good chest expansion, crackles heard on right lower posterior chest  Cardiovascular: Regular rate, regular rhythm  Gastrointestinal: Nondistended, soft, non-tender  Extremities: normal extremities, no cyanosis edema  Skin: Warm and dry     Pertinent Imaging    XR chest 2 views 7/23/25:  Patchy bilateral mid to lower lung and lung base opacities and mild pulmonary vascular congestion and diffuse interstitial prominence may reflect component of edema, overall similar to  prior. Slight blunting costophrenic angles may indicate small effusions. Clinical correlation and follow up advised.       ASSESSMENT & PLAN   Ailyn Banegas is an 80 y.o. female with a PMHx COPD (on baseline 4L NC), MARLENY/likely OHS, Hypothyroidism, DLD, Morbid Obesity who was brought into the ED by family for respiratory distress, confusion, and daytime somnolence.     Daily Progress  - Patient is well tolerating her switch to 4L NC oxygen.  - She is waiting insurance approval for her breathing devices to go home.    ASSESSMENT & PLAN    Acute Conditions  #Acute on chronic respiratory failure  - She was in and out of hospital since the end of May 2025 multiple times despite home oxygen @4L NC, Moderate obesity with obstructive sleep apnea and obesity hypoventilation, underlying COPD and related muscle weakness and deconditioning might have played role in her frequent hospitalization. She is doing well with NIVs.  - Pulmonology on board, recommended encourage use of noninvasive ventilation at night and as needed during the day.  PLAN:  - Wait for approval or availability of breathing device.   - Bronchodilators as needed.  - Titrate oxygen for saturation between 89-95%.  - Continue oral Azithromycin 500 mg 3 times weekly M,W,F for multiple exacerbations of COPD.  - Taper of steroids; Prednisone 40 mg PO OD for 3 days, then 30 mg PO OD for 3 days, then 20 mg PO OD for 3 days, then 10 mg PO OD for 3 days and then stop.  - Avoid benzodiazepines and narcotics.  - Encourage physical therapy and ambulation.    Chronic Conditions  - COPD exacerbation ( Multi)  - MARLENY/OHS  - Hypothyroidism      Check Lists  Code: DNR  DVT ppx: Lovenox  Diet: Adult Regular Diet    Yolanda Cuello MD  PGY-1, Internal Medicine  Please SecureChat for any further questions  This is a preliminary note, please await attending attestation for final A/P              [1] albuterol, 2.5 mg, nebulization, TID  atorvastatin, 10 mg, oral,  Daily  azithromycin, 500 mg, oral, Once per day on Monday Wednesday Friday  dilTIAZem, 30 mg, oral, q12h  enoxaparin, 60 mg, subcutaneous, q12h VLADIMIR  fluticasone furoate-vilanteroL, 1 puff, inhalation, Daily  levothyroxine, 100 mcg, oral, Daily  oxygen, , inhalation, Continuous - Inhalation  polyethylene glycol, 17 g, oral, Daily  predniSONE, 40 mg, oral, Daily  spironolactone, 100 mg, oral, Daily  [Held by provider] torsemide, 40 mg, oral, Daily

## 2025-07-29 LAB
ANION GAP SERPL CALC-SCNC: 13 MMOL/L (ref 10–20)
BASOPHILS # BLD AUTO: 0.01 X10*3/UL (ref 0–0.1)
BASOPHILS NFR BLD AUTO: 0.1 %
BUN SERPL-MCNC: 30 MG/DL (ref 6–23)
CALCIUM SERPL-MCNC: 10 MG/DL (ref 8.6–10.3)
CHLORIDE SERPL-SCNC: 93 MMOL/L (ref 98–107)
CO2 SERPL-SCNC: 37 MMOL/L (ref 21–32)
CREAT SERPL-MCNC: 0.83 MG/DL (ref 0.5–1.05)
EGFRCR SERPLBLD CKD-EPI 2021: 71 ML/MIN/1.73M*2
EOSINOPHIL # BLD AUTO: 0.02 X10*3/UL (ref 0–0.4)
EOSINOPHIL NFR BLD AUTO: 0.2 %
ERYTHROCYTE [DISTWIDTH] IN BLOOD BY AUTOMATED COUNT: 14.6 % (ref 11.5–14.5)
GLUCOSE SERPL-MCNC: 88 MG/DL (ref 74–99)
HCT VFR BLD AUTO: 38.9 % (ref 36–46)
HGB BLD-MCNC: 11.5 G/DL (ref 12–16)
IMM GRANULOCYTES # BLD AUTO: 0.05 X10*3/UL (ref 0–0.5)
IMM GRANULOCYTES NFR BLD AUTO: 0.5 % (ref 0–0.9)
LYMPHOCYTES # BLD AUTO: 1.19 X10*3/UL (ref 0.8–3)
LYMPHOCYTES NFR BLD AUTO: 11.3 %
MAGNESIUM SERPL-MCNC: 2.31 MG/DL (ref 1.6–2.4)
MCH RBC QN AUTO: 27.8 PG (ref 26–34)
MCHC RBC AUTO-ENTMCNC: 29.6 G/DL (ref 32–36)
MCV RBC AUTO: 94 FL (ref 80–100)
MONOCYTES # BLD AUTO: 0.6 X10*3/UL (ref 0.05–0.8)
MONOCYTES NFR BLD AUTO: 5.7 %
NEUTROPHILS # BLD AUTO: 8.62 X10*3/UL (ref 1.6–5.5)
NEUTROPHILS NFR BLD AUTO: 82.2 %
NRBC BLD-RTO: 0 /100 WBCS (ref 0–0)
PLATELET # BLD AUTO: 236 X10*3/UL (ref 150–450)
POTASSIUM SERPL-SCNC: 4.5 MMOL/L (ref 3.5–5.3)
RBC # BLD AUTO: 4.14 X10*6/UL (ref 4–5.2)
SODIUM SERPL-SCNC: 138 MMOL/L (ref 136–145)
WBC # BLD AUTO: 10.5 X10*3/UL (ref 4.4–11.3)

## 2025-07-29 PROCEDURE — 2500000004 HC RX 250 GENERAL PHARMACY W/ HCPCS (ALT 636 FOR OP/ED)

## 2025-07-29 PROCEDURE — 80048 BASIC METABOLIC PNL TOTAL CA: CPT

## 2025-07-29 PROCEDURE — 36415 COLL VENOUS BLD VENIPUNCTURE: CPT

## 2025-07-29 PROCEDURE — 94660 CPAP INITIATION&MGMT: CPT

## 2025-07-29 PROCEDURE — 97535 SELF CARE MNGMENT TRAINING: CPT | Mod: CO,GO

## 2025-07-29 PROCEDURE — 85025 COMPLETE CBC W/AUTO DIFF WBC: CPT

## 2025-07-29 PROCEDURE — 2500000001 HC RX 250 WO HCPCS SELF ADMINISTERED DRUGS (ALT 637 FOR MEDICARE OP)

## 2025-07-29 PROCEDURE — 83735 ASSAY OF MAGNESIUM: CPT

## 2025-07-29 PROCEDURE — 2500000002 HC RX 250 W HCPCS SELF ADMINISTERED DRUGS (ALT 637 FOR MEDICARE OP, ALT 636 FOR OP/ED)

## 2025-07-29 PROCEDURE — 94640 AIRWAY INHALATION TREATMENT: CPT

## 2025-07-29 PROCEDURE — 2060000001 HC INTERMEDIATE ICU ROOM DAILY

## 2025-07-29 PROCEDURE — 2500000001 HC RX 250 WO HCPCS SELF ADMINISTERED DRUGS (ALT 637 FOR MEDICARE OP): Performed by: INTERNAL MEDICINE

## 2025-07-29 PROCEDURE — 2500000005 HC RX 250 GENERAL PHARMACY W/O HCPCS: Performed by: INTERNAL MEDICINE

## 2025-07-29 RX ORDER — PREDNISONE 20 MG/1
20 TABLET ORAL DAILY
Status: DISCONTINUED | OUTPATIENT
Start: 2025-08-02 | End: 2025-08-01 | Stop reason: HOSPADM

## 2025-07-29 RX ORDER — PREDNISONE 10 MG/1
10 TABLET ORAL DAILY
Status: DISCONTINUED | OUTPATIENT
Start: 2025-08-05 | End: 2025-08-01 | Stop reason: HOSPADM

## 2025-07-29 RX ADMIN — ALBUTEROL SULFATE 2.5 MG: 2.5 SOLUTION RESPIRATORY (INHALATION) at 19:19

## 2025-07-29 RX ADMIN — FLUTICASONE FUROATE AND VILANTEROL TRIFENATATE 1 PUFF: 200; 25 POWDER RESPIRATORY (INHALATION) at 07:17

## 2025-07-29 RX ADMIN — Medication: at 03:08

## 2025-07-29 RX ADMIN — TORSEMIDE 40 MG: 20 TABLET ORAL at 20:13

## 2025-07-29 RX ADMIN — ALBUTEROL SULFATE 2.5 MG: 2.5 SOLUTION RESPIRATORY (INHALATION) at 12:05

## 2025-07-29 RX ADMIN — ACETAMINOPHEN 650 MG: 325 TABLET ORAL at 21:46

## 2025-07-29 RX ADMIN — Medication: at 19:19

## 2025-07-29 RX ADMIN — ENOXAPARIN SODIUM 60 MG: 60 INJECTION SUBCUTANEOUS at 20:13

## 2025-07-29 RX ADMIN — LEVOTHYROXINE SODIUM 100 MCG: 0.1 TABLET ORAL at 05:17

## 2025-07-29 RX ADMIN — SPIRONOLACTONE 100 MG: 25 TABLET, FILM COATED ORAL at 08:19

## 2025-07-29 RX ADMIN — ATORVASTATIN CALCIUM 10 MG: 10 TABLET, FILM COATED ORAL at 08:19

## 2025-07-29 RX ADMIN — DILTIAZEM HYDROCHLORIDE 30 MG: 60 TABLET, FILM COATED ORAL at 20:13

## 2025-07-29 RX ADMIN — Medication: at 22:43

## 2025-07-29 RX ADMIN — DILTIAZEM HYDROCHLORIDE 30 MG: 60 TABLET, FILM COATED ORAL at 08:19

## 2025-07-29 RX ADMIN — ENOXAPARIN SODIUM 60 MG: 60 INJECTION SUBCUTANEOUS at 08:19

## 2025-07-29 RX ADMIN — Medication: at 06:54

## 2025-07-29 RX ADMIN — PREDNISONE 40 MG: 20 TABLET ORAL at 08:19

## 2025-07-29 RX ADMIN — ALBUTEROL SULFATE 2.5 MG: 2.5 SOLUTION RESPIRATORY (INHALATION) at 06:53

## 2025-07-29 ASSESSMENT — COGNITIVE AND FUNCTIONAL STATUS - GENERAL
MOVING FROM LYING ON BACK TO SITTING ON SIDE OF FLAT BED WITH BEDRAILS: A LOT
DAILY ACTIVITIY SCORE: 15
DRESSING REGULAR LOWER BODY CLOTHING: TOTAL
HELP NEEDED FOR BATHING: A LOT
WALKING IN HOSPITAL ROOM: TOTAL
DRESSING REGULAR LOWER BODY CLOTHING: A LOT
STANDING UP FROM CHAIR USING ARMS: A LOT
MOBILITY SCORE: 10
HELP NEEDED FOR BATHING: A LOT
CLIMB 3 TO 5 STEPS WITH RAILING: TOTAL
MOVING TO AND FROM BED TO CHAIR: A LOT
MOVING FROM LYING ON BACK TO SITTING ON SIDE OF FLAT BED WITH BEDRAILS: A LOT
STANDING UP FROM CHAIR USING ARMS: A LOT
WALKING IN HOSPITAL ROOM: A LOT
TOILETING: A LOT
DAILY ACTIVITIY SCORE: 14
HELP NEEDED FOR BATHING: A LOT
MOBILITY SCORE: 11
TOILETING: A LOT
PERSONAL GROOMING: A LITTLE
DRESSING REGULAR UPPER BODY CLOTHING: A LOT
DRESSING REGULAR UPPER BODY CLOTHING: A LITTLE
PERSONAL GROOMING: A LITTLE
DRESSING REGULAR UPPER BODY CLOTHING: A LITTLE
TURNING FROM BACK TO SIDE WHILE IN FLAT BAD: A LOT
DRESSING REGULAR LOWER BODY CLOTHING: A LOT
EATING MEALS: A LITTLE
DAILY ACTIVITIY SCORE: 15
EATING MEALS: A LITTLE
MOVING TO AND FROM BED TO CHAIR: A LOT
TOILETING: A LOT
TURNING FROM BACK TO SIDE WHILE IN FLAT BAD: A LOT
CLIMB 3 TO 5 STEPS WITH RAILING: TOTAL
PERSONAL GROOMING: A LITTLE

## 2025-07-29 ASSESSMENT — ACTIVITIES OF DAILY LIVING (ADL)
HOME_MANAGEMENT_TIME_ENTRY: 20
BATHING_LEVEL_OF_ASSISTANCE: MAXIMUM ASSISTANCE

## 2025-07-29 ASSESSMENT — PAIN SCALES - GENERAL
PAINLEVEL_OUTOF10: 3
PAINLEVEL_OUTOF10: 0 - NO PAIN
PAINLEVEL_OUTOF10: 0 - NO PAIN

## 2025-07-29 ASSESSMENT — PAIN - FUNCTIONAL ASSESSMENT
PAIN_FUNCTIONAL_ASSESSMENT: 0-10
PAIN_FUNCTIONAL_ASSESSMENT: 0-10

## 2025-07-29 ASSESSMENT — PAIN DESCRIPTION - LOCATION: LOCATION: KNEE

## 2025-07-29 ASSESSMENT — PAIN DESCRIPTION - ORIENTATION: ORIENTATION: LEFT;RIGHT

## 2025-07-29 ASSESSMENT — PAIN DESCRIPTION - DESCRIPTORS: DESCRIPTORS: ACHING

## 2025-07-29 NOTE — DOCUMENTATION CLARIFICATION NOTE
"    PATIENT:               AJAY GUZMAN  ACCT #:                  [id number]  MRN:                       [id number]  :                       1945  ADMIT DATE:       2025 1:07 PM  DISCH DATE:  RESPONDING PROVIDER #:        [id number]          PROVIDER RESPONSE TEXT:    Metabolic encephalopathy 2/2 Acute respiratory failure with hypoxia and hypercapnia    CDI QUERY TEXT:    Clarification        Instruction:    Based on your assessment of the patient and the clinical information, please provide the requested documentation by clicking on the appropriate radio button and enter any additional information if prompted.    Question: Please further clarify the most likely etiology of the confudion as    When answering this query, please exercise your independent professional judgment. The fact that a question is being asked, does not imply that any particular answer is desired or expected.    The patient's clinical indicators include:  Clinical Information:  80 y.o. female who was brought into the ED by family for respiratory distress    Clinical Indicators:  25  ED note:  \"80 y.o. female past medical history of COPD no PFTs on record, on baseline 5L NC, MARLENY/likely OHS, Hypothyroidism, DLD, Morbid Obesity, ED with hypoxia\"    25 H and P: \"was brought into the ED by family for respiratory distress, confusion, and daytime somnolence\"    25 Medicine: \"patient reports intermittent episodes of visual hallucinations and confusion that's ongoing since her admission\"  \"Acute respiratory failure with hypoxia and hypercapnia\"    Treatment:  25 HFNC 60L  at  60 percent  25 Oxygen 5L via NC  25- present IV SoluMedrol 40mg q 12 hrs  25 DuoNeb  25 -25 Breo Ellipta inhalation    Risk Factors:  Confusion, Respiratory Failure  Options provided:  -- Metabolic encephalopathy 2/2 Acute respiratory failure with hypoxia and hypercapnia  -- Other - I will add my own diagnosis  -- Refer to " Clinical Documentation Reviewer    Query created by: Heather Pacheco on 7/23/2025 6:50 PM      Electronically signed by:  BROOK JACKMAN MD 7/29/2025 8:46 AM

## 2025-07-29 NOTE — CARE PLAN
The patient's goals for the shift include comfort and safety    The clinical goals for the shift include Monitor SpO2. Maintain between 89-94%      Problem: Pain - Adult  Goal: Verbalizes/displays adequate comfort level or baseline comfort level  Outcome: Progressing  Flowsheets (Taken 7/28/2025 2049)  Verbalizes/displays adequate comfort level or baseline comfort level:   Encourage patient to monitor pain and request assistance   Assess pain using appropriate pain scale   Consider cultural and social influences on pain and pain management     Problem: Safety - Adult  Goal: Free from fall injury  Outcome: Progressing     Problem: Skin  Goal: Decreased wound size/increased tissue granulation at next dressing change  Outcome: Progressing  Flowsheets (Taken 7/28/2025 2049)  Decreased wound size/increased tissue granulation at next dressing change: Promote sleep for wound healing  Goal: Participates in plan/prevention/treatment measures  Outcome: Progressing  Flowsheets (Taken 7/28/2025 2049)  Participates in plan/prevention/treatment measures:   Discuss with provider PT/OT consult   Elevate heels  Goal: Prevent/manage excess moisture  Outcome: Progressing  Flowsheets (Taken 7/28/2025 2049)  Prevent/manage excess moisture:   Cleanse incontinence/protect with barrier cream   Moisturize dry skin  Goal: Prevent/minimize sheer/friction injuries  Outcome: Progressing  Flowsheets (Taken 7/28/2025 2049)  Prevent/minimize sheer/friction injuries:   Increase activity/out of bed for meals   Complete micro-shifts as needed if patient unable. Adjust patient position to relieve pressure points, not a full turn  Goal: Promote/optimize nutrition  Outcome: Progressing  Flowsheets (Taken 7/28/2025 2049)  Promote/optimize nutrition:   Assist with feeding   Consume > 50% meals/supplements   Monitor/record intake including meals

## 2025-07-29 NOTE — PROGRESS NOTES
Ailyn Banegas is a 80 y.o. female on day 8 of admission presenting with COPD exacerbation (Multi).      SUBJECTIVE     Patient evaluated this morning and found to be resting comfortably in chair. She reports no shortness of breath and cough. Tolerating well her switch of oxygen to her baseline @4L NC with 98% saturation and switch to NIV: AVAPS during night. Gives history of cataracts and is concerned if the medicine she using or her current health condition is making her vision might have diminished slightly. She also wants to get the device before going home to prevent future exacerbation episodes.    OBJECTIVE     Vitals:    07/29/25 0654 07/29/25 0749 07/29/25 0955 07/29/25 1142   BP:  125/69  132/63   BP Location:       Patient Position:       Pulse:  78  66   Resp:  20 23 20   Temp:  36 °C (96.8 °F)  36.2 °C (97.2 °F)   TempSrc:       SpO2: 100% 93%  95%   Weight:       Height:          Results from last 7 days   Lab Units 07/29/25  0538   WBC AUTO x10*3/uL 10.5   HEMOGLOBIN g/dL 11.5*   HEMATOCRIT % 38.9   PLATELETS AUTO x10*3/uL 236   NEUTROS PCT AUTO % 82.2   LYMPHS PCT AUTO % 11.3   MONOS PCT AUTO % 5.7   EOS PCT AUTO % 0.2     Results from last 7 days   Lab Units 07/29/25  0538   SODIUM mmol/L 138   POTASSIUM mmol/L 4.5   CHLORIDE mmol/L 93*   CO2 mmol/L 37*   BUN mg/dL 30*   CREATININE mg/dL 0.83   CALCIUM mg/dL 10.0   GLUCOSE mg/dL 88       Scheduled Medications  Scheduled Medications[1]   Physical Exam    Constitutional: Well developed, A&Ox3, no acute distress, alert and cooperative  Respiratory/Thorax: CTAB, good chest expansion, crackles heard on right lower posterior chest  Cardiovascular: Regular rate, regular rhythm  Gastrointestinal: Nondistended, soft, non-tender  Extremities: normal extremities, no cyanosis edema  Skin: Warm and dry     Pertinent Imaging    XR chest 2 views 7/23/25:  Patchy bilateral mid to lower lung and lung base opacities and mild pulmonary vascular congestion and diffuse  interstitial prominence may reflect component of edema, overall similar to prior. Slight blunting costophrenic angles may indicate small effusions. Clinical correlation and follow up advised.       ASSESSMENT & PLAN   Ailyn Banegas is an 80 y.o. female with a PMHx COPD (on baseline 4L NC), MARLENY/likely OHS, Hypothyroidism, DLD, Morbid Obesity who was brought into the ED by family for respiratory distress, confusion, and daytime somnolence.     Daily Progress  Patient is well tolerating her switch to 4L NC oxygen. She is waiting insurance approval for her breathing devices to go home.    ASSESSMENT & PLAN    Acute Conditions  #Acute on chronic respiratory failure   #COPD exacerbation ( Multi)  - She was in and out of hospital since the end of May 2025 multiple times despite home oxygen @4L NC, Moderate obesity with obstructive sleep apnea and obesity hypoventilation, underlying COPD and related muscle weakness and deconditioning might have played role in her frequent hospitalization. She is doing well with NIVs.  - Pulmonology on board, recommended encourage use of noninvasive ventilation at night and as needed during the day.  PLAN:  - Wait till tomorrow noon for insurance approval if they deny again, Dr Paul has put an order for IVAPs ready for her.  - Bronchodilators as needed.  - Titrate oxygen for saturation between 89-95%.  - Continue oral Azithromycin 500 mg 3 times weekly M,W,F for multiple exacerbations of COPD.  - Taper of steroids; Prednisone 40 mg, to 30 mg, to 20 mg, and then to 10 mg (PO OD each dose for 3 days respectively and then stop)  - Avoid benzodiazepines and narcotics.  - Encourage physical therapy and ambulation.    Chronic Conditions  - MARLENY/OHS  - Hypothyroidism  - Cataract : outpatient eye exam follow up      Check Lists  Code: DNR  DVT ppx: Lovenox  Diet: Adult Regular Diet    Yolanda Cuello MD  PGY-1, Internal Medicine  Please SecureChat for any further questions  This is a  preliminary note, please await attending attestation for final A/P                [1] albuterol, 2.5 mg, nebulization, TID  atorvastatin, 10 mg, oral, Daily  azithromycin, 500 mg, oral, Once per day on Monday Wednesday Friday  dilTIAZem, 30 mg, oral, q12h  enoxaparin, 60 mg, subcutaneous, q12h VLADIMIR  fluticasone furoate-vilanteroL, 1 puff, inhalation, Daily  levothyroxine, 100 mcg, oral, Daily  oxygen, , inhalation, Continuous - Inhalation  polyethylene glycol, 17 g, oral, Daily  predniSONE, 40 mg, oral, Daily  spironolactone, 100 mg, oral, Daily  [Held by provider] torsemide, 40 mg, oral, Daily

## 2025-07-29 NOTE — CARE PLAN
The patient's goals for the shift include comfort and safety    The clinical goals for the shift include remain HDS      Problem: Safety - Adult  Goal: Free from fall injury  Outcome: Progressing     Problem: Chronic Conditions and Co-morbidities  Goal: Patient's chronic conditions and co-morbidity symptoms are monitored and maintained or improved  Outcome: Progressing

## 2025-07-29 NOTE — PROGRESS NOTES
Occupational Therapy    OT Treatment    Patient Name: Ailyn Banegas  MRN: [id number]  Department: [facility]  Room: [address] [place] [address]-  Today's Date: 7/29/2025  Time Calculation  Start Time: 1115  Stop Time: 1135  Time Calculation (min): 20 min        Assessment:  End of Session Patient Position: Up in chair, Alarm off, not on at start of session     Plan:  Treatment Interventions: ADL retraining, Functional transfer training, Endurance training  OT Frequency: 3 times per week (During this acute hospitalization)  OT Discharge Recommendations: Moderate intensity level of continued care (Based on current functional status and rehab potential, patient is anticipated to tolerate and benefit from 5 or more days per week of skilled rehabilitative therapy after discharge from this acute inpatient hospitalization.)  OT Recommended Transfer Status: Moderate assist, Assist of 2  OT - OK to Discharge: Yes (when medically cleared)  Treatment Interventions: ADL retraining, Functional transfer training, Endurance training    Subjective   OT Visit Info:  OT Received On: 07/29/25  General Visit Info:  General  Prior to Session Communication: Bedside nurse  Patient Position Received: Up in chair, Alarm off, not on at start of session  General Comment: Pt agreeable to therapy  Precautions:  Medical Precautions: Fall precautions, Oxygen therapy device and L/min (4L)  Precautions Comment: samra sherwood            Pain:  Pain Assessment  Pain Assessment:  (min c/o pain in knees post standing but did not rate on pain scale)    Objective    Cognition:  Cognition  Overall Cognitive Status: Within Functional Limits       Activities of Daily Living: LE Bathing  LE Bathing Level of Assistance: Maximum assistance  LE Bathing Where Assessed:  (standing at FWW d/t incontinence)    UE Dressing  UE Dressing Comments: pt declined UB bathing/dressing tasks stating one wanted to wait till after lunch    LE Dressing  LE Dressing: Yes  Sock Level of Assistance:  Maximum assistance  LE Dressing Where Assessed: Chair    Toileting  Toileting Comments: declined toileting task  Functional Standing Tolerance:  Time: 1:00 standing at FWW. pt standing in forward flexed posture  Bed Mobility/Transfers: Transfers  Transfer: Yes  Transfer 1  Technique 1: Sit to stand, Stand to sit  Transfer Device 1: Walker  Transfer Level of Assistance 1: Moderate assistance  Trials/Comments 1: pt completed STS from recliner x3 trials      Outcome Measures:[facility] Daily Activity  Putting on and taking off regular lower body clothing: Total  Bathing (including washing, rinsing, drying): A lot  Putting on and taking off regular upper body clothing: A little  Toileting, which includes using toilet, bedpan or urinal: A lot  Taking care of personal grooming such as brushing teeth: A little  Eating Meals: None  Daily Activity - Total Score: 15        Education Documentation  Body Mechanics, taught by SUZIE Parham at 7/29/2025  2:36 PM.  Learner: Patient  Readiness: Acceptance  Method: Explanation  Response: Verbalizes Understanding    Precautions, taught by SUZIE Parham at 7/29/2025  2:36 PM.  Learner: Patient  Readiness: Acceptance  Method: Explanation  Response: Verbalizes Understanding    ADL Training, taught by SUZIE Parham at 7/29/2025  2:36 PM.  Learner: Patient  Readiness: Acceptance  Method: Explanation  Response: Verbalizes Understanding    Education Comments  No comments found.             Goals:  Encounter Problems       Encounter Problems (Active)       ADLs       Patient with complete upper body dressing with modified independent level of assistance (Progressing)       Start:  07/22/25    Expected End:  08/04/25            Patient with complete lower body dressing with minimal assist  level of assistance with PRN adaptive equipment while supported sitting (Progressing)       Start:  07/22/25    Expected End:  08/04/25            Patient will complete toileting  including hygiene clothing management/hygiene with maximal assist level of assistance and bedside commode. (Progressing)       Start:  07/22/25    Expected End:  08/04/25               MOBILITY       Patient will perform Functional mobility mod  Household distances/Community Distances with stand by assist level of assistance and front wheeled walker in order to improve safety and functional mobility. (Progressing)       Start:  07/22/25    Expected End:  08/04/25               TRANSFERS       Patient will perform bed mobility minimal assist  level of assistance x1 in order to improve safety and independence with mobility (Progressing)       Start:  07/22/25    Expected End:  08/04/25            Patient will complete functional transfer to toilet  with front wheeled walker with stand by assist level of assistance. (Progressing)       Start:  07/22/25    Expected End:  08/04/25            Patient will complete sit to stand transfer with stand by assist level of assistance and front wheeled walker in order to improve safety and prepare for out of bed mobility. (Progressing)       Start:  07/22/25    Expected End:  08/04/25

## 2025-07-30 LAB
ANION GAP SERPL CALC-SCNC: 12 MMOL/L (ref 10–20)
BASOPHILS # BLD AUTO: 0.02 X10*3/UL (ref 0–0.1)
BASOPHILS NFR BLD AUTO: 0.2 %
BUN SERPL-MCNC: 32 MG/DL (ref 6–23)
CALCIUM SERPL-MCNC: 10.2 MG/DL (ref 8.6–10.3)
CHLORIDE SERPL-SCNC: 90 MMOL/L (ref 98–107)
CO2 SERPL-SCNC: 40 MMOL/L (ref 21–32)
CREAT SERPL-MCNC: 0.98 MG/DL (ref 0.5–1.05)
EGFRCR SERPLBLD CKD-EPI 2021: 58 ML/MIN/1.73M*2
EOSINOPHIL # BLD AUTO: 0.03 X10*3/UL (ref 0–0.4)
EOSINOPHIL NFR BLD AUTO: 0.2 %
ERYTHROCYTE [DISTWIDTH] IN BLOOD BY AUTOMATED COUNT: 14.6 % (ref 11.5–14.5)
GLUCOSE SERPL-MCNC: 92 MG/DL (ref 74–99)
HCT VFR BLD AUTO: 40.3 % (ref 36–46)
HGB BLD-MCNC: 12.4 G/DL (ref 12–16)
IMM GRANULOCYTES # BLD AUTO: 0.08 X10*3/UL (ref 0–0.5)
IMM GRANULOCYTES NFR BLD AUTO: 0.6 % (ref 0–0.9)
LYMPHOCYTES # BLD AUTO: 1.34 X10*3/UL (ref 0.8–3)
LYMPHOCYTES NFR BLD AUTO: 10.5 %
MAGNESIUM SERPL-MCNC: 2.05 MG/DL (ref 1.6–2.4)
MCH RBC QN AUTO: 28.3 PG (ref 26–34)
MCHC RBC AUTO-ENTMCNC: 30.8 G/DL (ref 32–36)
MCV RBC AUTO: 92 FL (ref 80–100)
MONOCYTES # BLD AUTO: 0.83 X10*3/UL (ref 0.05–0.8)
MONOCYTES NFR BLD AUTO: 6.5 %
NEUTROPHILS # BLD AUTO: 10.41 X10*3/UL (ref 1.6–5.5)
NEUTROPHILS NFR BLD AUTO: 82 %
NRBC BLD-RTO: 0 /100 WBCS (ref 0–0)
PLATELET # BLD AUTO: 273 X10*3/UL (ref 150–450)
POTASSIUM SERPL-SCNC: 4.6 MMOL/L (ref 3.5–5.3)
RBC # BLD AUTO: 4.38 X10*6/UL (ref 4–5.2)
SODIUM SERPL-SCNC: 137 MMOL/L (ref 136–145)
WBC # BLD AUTO: 12.7 X10*3/UL (ref 4.4–11.3)

## 2025-07-30 PROCEDURE — 2500000001 HC RX 250 WO HCPCS SELF ADMINISTERED DRUGS (ALT 637 FOR MEDICARE OP)

## 2025-07-30 PROCEDURE — 2500000005 HC RX 250 GENERAL PHARMACY W/O HCPCS: Performed by: INTERNAL MEDICINE

## 2025-07-30 PROCEDURE — 36415 COLL VENOUS BLD VENIPUNCTURE: CPT

## 2025-07-30 PROCEDURE — 85025 COMPLETE CBC W/AUTO DIFF WBC: CPT

## 2025-07-30 PROCEDURE — 83735 ASSAY OF MAGNESIUM: CPT

## 2025-07-30 PROCEDURE — 80048 BASIC METABOLIC PNL TOTAL CA: CPT

## 2025-07-30 PROCEDURE — 2500000002 HC RX 250 W HCPCS SELF ADMINISTERED DRUGS (ALT 637 FOR MEDICARE OP, ALT 636 FOR OP/ED)

## 2025-07-30 PROCEDURE — 94660 CPAP INITIATION&MGMT: CPT

## 2025-07-30 PROCEDURE — 94640 AIRWAY INHALATION TREATMENT: CPT

## 2025-07-30 PROCEDURE — 2500000004 HC RX 250 GENERAL PHARMACY W/ HCPCS (ALT 636 FOR OP/ED)

## 2025-07-30 PROCEDURE — 9420000001 HC RT PATIENT EDUCATION 5 MIN

## 2025-07-30 PROCEDURE — 2060000001 HC INTERMEDIATE ICU ROOM DAILY

## 2025-07-30 RX ORDER — TORSEMIDE 20 MG/1
20 TABLET ORAL AS NEEDED
Status: DISCONTINUED | OUTPATIENT
Start: 2025-07-30 | End: 2025-07-31

## 2025-07-30 RX ORDER — TORSEMIDE 20 MG/1
20 TABLET ORAL DAILY
Status: DISCONTINUED | OUTPATIENT
Start: 2025-07-30 | End: 2025-07-30

## 2025-07-30 RX ORDER — DOXYCYCLINE HYCLATE 100 MG
100 TABLET ORAL EVERY 12 HOURS SCHEDULED
Status: DISCONTINUED | OUTPATIENT
Start: 2025-07-30 | End: 2025-07-30

## 2025-07-30 RX ORDER — CEFAZOLIN SODIUM 2 G/50ML
2 SOLUTION INTRAVENOUS EVERY 8 HOURS
Status: DISCONTINUED | OUTPATIENT
Start: 2025-07-30 | End: 2025-07-31

## 2025-07-30 RX ADMIN — ALBUTEROL SULFATE 2.5 MG: 2.5 SOLUTION RESPIRATORY (INHALATION) at 06:28

## 2025-07-30 RX ADMIN — ATORVASTATIN CALCIUM 10 MG: 10 TABLET, FILM COATED ORAL at 09:28

## 2025-07-30 RX ADMIN — Medication: at 18:45

## 2025-07-30 RX ADMIN — ENOXAPARIN SODIUM 60 MG: 60 INJECTION SUBCUTANEOUS at 20:32

## 2025-07-30 RX ADMIN — CEFAZOLIN SODIUM 2 G: 2 SOLUTION INTRAVENOUS at 23:09

## 2025-07-30 RX ADMIN — DILTIAZEM HYDROCHLORIDE 30 MG: 60 TABLET, FILM COATED ORAL at 09:27

## 2025-07-30 RX ADMIN — PREDNISONE 30 MG: 20 TABLET ORAL at 09:27

## 2025-07-30 RX ADMIN — TORSEMIDE 40 MG: 20 TABLET ORAL at 09:27

## 2025-07-30 RX ADMIN — AZITHROMYCIN DIHYDRATE 500 MG: 250 TABLET ORAL at 09:27

## 2025-07-30 RX ADMIN — ALBUTEROL SULFATE 2.5 MG: 2.5 SOLUTION RESPIRATORY (INHALATION) at 13:31

## 2025-07-30 RX ADMIN — Medication: at 06:32

## 2025-07-30 RX ADMIN — FLUTICASONE FUROATE AND VILANTEROL TRIFENATATE 1 PUFF: 200; 25 POWDER RESPIRATORY (INHALATION) at 06:32

## 2025-07-30 RX ADMIN — SPIRONOLACTONE 100 MG: 25 TABLET, FILM COATED ORAL at 09:27

## 2025-07-30 RX ADMIN — Medication: at 18:46

## 2025-07-30 RX ADMIN — ACETAMINOPHEN 650 MG: 325 TABLET ORAL at 20:32

## 2025-07-30 RX ADMIN — ALBUTEROL SULFATE 2.5 MG: 2.5 SOLUTION RESPIRATORY (INHALATION) at 18:45

## 2025-07-30 RX ADMIN — LEVOTHYROXINE SODIUM 100 MCG: 0.1 TABLET ORAL at 05:17

## 2025-07-30 RX ADMIN — ACETAMINOPHEN 650 MG: 325 TABLET ORAL at 14:01

## 2025-07-30 RX ADMIN — ENOXAPARIN SODIUM 60 MG: 60 INJECTION SUBCUTANEOUS at 09:28

## 2025-07-30 RX ADMIN — DILTIAZEM HYDROCHLORIDE 30 MG: 60 TABLET, FILM COATED ORAL at 20:32

## 2025-07-30 ASSESSMENT — COGNITIVE AND FUNCTIONAL STATUS - GENERAL
MOVING TO AND FROM BED TO CHAIR: A LITTLE
MOBILITY SCORE: 16
DRESSING REGULAR UPPER BODY CLOTHING: A LOT
MOVING FROM LYING ON BACK TO SITTING ON SIDE OF FLAT BED WITH BEDRAILS: A LITTLE
HELP NEEDED FOR BATHING: A LOT
DAILY ACTIVITIY SCORE: 13
PERSONAL GROOMING: A LOT
CLIMB 3 TO 5 STEPS WITH RAILING: A LOT
TOILETING: A LOT
DRESSING REGULAR LOWER BODY CLOTHING: A LOT
WALKING IN HOSPITAL ROOM: A LOT
STANDING UP FROM CHAIR USING ARMS: A LITTLE
TURNING FROM BACK TO SIDE WHILE IN FLAT BAD: A LITTLE
EATING MEALS: A LITTLE

## 2025-07-30 ASSESSMENT — PAIN DESCRIPTION - LOCATION
LOCATION: LEG
LOCATION: BACK

## 2025-07-30 ASSESSMENT — PAIN - FUNCTIONAL ASSESSMENT
PAIN_FUNCTIONAL_ASSESSMENT: 0-10

## 2025-07-30 ASSESSMENT — PAIN DESCRIPTION - DESCRIPTORS
DESCRIPTORS: ACHING
DESCRIPTORS: ACHING

## 2025-07-30 ASSESSMENT — PAIN SCALES - GENERAL
PAINLEVEL_OUTOF10: 3
PAINLEVEL_OUTOF10: 1
PAINLEVEL_OUTOF10: 3
PAINLEVEL_OUTOF10: 0 - NO PAIN

## 2025-07-30 NOTE — NURSING NOTE
Pulmonary Disease Navigator Documentation:    Comments:  Per DME, Medical Services, patient has been approved for home IVAPS machine.  Dr. Paul placed order for the following settings:     EPAP 8   Rate 16   PS Min 12   PS Max 22   Target Ve 10.4   Bleed in 5 lpm    Contact at [facility] is Shruthi Calderaer: [phone].  Shruthi stated she has been in contact with patient and family and will have machine delivered to the hospital this evening.  Medical team aware.

## 2025-07-30 NOTE — PROGRESS NOTES
Ailyn Banegas is a 80 y.o. female on day 9 of admission presenting with COPD exacerbation (Multi).      SUBJECTIVE     Patient evaluated this morning and found to be resting comfortably in chair. She reports no shortness of breath and cough. Tolerating well her switch of oxygen to her baseline @4L NC with 98% saturation and switch to NIV: AVAPS during night. She is waiting for insurance decision this noon. States that she was given one dose of lasix yesterday evening for swelling of limbs, today she has no concerns for swelling.    OBJECTIVE     Vitals:    07/30/25 0300 07/30/25 0631 07/30/25 0804 07/30/25 1214   BP: 130/70  128/65 159/67   BP Location: Right arm      Patient Position: Lying      Pulse: 83  83 74   Resp: 20  19    Temp: 36.2 °C (97.2 °F)  36 °C (96.8 °F) 36.2 °C (97.2 °F)   TempSrc: Temporal      SpO2: 98% 96% 96% 93%   Weight:       Height:          Results from last 7 days   Lab Units 07/30/25  0533   WBC AUTO x10*3/uL 12.7*   HEMOGLOBIN g/dL 12.4   HEMATOCRIT % 40.3   PLATELETS AUTO x10*3/uL 273   NEUTROS PCT AUTO % 82.0   LYMPHS PCT AUTO % 10.5   MONOS PCT AUTO % 6.5   EOS PCT AUTO % 0.2     Results from last 7 days   Lab Units 07/30/25  0533   SODIUM mmol/L 137   POTASSIUM mmol/L 4.6   CHLORIDE mmol/L 90*   CO2 mmol/L 40*   BUN mg/dL 32*   CREATININE mg/dL 0.98   CALCIUM mg/dL 10.2   GLUCOSE mg/dL 92       Scheduled Medications  Scheduled Medications[1]   Physical Exam    Constitutional: Well developed, A&Ox3, no acute distress, alert and cooperative  Respiratory/Thorax: CTAB, good chest expansion, crackles heard on right lower posterior chest  Cardiovascular: Regular rate, regular rhythm  Gastrointestinal: Nondistended, soft, non-tender  Extremities: normal extremities, no cyanosis edema  Skin: Warm and dry     Pertinent Imaging    XR chest 2 views 7/23/25:  Patchy bilateral mid to lower lung and lung base opacities and mild pulmonary vascular congestion and diffuse interstitial prominence  may reflect component of edema, overall similar to prior. Slight blunting costophrenic angles may indicate small effusions. Clinical correlation and follow up advised.       ASSESSMENT & PLAN   Ailyn Banegas is an 80 y.o. female with a PMHx COPD (on baseline 4L NC), MARLENY/likely OHS, Hypothyroidism, DLD, Morbid Obesity who was brought into the ED by family for respiratory distress, confusion, and daytime somnolence.     Daily Progress  Patient is well tolerating her switch to 4L NC oxygen. She is waiting insurance approval for her breathing devices to go home.    ASSESSMENT & PLAN    Acute Conditions  #Acute on chronic respiratory failure   #COPD exacerbation ( Multi)  - She was in and out of hospital since the end of May 2025 multiple times despite home oxygen @4L NC, Moderate obesity with obstructive sleep apnea and obesity hypoventilation, underlying COPD and related muscle weakness and deconditioning might have played role in her frequent hospitalization. She is doing well with NIVs.  - Pulmonology on board, recommended encourage use of noninvasive ventilation at night and as needed during the day.  PLAN:  - Wait for insurance approval till afternoon if they deny again, Dr Paul has put an order for IVAPs ready for her.  - Bronchodilators as needed.  - Titrate oxygen for saturation between 89-95%.  - Continue oral Azithromycin 500 mg 3 times weekly M,W,F for multiple exacerbations of COPD.  - Taper of steroids; Prednisone 30 mg, to 20 mg, and then to 10 mg (PO OD each dose for 3 days respectively and then stop)  - Avoid benzodiazepines and narcotics.  - Encourage physical therapy and ambulation.    Chronic Conditions  - MARLENY/OHS  - Hypothyroidism  - Cataract : outpatient eye exam follow up      Check Lists  Code: DNR  DVT ppx: Lovenox  Diet: Adult Regular Diet    Yolanda Cuello MD  PGY-1, Internal Medicine  Please SecureChat for any further questions  This is a preliminary note, please await attending  attestation for final A/P                  [1] albuterol, 2.5 mg, nebulization, TID  atorvastatin, 10 mg, oral, Daily  azithromycin, 500 mg, oral, Once per day on Monday Wednesday Friday  dilTIAZem, 30 mg, oral, q12h  enoxaparin, 60 mg, subcutaneous, q12h VLADIMIR  fluticasone furoate-vilanteroL, 1 puff, inhalation, Daily  levothyroxine, 100 mcg, oral, Daily  oxygen, , inhalation, Continuous - Inhalation  polyethylene glycol, 17 g, oral, Daily  predniSONE, 30 mg, oral, Daily   Followed by  [START ON 8/2/2025] predniSONE, 20 mg, oral, Daily   Followed by  [START ON 8/5/2025] predniSONE, 10 mg, oral, Daily  spironolactone, 100 mg, oral, Daily  torsemide, 40 mg, oral, Daily

## 2025-07-30 NOTE — CARE PLAN
The patient's goals for the shift include comfort and safety    The clinical goals for the shift include remain HDS      Problem: Skin  Goal: Decreased wound size/increased tissue granulation at next dressing change  Flowsheets (Taken 7/29/2025 1950)  Decreased wound size/increased tissue granulation at next dressing change: Promote sleep for wound healing  Goal: Participates in plan/prevention/treatment measures  Flowsheets (Taken 7/30/2025 1958)  Participates in plan/prevention/treatment measures: Elevate heels  Goal: Prevent/manage excess moisture  Flowsheets (Taken 7/30/2025 1958)  Prevent/manage excess moisture: Cleanse incontinence/protect with barrier cream  Goal: Prevent/minimize sheer/friction injuries  Flowsheets (Taken 7/28/2025 2049 by Zeeshan Purvis RN)  Prevent/minimize sheer/friction injuries:   Increase activity/out of bed for meals   Complete micro-shifts as needed if patient unable. Adjust patient position to relieve pressure points, not a full turn  Goal: Promote/optimize nutrition  Flowsheets (Taken 7/30/2025 1958)  Promote/optimize nutrition: Consume > 50% meals/supplements  Goal: Promote skin healing  Flowsheets (Taken 7/30/2025 1958)  Promote skin healing: Assess skin/pad under line(s)/device(s)

## 2025-07-31 ENCOUNTER — PATIENT OUTREACH (OUTPATIENT)
Dept: CARE COORDINATION | Facility: CLINIC | Age: 80
End: 2025-07-31
Payer: MEDICARE

## 2025-07-31 LAB
ANION GAP SERPL CALC-SCNC: 11 MMOL/L (ref 10–20)
BUN SERPL-MCNC: 38 MG/DL (ref 6–23)
C DIF TOX TCDA+TCDB STL QL NAA+PROBE: NOT DETECTED
CALCIUM SERPL-MCNC: 9.4 MG/DL (ref 8.6–10.3)
CHLORIDE SERPL-SCNC: 92 MMOL/L (ref 98–107)
CO2 SERPL-SCNC: 39 MMOL/L (ref 21–32)
CREAT SERPL-MCNC: 0.99 MG/DL (ref 0.5–1.05)
EGFRCR SERPLBLD CKD-EPI 2021: 57 ML/MIN/1.73M*2
ERYTHROCYTE [DISTWIDTH] IN BLOOD BY AUTOMATED COUNT: 14.6 % (ref 11.5–14.5)
GLUCOSE SERPL-MCNC: 104 MG/DL (ref 74–99)
HCT VFR BLD AUTO: 40.6 % (ref 36–46)
HGB BLD-MCNC: 12.5 G/DL (ref 12–16)
MAGNESIUM SERPL-MCNC: 1.97 MG/DL (ref 1.6–2.4)
MCH RBC QN AUTO: 28.3 PG (ref 26–34)
MCHC RBC AUTO-ENTMCNC: 30.8 G/DL (ref 32–36)
MCV RBC AUTO: 92 FL (ref 80–100)
NRBC BLD-RTO: 0 /100 WBCS (ref 0–0)
PLATELET # BLD AUTO: 258 X10*3/UL (ref 150–450)
POTASSIUM SERPL-SCNC: 4.3 MMOL/L (ref 3.5–5.3)
RBC # BLD AUTO: 4.41 X10*6/UL (ref 4–5.2)
SODIUM SERPL-SCNC: 138 MMOL/L (ref 136–145)
WBC # BLD AUTO: 14.2 X10*3/UL (ref 4.4–11.3)

## 2025-07-31 PROCEDURE — 2060000001 HC INTERMEDIATE ICU ROOM DAILY

## 2025-07-31 PROCEDURE — 9420000001 HC RT PATIENT EDUCATION 5 MIN

## 2025-07-31 PROCEDURE — 2500000005 HC RX 250 GENERAL PHARMACY W/O HCPCS: Performed by: INTERNAL MEDICINE

## 2025-07-31 PROCEDURE — 80048 BASIC METABOLIC PNL TOTAL CA: CPT

## 2025-07-31 PROCEDURE — 2500000001 HC RX 250 WO HCPCS SELF ADMINISTERED DRUGS (ALT 637 FOR MEDICARE OP)

## 2025-07-31 PROCEDURE — 2500000002 HC RX 250 W HCPCS SELF ADMINISTERED DRUGS (ALT 637 FOR MEDICARE OP, ALT 636 FOR OP/ED)

## 2025-07-31 PROCEDURE — 36415 COLL VENOUS BLD VENIPUNCTURE: CPT

## 2025-07-31 PROCEDURE — 2500000004 HC RX 250 GENERAL PHARMACY W/ HCPCS (ALT 636 FOR OP/ED)

## 2025-07-31 PROCEDURE — 94640 AIRWAY INHALATION TREATMENT: CPT

## 2025-07-31 PROCEDURE — 83735 ASSAY OF MAGNESIUM: CPT

## 2025-07-31 PROCEDURE — 85027 COMPLETE CBC AUTOMATED: CPT

## 2025-07-31 PROCEDURE — 87493 C DIFF AMPLIFIED PROBE: CPT | Mod: PARLAB

## 2025-07-31 RX ORDER — TORSEMIDE 20 MG/1
40 TABLET ORAL DAILY
Status: DISCONTINUED | OUTPATIENT
Start: 2025-07-31 | End: 2025-08-01 | Stop reason: HOSPADM

## 2025-07-31 RX ADMIN — Medication: at 06:47

## 2025-07-31 RX ADMIN — DILTIAZEM HYDROCHLORIDE 30 MG: 60 TABLET, FILM COATED ORAL at 20:12

## 2025-07-31 RX ADMIN — TORSEMIDE 40 MG: 20 TABLET ORAL at 20:17

## 2025-07-31 RX ADMIN — ALBUTEROL SULFATE 2.5 MG: 2.5 SOLUTION RESPIRATORY (INHALATION) at 06:47

## 2025-07-31 RX ADMIN — ACETAMINOPHEN 650 MG: 325 TABLET ORAL at 20:12

## 2025-07-31 RX ADMIN — CEFAZOLIN SODIUM 2 G: 2 SOLUTION INTRAVENOUS at 05:52

## 2025-07-31 RX ADMIN — ALBUTEROL SULFATE 2.5 MG: 2.5 SOLUTION RESPIRATORY (INHALATION) at 18:48

## 2025-07-31 RX ADMIN — ENOXAPARIN SODIUM 60 MG: 60 INJECTION SUBCUTANEOUS at 10:00

## 2025-07-31 RX ADMIN — LEVOTHYROXINE SODIUM 100 MCG: 0.1 TABLET ORAL at 05:52

## 2025-07-31 RX ADMIN — ENOXAPARIN SODIUM 60 MG: 60 INJECTION SUBCUTANEOUS at 20:12

## 2025-07-31 RX ADMIN — PREDNISONE 30 MG: 20 TABLET ORAL at 10:00

## 2025-07-31 RX ADMIN — Medication: at 08:00

## 2025-07-31 RX ADMIN — Medication: at 18:48

## 2025-07-31 RX ADMIN — FLUTICASONE FUROATE AND VILANTEROL TRIFENATATE 1 PUFF: 200; 25 POWDER RESPIRATORY (INHALATION) at 06:50

## 2025-07-31 RX ADMIN — SPIRONOLACTONE 100 MG: 25 TABLET, FILM COATED ORAL at 10:00

## 2025-07-31 RX ADMIN — ALBUTEROL SULFATE 2.5 MG: 2.5 SOLUTION RESPIRATORY (INHALATION) at 11:05

## 2025-07-31 RX ADMIN — DILTIAZEM HYDROCHLORIDE 30 MG: 60 TABLET, FILM COATED ORAL at 10:00

## 2025-07-31 RX ADMIN — ATORVASTATIN CALCIUM 10 MG: 10 TABLET, FILM COATED ORAL at 10:01

## 2025-07-31 ASSESSMENT — COGNITIVE AND FUNCTIONAL STATUS - GENERAL
STANDING UP FROM CHAIR USING ARMS: A LITTLE
EATING MEALS: A LITTLE
DRESSING REGULAR LOWER BODY CLOTHING: A LOT
MOVING FROM LYING ON BACK TO SITTING ON SIDE OF FLAT BED WITH BEDRAILS: A LITTLE
CLIMB 3 TO 5 STEPS WITH RAILING: A LOT
TOILETING: A LOT
WALKING IN HOSPITAL ROOM: A LOT
DAILY ACTIVITIY SCORE: 13
EATING MEALS: A LITTLE
MOBILITY SCORE: 16
DAILY ACTIVITIY SCORE: 18
TOILETING: A LITTLE
CLIMB 3 TO 5 STEPS WITH RAILING: A LOT
MOVING FROM LYING ON BACK TO SITTING ON SIDE OF FLAT BED WITH BEDRAILS: A LITTLE
WALKING IN HOSPITAL ROOM: A LOT
TURNING FROM BACK TO SIDE WHILE IN FLAT BAD: A LITTLE
HELP NEEDED FOR BATHING: A LITTLE
TURNING FROM BACK TO SIDE WHILE IN FLAT BAD: A LITTLE
HELP NEEDED FOR BATHING: A LOT
DRESSING REGULAR UPPER BODY CLOTHING: A LITTLE
MOVING TO AND FROM BED TO CHAIR: A LITTLE
DRESSING REGULAR LOWER BODY CLOTHING: A LITTLE
DRESSING REGULAR UPPER BODY CLOTHING: A LOT
STANDING UP FROM CHAIR USING ARMS: A LITTLE
PERSONAL GROOMING: A LITTLE
PERSONAL GROOMING: A LOT
MOBILITY SCORE: 16
MOVING TO AND FROM BED TO CHAIR: A LITTLE

## 2025-07-31 ASSESSMENT — PAIN SCALES - GENERAL
PAINLEVEL_OUTOF10: 3
PAINLEVEL_OUTOF10: 0 - NO PAIN
PAINLEVEL_OUTOF10: 0 - NO PAIN

## 2025-07-31 ASSESSMENT — PAIN DESCRIPTION - LOCATION: LOCATION: BACK

## 2025-07-31 ASSESSMENT — PAIN - FUNCTIONAL ASSESSMENT
PAIN_FUNCTIONAL_ASSESSMENT: 0-10
PAIN_FUNCTIONAL_ASSESSMENT: 0-10

## 2025-07-31 NOTE — PROGRESS NOTES
Patient currently hospitalized since 7/21 for COPD exacerbation.    JAIME Osorio, RN CM  [phone]   [facility] Department

## 2025-07-31 NOTE — CONSULTS
"Nutrition Initial Assessment:   Nutrition Assessment    Reason for Assessment: Length of stay    Patient is a 80 y.o. female presenting with resp distress; confusion      Nutrition History:  Food and Nutrient History: Pt is eating well and usually does when she is here.  She is being assess for LOS       Anthropometrics:  Height: 157.5 cm (5' 2\")   Weight: 145 kg (320 lb)   BMI (Calculated): 58.51  IBW/kg (Dietitian Calculated): 50 kg  Percent of IBW: 290 %                      Weight History:   Wt Readings from Last 10 Encounters:   07/21/25 145 kg (320 lb)   07/03/24 125 kg (275 lb)   05/17/24 113 kg (250 lb)   02/16/24 81.6 kg (180 lb)   02/07/24 81.6 kg (180 lb)   11/27/23 122 kg (270 lb)   10/07/22 122 kg (270 lb)   08/17/22 122 kg (270 lb)   04/06/22 127 kg (280 lb)   12/03/21 127 kg (280 lb)         Weight Change %:  Weight History / % Weight Change: Records indicate pt is up 45 lbs over the past year.    Nutrition Focused Physical Exam Findings:    Subcutaneous Fat Loss:   Orbital Fat Pads: Well nourished (slightly bulging fat pads)  Buccal Fat Pads: Well nourished (full, rounded cheeks)  Triceps: Well nourished (ample fat tissue)  Muscle Wasting:  Temporalis: Well nourished (well-defined muscle)  Pectoralis (Clavicular Region): Well nourished (clavicle not visible)  Deltoid/Trapezius: Well nourished (rounded appearance at arm, shoulder, neck)  Interosseous: Well nourished (muscle bulges)  Edema:  Edema Location: some edema  Physical Findings:  Skin: Positive (lower leg cellulitis)    Nutrition Significant Labs:  BMP Trend:   Results from last 7 days   Lab Units 07/31/25  0452 07/30/25  0533 07/29/25  0538 07/28/25  0546   GLUCOSE mg/dL 104* 92 88 86   CALCIUM mg/dL 9.4 10.2 10.0 9.5   SODIUM mmol/L 138 137 138 136   POTASSIUM mmol/L 4.3 4.6 4.5 4.6   CO2 mmol/L 39* 40* 37* 38*   CHLORIDE mmol/L 92* 90* 93* 94*   BUN mg/dL 38* 32* 30* 34*   CREATININE mg/dL 0.99 0.98 0.83 0.92        Nutrition Specific " Medications:  Lipitor; lovenox; synthroid; miralax; aldactone; demadex; protonix    I/O:   Last BM Date: 07/30/25; Stool Appearance: Liquid (07/31/25 0747)    Dietary Orders (From admission, onward)       Start     Ordered    07/21/25 2243  May Participate in Room Service  ( ROOM SERVICE MAY PARTICIPATE)  Once        Question:  .  Answer:  Yes    07/21/25 2242 07/21/25 1607  Adult diet Regular  Diet effective now        Question:  Diet type  Answer:  Regular    07/21/25 1631                     Estimated Needs:      Method for Estimating Needs: 1763-9260  26-30 sharmaine kg of OBW     Method for Estimating 24 Hour Protein Needs: 50-60  1-1.2 gm kg of IBW     Method for Estimating 24 Hour Fluid Needs: 7887-7542  20-30 ml kg of IBW as medically indicated  Patient on Order Fluid Restriction: No        Nutrition Diagnosis        Nutrition Diagnosis  Patient has Nutrition Diagnosis: Yes  Diagnosis Status (1): Active  Nutrition Diagnosis 1: Obesity class III  Related to (1): excessive energy intake and physical inactivity  As Evidenced by (1): obesity grade III   BMI    58.1       Nutrition Interventions/Recommendations   Nutrition prescription for oral nutrition    Nutrition Recommendations:  Individualized Nutrition Prescription Provided for : Continue regular diet,  monitor for possible supplement need    Nutrition Interventions/Goals:   Goal: >75% of meals  Coordination of Care with Providers: Nursing, Provider           Nutrition Monitoring and Evaluation   Estimated Energy Intake: Energy intake greater or equal to 75% of estimated energy needs  Fluid Intake:  (adequate fluid intake without fluid overload)  Intake / Amount of food: Consumes at least 75% or more of meals/snacks/supplements, Meets > 75% estimated energy needs         Criteria: Stable BMP  Criteria: glucose within desired range              Time Spent (min): 45 minutes

## 2025-07-31 NOTE — PROGRESS NOTES
07/31/25 1546   Discharge Planning   Home or Post Acute Services In home services   Expected Discharge Disposition Home Health     Plan to resume [facility] at discharge. Care transitions team will continue to follow for discharge planning.

## 2025-07-31 NOTE — PROGRESS NOTES
Ailyn Banegas is a 80 y.o. female on day 10 of admission presenting with COPD exacerbation (Multi).      SUBJECTIVE     Patient evaluated this morning and found to be resting comfortably in chair. She reports no shortness of breath and cough. Tolerating well her switch of oxygen to her baseline @4L NC with 94% saturation and switch to NIV: IVAPS during night. Yesterday evening her daughter had concern regarding her both legs being more red however, she insists she is aware about her legs being reddish for long time in the background of her bilateral legs issues. She had 5-6 episodes of bowel movement last night which initially was soft well formed and, last few episodes were more liquid. She noticed cramping sensation over abdomen prior to bowel movements and also gives history of no bowel movement for last 4 days prior to these episodes.    OBJECTIVE     Vitals:    07/31/25 0747 07/31/25 0800 07/31/25 1105 07/31/25 1137   BP: 133/75   110/61   BP Location:       Patient Position:       Pulse: 80      Resp: 20      Temp: 35.7 °C (96.3 °F)   36.4 °C (97.5 °F)   TempSrc: Temporal      SpO2: 92% 95% 94% 93%   Weight:       Height:          Results from last 7 days   Lab Units 07/31/25  0452 07/30/25  0533   WBC AUTO x10*3/uL 14.2* 12.7*   HEMOGLOBIN g/dL 12.5 12.4   HEMATOCRIT % 40.6 40.3   PLATELETS AUTO x10*3/uL 258 273   NEUTROS PCT AUTO %  --  82.0   LYMPHS PCT AUTO %  --  10.5   MONOS PCT AUTO %  --  6.5   EOS PCT AUTO %  --  0.2     Results from last 7 days   Lab Units 07/31/25  0452   SODIUM mmol/L 138   POTASSIUM mmol/L 4.3   CHLORIDE mmol/L 92*   CO2 mmol/L 39*   BUN mg/dL 38*   CREATININE mg/dL 0.99   CALCIUM mg/dL 9.4   GLUCOSE mg/dL 104*       Scheduled Medications  Scheduled Medications[1]   Physical Exam    Constitutional: Well developed, A&Ox3, no acute distress, alert and cooperative  Respiratory/Thorax: CTAB, good chest expansion, crackles heard on right lower posterior chest  Cardiovascular: Regular  rate, regular rhythm  Gastrointestinal: Nondistended, soft, non-tender  Extremities: normal extremities, no cyanosis edema                      : B/L Legs confined to shin area there is light red skin discoloration that blanches to touch, smooth and normal temp to touch with associated scaling, and non-tender. Mild swelling present.  Skin: Warm and dry     Pertinent Imaging    XR chest 2 views 7/23/25:  Patchy bilateral mid to lower lung and lung base opacities and mild pulmonary vascular congestion and diffuse interstitial prominence may reflect component of edema, overall similar to prior. Slight blunting costophrenic angles may indicate small effusions. Clinical correlation and follow up advised.       ASSESSMENT & PLAN   Ailyn Banegas is an 80 y.o. female with a PMHx COPD (on baseline 4L NC), MARLENY/likely OHS, Hypothyroidism, DLD, Morbid Obesity who was brought into the ED by family for respiratory distress, confusion, and daytime somnolence.     Daily Progress  Patient is well tolerating her switch to 4L NC oxygen. Her WBC was elevated to 14.2 from 12.7 and the episodes of bowel movement  ASSESSMENT & PLAN    Acute Conditions  #Acute on chronic respiratory failure   #COPD exacerbation ( Multi)  - She was in and out of hospital since the end of May 2025 multiple times despite home oxygen @4L NC, Moderate obesity with obstructive sleep apnea and obesity hypoventilation, underlying COPD and related muscle weakness and deconditioning might have played role in her frequent hospitalization. She is doing well with NIVs.  - Pulmonology on board, recommended encourage use of noninvasive ventilation at night and as needed during the day.  PLAN:  - Titrate oxygen for saturation between 89-95% @ 4L NC baseline  - Bronchodilators as needed.  - Continue oral Azithromycin 500 mg 3 times weekly M,W,F for multiple exacerbations of COPD.  - Taper of steroids; Prednisone 30 mg, to 20 mg, and then to 10 mg (PO OD each dose for 3  days respectively and then stop)  - Avoid benzodiazepines and narcotics.  - Encourage physical therapy and ambulation.    #Loose stool, overflow diarrhea  - As evidenced by her recent episodes of 5-6 episodes of loose motion over night and patient not being able to have one for 4 days prior to start of this issue. It seems more like a benign change in her bowel habit however, considering her recent antibiotic history, c. Diff test was ordered.   PLAN:  - Plenty of fluids, fiber rich diet  - C.diff pending  - Trend CBC tomorrow     Chronic Conditions  - MARLENY/OHS  - Hypothyroidism  - Cataract : outpatient eye exam follow up  - Chronic venous insufficiency:  Tab. Torsemide 40mg PO once a day added and scheduled daily from her previous regimen of 20 mg PRN for increased leg swelling    Check Lists  Code: DNR  DVT ppx: Lovenox  Diet: Adult Regular Diet    Yolanda Cuello MD  PGY-1, Internal Medicine  Please SecureChat for any further questions  This is a preliminary note, please await attending attestation for final A/P                    [1] albuterol, 2.5 mg, nebulization, TID  atorvastatin, 10 mg, oral, Daily  azithromycin, 500 mg, oral, Once per day on Monday Wednesday Friday  dilTIAZem, 30 mg, oral, q12h  enoxaparin, 60 mg, subcutaneous, q12h VLADIMIR  fluticasone furoate-vilanteroL, 1 puff, inhalation, Daily  levothyroxine, 100 mcg, oral, Daily  oxygen, , inhalation, Continuous - Inhalation  polyethylene glycol, 17 g, oral, Daily  predniSONE, 30 mg, oral, Daily   Followed by  [START ON 8/2/2025] predniSONE, 20 mg, oral, Daily   Followed by  [START ON 8/5/2025] predniSONE, 10 mg, oral, Daily  spironolactone, 100 mg, oral, Daily  torsemide, 40 mg, oral, Daily

## 2025-07-31 NOTE — PROGRESS NOTES
PULMONOLOGY CONSULT NOTE      Assessment/Plan      Patient is 80 y.o. female  with the following medical Problems:    COPD (on baseline 4 L nasal cannula)  MARLENY/likely OHS  Hypothyroidism  DLD  Morbid obesity     who presented to the ED for respiratory distress, confusion, and daytime somnolence.  She is being managed for acute on chronic respiratory failure secondary to COPD exacerbation.  She has a history of frequent hospitalizations for this issue since May 2025.   Currently doing well with noninvasive ventilation,  back on 4 L nasal cannula,  AVAPS at nighttime.    Plan of Care:  Continue prednisone taper  and Azithromycin  Adjunct COPD management: Physical therapy, ambulation, bronchodilators, O2 sat 89 to 95%.   Patient has been approved for home IV APS machine.  Avoid sedating medications      Subjective      History of Present Illness: 80-year-old female with a PMH of COPD on (baseline 4 L NC), MARLENY/OHS, hypothyroidism, DLD, morbid obesity, chronic BLE cellulitis, chronic anemia and hyperglycemia who presented to the ED by her family for respiratory distress, confusion, and daytime somnolence.  The patient was recently admitted to the ICU for acute on chronic hypercapnic hypoxic respiratory failure secondary to ADHF on 6/22/2025, requiring intubation for no improvement in blood gases and worsening mental status.  The daughter mentioned that the patient does not have the proper equipment at home to administer BiPAP effectively.  The patient's BiPAP at home requires new settings with AVAP for which they do not have the device.  She also reported that the patient's O2 on pulse oximetry was in the mid to high 80s.  The patient has also been experiencing increased daytime somnolence, along with intermittent periods of confusion.  She denied any fever, chills, and recent sick contacts.  However, she does have a cough (productive, clear sputum) s/p using BiPAP.  She is on 4 L of home oxygen.     Past  Medical/Surgical History:   Medical History[1]  Surgical History[2]    Family History:   No relevant family history has been documented for this patient.    Allergies:     Allergies[3]    Social history:   reports that she has never smoked. She has never used smokeless tobacco. She reports that she does not currently use alcohol. She reports that she does not currently use drugs.    Current Medications:   No recently discontinued medications to reconcile    Review of Systems:   General: denies weight gain, denies loss of appetite, fever, chills, night sweats.  Respiratory: denies chest pain, dyspnea, cough and hemoptysis  Cardiovascular: denies orthopnea, paroxysmal nocturnal dyspnea, leg swelling, and previous heart attack.        Objective      Vitals in Last 24h  Temp: Temp  Av.2 °C (97.1 °F)  Min: 35.7 °C (96.3 °F)  Max: 36.9 °C (98.4 °F)  HR: Pulse  Av.6  Min: 69  Max: 87  RR: Resp  Av.8  Min: 19  Max: 22  SPO2: SpO2  Av.8 %  Min: 92 %  Max: 97 %  FiO2: FiO2 (%)  Av %  Min: 36 %  Max: 36 %  MAP: MAP (mmHg)  Av  Min: 80  Max: 101  Input/Output:    Intake/Output Summary (Last 24 hours) at 2025 1626  Last data filed at 2025 0640  Gross per 24 hour   Intake 100 ml   Output 155 ml   Net -55 ml        Oxygen requirements:  Oxygen Therapy  Pulse Ox (24 hr min): 92  Medical Gas Therapy: Supplemental oxygen  Medical Gas Delivery Method: Nasal cannula  O2 Flow Rate (L/min): 4 L/min FiO2 (%): 36 %    Ventilator Information:  FiO2 (%):  [36 %] 36 %     Physical Exam:     General appearance:  obese body habitus.    HEENT: Atraumatic/normocephalic, EOMI, SHANTHI, pharynx clear, moist mucosa, redness of the uvula appreciated,   Neck: Supple, no jugular venous distension, lymphadenopathy, thyromegaly or carotid bruits  Chest: Minimal to no wheezes in the upper lobes. No crackles and no tenderness over ribs.   Cardiovascular: Normal S1, S2, regular rate and rhythm, no murmur, rub or  gallop  Abdomen: Normal sounds present, soft, lax with no tenderness, no hepatosplenomegaly, and no masses  Extremities: No edema. Pulses are equally present.   Skin: Bilateral mildly erythematous lower extremities  Neurologic: Alert and oriented x 3, No focal deficit     Investigations:  Labs, radiological imaging and cardiac work up were personally reviewed.      Theron Chris DO  PGY-1, Internal Medicine  Please SecureChat for any further questions  This is a preliminary note, please await attending attestation for final A/P       STAFF PHYSICIAN NOTE OF PERSONAL INVOLVEMENT IN CARE  As the attending physician, I certify that I personally reviewed the patient's history and personally examined the patient to confirm the physical findings described above, and that I reviewed the relevant imaging studies and available reports.  I also discussed the differential diagnosis and all of the proposed management plans with the patient and individuals accompanying the patient to this visit.  They had the opportunity to ask questions about the proposed management plans and to have those questions answered.          [1]   Past Medical History:  Diagnosis Date    Abnormal weight gain     Weight gain    Essential (primary) hypertension     HTN (hypertension), benign    Localized edema     Edema extremities    Old myocardial infarction     History of myocardial infarction    Old myocardial infarction 02/27/2017    History of non-ST elevation myocardial infarction (NSTEMI)    Other specified postprocedural states     History of repair of both hip joints    Other symptoms and signs involving the genitourinary system     Urinary problem    Pain due to internal orthopedic prosthetic devices, implants and grafts, initial encounter     Artificial joint pain    Personal history of other diseases of the circulatory system     History of hypertension    Personal history of other diseases of the musculoskeletal system and connective  tissue     Personal history of gout    Personal history of other diseases of the musculoskeletal system and connective tissue     Personal history of arthritis    Personal history of other diseases of the musculoskeletal system and connective tissue     Personal history of fibromyalgia    Personal history of other diseases of the musculoskeletal system and connective tissue     History of muscle pain    Personal history of other diseases of the respiratory system 02/12/2018    History of chronic respiratory failure    Personal history of other endocrine, nutritional and metabolic disease     History of thyroid disease    Personal history of other endocrine, nutritional and metabolic disease     History of obesity    Personal history of other endocrine, nutritional and metabolic disease     History of high cholesterol    Personal history of other endocrine, nutritional and metabolic disease 02/28/2014    History of hypothyroidism    Personal history of other medical treatment     History of mammogram    Personal history of other specified conditions     H/O shortness of breath    Personal history of pneumonia (recurrent)     History of pneumonia    Presence of artificial hip joint, bilateral     Status post total replacement of both hips    Pure hypercholesterolemia, unspecified     High cholesterol    Unspecified disorder of nose and nasal sinuses     Sinus problem   [2]   Past Surgical History:  Procedure Laterality Date    CARDIAC CATHETERIZATION  01/25/2018    Cardiac Cath Procedure Outcome:    CARPAL TUNNEL RELEASE  02/27/2014    Neuroplasty Median Nerve At Carpal Tunnel    HIP SURGERY  03/03/2018    Hip Surgery    MOUTH SURGERY  04/14/2015    Oral Surgery Tooth Extraction    THYROID SURGERY  04/14/2015    Thyroid Surgery    TONSILLECTOMY  04/14/2015    Tonsillectomy    TOTAL HIP ARTHROPLASTY  01/25/2018    Hip Replacement   [3] No Known Allergies

## 2025-07-31 NOTE — CARE PLAN
The patient's goals for the shift include comfort and safety    The clinical goals for the shift include remain hds    Over the shift, the patient will maintain safety and refrain from further complications

## 2025-07-31 NOTE — NURSING NOTE
Pulmonary Disease Navigator Documentation:    Comments:  Met with patient and verified IVAPS machine had been delivered by [facility] and that she along with family members received proper education on device and usage.  Patient familiar with device and stated she wore it less than normal last night due to issues with diarrhea, but feels comfortable and happy to have new machine for home usage.

## 2025-08-01 ENCOUNTER — PHARMACY VISIT (OUTPATIENT)
Dept: PHARMACY | Facility: CLINIC | Age: 80
End: 2025-08-01
Payer: COMMERCIAL

## 2025-08-01 ENCOUNTER — DOCUMENTATION (OUTPATIENT)
Dept: HOME HEALTH SERVICES | Facility: HOME HEALTH | Age: 80
End: 2025-08-01
Payer: MEDICARE

## 2025-08-01 VITALS
SYSTOLIC BLOOD PRESSURE: 129 MMHG | DIASTOLIC BLOOD PRESSURE: 72 MMHG | RESPIRATION RATE: 18 BRPM | TEMPERATURE: 97.5 F | HEIGHT: 62 IN | BODY MASS INDEX: 53.92 KG/M2 | HEART RATE: 81 BPM | OXYGEN SATURATION: 94 % | WEIGHT: 293 LBS

## 2025-08-01 LAB
ANION GAP SERPL CALC-SCNC: 12 MMOL/L (ref 10–20)
BUN SERPL-MCNC: 33 MG/DL (ref 6–23)
CALCIUM SERPL-MCNC: 9.8 MG/DL (ref 8.6–10.3)
CHLORIDE SERPL-SCNC: 92 MMOL/L (ref 98–107)
CO2 SERPL-SCNC: 38 MMOL/L (ref 21–32)
CREAT SERPL-MCNC: 1.06 MG/DL (ref 0.5–1.05)
EGFRCR SERPLBLD CKD-EPI 2021: 53 ML/MIN/1.73M*2
ERYTHROCYTE [DISTWIDTH] IN BLOOD BY AUTOMATED COUNT: 14.8 % (ref 11.5–14.5)
GLUCOSE SERPL-MCNC: 108 MG/DL (ref 74–99)
HCT VFR BLD AUTO: 41.7 % (ref 36–46)
HGB BLD-MCNC: 12.7 G/DL (ref 12–16)
MAGNESIUM SERPL-MCNC: 1.87 MG/DL (ref 1.6–2.4)
MCH RBC QN AUTO: 28 PG (ref 26–34)
MCHC RBC AUTO-ENTMCNC: 30.5 G/DL (ref 32–36)
MCV RBC AUTO: 92 FL (ref 80–100)
NRBC BLD-RTO: 0 /100 WBCS (ref 0–0)
PLATELET # BLD AUTO: 280 X10*3/UL (ref 150–450)
POTASSIUM SERPL-SCNC: 3.7 MMOL/L (ref 3.5–5.3)
RBC # BLD AUTO: 4.54 X10*6/UL (ref 4–5.2)
SODIUM SERPL-SCNC: 138 MMOL/L (ref 136–145)
WBC # BLD AUTO: 8.4 X10*3/UL (ref 4.4–11.3)

## 2025-08-01 PROCEDURE — 83735 ASSAY OF MAGNESIUM: CPT

## 2025-08-01 PROCEDURE — 2500000002 HC RX 250 W HCPCS SELF ADMINISTERED DRUGS (ALT 637 FOR MEDICARE OP, ALT 636 FOR OP/ED)

## 2025-08-01 PROCEDURE — 2500000004 HC RX 250 GENERAL PHARMACY W/ HCPCS (ALT 636 FOR OP/ED)

## 2025-08-01 PROCEDURE — 2500000001 HC RX 250 WO HCPCS SELF ADMINISTERED DRUGS (ALT 637 FOR MEDICARE OP)

## 2025-08-01 PROCEDURE — 85027 COMPLETE CBC AUTOMATED: CPT

## 2025-08-01 PROCEDURE — 36415 COLL VENOUS BLD VENIPUNCTURE: CPT

## 2025-08-01 PROCEDURE — RXMED WILLOW AMBULATORY MEDICATION CHARGE

## 2025-08-01 PROCEDURE — 94640 AIRWAY INHALATION TREATMENT: CPT

## 2025-08-01 PROCEDURE — 2500000005 HC RX 250 GENERAL PHARMACY W/O HCPCS: Performed by: INTERNAL MEDICINE

## 2025-08-01 PROCEDURE — 80048 BASIC METABOLIC PNL TOTAL CA: CPT

## 2025-08-01 RX ORDER — ALBUTEROL SULFATE 0.83 MG/ML
2.5 SOLUTION RESPIRATORY (INHALATION)
Qty: 270 ML | Refills: 0 | Status: SHIPPED | OUTPATIENT
Start: 2025-08-01 | End: 2025-08-01

## 2025-08-01 RX ORDER — ALBUTEROL SULFATE 0.83 MG/ML
2.5 SOLUTION RESPIRATORY (INHALATION)
Qty: 270 ML | Refills: 0 | Status: SHIPPED | OUTPATIENT
Start: 2025-08-01

## 2025-08-01 RX ORDER — NYSTATIN 100000 U/G
1 CREAM TOPICAL 2 TIMES DAILY PRN
Qty: 30 G | Refills: 0 | Status: SHIPPED | OUTPATIENT
Start: 2025-08-01 | End: 2025-08-08

## 2025-08-01 RX ORDER — PREDNISONE 10 MG/1
TABLET ORAL
Qty: 12 TABLET | Refills: 0 | Status: SHIPPED | OUTPATIENT
Start: 2025-08-01 | End: 2025-08-08

## 2025-08-01 RX ADMIN — ENOXAPARIN SODIUM 60 MG: 60 INJECTION SUBCUTANEOUS at 19:47

## 2025-08-01 RX ADMIN — Medication: at 06:59

## 2025-08-01 RX ADMIN — DILTIAZEM HYDROCHLORIDE 30 MG: 60 TABLET, FILM COATED ORAL at 19:46

## 2025-08-01 RX ADMIN — PREDNISONE 30 MG: 20 TABLET ORAL at 09:47

## 2025-08-01 RX ADMIN — FLUTICASONE FUROATE AND VILANTEROL TRIFENATATE 1 PUFF: 200; 25 POWDER RESPIRATORY (INHALATION) at 07:03

## 2025-08-01 RX ADMIN — AZITHROMYCIN DIHYDRATE 500 MG: 250 TABLET ORAL at 09:47

## 2025-08-01 RX ADMIN — ATORVASTATIN CALCIUM 10 MG: 10 TABLET, FILM COATED ORAL at 09:47

## 2025-08-01 RX ADMIN — DILTIAZEM HYDROCHLORIDE 30 MG: 60 TABLET, FILM COATED ORAL at 09:47

## 2025-08-01 RX ADMIN — SPIRONOLACTONE 100 MG: 25 TABLET, FILM COATED ORAL at 09:46

## 2025-08-01 RX ADMIN — Medication 1 DOSE: at 12:27

## 2025-08-01 RX ADMIN — ALBUTEROL SULFATE 2.5 MG: 2.5 SOLUTION RESPIRATORY (INHALATION) at 12:27

## 2025-08-01 RX ADMIN — ENOXAPARIN SODIUM 60 MG: 60 INJECTION SUBCUTANEOUS at 09:46

## 2025-08-01 RX ADMIN — ALBUTEROL SULFATE 2.5 MG: 2.5 SOLUTION RESPIRATORY (INHALATION) at 06:59

## 2025-08-01 RX ADMIN — LEVOTHYROXINE SODIUM 100 MCG: 0.1 TABLET ORAL at 05:32

## 2025-08-01 RX ADMIN — Medication: at 06:58

## 2025-08-01 ASSESSMENT — COGNITIVE AND FUNCTIONAL STATUS - GENERAL
TURNING FROM BACK TO SIDE WHILE IN FLAT BAD: A LITTLE
DAILY ACTIVITIY SCORE: 18
DRESSING REGULAR UPPER BODY CLOTHING: A LITTLE
PERSONAL GROOMING: A LITTLE
HELP NEEDED FOR BATHING: A LITTLE
MOBILITY SCORE: 16
DRESSING REGULAR LOWER BODY CLOTHING: A LITTLE
WALKING IN HOSPITAL ROOM: A LOT
CLIMB 3 TO 5 STEPS WITH RAILING: A LOT
MOVING FROM LYING ON BACK TO SITTING ON SIDE OF FLAT BED WITH BEDRAILS: A LITTLE
STANDING UP FROM CHAIR USING ARMS: A LITTLE
TOILETING: A LITTLE
EATING MEALS: A LITTLE
MOVING TO AND FROM BED TO CHAIR: A LITTLE

## 2025-08-01 ASSESSMENT — PAIN - FUNCTIONAL ASSESSMENT: PAIN_FUNCTIONAL_ASSESSMENT: 0-10

## 2025-08-01 ASSESSMENT — PAIN SCALES - GENERAL: PAINLEVEL_OUTOF10: 0 - NO PAIN

## 2025-08-01 NOTE — HH CARE COORDINATION
Home Care received a Referral to Resume Care for Nursing, Physical Therapy, and Occupational Therapy. We have processed the referral for a Resumption of Care 8/2/2025    If you have any questions or concerns, please feel free to contact us at [phone]. Follow the prompts, enter your five digit zip code, and you will be directed to your care team on [facility] 3.

## 2025-08-01 NOTE — PROGRESS NOTES
08/01/25 0839   Discharge Planning   Home or Post Acute Services In home services   Expected Discharge Disposition Home Health     Patient has written discharge order. [facility] updated on plan for discharge today.     Addendum 0932: Met with patient at bedside to follow up on discharge plan. Patient states she will need transport home, bedside nurse updated. Patient has declined any further home going needs.

## 2025-08-01 NOTE — CARE PLAN
The patient's goals for the shift include comfort and safety    The clinical goals for the shift include remain HDS    Over the shift, the patient will maintain safety measures and refrain from further complications

## 2025-08-01 NOTE — DISCHARGE SUMMARY
Discharge diagnosis:  Acute on chronic hypoxic hypercapnic respiratory failure  Acute exacerbation COPD  MARLENY  Obesity hypoventilation syndrome  Morbid obesity    Hospital course:  Ailyn Banegas is an 80 y.o. female with a PMHx COPD (on baseline 4L NC), MARLENY/likely OHS, Hypothyroidism, DLD, Morbid Obesity who was brought into the ED by family for respiratory distress, confusion, and daytime somnolence.  Of note, patient was recently admitted 6/22 for hypoxic respiratory failure in the setting of decompensated heart failure which resulted in intubation.  Patient was eventually able to weaned off and discharged 7/30.  On evaluation in the ED this visit, it was discovered that the patient did not have the proper equipment at home to administer her BiPAP effectively.  Apparently her new setting requirements necessitated AVAPS setting which her device did not have.  Patient was admitted and treated for a COPD exacerbation with short course of ABX, put on steroids, and scheduled nebulizers.  Pulmonology was consulted to facilitate insurance approval and acquisition of new device.  Over the course of the next 9 days there was multiple setbacks in terms of getting her approved for this new device.  Inevitably, patient was able to be approved for IVAPS and device was delivered at bedside.  Patient will be discharged home to resume her HHC on 8/1.  She was given strict instructions to follow-up with her PCP and pulmonologist in the outpatient setting.  Patient will continue her described prednisone taper.    Scheduled Medications  Scheduled Medications[1]   Vitals:    08/01/25 0659   BP:    Pulse:    Resp:    Temp:    SpO2: 94%      Physical Exam:    Constitutional: Morbidly obese, A&Ox3, no acute distress, alert and cooperative  HEENT: EOMI, clear sclera, thick neck  Respiratory/Thorax: Distant lung sounds, good chest expansion  Cardiovascular: Regular rate, regular rhythm  Gastrointestinal: Obese habitus, soft,  non-tender  Extremities: Darkened skin on lower extremity consistent with CVI, 1+ pitting edema bilaterally (chronic), no cyanosis edema  Skin: Warm and dry    Outpatient follow-up instructions and medication changes:  - You are being sent home with a new variant of your BiPAP machine.  Continue to wear this nightly.  - Please follow-up with your established pulmonologist.  Call to make an appointment within the next month.  - Please continue to take your home torsemide 40 mg daily for your leg swelling  - You will need to complete your prednisone taper that has been started in the hospital.  Take 30 mg for 1 more day, followed by 20 mg for 3 days, followed by 10 mg for 3 days after that.  - You have been given a prescription for nystatin.  Please use this as needed in the skin folds to reduce risk of infection and to keep skin dry.  - Please follow-up with your primary care physician within the next 2 weeks.    Harjinder Brady,   PGY-3, Internal Medicine  Please SecureChat for any further questions         [1] albuterol, 2.5 mg, nebulization, TID  atorvastatin, 10 mg, oral, Daily  azithromycin, 500 mg, oral, Once per day on Monday Wednesday Friday  dilTIAZem, 30 mg, oral, q12h  enoxaparin, 60 mg, subcutaneous, q12h VLADIMIR  fluticasone furoate-vilanteroL, 1 puff, inhalation, Daily  levothyroxine, 100 mcg, oral, Daily  oxygen, , inhalation, Continuous - Inhalation  polyethylene glycol, 17 g, oral, Daily  predniSONE, 30 mg, oral, Daily   Followed by  [START ON 8/2/2025] predniSONE, 20 mg, oral, Daily   Followed by  [START ON 8/5/2025] predniSONE, 10 mg, oral, Daily  spironolactone, 100 mg, oral, Daily  torsemide, 40 mg, oral, Daily

## 2025-08-01 NOTE — PROGRESS NOTES
PULMONOLOGY CONSULT NOTE      Assessment/Plan      Patient is 80 y.o. female  with the following medical Problems:    #COPD (on baseline 4 L nasal cannula)  #MARLENY/likely OHS  #Morbid obesity    Patient presented to ED for respiratory distress, confusion, and daytime somnolence.  She is being managed for acute on chronic respiratory failure secondary to COPD exacerbation.  She has a history of frequent hospitalizations for this issue since May 2025.   Currently doing well with noninvasive ventilation,  back on 4 L nasal cannula,  AVAPS at nighttime.  8 8  Continue prednisone taper  Continue with azithromycin (MWF)  Continue with physical therapy, ambulation, bronchodilators, O2 sat 89 to 95%.   Patient has been approved for home IV APS machine pending discharge  Avoid sedating medications.      Subjective    Patient seen and examined at bedside, tells me she feels good, denies any active complaints  Denies any chest pain, shortness of breath, difficulty breathing  Denies any nausea/vomiting or abdominal pain  Denies any frequency urgency or dysuria  Does not have any other active complaints at this time    Objective      Vitals in Last 24h  Temp: Temp  Av.4 °C (97.5 °F)  Min: 36.2 °C (97.2 °F)  Max: 36.9 °C (98.4 °F)  HR: Pulse  Av.5  Min: 64  Max: 88  RR: Resp  Av.5  Min: 16  Max: 22  SPO2: SpO2  Av.6 %  Min: 93 %  Max: 98 %  FiO2: FiO2 (%)  Av %  Min: 36 %  Max: 36 %  MAP: MAP (mmHg)  Av.5  Min: 80  Max: 103  Input/Output:    Intake/Output Summary (Last 24 hours) at 2025 1023  Last data filed at 2025 0543  Gross per 24 hour   Intake 240 ml   Output 1300 ml   Net -1060 ml        Oxygen requirements:  Oxygen Therapy  Pulse Ox (24 hr min): 93  Medical Gas Therapy: Supplemental oxygen  Medical Gas Delivery Method: Nasal cannula  O2 Flow Rate (L/min): 4 L/min FiO2 (%): 36 %    Ventilator Information:  FiO2 (%):  [36 %] 36 %     Physical Exam:     General appearance: ANO x 3,  obese, on 4 L of oxygen (baseline)  Chest: Decreased air entry carpules, no wheeze, no rhonchi.  Cardiovascular: Normal S1, S2, regular rate and rhythm, no murmur, rub or gallop  Abdomen: Soft, nontender, nondistended, bowel sounds positive  Extremities: No edema. Pulses are equally present.       Rest of management per primary team    Case discussed with attending physician  -------------------------------------------------------------------  Mike Jalloh MD   IM-[id number]

## 2025-08-01 NOTE — DISCHARGE INSTRUCTIONS
************************PLEASE FOLLOW THE INSTRUCTIONS BELOW***************************          - You are being sent home with a new variant of your BiPAP machine.  Continue to wear this nightly.  - Please follow-up with your established pulmonologist.  Call to make an appointment within the next month.  - Please continue to take your home torsemide 40 mg daily for your leg swelling  - You will need to complete your prednisone taper that has been started in the hospital.  Take 30 mg for 1 more day, followed by 20 mg for 3 days, followed by 10 mg for 3 days after that.  - You have been given a prescription for nystatin.  Please use this as needed in the skin folds to reduce risk of infection and to keep skin dry.

## 2025-08-01 NOTE — CARE PLAN
The patient's goals for the shift include comfort and safety    The clinical goals for the shift include remain HDS    Problem: Pain - Adult  Goal: Verbalizes/displays adequate comfort level or baseline comfort level  Outcome: Progressing

## 2025-08-04 ENCOUNTER — HOME CARE VISIT (OUTPATIENT)
Dept: HOME HEALTH SERVICES | Facility: HOME HEALTH | Age: 80
End: 2025-08-04
Payer: MEDICARE

## 2025-08-04 ENCOUNTER — PATIENT OUTREACH (OUTPATIENT)
Dept: CARE COORDINATION | Facility: CLINIC | Age: 80
End: 2025-08-04
Payer: MEDICARE

## 2025-08-04 VITALS
DIASTOLIC BLOOD PRESSURE: 60 MMHG | RESPIRATION RATE: 18 BRPM | TEMPERATURE: 97.7 F | SYSTOLIC BLOOD PRESSURE: 118 MMHG | HEART RATE: 96 BPM | OXYGEN SATURATION: 96 %

## 2025-08-04 DIAGNOSIS — E03.9 HYPOTHYROIDISM, UNSPECIFIED TYPE: ICD-10-CM

## 2025-08-04 PROCEDURE — G0299 HHS/HOSPICE OF RN EA 15 MIN: HCPCS

## 2025-08-04 RX ORDER — LEVOTHYROXINE SODIUM 100 UG/1
100 TABLET ORAL DAILY
Qty: 90 TABLET | Refills: 2 | Status: SHIPPED | OUTPATIENT
Start: 2025-08-04 | End: 2025-09-03

## 2025-08-04 SDOH — ECONOMIC STABILITY: HOUSING INSECURITY: EVIDENCE OF SMOKING MATERIAL: 0

## 2025-08-04 SDOH — HEALTH STABILITY: MENTAL HEALTH: SMOKING IN HOME: 0

## 2025-08-04 SDOH — ECONOMIC STABILITY: GENERAL: WOULD YOU LIKE HELP WITH ANY OF THE FOLLOWING NEEDS?: I DONT NEED HELP WITH ANY OF THESE

## 2025-08-04 ASSESSMENT — ACTIVITIES OF DAILY LIVING (ADL)
ENTERING_EXITING_HOME: STAND BY ASSIST
OASIS_M1830: 03

## 2025-08-04 ASSESSMENT — ENCOUNTER SYMPTOMS
ANGER WITHIN DEFINED LIMITS: 1
PAIN SEVERITY GOAL: 0/10
LIMITED RANGE OF MOTION: 1
SLEEP QUALITY: ADEQUATE
APPETITE LEVEL: FAIR
HIGHEST PAIN SEVERITY IN PAST 24 HOURS: 6/10
AGGRESSION WITHIN DEFINED LIMITS: 1
MUSCLE WEAKNESS: 1
LOWEST PAIN SEVERITY IN PAST 24 HOURS: 0/10
LOWER EXTREMITY EDEMA: 1

## 2025-08-04 NOTE — PROGRESS NOTES
Inpatient Facility:  [facility]   Admission: 7/21/2025  Discharge: 8/1/2025  Discharge Diagnosis: Acute COPD exacerbation, MARLENY, Acute on chronic hypoxia hypercapnia respiratory failure, obesity hypoventilation syndrome.     Outreach successful.  This CM spoke with patient who states she is doing well at home breathing has improved, currently taking prednisone which is causing some puffiness, she states she has had this before.  Patient states she will make a follow up with her PCP.  Currently, she is seeing both  PT. OT in her home.  Patient did receive her new Bi-PAP machine which she says have been really helpful for her.  Patient seen  yesterday.  Patient had no questions or needs states she is doing much better.  CM will continue to follow.       Wrap Up  Wrap Up Additional Comments: CM will continue to follow up with patient (8/4/2025  1:05 PM)  Call End Time: 1308 (8/4/2025  1:05 PM)    Engagement  Call Start Time: 1305 (8/4/2025  1:05 PM)    Medications  Medications reviewed with patient/caregiver?: Yes (8/4/2025  1:05 PM)  Is the patient having any side effects they believe may be caused by any medication additions or changes?: (!) Yes (Patient is having some puffiness which she has had before when taking prednisone.) (8/4/2025  1:05 PM)  Does the patient have all medications ordered at discharge?: Yes (8/4/2025  1:05 PM)  Care Management Interventions: No intervention needed (8/4/2025  1:05 PM)  Is the patient taking all medications as directed (includes completed medication regime)?: Yes (8/4/2025  1:05 PM)    Appointments  Does the patient have a primary care provider?: Yes (8/4/2025  1:05 PM)  Care Management Interventions: Advised patient to make appointment (8/4/2025  1:05 PM)  Has the patient kept scheduled appointments due by today?: Yes (8/4/2025  1:05 PM)    Self Management  What is the home health agency?:  home care (8/4/2025  1:05 PM)  Has home health visited the  patient within 72 hours of discharge?: Yes (8/4/2025  1:05 PM)  What Durable Medical Equipment (DME) was ordered?: New Bi pap machine (8/4/2025  1:05 PM)    Patient Teaching  Does the patient have access to their discharge instructions?: Yes (8/4/2025  1:05 PM)  Care Management Interventions: Reviewed instructions with patient (8/4/2025  1:05 PM)  What is the patient's perception of their health status since discharge?: Improving (8/4/2025  1:05 PM)  Is the patient/caregiver able to teach back the hierarchy of who to call/visit for symptoms/problems? PCP, Specialist, Home Health nurse, Urgent Care, ED, 911: Yes (8/4/2025  1:05 PM)        JAIME Osorio, RN   [phone]   ACO Department

## 2025-08-04 NOTE — SIGNIFICANT EVENT
08/04/25 1307   Social Determinants of Health- Help Requested   Would you like help with any of the following needs? I dont need help with any of these   Are any of your needs urgent? For example, uncertainty of where you will get your next meal or not having the medications you need to take tomorrow. N

## 2025-08-05 ENCOUNTER — HOME CARE VISIT (OUTPATIENT)
Dept: HOME HEALTH SERVICES | Facility: HOME HEALTH | Age: 80
End: 2025-08-05
Payer: MEDICARE

## 2025-08-05 VITALS — OXYGEN SATURATION: 95 % | DIASTOLIC BLOOD PRESSURE: 80 MMHG | HEART RATE: 78 BPM | SYSTOLIC BLOOD PRESSURE: 120 MMHG

## 2025-08-05 VITALS — HEART RATE: 85 BPM | OXYGEN SATURATION: 96 %

## 2025-08-05 PROCEDURE — G0152 HHCP-SERV OF OT,EA 15 MIN: HCPCS

## 2025-08-05 PROCEDURE — G0151 HHCP-SERV OF PT,EA 15 MIN: HCPCS

## 2025-08-05 SDOH — ECONOMIC STABILITY: HOUSING INSECURITY: EVIDENCE OF SMOKING MATERIAL: 0

## 2025-08-05 SDOH — HEALTH STABILITY: MENTAL HEALTH: SMOKING IN HOME: 0

## 2025-08-05 ASSESSMENT — ENCOUNTER SYMPTOMS
PAIN LOCATION: RIGHT KNEE
PERSON REPORTING PAIN: PATIENT
PAIN LOCATION: LEFT KNEE
PAIN LOCATION: RIGHT KNEE
PAIN LOCATION - PAIN SEVERITY: 1/10
PAIN LOCATION - PAIN FREQUENCY: FREQUENT
PAIN LOCATION - RELIEVING FACTORS: NO
PAIN LOCATION - PAIN SEVERITY: 6/10
LOWEST PAIN SEVERITY IN PAST 24 HOURS: 5/10
PAIN: 1
SUBJECTIVE PAIN PROGRESSION: WAXING AND WANING
PAIN LOCATION - PAIN SEVERITY: 6/10
PAIN LOCATION: LEFT KNEE
SUBJECTIVE PAIN PROGRESSION: UNCHANGED
PAIN LOCATION - RELIEVING FACTORS: NO
PAIN LOCATION - PAIN SEVERITY: 1/10
PAIN: 1
PERSON REPORTING PAIN: PATIENT
HIGHEST PAIN SEVERITY IN PAST 24 HOURS: 5/10
PAIN LOCATION - PAIN FREQUENCY: FREQUENT

## 2025-08-05 ASSESSMENT — ACTIVITIES OF DAILY LIVING (ADL)
BATHING_CURRENT_FUNCTION: MODERATE ASSIST
DRESSING_UB_CURRENT_FUNCTION: MINIMUM ASSIST
TOILETING: 1
AMBULATION_DISTANCE/DURATION_TOLERATED: 0 FT
DRESSING_LB_CURRENT_FUNCTION: MAXIMUM ASSIST
TOILETING: MAXIMUM ASSIST
BATHING ASSESSED: 1

## 2025-08-06 ENCOUNTER — HOME CARE VISIT (OUTPATIENT)
Dept: HOME HEALTH SERVICES | Facility: HOME HEALTH | Age: 80
End: 2025-08-06
Payer: MEDICARE

## 2025-08-06 PROCEDURE — G0156 HHCP-SVS OF AIDE,EA 15 MIN: HCPCS

## 2025-08-11 ENCOUNTER — HOME CARE VISIT (OUTPATIENT)
Dept: HOME HEALTH SERVICES | Facility: HOME HEALTH | Age: 80
End: 2025-08-11
Payer: MEDICARE

## 2025-08-11 VITALS
RESPIRATION RATE: 12 BRPM | OXYGEN SATURATION: 94 % | DIASTOLIC BLOOD PRESSURE: 86 MMHG | TEMPERATURE: 97.9 F | HEART RATE: 97 BPM | SYSTOLIC BLOOD PRESSURE: 140 MMHG

## 2025-08-11 DIAGNOSIS — J44.1 CHRONIC OBSTRUCTIVE PULMONARY DISEASE WITH (ACUTE) EXACERBATION (MULTI): ICD-10-CM

## 2025-08-11 DIAGNOSIS — J96.22 ACUTE AND CHRONIC RESPIRATORY FAILURE WITH HYPERCAPNIA: Primary | ICD-10-CM

## 2025-08-11 PROCEDURE — G0299 HHS/HOSPICE OF RN EA 15 MIN: HCPCS

## 2025-08-11 ASSESSMENT — ENCOUNTER SYMPTOMS
PAIN SEVERITY GOAL: 6/10
HIGHEST PAIN SEVERITY IN PAST 24 HOURS: 8/10
APPETITE LEVEL: GOOD
PAIN: 1
LOWEST PAIN SEVERITY IN PAST 24 HOURS: 7/10

## 2025-08-12 ENCOUNTER — HOME CARE VISIT (OUTPATIENT)
Dept: HOME HEALTH SERVICES | Facility: HOME HEALTH | Age: 80
End: 2025-08-12
Payer: MEDICARE

## 2025-08-12 VITALS — OXYGEN SATURATION: 96 % | HEART RATE: 103 BPM | SYSTOLIC BLOOD PRESSURE: 118 MMHG | DIASTOLIC BLOOD PRESSURE: 80 MMHG

## 2025-08-12 PROCEDURE — G0156 HHCP-SVS OF AIDE,EA 15 MIN: HCPCS

## 2025-08-12 PROCEDURE — G0152 HHCP-SERV OF OT,EA 15 MIN: HCPCS

## 2025-08-12 SDOH — HEALTH STABILITY: MENTAL HEALTH: SMOKING IN HOME: 0

## 2025-08-12 SDOH — ECONOMIC STABILITY: HOUSING INSECURITY: EVIDENCE OF SMOKING MATERIAL: 0

## 2025-08-12 ASSESSMENT — ENCOUNTER SYMPTOMS
PERSON REPORTING PAIN: PATIENT
PAIN LOCATION - RELIEVING FACTORS: MEDS/REST
PAIN LOCATION - PAIN SEVERITY: 3/10
PAIN LOCATION: LEFT KNEE
PAIN LOCATION - PAIN SEVERITY: 5/10
PAIN LOCATION - PAIN FREQUENCY: FREQUENT
PAIN: 1
SUBJECTIVE PAIN PROGRESSION: UNCHANGED
LOWEST PAIN SEVERITY IN PAST 24 HOURS: 3/10
PAIN LOCATION - EXACERBATING FACTORS: ACTIVITY
HIGHEST PAIN SEVERITY IN PAST 24 HOURS: 9/10
PAIN LOCATION - RELIEVING FACTORS: MEDS/REST
PAIN LOCATION - PAIN FREQUENCY: FREQUENT
PAIN LOCATION: RIGHT KNEE
PAIN LOCATION - EXACERBATING FACTORS: ACTIVITY

## 2025-08-13 ENCOUNTER — HOME CARE VISIT (OUTPATIENT)
Dept: HOME HEALTH SERVICES | Facility: HOME HEALTH | Age: 80
End: 2025-08-13
Payer: MEDICARE

## 2025-08-13 ENCOUNTER — TELEPHONE (OUTPATIENT)
Dept: PRIMARY CARE | Facility: CLINIC | Age: 80
End: 2025-08-13
Payer: MEDICARE

## 2025-08-13 VITALS
TEMPERATURE: 98 F | DIASTOLIC BLOOD PRESSURE: 68 MMHG | HEART RATE: 86 BPM | OXYGEN SATURATION: 96 % | RESPIRATION RATE: 16 BRPM | SYSTOLIC BLOOD PRESSURE: 108 MMHG

## 2025-08-13 DIAGNOSIS — F41.8 ANXIETY ASSOCIATED WITH DEPRESSION: ICD-10-CM

## 2025-08-13 PROCEDURE — G0157 HHC PT ASSISTANT EA 15: HCPCS | Mod: CQ

## 2025-08-13 SDOH — HEALTH STABILITY: MENTAL HEALTH: SMOKING IN HOME: 0

## 2025-08-13 SDOH — ECONOMIC STABILITY: HOUSING INSECURITY: EVIDENCE OF SMOKING MATERIAL: 0

## 2025-08-13 ASSESSMENT — ENCOUNTER SYMPTOMS
PAIN LOCATION - RELIEVING FACTORS: PAIN MEDICATION
PAIN LOCATION - PAIN QUALITY: ACHY, SORE
PAIN: 1
PAIN LOCATION - RELIEVING FACTORS: PAIN MEDICATION
PAIN LOCATION - PAIN QUALITY: ACHY, SORE
HIGHEST PAIN SEVERITY IN PAST 24 HOURS: 8/10
PAIN LOCATION: RIGHT KNEE
PAIN LOCATION - PAIN SEVERITY: 4/10
PAIN SEVERITY GOAL: 0/10
PAIN LOCATION - PAIN FREQUENCY: CONSTANT
PAIN LOCATION - PAIN SEVERITY: 6/10
PAIN LOCATION - PAIN FREQUENCY: CONSTANT
PAIN LOCATION: LEFT KNEE
PERSON REPORTING PAIN: PATIENT
LOWEST PAIN SEVERITY IN PAST 24 HOURS: 4/10

## 2025-08-15 RX ORDER — HYDROXYZINE PAMOATE 25 MG/1
25 CAPSULE ORAL 3 TIMES DAILY PRN
Qty: 90 CAPSULE | Refills: 1 | Status: SHIPPED | OUTPATIENT
Start: 2025-08-15 | End: 2025-09-14

## 2025-08-18 ENCOUNTER — HOME CARE VISIT (OUTPATIENT)
Dept: HOME HEALTH SERVICES | Facility: HOME HEALTH | Age: 80
End: 2025-08-18
Payer: MEDICARE

## 2025-08-18 VITALS
OXYGEN SATURATION: 93 % | TEMPERATURE: 97.9 F | SYSTOLIC BLOOD PRESSURE: 104 MMHG | RESPIRATION RATE: 18 BRPM | DIASTOLIC BLOOD PRESSURE: 65 MMHG | HEART RATE: 100 BPM

## 2025-08-18 PROCEDURE — G0300 HHS/HOSPICE OF LPN EA 15 MIN: HCPCS

## 2025-08-18 SDOH — HEALTH STABILITY: MENTAL HEALTH: SMOKING IN HOME: 0

## 2025-08-18 SDOH — ECONOMIC STABILITY: HOUSING INSECURITY: EVIDENCE OF SMOKING MATERIAL: 0

## 2025-08-18 ASSESSMENT — ACTIVITIES OF DAILY LIVING (ADL)
FEEDING ASSESSED: 1
DRESSING_LB_CURRENT_FUNCTION: MAXIMUM ASSIST
TOILETING: INDEPENDENT
BATHING ASSESSED: 1
AMBULATION ASSISTANCE: SUPERVISION
AMBULATION ASSISTANCE: 1
DRESSING_UB_CURRENT_FUNCTION: MAXIMUM ASSIST
BATHING_CURRENT_FUNCTION: MAXIMUM ASSIST
FEEDING: MODERATE ASSIST
TOILETING: 1

## 2025-08-18 ASSESSMENT — ENCOUNTER SYMPTOMS
DYSPNEA ON EXERTION: 1
LIMITED RANGE OF MOTION: 1
PAIN LOCATION - PAIN QUALITY: ACHE
LAST BOWEL MOVEMENT: 67434
LOWER EXTREMITY EDEMA: 1
FATIGUES EASILY: 1
MUSCLE WEAKNESS: 1
SUBJECTIVE PAIN PROGRESSION: UNCHANGED
PAIN LOCATION: LEFT KNEE
PAIN LOCATION - PAIN SEVERITY: 4/10
CHANGE IN APPETITE: UNCHANGED
APPETITE LEVEL: GOOD
STOOL FREQUENCY: DAILY
SHORTNESS OF BREATH: 1
PAIN: 1
BOWEL PATTERN NORMAL: 1
DYSPNEA ACTIVITY LEVEL: AFTER AMBULATING LESS THAN 10 FT

## 2025-08-19 ENCOUNTER — HOME CARE VISIT (OUTPATIENT)
Dept: HOME HEALTH SERVICES | Facility: HOME HEALTH | Age: 80
End: 2025-08-19
Payer: MEDICARE

## 2025-08-19 VITALS — DIASTOLIC BLOOD PRESSURE: 80 MMHG | HEART RATE: 94 BPM | OXYGEN SATURATION: 92 % | SYSTOLIC BLOOD PRESSURE: 120 MMHG

## 2025-08-19 DIAGNOSIS — Z00.00 ROUTINE GENERAL MEDICAL EXAMINATION AT A HEALTH CARE FACILITY: ICD-10-CM

## 2025-08-19 DIAGNOSIS — E66.01 MORBID OBESITY (MULTI): ICD-10-CM

## 2025-08-19 PROCEDURE — G0156 HHCP-SVS OF AIDE,EA 15 MIN: HCPCS

## 2025-08-19 PROCEDURE — G0152 HHCP-SERV OF OT,EA 15 MIN: HCPCS

## 2025-08-19 SDOH — HEALTH STABILITY: MENTAL HEALTH: SMOKING IN HOME: 0

## 2025-08-19 SDOH — ECONOMIC STABILITY: HOUSING INSECURITY: EVIDENCE OF SMOKING MATERIAL: 0

## 2025-08-19 ASSESSMENT — ENCOUNTER SYMPTOMS
PAIN: 1
PAIN LOCATION - RELIEVING FACTORS: REST/MEDS
PAIN LOCATION - PAIN FREQUENCY: FREQUENT
PAIN LOCATION - EXACERBATING FACTORS: ACTIVITY
LOWEST PAIN SEVERITY IN PAST 24 HOURS: 3/10
HIGHEST PAIN SEVERITY IN PAST 24 HOURS: 9/10
PERSON REPORTING PAIN: PATIENT
PAIN LOCATION - EXACERBATING FACTORS: ACTIVITY
PAIN LOCATION - PAIN FREQUENCY: FREQUENT
SUBJECTIVE PAIN PROGRESSION: UNCHANGED
PAIN LOCATION - RELIEVING FACTORS: REST/MEDS
PAIN LOCATION: LEFT LEG
PAIN LOCATION: RIGHT LEG
PAIN LOCATION - PAIN SEVERITY: 5/10
PAIN LOCATION - PAIN SEVERITY: 5/10

## 2025-08-20 ENCOUNTER — APPOINTMENT (OUTPATIENT)
Dept: PRIMARY CARE | Facility: CLINIC | Age: 80
End: 2025-08-20
Payer: MEDICARE

## 2025-08-20 ENCOUNTER — HOME CARE VISIT (OUTPATIENT)
Dept: HOME HEALTH SERVICES | Facility: HOME HEALTH | Age: 80
End: 2025-08-20
Payer: MEDICARE

## 2025-08-20 VITALS
SYSTOLIC BLOOD PRESSURE: 112 MMHG | TEMPERATURE: 98.9 F | DIASTOLIC BLOOD PRESSURE: 68 MMHG | HEART RATE: 102 BPM | RESPIRATION RATE: 22 BRPM | OXYGEN SATURATION: 94 %

## 2025-08-20 VITALS — HEIGHT: 62 IN | BODY MASS INDEX: 58.53 KG/M2

## 2025-08-20 DIAGNOSIS — E11.69 HYPERLIPIDEMIA ASSOCIATED WITH TYPE 2 DIABETES MELLITUS: ICD-10-CM

## 2025-08-20 DIAGNOSIS — M15.9 OSTEOARTHRITIS OF MULTIPLE JOINTS, UNSPECIFIED OSTEOARTHRITIS TYPE: ICD-10-CM

## 2025-08-20 DIAGNOSIS — J96.02 ACUTE RESPIRATORY FAILURE WITH HYPOXIA AND HYPERCAPNIA: ICD-10-CM

## 2025-08-20 DIAGNOSIS — J96.01 ACUTE RESPIRATORY FAILURE WITH HYPOXIA AND HYPERCAPNIA: ICD-10-CM

## 2025-08-20 DIAGNOSIS — J44.1 COPD EXACERBATION (MULTI): ICD-10-CM

## 2025-08-20 DIAGNOSIS — F41.8 ANXIETY ASSOCIATED WITH DEPRESSION: ICD-10-CM

## 2025-08-20 DIAGNOSIS — E78.5 HYPERLIPIDEMIA ASSOCIATED WITH TYPE 2 DIABETES MELLITUS: ICD-10-CM

## 2025-08-20 DIAGNOSIS — I48.91 ATRIAL FIBRILLATION, UNSPECIFIED TYPE (MULTI): Primary | ICD-10-CM

## 2025-08-20 PROCEDURE — 1111F DSCHRG MED/CURRENT MED MERGE: CPT | Performed by: EMERGENCY MEDICINE

## 2025-08-20 PROCEDURE — G0157 HHC PT ASSISTANT EA 15: HCPCS | Mod: CQ

## 2025-08-20 PROCEDURE — 99214 OFFICE O/P EST MOD 30 MIN: CPT | Performed by: EMERGENCY MEDICINE

## 2025-08-20 PROCEDURE — 1159F MED LIST DOCD IN RCRD: CPT | Performed by: EMERGENCY MEDICINE

## 2025-08-20 PROCEDURE — 1157F ADVNC CARE PLAN IN RCRD: CPT | Performed by: EMERGENCY MEDICINE

## 2025-08-20 RX ORDER — NYSTATIN 100000 U/G
CREAM TOPICAL 2 TIMES DAILY PRN
Qty: 30 G | Refills: 2 | Status: SHIPPED | OUTPATIENT
Start: 2025-08-20

## 2025-08-20 ASSESSMENT — ENCOUNTER SYMPTOMS
HIGHEST PAIN SEVERITY IN PAST 24 HOURS: 8/10
PERSON REPORTING PAIN: PATIENT
PAIN: 1
PAIN LOCATION - PAIN FREQUENCY: CONSTANT
PAIN LOCATION: RIGHT KNEE
PAIN LOCATION: LEFT KNEE
PAIN LOCATION - PAIN SEVERITY: 8/10
PAIN LOCATION - PAIN FREQUENCY: CONSTANT
PAIN SEVERITY GOAL: 0/10
LOWEST PAIN SEVERITY IN PAST 24 HOURS: 6/10
PAIN LOCATION - PAIN SEVERITY: 6/10

## 2025-08-25 ENCOUNTER — HOME CARE VISIT (OUTPATIENT)
Dept: HOME HEALTH SERVICES | Facility: HOME HEALTH | Age: 80
End: 2025-08-25
Payer: MEDICARE

## 2025-08-25 VITALS
HEART RATE: 92 BPM | TEMPERATURE: 97 F | SYSTOLIC BLOOD PRESSURE: 122 MMHG | OXYGEN SATURATION: 93 % | RESPIRATION RATE: 22 BRPM | DIASTOLIC BLOOD PRESSURE: 58 MMHG

## 2025-08-25 PROCEDURE — G0300 HHS/HOSPICE OF LPN EA 15 MIN: HCPCS

## 2025-08-25 SDOH — HEALTH STABILITY: MENTAL HEALTH: SMOKING IN HOME: 0

## 2025-08-25 SDOH — ECONOMIC STABILITY: HOUSING INSECURITY: EVIDENCE OF SMOKING MATERIAL: 0

## 2025-08-25 ASSESSMENT — ENCOUNTER SYMPTOMS
DYSPNEA ON EXERTION: 1
DYSPNEA ACTIVITY LEVEL: AFTER AMBULATING LESS THAN 10 FT
LIMITED RANGE OF MOTION: 1
SHORTNESS OF BREATH: 1
DENIES PAIN: 1
DYSPNEA ACTIVITY LEVEL: WHILE SPEAKING
PERSON REPORTING PAIN: PATIENT
APPETITE LEVEL: GOOD
LOWER EXTREMITY EDEMA: 1
MUSCLE WEAKNESS: 1
CHANGE IN APPETITE: UNCHANGED
DRY SKIN: 1
FATIGUES EASILY: 1
FREQUENCY: 1

## 2025-08-25 ASSESSMENT — ACTIVITIES OF DAILY LIVING (ADL)
FEEDING: MODERATE ASSIST
TOILETING: 1
FEEDING ASSESSED: 1
AMBULATION ASSISTANCE: 1
DRESSING_UB_CURRENT_FUNCTION: MAXIMUM ASSIST
TOILETING: SUPERVISION
BATHING_CURRENT_FUNCTION: MAXIMUM ASSIST
AMBULATION ASSISTANCE: SUPERVISION
DRESSING_LB_CURRENT_FUNCTION: MAXIMUM ASSIST
BATHING ASSESSED: 1

## 2025-08-27 ENCOUNTER — HOME CARE VISIT (OUTPATIENT)
Dept: HOME HEALTH SERVICES | Facility: HOME HEALTH | Age: 80
End: 2025-08-27
Payer: MEDICARE

## 2025-08-27 VITALS
RESPIRATION RATE: 16 BRPM | OXYGEN SATURATION: 93 % | HEART RATE: 91 BPM | SYSTOLIC BLOOD PRESSURE: 130 MMHG | TEMPERATURE: 97.3 F | DIASTOLIC BLOOD PRESSURE: 68 MMHG

## 2025-08-27 PROCEDURE — G0156 HHCP-SVS OF AIDE,EA 15 MIN: HCPCS

## 2025-08-27 PROCEDURE — G0157 HHC PT ASSISTANT EA 15: HCPCS | Mod: CQ

## 2025-08-27 SDOH — ECONOMIC STABILITY: HOUSING INSECURITY: EVIDENCE OF SMOKING MATERIAL: 0

## 2025-08-27 SDOH — HEALTH STABILITY: MENTAL HEALTH: SMOKING IN HOME: 0

## 2025-08-27 ASSESSMENT — ENCOUNTER SYMPTOMS
PAIN: 1
SUBJECTIVE PAIN PROGRESSION: WAXING AND WANING
HIGHEST PAIN SEVERITY IN PAST 24 HOURS: 7/10
PAIN LOCATION - RELIEVING FACTORS: REST
PAIN LOCATION - PAIN FREQUENCY: CONSTANT
PAIN LOCATION - PAIN QUALITY: ACHY, SORE
PERSON REPORTING PAIN: PATIENT
PAIN LOCATION - PAIN SEVERITY: 4/10
LOWEST PAIN SEVERITY IN PAST 24 HOURS: 3/10
PAIN LOCATION - EXACERBATING FACTORS: ACTIVITY
PAIN SEVERITY GOAL: 0/10
PAIN LOCATION: LEFT KNEE

## 2025-08-28 ENCOUNTER — HOME CARE VISIT (OUTPATIENT)
Dept: HOME HEALTH SERVICES | Facility: HOME HEALTH | Age: 80
End: 2025-08-28
Payer: MEDICARE

## 2025-08-28 VITALS — DIASTOLIC BLOOD PRESSURE: 60 MMHG | SYSTOLIC BLOOD PRESSURE: 120 MMHG | HEART RATE: 91 BPM | OXYGEN SATURATION: 93 %

## 2025-08-28 PROCEDURE — G0152 HHCP-SERV OF OT,EA 15 MIN: HCPCS

## 2025-08-28 SDOH — HEALTH STABILITY: MENTAL HEALTH: SMOKING IN HOME: 0

## 2025-08-28 SDOH — ECONOMIC STABILITY: HOUSING INSECURITY: EVIDENCE OF SMOKING MATERIAL: 0

## 2025-08-28 ASSESSMENT — ACTIVITIES OF DAILY LIVING (ADL)
DRESSING_LB_CURRENT_FUNCTION: MINIMUM ASSIST
TOILETING: MAXIMUM ASSIST
TOILETING: 1
DRESSING_UB_CURRENT_FUNCTION: MINIMUM ASSIST
BATHING_CURRENT_FUNCTION: MODERATE ASSIST
BATHING ASSESSED: 1

## 2025-08-28 ASSESSMENT — ENCOUNTER SYMPTOMS
PAIN LOCATION - RELIEVING FACTORS: REST/MEDS
PAIN: 1
PAIN LOCATION: RIGHT KNEE
PERSON REPORTING PAIN: PATIENT
SUBJECTIVE PAIN PROGRESSION: GRADUALLY IMPROVING
LOWEST PAIN SEVERITY IN PAST 24 HOURS: 0/10
PAIN LOCATION - PAIN FREQUENCY: INFREQUENT
HIGHEST PAIN SEVERITY IN PAST 24 HOURS: 8/10
PAIN LOCATION - EXACERBATING FACTORS: ACTIVITY
PAIN LOCATION - PAIN SEVERITY: 0/10

## 2025-09-02 ENCOUNTER — HOME CARE VISIT (OUTPATIENT)
Dept: HOME HEALTH SERVICES | Facility: HOME HEALTH | Age: 80
End: 2025-09-02
Payer: MEDICARE

## 2025-09-02 VITALS
RESPIRATION RATE: 18 BRPM | DIASTOLIC BLOOD PRESSURE: 60 MMHG | HEART RATE: 98 BPM | OXYGEN SATURATION: 93 % | TEMPERATURE: 98.1 F | SYSTOLIC BLOOD PRESSURE: 110 MMHG

## 2025-09-02 PROCEDURE — G0299 HHS/HOSPICE OF RN EA 15 MIN: HCPCS

## 2025-09-02 ASSESSMENT — ENCOUNTER SYMPTOMS
APPETITE LEVEL: GOOD
HIGHEST PAIN SEVERITY IN PAST 24 HOURS: 0/10
LOWER EXTREMITY EDEMA: 1
PAIN SEVERITY GOAL: 0/10
LOWEST PAIN SEVERITY IN PAST 24 HOURS: 0/10
MUSCLE WEAKNESS: 1
LIMITED RANGE OF MOTION: 1

## 2025-09-02 ASSESSMENT — PAIN SCALES - PAIN ASSESSMENT IN ADVANCED DEMENTIA (PAINAD): BREATHING: 0

## 2025-09-03 ENCOUNTER — HOME CARE VISIT (OUTPATIENT)
Dept: HOME HEALTH SERVICES | Facility: HOME HEALTH | Age: 80
End: 2025-09-03
Payer: MEDICARE

## 2025-09-03 VITALS — OXYGEN SATURATION: 93 % | HEART RATE: 94 BPM | TEMPERATURE: 98.2 F

## 2025-09-03 PROCEDURE — G0151 HHCP-SERV OF PT,EA 15 MIN: HCPCS

## 2025-09-03 PROCEDURE — G0156 HHCP-SVS OF AIDE,EA 15 MIN: HCPCS

## 2025-09-03 ASSESSMENT — ACTIVITIES OF DAILY LIVING (ADL)
AMBULATION_DISTANCE/DURATION_TOLERATED: 6 FT
AMBULATION ASSISTANCE ON FLAT SURFACES: 1

## 2025-09-05 ENCOUNTER — TELEPHONE (OUTPATIENT)
Dept: PRIMARY CARE | Facility: CLINIC | Age: 80
End: 2025-09-05
Payer: MEDICARE

## 2025-09-05 DIAGNOSIS — A49.9 BACTERIAL INFECTION: ICD-10-CM

## 2025-09-05 DIAGNOSIS — R05.9 COUGH, UNSPECIFIED TYPE: ICD-10-CM

## 2025-09-05 DIAGNOSIS — R60.9 EDEMA, UNSPECIFIED TYPE: ICD-10-CM

## 2025-09-05 RX ORDER — AZITHROMYCIN 250 MG/1
TABLET, FILM COATED ORAL
Qty: 6 TABLET | Refills: 0 | Status: SHIPPED | OUTPATIENT
Start: 2025-09-05

## 2025-09-05 RX ORDER — SPIRONOLACTONE 100 MG/1
100 TABLET, FILM COATED ORAL DAILY
Qty: 30 TABLET | Refills: 2 | Status: SHIPPED | OUTPATIENT
Start: 2025-09-05